# Patient Record
Sex: FEMALE | Race: WHITE | Employment: OTHER | URBAN - METROPOLITAN AREA
[De-identification: names, ages, dates, MRNs, and addresses within clinical notes are randomized per-mention and may not be internally consistent; named-entity substitution may affect disease eponyms.]

---

## 2017-01-13 ENCOUNTER — HOSPITAL ENCOUNTER (OUTPATIENT)
Dept: CARDIAC CATH/INVASIVE PROCEDURES | Age: 82
Discharge: HOME | End: 2017-01-13
Attending: INTERNAL MEDICINE | Admitting: INTERNAL MEDICINE
Payer: MEDICARE

## 2017-01-13 VITALS
BODY MASS INDEX: 24.92 KG/M2 | OXYGEN SATURATION: 94 % | DIASTOLIC BLOOD PRESSURE: 72 MMHG | RESPIRATION RATE: 16 BRPM | TEMPERATURE: 98.3 F | WEIGHT: 146 LBS | HEIGHT: 64 IN | HEART RATE: 66 BPM | SYSTOLIC BLOOD PRESSURE: 167 MMHG

## 2017-01-13 DIAGNOSIS — I35.0 NONRHEUMATIC AORTIC VALVE STENOSIS: Primary | ICD-10-CM

## 2017-01-13 LAB
ANION GAP BLD CALC-SCNC: 8 MMOL/L (ref 7–16)
ATRIAL RATE: 73 BPM
BUN SERPL-MCNC: 23 MG/DL (ref 8–23)
CALCIUM SERPL-MCNC: 8.6 MG/DL (ref 8.3–10.4)
CALCULATED P AXIS, ECG09: 79 DEGREES
CALCULATED R AXIS, ECG10: 39 DEGREES
CALCULATED T AXIS, ECG11: 62 DEGREES
CHLORIDE SERPL-SCNC: 106 MMOL/L (ref 98–107)
CO2 SERPL-SCNC: 27 MMOL/L (ref 21–32)
CREAT SERPL-MCNC: 1.08 MG/DL (ref 0.6–1)
DIAGNOSIS, 93000: NORMAL
DIASTOLIC BP, ECG02: NORMAL MMHG
ERYTHROCYTE [DISTWIDTH] IN BLOOD BY AUTOMATED COUNT: 13 % (ref 11.9–14.6)
GLUCOSE SERPL-MCNC: 160 MG/DL (ref 65–100)
HCT VFR BLD AUTO: 38.4 % (ref 35.8–46.3)
HGB BLD-MCNC: 13.4 G/DL (ref 11.7–15.4)
INR PPP: 0.9 (ref 0.9–1.2)
MCH RBC QN AUTO: 30.9 PG (ref 26.1–32.9)
MCHC RBC AUTO-ENTMCNC: 34.9 G/DL (ref 31.4–35)
MCV RBC AUTO: 88.5 FL (ref 79.6–97.8)
P-R INTERVAL, ECG05: 138 MS
PLATELET # BLD AUTO: 165 K/UL (ref 150–450)
PMV BLD AUTO: 11 FL (ref 10.8–14.1)
POTASSIUM SERPL-SCNC: 4.3 MMOL/L (ref 3.5–5.1)
PROTHROMBIN TIME: 10.3 SEC (ref 9.6–12)
Q-T INTERVAL, ECG07: 398 MS
QRS DURATION, ECG06: 84 MS
QTC CALCULATION (BEZET), ECG08: 438 MS
RBC # BLD AUTO: 4.34 M/UL (ref 4.05–5.25)
SODIUM SERPL-SCNC: 141 MMOL/L (ref 136–145)
SYSTOLIC BP, ECG01: NORMAL MMHG
VENTRICULAR RATE, ECG03: 73 BPM
WBC # BLD AUTO: 7.7 K/UL (ref 4.3–11.1)

## 2017-01-13 PROCEDURE — 93005 ELECTROCARDIOGRAM TRACING: CPT | Performed by: INTERNAL MEDICINE

## 2017-01-13 PROCEDURE — 74011250636 HC RX REV CODE- 250/636: Performed by: INTERNAL MEDICINE

## 2017-01-13 PROCEDURE — 74011250636 HC RX REV CODE- 250/636

## 2017-01-13 PROCEDURE — 85027 COMPLETE CBC AUTOMATED: CPT | Performed by: INTERNAL MEDICINE

## 2017-01-13 PROCEDURE — 77030015766

## 2017-01-13 PROCEDURE — 99152 MOD SED SAME PHYS/QHP 5/>YRS: CPT

## 2017-01-13 PROCEDURE — 74011000250 HC RX REV CODE- 250: Performed by: INTERNAL MEDICINE

## 2017-01-13 PROCEDURE — C1894 INTRO/SHEATH, NON-LASER: HCPCS

## 2017-01-13 PROCEDURE — 77030029997 HC DEV COM RDL R BND TELE -B

## 2017-01-13 PROCEDURE — C1769 GUIDE WIRE: HCPCS

## 2017-01-13 PROCEDURE — 93454 CORONARY ARTERY ANGIO S&I: CPT

## 2017-01-13 PROCEDURE — 85610 PROTHROMBIN TIME: CPT | Performed by: INTERNAL MEDICINE

## 2017-01-13 PROCEDURE — 74011636320 HC RX REV CODE- 636/320: Performed by: INTERNAL MEDICINE

## 2017-01-13 PROCEDURE — 80048 BASIC METABOLIC PNL TOTAL CA: CPT | Performed by: INTERNAL MEDICINE

## 2017-01-13 RX ORDER — MIDAZOLAM HYDROCHLORIDE 1 MG/ML
.5-5 INJECTION, SOLUTION INTRAMUSCULAR; INTRAVENOUS
Status: DISCONTINUED | OUTPATIENT
Start: 2017-01-13 | End: 2017-01-13 | Stop reason: HOSPADM

## 2017-01-13 RX ORDER — FENTANYL CITRATE 50 UG/ML
25-100 INJECTION, SOLUTION INTRAMUSCULAR; INTRAVENOUS
Status: DISCONTINUED | OUTPATIENT
Start: 2017-01-13 | End: 2017-01-13 | Stop reason: HOSPADM

## 2017-01-13 RX ORDER — LIDOCAINE HYDROCHLORIDE 20 MG/ML
1-20 INJECTION, SOLUTION INFILTRATION; PERINEURAL
Status: DISCONTINUED | OUTPATIENT
Start: 2017-01-13 | End: 2017-01-13 | Stop reason: HOSPADM

## 2017-01-13 RX ORDER — HEPARIN SODIUM 200 [USP'U]/100ML
3 INJECTION, SOLUTION INTRAVENOUS CONTINUOUS
Status: DISCONTINUED | OUTPATIENT
Start: 2017-01-13 | End: 2017-01-13 | Stop reason: HOSPADM

## 2017-01-13 RX ORDER — SODIUM CHLORIDE 9 MG/ML
75 INJECTION, SOLUTION INTRAVENOUS CONTINUOUS
Status: DISCONTINUED | OUTPATIENT
Start: 2017-01-13 | End: 2017-01-13 | Stop reason: HOSPADM

## 2017-01-13 RX ADMIN — MIDAZOLAM HYDROCHLORIDE 1 MG: 1 INJECTION, SOLUTION INTRAMUSCULAR; INTRAVENOUS at 14:06

## 2017-01-13 RX ADMIN — LIDOCAINE HYDROCHLORIDE 60 MG: 20 INJECTION, SOLUTION INFILTRATION; PERINEURAL at 14:30

## 2017-01-13 RX ADMIN — IOPAMIDOL 45 ML: 755 INJECTION, SOLUTION INTRAVENOUS at 14:30

## 2017-01-13 RX ADMIN — FENTANYL CITRATE 25 MCG: 50 INJECTION, SOLUTION INTRAMUSCULAR; INTRAVENOUS at 14:06

## 2017-01-13 RX ADMIN — HEPARIN SODIUM 2 ML: 10000 INJECTION, SOLUTION INTRAVENOUS; SUBCUTANEOUS at 14:29

## 2017-01-13 RX ADMIN — HEPARIN SODIUM 3 ML/HR: 200 INJECTION, SOLUTION INTRAVENOUS at 14:36

## 2017-01-13 NOTE — PROGRESS NOTES
Discharged to home accompanied by family. Left unit via wheelchair. Right wrist without bleeding or hematoma.

## 2017-01-13 NOTE — PROGRESS NOTES
Discharge instructions reviewed with patient and family. Verbalize understanding. Written instructions given.

## 2017-01-13 NOTE — PROGRESS NOTES
Report received from RN Cath Lab RN. Procedural findings communicated. Intra procedural  medication administration reviewed. Progression of care discussed.      Patient received into [address] post sheath removal.     Access site without bleeding or swelling yes    Dressing dry and intact yes    Patient instructed to limit movement to right upper extremity    Routine post procedural vital signs and site assessment initiated yes

## 2017-01-13 NOTE — PROGRESS NOTES
R band removed after deflation completed. No bleeding or hematoma. Site redressed with sterile gauze covered with tegaderm.

## 2017-01-13 NOTE — IP AVS SNAPSHOT
Chel Alvarado 
 
 
 [address] [phone] Patient: Da Pelaez MRN: [id number] ETX:7[birth year] Discharge Summary 1/13/2017 Aurora Oleary MRN[de-identified]  [id number] Admission Information Provider Pager Service Admission Date Expected D/C Date Akilah Lewis MD  CARDIAC CATH LAB 1/13/2017 Actual LOS Patient Class 0 days OUTPATIENT Follow-up Information Follow up With Details Comments Contact Info Akilah Lewis MD On 2/16/2017 at 1:00 p.m. in the [place] office [address] [facility] [address] 
[phone] Current Discharge Medication List  
  
ASK your doctor about these medications Dose & Instructions Dispensing Information Comments Morning Noon Evening Bedtime  
 amLODIPine 10 mg tablet Commonly known as:  Carroll Alstrom Your next dose is: Today, Tomorrow Other:  _________ Dose:  10 mg Take 1 Tab by mouth daily. Quantity:  90 Tab Refills:  3  
     
   
   
   
  
 benazepril 20 mg tablet Commonly known as:  LOTENSIN Your next dose is: Today, Tomorrow Other:  _________ Dose:  20 mg Take 1 Tab by mouth daily. Quantity:  90 Tab Refills:  1  
     
   
   
   
  
 clopidogrel 75 mg Tab Commonly known as:  PLAVIX Your next dose is: Today, Tomorrow Other:  _________ Recently started for aortic stenosis. Quantity:  90 Tab Refills:  1 CRANBERRY CONCENTRATE PO Your next dose is: Today, Tomorrow Other:  _________ Take  by mouth. Refills:  0 FREESTYLE LITE STRIPS strip Generic drug:  glucose blood VI test strips Your next dose is: Today, Tomorrow Other:  _________ CHECK BLOOD SUGAR EVERY DAY Quantity:  50 Strip Refills:  11  
     
   
   
   
  
 furosemide 40 mg tablet Commonly known as:  LASIX Your next dose is: Today, Tomorrow Other:  _________ Dose:  40 mg Take 1 Tab by mouth daily. Quantity:  90 Tab Refills:  1  
     
   
   
   
  
 levothyroxine 100 mcg tablet Commonly known as:  SYNTHROID Your next dose is: Today, Tomorrow Other:  _________ Dose:  100 mcg Take 1 Tab by mouth Daily (before breakfast). Quantity:  90 Tab Refills:  1  
     
   
   
   
  
 metoprolol tartrate 25 mg tablet Commonly known as:  LOPRESSOR Your next dose is: Today, Tomorrow Other:  _________ Dose:  25 mg Take 1 Tab by mouth two (2) times a day. Quantity:  180 Tab Refills:  3  
     
   
   
   
  
 nitroglycerin 0.4 mg SL tablet Commonly known as:  NITROSTAT Your next dose is: Today, Tomorrow Other:  _________ Dose:  0.4 mg  
1 Tab by SubLINGual route every five (5) minutes as needed. Quantity:  25 Tab Refills:  11  
     
   
   
   
  
 rosuvastatin 20 mg tablet Commonly known as:  CRESTOR Your next dose is: Today, Tomorrow Other:  _________ Dose:  20 mg Take 1 Tab by mouth nightly. Quantity:  30 Tab Refills:  5  
     
   
   
   
  
 traMADol 50 mg tablet Commonly known as:  ULTRAM  
   
Your next dose is: Today, Tomorrow Other:  _________ Dose:  50 mg Take 1 Tab by mouth every six (6) hours as needed for Pain. Quantity:  90 Tab Refills:  2 General Information Please provide this summary of care documentation to your next provider. Allergies Unspecified:  Aleve [Naproxen Sodium]; Aspirin;  Carafate [Sucralfate]; Clorazepate Dipotassium; Flexeril [Cyclobenzaprine]; Robaxin [Methocarbamol]; Vytorin 10-10 [Ezetimibe-simvastatin]; Zantac [Ranitidine Hcl] Current Immunizations  Never Reviewed Name Date Pneumococcal Conjugate (PCV-13) 9/29/2015 Discharge Instructions Discharge Instructions HEART CATHETERIZATION/ANGIOGRAPHY DISCHARGE INSTRUCTIONS 1. Check puncture site frequently for swelling or bleeding. If there is any bleeding, lie down and apply pressure over the area with a clean towel or washcloth. Notify your doctor for any redness, swelling, drainage, or oozing from the puncture site. Notify your doctor for any fever or chills. 2. If the extremity becomes cold, numb, or painful call Dr. Marcie Raines at [phone]. 
3. Activity should be limited for the next 48 hours. Do not use your right arm for lifting, pushing, or pulling during this time. 4. You may resume your usual diet. Drink more fluids than usual. 
5. Have a responsible person drive you home and stay with you for at least 24 hours after your heart catheterization/angiography. 6. You may remove bandage from your Right wrist in 24 hours. You may shower in 24 hours. No tub baths, hot tubs, or swimming for 1 week. Do not place any lotions, creams, powders, or ointments over puncture site for 1 week. You may place a clean band-aid over the puncture site each day for 5 days. Change daily. 7. Do not drive for 24 hours. I have read the above instructions and have had the opportunity to ask questions. Patient: ________________________   Date: 1/13/2017 Witness: _______________________   Date: 1/13/2017 Discharge Orders None  
  
` Patient Signature:  ____________________________________________________________ Date:  ____________________________________________________________  
  
 Ruth Prado Provider Signature:  ____________________________________________________________ Date:  ____________________________________________________________

## 2017-01-13 NOTE — DISCHARGE INSTRUCTIONS
HEART CATHETERIZATION/ANGIOGRAPHY DISCHARGE INSTRUCTIONS    1. Check puncture site frequently for swelling or bleeding. If there is any bleeding, lie down and apply pressure over the area with a clean towel or washcloth. Notify your doctor for any redness, swelling, drainage, or oozing from the puncture site. Notify your doctor for any fever or chills. 2. If the extremity becomes cold, numb, or painful call Dr. Tadeo Vance at [phone].  3. Activity should be limited for the next 48 hours. Do not use your right arm for lifting, pushing, or pulling during this time. 4. You may resume your usual diet. Drink more fluids than usual.  5. Have a responsible person drive you home and stay with you for at least 24 hours after your heart catheterization/angiography. 6. You may remove bandage from your Right wrist in 24 hours. You may shower in 24 hours. No tub baths, hot tubs, or swimming for 1 week. Do not place any lotions, creams, powders, or ointments over puncture site for 1 week. You may place a clean band-aid over the puncture site each day for 5 days. Change daily. 7. Do not drive for 24 hours. I have read the above instructions and have had the opportunity to ask questions.       Patient: ________________________   Date: 1/13/2017    Witness: _______________________   Date: 1/13/2017

## 2017-01-13 NOTE — PROGRESS NOTES
TRANSFER - OUT REPORT:    Verbal report given to Nghia Perez RN(name) on [address]  being transferred to cpru(unit) for routine progression of care       Report consisted of patients Situation, Background, Assessment and   Recommendations(SBAR). Information from the following report(s) SBAR was reviewed with the receiving nurse. is allergic to aleve [naproxen sodium]; aspirin; carafate [sucralfate]; clorazepate dipotassium; flexeril [cyclobenzaprine]; robaxin [methocarbamol]; vytorin 10-10 [ezetimibe-simvastatin]; and zantac [ranitidine hcl]. Opportunity for questions and clarification was provided. Procedure Summary:Pt had LHC via R wrist, site sealed with R band using 7 ml at 1430 hrs.     Med Administration    Versed:  1 mg  Fentanyl: 25 mcg  Visit Vitals    /67 (BP 1 Location: Left arm, BP Patient Position: Supine)    Pulse 65    Temp 98.3 °F (36.8 °C)    Resp 16    Ht 5' 4\" (1.626 m)    Wt 66.2 kg (146 lb)    SpO2 97%    BMI 25.06 kg/m2     Past Medical History   Diagnosis Date    Aortic stenosis 11/24/2015    Arrhythmia      murmur with sinus rhythm    Arthritis      osteo-right hand, left hip area    At risk for falling 9/4/2014    Bruit (arterial) 11/24/2015    Cancer (HCC)      basal cell right nose    Chest pain 11/24/2015    Chronic pain     Diabetes ([address]      controlled by diet; fbs 96  - 120    Dizzy      2-3 times a year    Essential hypertension, benign 9/4/2014    Frequent UTI      last time 11/11    Hyperlipidemia 9/4/2014    Hypertension     Hypothyroidism     Murmur, cardiac      dx'ed 2004    Type II or unspecified type diabetes mellitus without mention of complication, not stated as uncontrolled 9/4/2014    Unspecified adverse effect of anesthesia 2004     elevated BP w/ trigger finger surgery     Urinary incontinence      wears a pad    Visual disturbances 11/24/2015           Peripheral IV 01/13/17 Right Antecubital (Active)       Peripheral IV 01/13/17 Left Antecubital (Active)

## 2017-01-14 NOTE — PROCEDURES
[address] 44       Name:  Harper Guardado   MR#:  [id number]   :  1929   Account #:  [de-identified]   Date of Adm:  2017       DATE OF PROCEDURE: 2017. PROCEDURE: Coronary arteriography. INDICATION: Severe aortic stenosis. Preoperative cardiac angiography   needed prior to aortic valve replacement. TECHNICAL FACTORS: After informed consent was obtained, patient   brought to the cardiac catheterization lab. The right radial   artery was prepped and draped in the usual sterile fashion. Utilizing modified Seldinger technique and a micropuncture kit,   the right radial artery was entered. A 6 Malay Slender sheath   was placed without difficulty. A radial cocktail consisting of   2000 units of heparin, 2 mg verapamil, 200 mcg of nitroglycerin   was administered. A 5 Malay Tiger 4.0 catheter was used to   selectively engage the ostium of the left main coronary artery   and right coronary artery, respectively. Selective injections in   various projections performed. At the conclusion of the diagnostic procedure, radial sheath was   removed and a pneumatic band was placed with excellent   hemostasis. No complications were encountered. CONTRAST: Isovue 45. HEMODYNAMIC RESULTS: Aortic pressure 109/47 with a mean of 72. ANGIOGRAPHY   1.  Left main coronary artery is a medium to large caliber   vessel. Appears angiographically normal.   2.  LAD: A medium caliber vessel. Contains a focal 40% mid   stenosis. The remainder of vessel is patent. 3.  First diagonal artery: Small caliber vessel. Patent. 4.  Ramus intermediate is a small to medium caliber vessel, 20%   mid stenosis. 5.  Left circumflex is a medium caliber, codominant vessel. Contains a distal 70% stenosis. This is a small caliber 2 mm   vessel at this point. 6.  First obtuse marginal is a medium caliber vessel. Contains a   50% proximal stenosis.    7.  Second obtuse marginal artery: Very small caliber 2 mm   vessel. Patent. 8.  Third obtuse marginal artery: Small caliber vessels. A 1.5   to 2 mm vessel. Patent. 9.  Right coronary artery is a medium caliber dominant vessel,   20% proximal and 20% mid luminal irregularities. 10. Right PDA: Medium caliber vessel. Patent. 11. Right posterolateral branch: Small caliber vessels. Patent. CONCLUSION: Small vessel disease involving the distal   circumflex. PLAN: I see no targets for revascularization. The patient will   be evaluated by CT Surgery. She needs aortic valve replacement. Will assess for surgical risk and proceed with surgical versus   AVR versus transcatheter aortic valve replacement.         MD MARYANNE Maurice / MILAGRO   D:  01/13/2017   19:54   T:  01/13/2017   20:14   Job #:  [id number]

## 2017-01-20 PROBLEM — N18.31 CHRONIC KIDNEY DISEASE (CKD) STAGE G3A/A1, MODERATELY DECREASED GLOMERULAR FILTRATION RATE (GFR) BETWEEN 45-59 ML/MIN/1.73 SQUARE METER AND ALBUMINURIA CREATININE RATIO LESS THAN 30 MG/G (HCC): Status: ACTIVE | Noted: 2017-01-20

## 2017-02-07 ENCOUNTER — HOSPITAL ENCOUNTER (OUTPATIENT)
Dept: RESPIRATORY THERAPY | Age: 82
Discharge: HOME | End: 2017-02-07
Attending: INTERNAL MEDICINE
Payer: MEDICARE

## 2017-02-07 ENCOUNTER — HOSPITAL ENCOUNTER (OUTPATIENT)
Dept: CT IMAGING | Age: 82
Discharge: HOME | End: 2017-02-07
Attending: INTERNAL MEDICINE
Payer: MEDICARE

## 2017-02-07 ENCOUNTER — HOSPITAL ENCOUNTER (OUTPATIENT)
Dept: ULTRASOUND IMAGING | Age: 82
Discharge: HOME | End: 2017-02-07
Attending: INTERNAL MEDICINE
Payer: MEDICARE

## 2017-02-07 DIAGNOSIS — I35.0 NONRHEUMATIC AORTIC VALVE STENOSIS: ICD-10-CM

## 2017-02-07 PROCEDURE — 74011636320 HC RX REV CODE- 636/320: Performed by: INTERNAL MEDICINE

## 2017-02-07 PROCEDURE — 94010 BREATHING CAPACITY TEST: CPT

## 2017-02-07 PROCEDURE — 75574 CT ANGIO HRT W/3D IMAGE: CPT

## 2017-02-07 PROCEDURE — 74174 CTA ABD&PLVS W/CONTRAST: CPT

## 2017-02-07 PROCEDURE — 74011000258 HC RX REV CODE- 258: Performed by: INTERNAL MEDICINE

## 2017-02-07 PROCEDURE — 93880 EXTRACRANIAL BILAT STUDY: CPT

## 2017-02-07 RX ORDER — SODIUM CHLORIDE 0.9 % (FLUSH) 0.9 %
10 SYRINGE (ML) INJECTION
Status: COMPLETED | OUTPATIENT
Start: 2017-02-07 | End: 2017-02-07

## 2017-02-07 RX ADMIN — SODIUM CHLORIDE 100 ML: 900 INJECTION, SOLUTION INTRAVENOUS at 10:20

## 2017-02-07 RX ADMIN — IOPAMIDOL 140 ML: 755 INJECTION, SOLUTION INTRAVENOUS at 10:20

## 2017-02-07 RX ADMIN — Medication 10 ML: at 10:20

## 2017-02-16 NOTE — H&P
Patient:  Duane Ansari  YOB: 1929   Date:                       02/13/2017 3:30 PM   Visit Type:                 Consult      This 80year old  patient was referred by Albertina Ernst MD.    Chief Complaint:  Patient seen at the request of Albertina Ernst MD for evaluation of the following:  abnormal CT    History of Present Illness:  1.  abnormal CT   Mrs. Olivia Hernandez is an 80year old female new patient who presents for referral from Dr. Lupe Chaudhari regarding an abnormal CT. She is accompanied by her daughter, Barbara Dumont. CT scan on 2/7/17 showed dilation of intrahepatic and extrahepatic ducts with a distal CBD calcification, likely a gallstone. There are no pancreatic abnormalities. She has not had her gallbladder removed according to her report, although the CT suggests that she has. She denies any abdominal pain, urinary changes, nausea, or other GI related symptoms.         PAST MEDICAL/SURGICAL HISTORY  (Detailed)    Disease/disorder Onset Date Management Date Comments   Coronary artery disease       Diabetes type 2    RUST 02/13/2017 -   Hyperlipidemia    T 02/13/2017 -   Hypertension         Additional Surgical History:  Back surgery 1973  Spinal fusion 1971  Hysterectomy 1966  Bladder tack 1973, 2000  Knee replacement          PROVIDER INFORMATION AND PATIENT ADMISSION:    CURRENT MEDICATIONS  Medication Name Sig Desc   clopidogrel 75 mg tablet take 1 tablet by oral route  every day   furosemide 40 mg tablet take 1 tablet by oral route  every day   amlodipine 10 mg tablet take 1 tablet by oral route  every day   benazepril 20 mg tablet take 1 tablet by oral route  every day   tramadol 50 mg tablet take 1 tablet by oral route  every 6 hours as needed   metoprolol tartrate 25 mg tablet take 1 tablet by oral route 2 times every day   Crestor 20 mg tablet take 1 tablet by oral route  every day   Fish Oil 1 po qd   cranberry 2 po qd   levothyroxine 100 mcg tablet take 1 tablet by oral route  every day   Medication Reconciliation  Medications reconciled today. Allergies:  Ingredient Reaction Medication Name Comment   METHOCARBAMOL hives Robaxin    ASPIRIN swelling, hives     SUCRALFATE itching Carafate    Reviewed, updated. Family History  (Detailed)  Relationship Family Member Name  Age at Death Condition Onset Age Cause of Death       No family history of Cancer, colon  N     SOCIAL HISTORY  (Detailed)  Tobacco use reviewed. Preferred language is Unknown / Not Reported. Smoking status: Never smoker. SMOKING STATUS  Use Status Type Smoking Status Usage Per Day Years Used Total Pack Years   no/never  Never smoker          ALCOHOL  The patient denies alcohol use. CAFFEINE  The patient uses caffeine: coffee and tea. - 1 cup a day. REVIEW OF SYSTEMS  System Neg/Pos Details   ENMT Negative Hearing deficit. Eyes Negative Vision changes. Respiratory Positive Dyspnea. Cardio Negative Chest pain and irregular heartbeat/palpitations.  Negative Dysuria and urinary frequency. Endocrine Negative Cold intolerance and heat intolerance. Neuro Negative Confusion/disorientation, dizziness, headache and seizures. Integumentary Positive Rash. MS Positive Joint pain, Myalgia, Back pain. Hema/Lymph Negative Easy bleeding. The remainder of the 11-point review of systems is negative. VITAL SIGNS  BP mm/Hg Pulse/min Resp/min Temp F Height (Total in.) Weight (lbs.) Weight (oz.) BMI   128/72 73 16 97.2 64.00 150.20  25.78     PHYSICAL EXAM:  Patient appears healthy in no acute distress. Overweight. Neuro- alert and oriented. eyes-nonicteric, normal conjunctiva. ENT--grossly normal.  Lungs-clear to auscultation and percussion. Heart-regular rate and rhythm without murmur, gallop or rub. Abdomen-soft, nondistended, good bowel sounds, no significant tenderness, no rebound, no organomegaly. Extremities-without edema.   Skin-grossly normal.          Completed Orders (this encounter)  Order Details   Dietary management education, guidance, and counseling      Assessment/Plan  # Detail Type Description    1. Assessment Body mass index (BMI) 25.0-25.9, adult (O52.66). Plan Orders Today's instructions / counseling include(s) Dietary management education, guidance, and counseling. 2. Assessment Choledocholithiasis (K80.50). Provider Plan Patient does not have any noticeable symptoms but does have bile duct dilation and a distal ductal calcification, consistent with choledocholithiasis. Labs available by reviewing were normal from a liver standpoint. Patient denies any jaundice or bilirubinuria. She is scheduled for a TAVR in March and would like the ducts cleared prior. The plan is to hold her Plavix and schedule ERCP with CBD extraction one week from today. She will call in the meantime with any problems. after review - she has had some vague GI sxs. Plan Orders Further diagnostic evaluations ordered today include(s) ERCP to be performed. Counseling / Educational Factors:  Items reviewed / discussed during today's visit: prior testing / procedures were reviewed; testing was discussed in detail; old records were reviewed; indications and risks of the procedure were discussed; prescription medication usage was discussed; symptomatic care was discussed; advised to continue current treatment; patient instructed to call for results of diagnostic testing. Patient expressed understanding of instructions. The patient was checked out at 4:12 PM by Marsha Edmondson. Documentation assistance provided by zachery. Information recorded by the scribe was done at my direction and has been reviewed and validated by me. Provider:  Gayle Ashton MD 02/13/2017 04:18 PM  Document generated by:  Stella Balderas 02/13/2017 04:17 PM    CC Providers:  Yenny Irwin MD  [facility]  [address] [facility] [place], Yessenia Crooks 14 Puja Guaman MD  [facility] AN[address]ON[address]-          Electronically signed by Jodi Arevalo MD on 02/13/2017 04:31 PM

## 2017-02-20 ENCOUNTER — SURGERY (OUTPATIENT)
Age: 82
End: 2017-02-20

## 2017-02-20 ENCOUNTER — APPOINTMENT (OUTPATIENT)
Dept: GENERAL RADIOLOGY | Age: 82
End: 2017-02-20
Attending: INTERNAL MEDICINE
Payer: MEDICARE

## 2017-02-20 ENCOUNTER — ANESTHESIA EVENT (OUTPATIENT)
Dept: ENDOSCOPY | Age: 82
End: 2017-02-20
Payer: MEDICARE

## 2017-02-20 ENCOUNTER — ANESTHESIA (OUTPATIENT)
Dept: ENDOSCOPY | Age: 82
End: 2017-02-20
Payer: MEDICARE

## 2017-02-20 ENCOUNTER — HOSPITAL ENCOUNTER (OUTPATIENT)
Age: 82
Setting detail: OUTPATIENT SURGERY
Discharge: HOME | End: 2017-02-20
Attending: INTERNAL MEDICINE | Admitting: INTERNAL MEDICINE
Payer: MEDICARE

## 2017-02-20 VITALS
HEART RATE: 76 BPM | DIASTOLIC BLOOD PRESSURE: 56 MMHG | WEIGHT: 148 LBS | HEIGHT: 64 IN | TEMPERATURE: 97.5 F | RESPIRATION RATE: 16 BRPM | SYSTOLIC BLOOD PRESSURE: 135 MMHG | BODY MASS INDEX: 25.27 KG/M2 | OXYGEN SATURATION: 96 %

## 2017-02-20 LAB — GLUCOSE BLD STRIP.AUTO-MCNC: 164 MG/DL (ref 65–100)

## 2017-02-20 PROCEDURE — 74011250636 HC RX REV CODE- 250/636: Performed by: ANESTHESIOLOGY

## 2017-02-20 PROCEDURE — 74330 X-RAY BILE/PANC ENDOSCOPY: CPT

## 2017-02-20 PROCEDURE — 77030011640 HC PAD GRND REM COVD -A: Performed by: INTERNAL MEDICINE

## 2017-02-20 PROCEDURE — C2625 STENT, NON-COR, TEM W/DEL SY: HCPCS | Performed by: INTERNAL MEDICINE

## 2017-02-20 PROCEDURE — 77030007288 HC DEV LOK BILI BSC -A: Performed by: INTERNAL MEDICINE

## 2017-02-20 PROCEDURE — 77030008703 HC TU ET UNCUF COVD -A: Performed by: ANESTHESIOLOGY

## 2017-02-20 PROCEDURE — C1726 CATH, BAL DIL, NON-VASCULAR: HCPCS | Performed by: INTERNAL MEDICINE

## 2017-02-20 PROCEDURE — 74011250636 HC RX REV CODE- 250/636

## 2017-02-20 PROCEDURE — 77030009038 HC CATH BILI STN RTVR BSC -C: Performed by: INTERNAL MEDICINE

## 2017-02-20 PROCEDURE — 76060000034 HC ANESTHESIA 1.5 TO 2 HR: Performed by: INTERNAL MEDICINE

## 2017-02-20 PROCEDURE — 77030032490 HC SLV COMPR SCD KNE COVD -B: Performed by: INTERNAL MEDICINE

## 2017-02-20 PROCEDURE — 77030006969 HC BSKT BILI RTVR BSC -D: Performed by: INTERNAL MEDICINE

## 2017-02-20 PROCEDURE — 74011000250 HC RX REV CODE- 250

## 2017-02-20 PROCEDURE — 82962 GLUCOSE BLOOD TEST: CPT

## 2017-02-20 PROCEDURE — 77030012595 HC SPHNTOM BILI BSC -D: Performed by: INTERNAL MEDICINE

## 2017-02-20 PROCEDURE — 76040000027: Performed by: INTERNAL MEDICINE

## 2017-02-20 PROCEDURE — 77030008477 HC STYL SATN SLP COVD -A: Performed by: ANESTHESIOLOGY

## 2017-02-20 DEVICE — BILIARY STENT WITH RX DELIVERY SYSTEM
Type: IMPLANTABLE DEVICE | Site: BILE DUCT | Status: FUNCTIONAL
Brand: RX BILIARY

## 2017-02-20 RX ORDER — ROCURONIUM BROMIDE 10 MG/ML
INJECTION, SOLUTION INTRAVENOUS AS NEEDED
Status: DISCONTINUED | OUTPATIENT
Start: 2017-02-20 | End: 2017-02-20 | Stop reason: HOSPADM

## 2017-02-20 RX ORDER — SODIUM CHLORIDE 0.9 % (FLUSH) 0.9 %
5-10 SYRINGE (ML) INJECTION AS NEEDED
Status: DISCONTINUED | OUTPATIENT
Start: 2017-02-20 | End: 2017-02-20 | Stop reason: HOSPADM

## 2017-02-20 RX ORDER — PROPOFOL 10 MG/ML
INJECTION, EMULSION INTRAVENOUS AS NEEDED
Status: DISCONTINUED | OUTPATIENT
Start: 2017-02-20 | End: 2017-02-20

## 2017-02-20 RX ORDER — ETOMIDATE 2 MG/ML
INJECTION INTRAVENOUS AS NEEDED
Status: DISCONTINUED | OUTPATIENT
Start: 2017-02-20 | End: 2017-02-20 | Stop reason: HOSPADM

## 2017-02-20 RX ORDER — SODIUM CHLORIDE, SODIUM LACTATE, POTASSIUM CHLORIDE, CALCIUM CHLORIDE 600; 310; 30; 20 MG/100ML; MG/100ML; MG/100ML; MG/100ML
100 INJECTION, SOLUTION INTRAVENOUS CONTINUOUS
Status: DISCONTINUED | OUTPATIENT
Start: 2017-02-20 | End: 2017-02-20 | Stop reason: HOSPADM

## 2017-02-20 RX ORDER — LIDOCAINE HYDROCHLORIDE 20 MG/ML
INJECTION, SOLUTION EPIDURAL; INFILTRATION; INTRACAUDAL; PERINEURAL AS NEEDED
Status: DISCONTINUED | OUTPATIENT
Start: 2017-02-20 | End: 2017-02-20 | Stop reason: HOSPADM

## 2017-02-20 RX ADMIN — ROCURONIUM BROMIDE 35 MG: 10 INJECTION, SOLUTION INTRAVENOUS at 14:08

## 2017-02-20 RX ADMIN — SODIUM CHLORIDE, SODIUM LACTATE, POTASSIUM CHLORIDE, AND CALCIUM CHLORIDE 100 ML/HR: 600; 310; 30; 20 INJECTION, SOLUTION INTRAVENOUS at 13:18

## 2017-02-20 RX ADMIN — LIDOCAINE HYDROCHLORIDE 50 MG: 20 INJECTION, SOLUTION EPIDURAL; INFILTRATION; INTRACAUDAL; PERINEURAL at 14:08

## 2017-02-20 RX ADMIN — ETOMIDATE 22 MG: 2 INJECTION INTRAVENOUS at 14:08

## 2017-02-20 NOTE — PROCEDURES
ENDOSCOPIC  RETROGRADE CHOLANGIOPANCREATOGRAPHY    DATE of PROCEDURE: 2/20/2017    PT NAME: Altagracia Abreu     [id number]    MEDICATION: general; Glucagon 0.25 mg iv    INSTRUMENT:  [id number] VF    SPECIAL PROCEDURE:papillotomy w/ balloon sweep; 10 mm x 4 cm hurricane sphincteroplasty; 9-12 and 12-15 balloon; 1.5 and 2.0 cm basket; 8.5 / 10 cm biliary stent  BLOOD LOSS- 0 to min. SPEC- no  IMPLANT- none    PROCEDURE: standard procedure w/o complications    ASSESSMENT:  1. 2 large cuboidal (1.5 cm CBD stones)- unable to basket or extract despite passage of a 1.5 cm balloon through sphincteroplasty site- stent placed with good drainage  2. Pancreas not evaluated    3. GB    PLAN:  1.  Repeat in 6-8 weeks with possible spyglass and holmium laser if stent does not fracture stones    ERICH Ley MD

## 2017-02-20 NOTE — DISCHARGE INSTRUCTIONS
Endoscopic Retrograde Cholangiopancreatogram (ERCP): What to Expect at [address] [facility]  After you have an endoscopic retrograde cholangiopancreatogram (ERCP). You will be able to go home after your doctor or a nurse checks to make sure you are not having any problems. If you stay in the hospital overnight, you may go home the next day. You may have a sore throat for a day or two after the procedure. This care sheet gives you a general idea about how long it will take for you to recover. But each person recovers at a different pace. Follow the steps below to get better as quickly as possible. How can you care for yourself at home? Activity  1. Rest as much as you need to after you go home. 2. You should be able to go back to your usual activities the day after the procedure. Diet  · Follow your doctor's directions for eating after the procedure. · Drink plenty of fluids (unless your doctor tells you not to). Medicines  · If you have a sore throat the next day, use an over-the-counter spray to numb your throat. Follow-up care is a key part of your treatment and safety. Be sure to make and go to all appointments, and call your doctor if you are having problems. Its also a good idea to know your test results and keep a list of the medicines you take. When should you call for help? Call 911 anytime you think you may need emergency care. For example, call if:  · You vomit blood or what looks like coffee grounds. · You pass maroon or bloody stools. Call your doctor now or go to the emergency room if:  · You have trouble swallowing. · You have belly pain. · Your stools are black and tarlike or have streaks of blood. · You are sick to your stomach and cannot drink fluids. · You have a fever. · You have pain that does not get better after you take your pain medicine. Watch closely for changes in your health, and be sure to contact your doctor if:  · Your throat still hurts after a day or two.   You do not get better as expected. After general anesthesia or intravenous sedation, for 24 hours or while taking prescription Narcotics:  · Limit your activities  · Do not drive and operate hazardous machinery  · Do not make important personal or business decisions  · Do  not drink alcoholic beverages  · If you have not urinated within 8 hours after discharge, please contact your surgeon on call. *  Please give a list of your current medications to your Primary Care Provider. *  Please update this list whenever your medications are discontinued, doses are      changed, or new medications (including over-the-counter products) are added. *  Please carry medication information at all times in case of emergency situations. These are general instructions for a healthy lifestyle:    No smoking/ No tobacco products/ Avoid exposure to second hand smoke    Surgeon General's Warning:  Quitting smoking now greatly reduces serious risk to your health. Obesity, smoking, and sedentary lifestyle greatly increases your risk for illness    A healthy diet, regular physical exercise & weight monitoring are important for maintaining a healthy lifestyle    You may be retaining fluid if you have a history of heart failure or if you experience any of the following symptoms:  Weight gain of 3 pounds or more overnight or 5 pounds in a week, increased swelling in our hands or feet or shortness of breath while lying flat in bed. Please call your doctor as soon as you notice any of these symptoms; do not wait until your next office visit. Recognize signs and symptoms of STROKE:    F-face looks uneven    A-arms unable to move or move unevenly    S-speech slurred or non-existent    T-time-call 911 as soon as signs and symptoms begin-DO NOT go       Back to bed or wait to see if you get better-TIME IS BRAIN.

## 2017-02-20 NOTE — ANESTHESIA POSTPROCEDURE EVALUATION
Post-Anesthesia Evaluation and Assessment    Patient: Key Araiza MRN: [id number]  SSN: [id number]    YOB: 1929  Age: 80 y.o. Sex: female       Cardiovascular Function/Vital Signs  Visit Vitals    /56    Pulse 76    Temp 36.2 °C (97.1 °F)    Resp 16    Ht 5' 4\" (1.626 m)    Wt 67.1 kg (148 lb)    SpO2 99%    Breastfeeding No    BMI 25.4 kg/m2       Patient is status post general anesthesia for Procedure(s):  ENDOSCOPIC RETROGRADE CHOLANGIOPANCREATOGRAPHY/ 26  ENDOSCOPIC SPHINCTEROTOMY  ENDOSCOPIC STONE EXTRACTION/BALLOON SWEEP  BILIARY STENT PLACEMENT  Hurricane balloon Dilation. Nausea/Vomiting: None    Postoperative hydration reviewed and adequate. Pain:  Pain Scale 1: Visual (02/20/17 1533)  Pain Intensity 1: 0 (02/20/17 1533)   Managed    Neurological Status:   Neuro (WDL): Within Defined Limits (02/20/17 1533)   At baseline    Mental Status and Level of Consciousness: Arousable    Pulmonary Status:   O2 Device: Nasal cannula (02/20/17 1533)   Adequate oxygenation and airway patent    Complications related to anesthesia: None    Post-anesthesia assessment completed.  No concerns    Signed By: Wilner Aquino MD     February 20, 2017

## 2017-02-20 NOTE — IP AVS SNAPSHOT
Darlene Banda 
 
 
 [address] [place] [address] 
[phone] Patient: Jackie Acevedo MRN: [id number] KMX:[id number] You are allergic to the following Allergen Reactions Aleve (Naproxen Sodium) Rash Aspirin Anaphylaxis Hives, swelling Carafate (Sucralfate) Hives Clorazepate Dipotassium Hives Flexeril (Cyclobenzaprine) Itching Robaxin (Methocarbamol) Anaphylaxis Swelling of tongue, wheezing Muscle relaxers in general  
    
 Vytorin 10-10 (Ezetimibe-Simvastatin) Other (comments) Muscle soreness Zantac (Ranitidine Hcl) Itching Recent Documentation Height Weight Breastfeeding? BMI OB Status Smoking Status 1.626 m 67.1 kg No 25.4 kg/m2 Hysterectomy Never Smoker Emergency Contacts Name Discharge Info Relation Home Work Mobile Deonna Blum DISCHARGE CAREGIVER [3] Daughter [21] [phone] 4 [facility] CAREGIVER [3] Son [22] [phone] About your hospitalization You were admitted on:  February 20, 2017 You last received care in the:  [facility] PACU You were discharged on:  February 20, 2017 Unit phone number:  [phone] Why you were hospitalized Your primary diagnosis was:  Not on File Providers Seen During Your Hospitalizations Provider Role Specialty Primary office phone Clinton Ott MD Attending Provider Gastroenterology [phone] Your Primary Care Physician (PCP) Primary Care Physician Office Phone Office Fax Erna Raleigh, [address] [place] [address] [phone] Follow-up Information Follow up With Details Comments Contact Info Clinton Ott MD Schedule an appointment as soon as possible for a visit today follow up 6-8 weeks from now after your TAVR is completed. [address] [place] [address] [address] [facility] [address] [address] 
[phone] Current Discharge Medication List  
  
ASK your doctor about these medications Dose & Instructions Dispensing Information Comments Morning Noon Evening Bedtime  
 amLODIPine 10 mg tablet Commonly known as:  Davida Darting Your next dose is: Today, Tomorrow Other:  _________ Dose:  10 mg Take 1 Tab by mouth daily. Quantity:  90 Tab Refills:  3  
     
   
   
   
  
 benazepril 20 mg tablet Commonly known as:  LOTENSIN Your next dose is: Today, Tomorrow Other:  _________ Dose:  20 mg Take 1 Tab by mouth daily. Quantity:  90 Tab Refills:  1  
     
   
   
   
  
 clopidogrel 75 mg Tab Commonly known as:  PLAVIX Your next dose is: Today, Tomorrow Other:  _________ Recently started for aortic stenosis. Quantity:  90 Tab Refills:  1 CRANBERRY CONCENTRATE PO Your next dose is: Today, Tomorrow Other:  _________ Take  by mouth. Refills:  0 EPINEPHrine 0.3 mg/0.3 mL injection Commonly known as:  Buelah Mayotte Your next dose is: Today, Tomorrow Other:  _________ Dose:  0.3 mg  
0.3 mL by IntraMUSCular route once as needed for up to 1 dose. Indications: Anaphylaxis Quantity:  2 Syringe Refills:  1 FREESTYLE LITE STRIPS strip Generic drug:  glucose blood VI test strips Your next dose is: Today, Tomorrow Other:  _________ CHECK BLOOD SUGAR EVERY DAY Quantity:  50 Strip Refills:  11  
     
   
   
   
  
 furosemide 40 mg tablet Commonly known as:  LASIX Your next dose is: Today, Tomorrow Other:  _________ Dose:  40 mg Take 1 Tab by mouth daily. Quantity:  90 Tab Refills:  1  
     
   
   
   
  
 levothyroxine 100 mcg tablet Commonly known as:  SYNTHROID Your next dose is: Today, Tomorrow Other:  _________ Dose:  100 mcg Take 1 Tab by mouth Daily (before breakfast). Quantity:  90 Tab Refills:  1  
     
   
   
   
  
 metoprolol tartrate 25 mg tablet Commonly known as:  LOPRESSOR Your next dose is: Today, Tomorrow Other:  _________ Dose:  25 mg Take 1 Tab by mouth two (2) times a day. Quantity:  180 Tab Refills:  3  
     
   
   
   
  
 nitroglycerin 0.4 mg SL tablet Commonly known as:  NITROSTAT Your next dose is: Today, Tomorrow Other:  _________ Dose:  0.4 mg  
1 Tab by SubLINGual route every five (5) minutes as needed. Quantity:  25 Tab Refills:  11  
     
   
   
   
  
 rosuvastatin 20 mg tablet Commonly known as:  CRESTOR Your next dose is: Today, Tomorrow Other:  _________ Dose:  20 mg Take 1 Tab by mouth nightly. Quantity:  30 Tab Refills:  5  
     
   
   
   
  
 traMADol 50 mg tablet Commonly known as:  ULTRAM  
   
Your next dose is: Today, Tomorrow Other:  _________ Dose:  50 mg Take 1 Tab by mouth every six (6) hours as needed for Pain. Quantity:  90 Tab Refills:  2 Discharge Instructions Endoscopic Retrograde Cholangiopancreatogram (ERCP): What to Expect at [facility] Your Recovery After you have an endoscopic retrograde cholangiopancreatogram (ERCP). You will be able to go home after your doctor or a nurse checks to make sure you are not having any problems. If you stay in the hospital overnight, you may go home the next day. You may have a sore throat for a day or two after the procedure. This care sheet gives you a general idea about how long it will take for you to recover. But each person recovers at a different pace. Follow the steps below to get better as quickly as possible. How can you care for yourself at home? Activity 1. Rest as much as you need to after you go home. 2. You should be able to go back to your usual activities the day after the procedure. Diet · Follow your doctor's directions for eating after the procedure. · Drink plenty of fluids (unless your doctor tells you not to). Medicines · If you have a sore throat the next day, use an over-the-counter spray to numb your throat. Follow-up care is a key part of your treatment and safety. Be sure to make and go to all appointments, and call your doctor if you are having problems. Its also a good idea to know your test results and keep a list of the medicines you take. When should you call for help? Call 911 anytime you think you may need emergency care. For example, call if: 
· You vomit blood or what looks like coffee grounds. · You pass maroon or bloody stools. Call your doctor now or go to the emergency room if: 
· You have trouble swallowing. · You have belly pain. · Your stools are black and tarlike or have streaks of blood. · You are sick to your stomach and cannot drink fluids. · You have a fever. · You have pain that does not get better after you take your pain medicine. Watch closely for changes in your health, and be sure to contact your doctor if: 
· Your throat still hurts after a day or two. You do not get better as expected. After general anesthesia or intravenous sedation, for 24 hours or while taking prescription Narcotics: · Limit your activities · Do not drive and operate hazardous machinery · Do not make important personal or business decisions · Do  not drink alcoholic beverages · If you have not urinated within 8 hours after discharge, please contact your surgeon on call. *  Please give a list of your current medications to your Primary Care Provider. *  Please update this list whenever your medications are discontinued, doses are 
    changed, or new medications (including over-the-counter products) are added. *  Please carry medication information at all times in case of emergency situations. These are general instructions for a healthy lifestyle: No smoking/ No tobacco products/ Avoid exposure to second hand smoke Surgeon General's Warning:  Quitting smoking now greatly reduces serious risk to your health. Obesity, smoking, and sedentary lifestyle greatly increases your risk for illness A healthy diet, regular physical exercise & weight monitoring are important for maintaining a healthy lifestyle You may be retaining fluid if you have a history of heart failure or if you experience any of the following symptoms:  Weight gain of 3 pounds or more overnight or 5 pounds in a week, increased swelling in our hands or feet or shortness of breath while lying flat in bed. Please call your doctor as soon as you notice any of these symptoms; do not wait until your next office visit. Recognize signs and symptoms of STROKE: 
 
F-face looks uneven A-arms unable to move or move unevenly S-speech slurred or non-existent T-time-call 911 as soon as signs and symptoms begin-DO NOT go Back to bed or wait to see if you get better-TIME IS BRAIN. Discharge Orders None ACO Transitions of Care Introducing [employer] [address] Radha Cope offers a voluntary care coordination program to provide high quality service and care to [facility] fee-for-service beneficiaries. Cole David was designed to help you enhance your health and well-being through the following services: ? Transitions of Care  support for individuals who are transitioning from one care setting to another (example: Hospital to home). ? Chronic and Complex Care Coordination  support for individuals and caregivers of those with serious or chronic illnesses or with more than one chronic (ongoing) condition and those who take a number of different medications. If you meet specific medical criteria, a [address] [place] [address] Rd may call you directly to coordinate your care with your primary care physician and your other care providers. For questions about the [facility] programs, please, contact your physicians office. For general questions or additional information about Accountable Care Organizations: 
Please visit www.medicare.gov/acos. html or call 1-800-MEDICARE (3-471.364.8072) TTY users should call 6-250.218.4892. Introducing [facility]! Janel Loya introduces [employer] patient portal. Now you can access parts of your medical record, email your doctor's office, and request medication refills online. 1. In your internet browser, go to [email or web address] 2. Click on the First Time User? Click Here link in the Sign In box. You will see the New Member Sign Up page. 3. Enter your Reamaze Access Code exactly as it appears below. You will not need to use this code after youve completed the sign-up process. If you do not sign up before the expiration date, you must request a new code. · Reamaze Access Code: [email or web address] Expires: 4/6/2017 12:43 PM 
 
4. Enter the last four digits of your Social Security Number (xxxx) and Date of Birth (mm/dd/yyyy) as indicated and click Submit. You will be taken to the next sign-up page. 5. Create a Reamaze ID. This will be your Reamaze login ID and cannot be changed, so think of one that is secure and easy to remember. 6. Create a [employer] password. You can change your password at any time. 7. Enter your Password Reset Question and Answer. This can be used at a later time if you forget your password. 8. Enter your e-mail address. You will receive e-mail notification when new information is available in [address] 9. Click Sign Up. You can now view and download portions of your medical record.  
10. Click the Download Summary menu link to download a portable copy of your medical information. If you have questions, please visit the Frequently Asked Questions section of the [employer] website. Remember, MyChart is NOT to be used for urgent needs. For medical emergencies, dial 911. Now available from your iPhone and Android! General Information Please provide this summary of care documentation to your next provider. Patient Signature:  ____________________________________________________________ Date:  ____________________________________________________________  
  
Kip Shona Provider Signature:  ____________________________________________________________ Date:  ____________________________________________________________

## 2017-02-20 NOTE — ANESTHESIA PREPROCEDURE EVALUATION
Anesthetic History   No history of anesthetic complications            Review of Systems / Medical History  Patient summary reviewed, nursing notes reviewed and pertinent labs reviewed    Pulmonary  Within defined limits                 Neuro/Psych   Within defined limits           Cardiovascular    Hypertension: well controlled  Valvular problems/murmurs: aortic stenosis        CAD (Diffuse small vessel dz not amenable to intervention) and hyperlipidemia    Exercise tolerance: <4 METS  Comments: Severe AS - 0.9 cm HERNAN, peak grad 64, mean grad 40  Normal EF   GI/Hepatic/Renal     GERD: well controlled          Comments: Bile duct stone Endo/Other    Diabetes: well controlled, type 2  Hypothyroidism: well controlled  Arthritis     Other Findings              Physical Exam    Airway  Mallampati: I  TM Distance: 4 - 6 cm  Neck ROM: normal range of motion   Mouth opening: Normal     Cardiovascular    Rhythm: regular  Rate: normal    Murmur: Grade 4, Aortic area     Dental    Dentition: Full lower dentures and Full upper dentures     Pulmonary  Breath sounds clear to auscultation               Abdominal         Other Findings            Anesthetic Plan    ASA: 4  Anesthesia type: general    Monitoring Plan: Arterial line        Anesthetic plan and risks discussed with: Patient      Scheduled for TAVR 3/17

## 2017-02-20 NOTE — ANESTHESIA PROCEDURE NOTES
Arterial Line Placement    Start time: 2/20/2017 1:50 PM  End time: 2/20/2017 1:52 PM  Performed by: Werner Montesinos  Authorized by: Werner Montesinos     Pre-Procedure  Indications:  Arterial pressure monitoring  Preanesthetic Checklist: patient identified, risks and benefits discussed, anesthesia consent, site marked, patient being monitored, timeout performed and patient being monitored    Timeout Time: 13:49        Procedure:   Prep:  Chlorhexidine  Seldinger Technique?: Yes    Orientation:  Left  Location:  Radial artery  Catheter size:  20 G  Number of attempts:  1  Cont Cardiac Output Sensor: No      Assessment:   Post-procedure:  Line secured and sterile dressing applied  Patient Tolerance:  Patient tolerated the procedure well with no immediate complications

## 2017-02-20 NOTE — PROGRESS NOTES
TRANSFER - OUT REPORT:    Verbal report given to Sherren Pickles RN  on [employer]  being transferred to PACU (unit) for routine progression of care       Report consisted of patients Situation, Background, Assessment and   Recommendations(SBAR). Information from the following report(s) SBAR, Procedure Summary and Recent Results was reviewed with the receiving nurse. Lines:   Peripheral IV 02/20/17 Right Antecubital (Active)   Site Assessment Clean, dry, & intact 2/20/2017  1:00 PM   Phlebitis Assessment 0 2/20/2017  1:00 PM   Infiltration Assessment 0 2/20/2017  1:00 PM   Dressing Status Clean, dry, & intact 2/20/2017  1:00 PM   Dressing Type Transparent 2/20/2017  1:00 PM       Arterial Line 02/20/17 Left Radial artery (Active)        Opportunity for questions and clarification was provided.       Patient transported with:   O2 @ 2 liters  Registered Nurse

## 2017-03-15 DIAGNOSIS — I35.0 AORTIC VALVE STENOSIS, UNSPECIFIED ETIOLOGY: Primary | ICD-10-CM

## 2017-03-16 ENCOUNTER — APPOINTMENT (RX ONLY)
Dept: URBAN - METROPOLITAN AREA CLINIC 23 | Facility: CLINIC | Age: 82
Setting detail: DERMATOLOGY
End: 2017-03-16

## 2017-03-16 DIAGNOSIS — Z85.828 PERSONAL HISTORY OF OTHER MALIGNANT NEOPLASM OF SKIN: ICD-10-CM

## 2017-03-16 DIAGNOSIS — I78.8 OTHER DISEASES OF CAPILLARIES: ICD-10-CM

## 2017-03-16 DIAGNOSIS — D22 MELANOCYTIC NEVI: ICD-10-CM

## 2017-03-16 PROBLEM — D22.5 MELANOCYTIC NEVI OF TRUNK: Status: ACTIVE | Noted: 2017-03-16

## 2017-03-16 PROCEDURE — ? COUNSELING

## 2017-03-16 PROCEDURE — 99213 OFFICE O/P EST LOW 20 MIN: CPT

## 2017-03-16 ASSESSMENT — LOCATION SIMPLE DESCRIPTION DERM
LOCATION SIMPLE: RIGHT LOWER BACK
LOCATION SIMPLE: NOSE
LOCATION SIMPLE: RIGHT NOSE

## 2017-03-16 ASSESSMENT — LOCATION ZONE DERM
LOCATION ZONE: NOSE
LOCATION ZONE: TRUNK

## 2017-03-16 ASSESSMENT — LOCATION DETAILED DESCRIPTION DERM
LOCATION DETAILED: NASAL DORSUM
LOCATION DETAILED: NASAL ROOT
LOCATION DETAILED: RIGHT NASAL SIDEWALL
LOCATION DETAILED: RIGHT INFERIOR MEDIAL MIDBACK

## 2017-03-22 ENCOUNTER — HOSPITAL ENCOUNTER (OUTPATIENT)
Dept: LAB | Age: 82
Discharge: HOME | End: 2017-03-22
Payer: MEDICARE

## 2017-03-22 DIAGNOSIS — R53.81 MALAISE AND FATIGUE: ICD-10-CM

## 2017-03-22 DIAGNOSIS — I35.0 AORTIC VALVE STENOSIS, UNSPECIFIED ETIOLOGY: Primary | ICD-10-CM

## 2017-03-22 DIAGNOSIS — R53.83 MALAISE AND FATIGUE: ICD-10-CM

## 2017-03-22 DIAGNOSIS — I35.0 AORTIC VALVE STENOSIS, UNSPECIFIED ETIOLOGY: ICD-10-CM

## 2017-03-22 LAB
ALBUMIN SERPL BCP-MCNC: 4 G/DL (ref 3.2–4.6)
ALBUMIN/GLOB SERPL: 0.9 {RATIO} (ref 1.2–3.5)
ALP SERPL-CCNC: 120 U/L (ref 50–136)
ALT SERPL-CCNC: 17 U/L (ref 12–65)
ANION GAP BLD CALC-SCNC: 9 MMOL/L (ref 7–16)
APPEARANCE UR: ABNORMAL
AST SERPL W P-5'-P-CCNC: 17 U/L (ref 15–37)
BACTERIA URNS QL MICRO: ABNORMAL /HPF
BILIRUB SERPL-MCNC: 0.4 MG/DL (ref 0.2–1.1)
BILIRUB UR QL: NEGATIVE
BNP SERPL-MCNC: 174 PG/ML
BUN SERPL-MCNC: 31 MG/DL (ref 8–23)
CALCIUM SERPL-MCNC: 8.9 MG/DL (ref 8.3–10.4)
CHLORIDE SERPL-SCNC: 107 MMOL/L (ref 98–107)
CO2 SERPL-SCNC: 27 MMOL/L (ref 21–32)
COLOR UR: YELLOW
CREAT SERPL-MCNC: 1.16 MG/DL (ref 0.6–1)
CRYSTALS URNS QL MICRO: ABNORMAL /LPF
EPI CELLS #/AREA URNS HPF: ABNORMAL /HPF
ERYTHROCYTE [DISTWIDTH] IN BLOOD BY AUTOMATED COUNT: 13.2 % (ref 11.9–14.6)
GLOBULIN SER CALC-MCNC: 4.5 G/DL (ref 2.3–3.5)
GLUCOSE SERPL-MCNC: 140 MG/DL (ref 65–100)
GLUCOSE UR STRIP.AUTO-MCNC: NEGATIVE MG/DL
HCT VFR BLD AUTO: 39.3 % (ref 35.8–46.3)
HGB BLD-MCNC: 13.7 G/DL (ref 11.7–15.4)
HGB UR QL STRIP: ABNORMAL
KETONES UR QL STRIP.AUTO: NEGATIVE MG/DL
LEUKOCYTE ESTERASE UR QL STRIP.AUTO: NEGATIVE
MCH RBC QN AUTO: 30.4 PG (ref 26.1–32.9)
MCHC RBC AUTO-ENTMCNC: 34.9 G/DL (ref 31.4–35)
MCV RBC AUTO: 87.3 FL (ref 79.6–97.8)
NITRITE UR QL STRIP.AUTO: POSITIVE
PH UR STRIP: 5.5 [PH] (ref 5–9)
PLATELET # BLD AUTO: 160 K/UL (ref 150–450)
PMV BLD AUTO: 11.1 FL (ref 10.8–14.1)
POTASSIUM SERPL-SCNC: 4 MMOL/L (ref 3.5–5.1)
PROT SERPL-MCNC: 8.5 G/DL (ref 6.3–8.2)
PROT UR STRIP-MCNC: NEGATIVE MG/DL
RBC # BLD AUTO: 4.5 M/UL (ref 4.05–5.25)
RBC #/AREA URNS HPF: ABNORMAL /HPF
SODIUM SERPL-SCNC: 143 MMOL/L (ref 136–145)
SP GR UR REFRACTOMETRY: 1.02 (ref 1–1.02)
UROBILINOGEN UR QL STRIP.AUTO: 0.2 EU/DL (ref 0.2–1)
WBC # BLD AUTO: 7.2 K/UL (ref 4.3–11.1)
WBC URNS QL MICRO: ABNORMAL /HPF

## 2017-03-22 PROCEDURE — 36415 COLL VENOUS BLD VENIPUNCTURE: CPT | Performed by: INTERNAL MEDICINE

## 2017-03-22 PROCEDURE — 86900 BLOOD TYPING SEROLOGIC ABO: CPT | Performed by: INTERNAL MEDICINE

## 2017-03-22 PROCEDURE — 87186 SC STD MICRODIL/AGAR DIL: CPT | Performed by: INTERNAL MEDICINE

## 2017-03-22 PROCEDURE — 80053 COMPREHEN METABOLIC PANEL: CPT | Performed by: INTERNAL MEDICINE

## 2017-03-22 PROCEDURE — 85027 COMPLETE CBC AUTOMATED: CPT | Performed by: INTERNAL MEDICINE

## 2017-03-22 PROCEDURE — 87088 URINE BACTERIA CULTURE: CPT | Performed by: INTERNAL MEDICINE

## 2017-03-22 PROCEDURE — 81001 URINALYSIS AUTO W/SCOPE: CPT | Performed by: INTERNAL MEDICINE

## 2017-03-22 PROCEDURE — 87086 URINE CULTURE/COLONY COUNT: CPT | Performed by: INTERNAL MEDICINE

## 2017-03-22 PROCEDURE — 83880 ASSAY OF NATRIURETIC PEPTIDE: CPT | Performed by: INTERNAL MEDICINE

## 2017-03-23 LAB
ABO + RH BLD: NORMAL
BLOOD GROUP ANTIBODIES SERPL: NORMAL
SPECIMEN EXP DATE BLD: NORMAL

## 2017-03-26 LAB
BACTERIA SPEC CULT: ABNORMAL
SERVICE CMNT-IMP: ABNORMAL

## 2017-03-27 ENCOUNTER — APPOINTMENT (OUTPATIENT)
Dept: GENERAL RADIOLOGY | Age: 82
DRG: 267 | End: 2017-03-27
Payer: MEDICARE

## 2017-03-27 ENCOUNTER — ANESTHESIA EVENT (OUTPATIENT)
Dept: SURGERY | Age: 82
DRG: 267 | End: 2017-03-27
Payer: MEDICARE

## 2017-03-27 ENCOUNTER — HOSPITAL ENCOUNTER (INPATIENT)
Age: 82
LOS: 2 days | Discharge: HOME | DRG: 267 | End: 2017-03-30
Attending: INTERNAL MEDICINE | Admitting: INTERNAL MEDICINE
Payer: MEDICARE

## 2017-03-27 DIAGNOSIS — I10 ESSENTIAL HYPERTENSION, BENIGN: ICD-10-CM

## 2017-03-27 DIAGNOSIS — I25.83 CORONARY ARTERY DISEASE DUE TO LIPID RICH PLAQUE: ICD-10-CM

## 2017-03-27 DIAGNOSIS — I25.10 CORONARY ARTERY DISEASE DUE TO LIPID RICH PLAQUE: ICD-10-CM

## 2017-03-27 DIAGNOSIS — I77.9 AORTA DISORDER (HCC): ICD-10-CM

## 2017-03-27 LAB
ANION GAP BLD CALC-SCNC: 9 MMOL/L (ref 7–16)
APPEARANCE UR: CLEAR
APTT PPP: 27.5 SEC (ref 23.5–31.7)
BACTERIA SPEC CULT: NORMAL
BILIRUB UR QL: NEGATIVE
BNP SERPL-MCNC: 294 PG/ML
BUN SERPL-MCNC: 18 MG/DL (ref 8–23)
CALCIUM SERPL-MCNC: 8.6 MG/DL (ref 8.3–10.4)
CHLORIDE SERPL-SCNC: 109 MMOL/L (ref 98–107)
CO2 SERPL-SCNC: 26 MMOL/L (ref 21–32)
COLOR UR: YELLOW
CREAT SERPL-MCNC: 0.99 MG/DL (ref 0.6–1)
ERYTHROCYTE [DISTWIDTH] IN BLOOD BY AUTOMATED COUNT: 13.2 % (ref 11.9–14.6)
EST. AVERAGE GLUCOSE BLD GHB EST-MCNC: 169 MG/DL
GLUCOSE SERPL-MCNC: 139 MG/DL (ref 65–100)
GLUCOSE UR STRIP.AUTO-MCNC: NEGATIVE MG/DL
HBA1C MFR BLD: 7.5 % (ref 4.8–6)
HCT VFR BLD AUTO: 37.3 % (ref 35.8–46.3)
HGB BLD-MCNC: 12.7 G/DL (ref 11.7–15.4)
HGB UR QL STRIP: NEGATIVE
INR PPP: 1 (ref 0.9–1.2)
KETONES UR QL STRIP.AUTO: NEGATIVE MG/DL
LEUKOCYTE ESTERASE UR QL STRIP.AUTO: NEGATIVE
MCH RBC QN AUTO: 29.6 PG (ref 26.1–32.9)
MCHC RBC AUTO-ENTMCNC: 34 G/DL (ref 31.4–35)
MCV RBC AUTO: 86.9 FL (ref 79.6–97.8)
NITRITE UR QL STRIP.AUTO: NEGATIVE
PH UR STRIP: 5.5 [PH] (ref 5–9)
PLATELET # BLD AUTO: 156 K/UL (ref 150–450)
PMV BLD AUTO: 11.1 FL (ref 10.8–14.1)
POTASSIUM SERPL-SCNC: 3.7 MMOL/L (ref 3.5–5.1)
PROT UR STRIP-MCNC: NEGATIVE MG/DL
PROTHROMBIN TIME: 10.6 SEC (ref 9.6–12)
RBC # BLD AUTO: 4.29 M/UL (ref 4.05–5.25)
SERVICE CMNT-IMP: NORMAL
SODIUM SERPL-SCNC: 144 MMOL/L (ref 136–145)
SP GR UR REFRACTOMETRY: 1.01 (ref 1–1.02)
UROBILINOGEN UR QL STRIP.AUTO: 0.2 EU/DL (ref 0.2–1)
WBC # BLD AUTO: 6.6 K/UL (ref 4.3–11.1)

## 2017-03-27 PROCEDURE — 71010 XR CHEST PORT: CPT

## 2017-03-27 PROCEDURE — 80048 BASIC METABOLIC PNL TOTAL CA: CPT | Performed by: PHYSICIAN ASSISTANT

## 2017-03-27 PROCEDURE — 87641 MR-STAPH DNA AMP PROBE: CPT | Performed by: INTERNAL MEDICINE

## 2017-03-27 PROCEDURE — 85730 THROMBOPLASTIN TIME PARTIAL: CPT | Performed by: PHYSICIAN ASSISTANT

## 2017-03-27 PROCEDURE — 85610 PROTHROMBIN TIME: CPT | Performed by: PHYSICIAN ASSISTANT

## 2017-03-27 PROCEDURE — 81003 URINALYSIS AUTO W/O SCOPE: CPT | Performed by: PHYSICIAN ASSISTANT

## 2017-03-27 PROCEDURE — 36415 COLL VENOUS BLD VENIPUNCTURE: CPT | Performed by: PHYSICIAN ASSISTANT

## 2017-03-27 PROCEDURE — 93005 ELECTROCARDIOGRAM TRACING: CPT | Performed by: PHYSICIAN ASSISTANT

## 2017-03-27 PROCEDURE — 86923 COMPATIBILITY TEST ELECTRIC: CPT | Performed by: PHYSICIAN ASSISTANT

## 2017-03-27 PROCEDURE — 83036 HEMOGLOBIN GLYCOSYLATED A1C: CPT | Performed by: PHYSICIAN ASSISTANT

## 2017-03-27 PROCEDURE — 83880 ASSAY OF NATRIURETIC PEPTIDE: CPT | Performed by: PHYSICIAN ASSISTANT

## 2017-03-27 PROCEDURE — 87086 URINE CULTURE/COLONY COUNT: CPT | Performed by: PHYSICIAN ASSISTANT

## 2017-03-27 PROCEDURE — 74011250637 HC RX REV CODE- 250/637: Performed by: PHYSICIAN ASSISTANT

## 2017-03-27 PROCEDURE — 85027 COMPLETE CBC AUTOMATED: CPT | Performed by: PHYSICIAN ASSISTANT

## 2017-03-27 PROCEDURE — 86900 BLOOD TYPING SEROLOGIC ABO: CPT | Performed by: PHYSICIAN ASSISTANT

## 2017-03-27 RX ORDER — ONDANSETRON 2 MG/ML
4 INJECTION INTRAMUSCULAR; INTRAVENOUS
Status: DISCONTINUED | OUTPATIENT
Start: 2017-03-27 | End: 2017-03-28 | Stop reason: HOSPADM

## 2017-03-27 RX ORDER — SODIUM CHLORIDE 0.9 % (FLUSH) 0.9 %
5-10 SYRINGE (ML) INJECTION EVERY 8 HOURS
Status: DISCONTINUED | OUTPATIENT
Start: 2017-03-27 | End: 2017-03-28 | Stop reason: HOSPADM

## 2017-03-27 RX ORDER — ROSUVASTATIN CALCIUM 5 MG/1
10 TABLET, COATED ORAL
Status: DISCONTINUED | OUTPATIENT
Start: 2017-03-27 | End: 2017-03-30 | Stop reason: HOSPADM

## 2017-03-27 RX ORDER — HYDROCODONE BITARTRATE AND ACETAMINOPHEN 5; 325 MG/1; MG/1
1 TABLET ORAL
Status: DISCONTINUED | OUTPATIENT
Start: 2017-03-27 | End: 2017-03-28 | Stop reason: HOSPADM

## 2017-03-27 RX ORDER — METOPROLOL TARTRATE 25 MG/1
25 TABLET, FILM COATED ORAL EVERY 12 HOURS
Status: DISCONTINUED | OUTPATIENT
Start: 2017-03-27 | End: 2017-03-30 | Stop reason: HOSPADM

## 2017-03-27 RX ORDER — CIPROFLOXACIN 500 MG/1
500 TABLET ORAL EVERY 12 HOURS
Status: COMPLETED | OUTPATIENT
Start: 2017-03-27 | End: 2017-03-30

## 2017-03-27 RX ORDER — NITROGLYCERIN 0.4 MG/1
0.4 TABLET SUBLINGUAL
Status: DISCONTINUED | OUTPATIENT
Start: 2017-03-27 | End: 2017-03-28 | Stop reason: HOSPADM

## 2017-03-27 RX ORDER — AMLODIPINE BESYLATE 10 MG/1
10 TABLET ORAL DAILY
Status: DISCONTINUED | OUTPATIENT
Start: 2017-03-28 | End: 2017-03-30 | Stop reason: HOSPADM

## 2017-03-27 RX ORDER — CEFAZOLIN SODIUM IN 0.9 % NACL 2 G/50 ML
2 INTRAVENOUS SOLUTION, PIGGYBACK (ML) INTRAVENOUS ONCE
Status: COMPLETED | OUTPATIENT
Start: 2017-03-28 | End: 2017-03-28

## 2017-03-27 RX ORDER — MORPHINE SULFATE 2 MG/ML
2 INJECTION, SOLUTION INTRAMUSCULAR; INTRAVENOUS
Status: DISCONTINUED | OUTPATIENT
Start: 2017-03-27 | End: 2017-03-28 | Stop reason: HOSPADM

## 2017-03-27 RX ORDER — BENAZEPRIL HYDROCHLORIDE 10 MG/1
20 TABLET ORAL DAILY
Status: DISCONTINUED | OUTPATIENT
Start: 2017-03-28 | End: 2017-03-30 | Stop reason: HOSPADM

## 2017-03-27 RX ORDER — LEVOTHYROXINE SODIUM 100 UG/1
100 TABLET ORAL
Status: DISCONTINUED | OUTPATIENT
Start: 2017-03-28 | End: 2017-03-30 | Stop reason: HOSPADM

## 2017-03-27 RX ORDER — TRAMADOL HYDROCHLORIDE 50 MG/1
50 TABLET ORAL
Status: DISCONTINUED | OUTPATIENT
Start: 2017-03-27 | End: 2017-03-30 | Stop reason: HOSPADM

## 2017-03-27 RX ORDER — MUPIROCIN 20 MG/G
1 OINTMENT TOPICAL 2 TIMES DAILY
Status: DISCONTINUED | OUTPATIENT
Start: 2017-03-27 | End: 2017-03-28 | Stop reason: HOSPADM

## 2017-03-27 RX ORDER — CLOPIDOGREL BISULFATE 75 MG/1
75 TABLET ORAL DAILY
Status: DISCONTINUED | OUTPATIENT
Start: 2017-03-28 | End: 2017-03-30 | Stop reason: HOSPADM

## 2017-03-27 RX ORDER — SODIUM CHLORIDE 0.9 % (FLUSH) 0.9 %
5-10 SYRINGE (ML) INJECTION AS NEEDED
Status: DISCONTINUED | OUTPATIENT
Start: 2017-03-27 | End: 2017-03-28 | Stop reason: HOSPADM

## 2017-03-27 RX ORDER — ACETAMINOPHEN 325 MG/1
650 TABLET ORAL
Status: DISCONTINUED | OUTPATIENT
Start: 2017-03-27 | End: 2017-03-28 | Stop reason: HOSPADM

## 2017-03-27 RX ADMIN — CIPROFLOXACIN 500 MG: 500 TABLET ORAL at 20:24

## 2017-03-27 RX ADMIN — METOPROLOL TARTRATE 25 MG: 25 TABLET ORAL at 20:23

## 2017-03-27 RX ADMIN — ROSUVASTATIN CALCIUM 10 MG: 5 TABLET ORAL at 20:23

## 2017-03-27 RX ADMIN — MUPIROCIN 1 G: 20 OINTMENT TOPICAL at 18:09

## 2017-03-27 RX ADMIN — Medication 10 ML: at 17:03

## 2017-03-27 RX ADMIN — Medication 10 ML: at 20:21

## 2017-03-27 NOTE — PROGRESS NOTES
Bedside and Verbal shift change report given to [address] [facility],[address] (oncoming nurse) by Carlos Golden (offgoing nurse). Report included the following information Kardex, OR Summary, Intake/Output, MAR and Recent Results.

## 2017-03-27 NOTE — H&P
[facility] CARDIOLOGY History &Physical                 Primary Cardiologist: Dr. Marylee Balo     Primary Care Physician: Dr. Carroll Bishop    Admitting Physician: Dr. Carlos Reeves:     Patient is a 80 y.o. female who was seen in the office by Dr. Renny Mcneal for severe aortic stenosis. She was seen by CT surgery and was felt to be high risk for surgical AVR given frailty and poor rehab potential. She was determined to be a candidate for TAVR and it was felt that this procedure would improve her quality of life. She has life expectancy of greater than 1 year. She currently has NYHA Class III heart failure symptoms. Her CT scan showed a retained gallstone and bile duct dilatation. She underwent ERCP on 2/20/17. She had 2 large cuboidal stones that could not be extracted. A stent was placed with good drainage. She will have repeat ERCP in 6-8 weeks. She denies any abdominal pain or nausea.        Past Medical History:   Diagnosis Date    Aortic stenosis 11/24/2015    Arrhythmia     murmur with sinus rhythm    Arthritis     osteo-right hand, left hip area    At risk for falling 9/4/2014    Bruit (arterial) 11/24/2015    Cancer (HCC)     basal cell right nose    Chest pain 11/24/2015    Choledocholithiasis 02/20/2017    Chronic pain     Diabetes ([address]     controlled by diet; fbs 96  - 120    Dizzy     2-3 times a year    Essential hypertension, benign 9/4/2014    Frequent UTI     last time 11/11    Hyperlipidemia 9/4/2014    Hypertension     Hypothyroidism     Murmur, cardiac     dx'ed 2004    Type II or unspecified type diabetes mellitus without mention of complication, not stated as uncontrolled 9/4/2014    Unspecified adverse effect of anesthesia 2004    elevated BP w/ trigger finger surgery     Urinary incontinence     wears a pad    Visual disturbances 11/24/2015      Past Surgical History:   Procedure Laterality Date    HX APPENDECTOMY      HX CARPAL TUNNEL RELEASE      patient denies    HX CYSTOCELE REPAIR      HX ERCP  02/20/2017    large CBD stones    HX HYSTERECTOMY      HX KNEE ARTHROSCOPY Right     HX OOPHORECTOMY      Partial    HX ORTHOPAEDIC      2 back-one fusion-low; all fingers-trigger    HX OTHER SURGICAL Right     basal cell nose    HX RECTOCELE REPAIR        Allergies   Allergen Reactions    Aleve [Naproxen Sodium] Rash    Aspirin Anaphylaxis     Hives, swelling    Carafate [Sucralfate] Hives    Clorazepate Dipotassium Hives    Flexeril [Cyclobenzaprine] Itching    Robaxin [Methocarbamol] Anaphylaxis     Swelling of tongue, wheezing  Muscle relaxers in general    Vytorin 10-10 [Ezetimibe-Simvastatin] Other (comments)     Muscle soreness    Zantac [Ranitidine Hcl] Itching     Social History   Substance Use Topics    Smoking status: Never Smoker    Smokeless tobacco: Never Used    Alcohol use No      FH:   Family History   Problem Relation Age of Onset    Heart Attack Father      age 68    Heart Disease Father     Arthritis-osteo Mother     Heart Disease Sister     Arthritis-osteo Sister     Arthritis-osteo Brother     Arthritis-osteo Sister     Arthritis-osteo Sister           Review of Systems   Constitution: Negative for chills, fever, weakness, malaise/fatigue, weight gain and weight loss. HENT: Negative for ear pain, headaches, hearing loss, nosebleeds, sore throat and tinnitus. Eyes: Negative for blurred vision, vision loss in left eye and vision loss in right eye. Cardiovascular: Positive for dyspnea on exertion. Negative for chest pain, leg swelling, near-syncope, orthopnea, palpitations, paroxysmal nocturnal dyspnea and syncope. Respiratory: Positive for shortness of breath. Negative for cough, hemoptysis, sputum production and wheezing. Endocrine: Negative for cold intolerance, heat intolerance and polydipsia. Hematologic/Lymphatic: Does not bruise/bleed easily. Skin: Negative for color change and rash.    Musculoskeletal: Negative for back pain, joint pain, joint swelling and myalgias. Gastrointestinal: Negative for abdominal pain, constipation, diarrhea, dysphagia, heartburn, hematemesis, melena, nausea and vomiting. Genitourinary: Negative for dysuria, frequency, hematuria and urgency. Neurological: Negative for difficulty with concentration, dizziness, light-headedness, numbness, paresthesias, seizures and vertigo. Psychiatric/Behavioral: Negative for altered mental status and depression. Objective:       Physical Exam:  General: Well Developed, Well Nourished, No Acute Distress  HEENT: pupils equal and round, no abnormalities noted  Neck: supple, no JVD  Heart: S1S2 with RRR with grade III/VI RANDY  Lungs: Clear throughout auscultation bilaterally  Abd: soft, nontender, nondistended, with good bowel sounds  Ext: warm, no edema, calves supple/nontender, pulses 2+ bilaterally  Skin: warm and dry  Psychiatric: Normal mood and affect  Neurologic: Alert and oriented X 3    Data Review:  Pending    CXR: Pending    Assessment/Plan:   Principal Problem: Aortic stenosis (11/24/2015)-for TAVR in AM    Active Problems:    Hyperlipidemia (9/4/2014)-continue Crestor      Type II diabetes mellitus, uncontrolled ([facility] (9/4/2014)-diet controlled, monitor      Essential hypertension, benign (9/4/2014)-continue home meds and monitor       Chronic kidney disease (CKD) stage G3a/A1-labs pending, monitor BMP      Nemesio Huitron PA-C  3/27/2017  2:31 PM     [facility] CARDIOLOGY     3/27/2017     9:01 PM    I have personally seen and examined Aurora JUNG Mamta with [facility]. I agree and confirm findings in history, physical exam, and assessment/plan as outlined above with following pertinent additions/exceptions:   Patient presents for a Transcatheter aortic valve replacement due to severe symptomatic aortic stenosis. She has significant dyspnea with exertion.   She is felt to be a candidate for transcatheter valve replacement as her life expectancy is greater than 1 year. She underwent ERCP in February and had 2 stones that could not be extracted. She had a biliary stent placed with plans for repeat ERCP in 6-8 weeks. I spoke with her gastroenterologist who stated that she could remain on Plavix during this time. PE: CV; RRR with II/VI RANDY. L: CTA bilaterally E: no edema. ASS/Plan:  As above. Plan TAVR with 20 mm Luis S3 valve.         Sumit Donohue MD

## 2017-03-27 NOTE — IP AVS SNAPSHOT
Darlene Banda 
 
 
 145 [facility] 322 [facility] 
[phone] Patient: Jackie Acevedo MRN: [id number] DEDE:[id number] You are allergic to the following Allergen Reactions Ativan (Lorazepam) Other (comments) Confused and aggressive Aleve (Naproxen Sodium) Rash Aspirin Anaphylaxis Hives, swelling Carafate (Sucralfate) Hives Clorazepate Dipotassium Hives Flexeril (Cyclobenzaprine) Itching Robaxin (Methocarbamol) Anaphylaxis Swelling of tongue, wheezing Muscle relaxers in general  
    
 Vytorin 10-10 (Ezetimibe-Simvastatin) Other (comments) Muscle soreness Zantac (Ranitidine Hcl) Itching Recent Documentation Height Weight Breastfeeding? BMI OB Status Smoking Status 1.626 m 63.4 kg No 24 kg/m2 Hysterectomy Never Smoker Unresulted Labs Order Current Status TYPE & CROSSMATCH Preliminary result Emergency Contacts Name Discharge Info Relation Home Work Mobile Rita Blum DISCHARGE CAREGIVER [3] Daughter [21] [phone] 2 [facility] CAREGIVER [3] Son [22] [phone] About your hospitalization You were admitted on:  March 27, 2017 You last received care in the:  [facility] 2 CV STEPDOWN You were discharged on:  March 30, 2017 Unit phone number:  [phone] Why you were hospitalized Your primary diagnosis was: Aortic Stenosis Your diagnoses also included:  Essential Hypertension, Benign, Hyperlipidemia, Type Ii Diabetes Mellitus, Uncontrolled (Hcc), Chronic Kidney Disease (Ckd) Stage G3a/A1, Moderately Decreased Glomerular Filtration Rate (Gfr) Between 45-59 Ml/Min/1.73 Square Meter And Albuminuria Creatinine Ratio Less Than 30 Mg/G, S/P Tavr (Transcatheter Aortic Valve Replacement), Acute Cystitis Providers Seen During Your Hospitalizations Provider Role Specialty Primary office phone Jaison Sanders MD Attending Provider Cardiology [phone] Your Primary Care Physician (PCP) Primary Care Physician Office Phone Office Fax Pema Floyd, [address] [facility] [phone] Follow-up Information Follow up With Details Comments Contact Info Anh Fitzpatrick MD   11[address] [facility] [address] [place] [address] 
[phone] Jaison Sanders MD On 4/4/2017 Follow up on April 4th at 2:30pm [facility]  [address] [facility] [address] [phone] [facility] Cardiology On 5/1/2017 at 8:30 for labs then 9:30 for echo [address] 
[address] [address] [facility] 
[phone] Jaison Sanders MD On 5/1/2017 at 10:45 with Dr. Marilee Malagon [address] [place] [facility] [address] [phone] Your Appointments Tuesday April 04, 2017  2:30 PM EDT TRANSITIONAL CARE MANAGEMENT with Jaison Sanders MD  
[facility] Cardiology ([address] [place] [address]) [address] 
[address]  
[phone] Monday April 24, 2017 10:00 AM EDT  
[facility] PHONE ASSESSMENT with [facility] PHONE [address] ([facility] OR PRE ASSESSMENT) [address] [place] [address]  
[phone] Thursday April 27, 2017  4:00 PM EDT Office Visit with Joaquín Robles MD  
[facility] ([address] [place] [address]) [address] [place] [address]  
[phone] Friday April 28, 2017 ENDOSCOPIC RETROGRADE CHOLANGIOPANCREATOGRAPHY with Gayle Ashton MD  
[facility] ENDOSCOPY ([address] [place] [address]) [address] [place] [address]  
[phone] Monday May 01, 2017  9:30 AM EDT  
ECHOCARDIOGRAM with CAIOE ECHO 26 [facility] ([address] [place] [address]) [address] 
[address]  
[phone] Monday May 01, 2017 10:45 AM T HOSPITAL FOLLOW-UP with Paloma Salazar MD  
[facility] Cardiology ([address]) [address]  
[address]  
[phone] Current Discharge Medication List  
  
CONTINUE these medications which have CHANGED Dose & Instructions Dispensing Information Comments Morning Noon Evening Bedtime  
 clopidogrel 75 mg Tab Commonly known as:  PLAVIX What changed:   
- how much to take 
- how to take this - when to take this 
- additional instructions Your last dose was: Your next dose is:    
   
   
 Dose:  75 mg Take 1 Tab by mouth daily. Quantity:  90 Tab Refills:  5  
     
   
   
   
  
 rosuvastatin 20 mg tablet Commonly known as:  CRESTOR What changed:  how much to take Your last dose was: Your next dose is:    
   
   
 Dose:  20 mg Take 1 Tab by mouth nightly. Quantity:  30 Tab Refills:  5 CONTINUE these medications which have NOT CHANGED Dose & Instructions Dispensing Information Comments Morning Noon Evening Bedtime  
 amLODIPine 10 mg tablet Commonly known as:  Marion Spruce Your last dose was: Your next dose is:    
   
   
 Dose:  10 mg Take 1 Tab by mouth daily. Quantity:  90 Tab Refills:  3  
     
   
   
   
  
 benazepril 20 mg tablet Commonly known as:  LOTENSIN Your last dose was: Your next dose is:    
   
   
 Dose:  20 mg Take 1 Tab by mouth daily. Quantity:  90 Tab Refills:  1 CRANBERRY CONCENTRATE PO Your last dose was: Your next dose is: Take  by mouth. Refills:  0 EPINEPHrine 0.3 mg/0.3 mL injection Commonly known as:  Shaista Heredia Your last dose was: Your next dose is:    
   
   
 Dose:  0.3 mg  
0.3 mL by IntraMUSCular route once as needed for up to 1 dose. Indications: Anaphylaxis Quantity:  2 Syringe Refills:  1 FREESTYLE LITE STRIPS strip Generic drug:  glucose blood VI test strips Your last dose was: Your next dose is: CHECK BLOOD SUGAR EVERY DAY Quantity:  50 Strip Refills:  11  
     
   
   
   
  
 furosemide 40 mg tablet Commonly known as:  LASIX Your last dose was: Your next dose is:    
   
   
 Dose:  40 mg Take 1 Tab by mouth daily. Quantity:  90 Tab Refills:  1  
     
   
   
   
  
 levothyroxine 100 mcg tablet Commonly known as:  SYNTHROID Your last dose was: Your next dose is:    
   
   
 Dose:  100 mcg Take 1 Tab by mouth Daily (before breakfast). Quantity:  90 Tab Refills:  1  
     
   
   
   
  
 metoprolol tartrate 25 mg tablet Commonly known as:  LOPRESSOR Your last dose was: Your next dose is:    
   
   
 Dose:  25 mg Take 1 Tab by mouth two (2) times a day. Quantity:  180 Tab Refills:  3  
     
   
   
   
  
 nitroglycerin 0.4 mg SL tablet Commonly known as:  NITROSTAT Your last dose was: Your next dose is:    
   
   
 Dose:  0.4 mg  
1 Tab by SubLINGual route every five (5) minutes as needed. Quantity:  25 Tab Refills:  11  
     
   
   
   
  
 traMADol 50 mg tablet Commonly known as:  ULTRAM  
   
Your last dose was: Your next dose is:    
   
   
 Dose:  50 mg Take 1 Tab by mouth every six (6) hours as needed for Pain. Quantity:  90 Tab Refills:  2 STOP taking these medications   
 ciprofloxacin HCl 500 mg tablet Commonly known as:  CIPRO Where to Get Your Medications Information on where to get these meds will be given to you by the nurse or doctor. ! Ask your nurse or doctor about these medications  
  clopidogrel 75 mg Tab Discharge Instructions Do not lift, push or pull anything heavier than 10 lbs for the next 2 weeks. You should also avoid any straining, stooping or squatting for 2 weeks[address] Do not drive for 1 week. If your groin site starts bleeding or oozing, apply firm pressure with a clean cloth and call [facility] Cardiology at [phone]. You should watch for signs of infection such as increasing areas of redness, fever, or purulent drainage. If you see anything concerning, please call our office. If you experience any severe pain, numbness, color change or temperature change in your leg, please return to the Emergency Room for immediate evaluation. All patients with prosthetic valves, including those who have undergone TAVR, are considered among those at highest risk for endocarditis (infection of a heart valve). You may need to take antibiotics before certain procedures to prevent infection. Please call our office before you have any dental procedures or oral surgery. DISCHARGE SUMMARY from Nurse The following personal items are in your possession at time of discharge: 
 
Dental Appliances: Lowers, Uppers sent home with patient Visual Aid: None Home Medications: Sent to pharmacy sent home with patient Jewelry: None Clothing: Sent home Other Valuables: None PATIENT INSTRUCTIONS: 
 
 
F-face looks uneven A-arms unable to move or move unevenly S-speech slurred or non-existent T-time-call 911 as soon as signs and symptoms begin-DO NOT go Back to bed or wait to see if you get better-TIME IS BRAIN. Warning Signs of HEART ATTACK Call 911 if you have these symptoms: 
? Chest discomfort. Most heart attacks involve discomfort in the center of the chest that lasts more than a few minutes, or that goes away and comes back. It can feel like uncomfortable pressure, squeezing, fullness, or pain. ? Discomfort in other areas of the upper body. Symptoms can include pain or discomfort in one or both arms, the back, neck, jaw, or stomach. ? Shortness of breath with or without chest discomfort. ? Other signs may include breaking out in a cold sweat, nausea, or lightheadedness. Don't wait more than five minutes to call [address]! Fast action can save your life. Calling 911 is almost always the fastest way to get lifesaving treatment. Emergency Medical Services staff can begin treatment when they arrive  up to an hour sooner than if someone gets to the hospital by car. The discharge information has been reviewed with the patient and caregiver. The patient and caregiver verbalized understanding. Discharge medications reviewed with the patient and caregiver and appropriate educational materials and side effects teaching were provided. Discharge Instructions Attachments/References TRANSCATHETER AORTIC VALVE REPLACEMENT (TAVR): GENERAL INFO (ENGLISH) TRANSCATHETER AORTIC VALVE REPLACEMENT (TAVR): OLDER ADULT: POST-OP (ENGLISH) HEALTHY DIET: HEART (ENGLISH) SECONDHAND SMOKE (ENGLISH) CLOPIDOGREL (BY MOUTH) (ENGLISH) SMOKING: STOPPING (ENGLISH) Discharge Orders None ACO Transitions of Care Introducing [employer] [address] Radha Cope offers a voluntary care coordination program to provide high quality service and care to [facility] fee-for-service beneficiaries. Charissa Barry was designed to help you enhance your health and well-being through the following services: ? Transitions of Care  support for individuals who are transitioning from one care setting to another (example: Hospital to home). ?  Chronic and Complex Care Coordination  support for individuals and caregivers of those with serious or chronic illnesses or with more than one chronic (ongoing) condition and those who take a number of different medications. If you meet specific medical criteria, a [facility] may call you directly to coordinate your care with your primary care physician and your other care providers. For questions about the [facility] programs, please, contact your physicians office. For general questions or additional information about Accountable Care Organizations: 
Please visit www.medicare.gov/acos. html or call 1-800-MEDICARE (4-522.222.1738) TTY users should call 0-517.813.8811. Posterous Announcement We are excited to announce that we are making your provider's discharge notes available to you in Posterous. You will see these notes when they are completed and signed by the physician that discharged you from your recent hospital stay. If you have any questions or concerns about any information you see in Posterous, please call the Health Information Department where you were seen or reach out to your Primary Care Provider for more information about your plan of care. Introducing [facility]! Elli Marino introduces Posterous patient portal. Now you can access parts of your medical record, email your doctor's office, and request medication refills online. 1. In your internet browser, go to [email or web address] 2. Click on the First Time User? Click Here link in the Sign In box. You will see the New Member Sign Up page. 3. Enter your Posterous Access Code exactly as it appears below. You will not need to use this code after youve completed the sign-up process. If you do not sign up before the expiration date, you must request a new code. · Posterous Access Code: [email or web address] Expires: 4/6/2017  1:43 PM 
 
4. Enter the last four digits of your Social Security Number (xxxx) and Date of Birth (mm/dd/yyyy) as indicated and click Submit. You will be taken to the next sign-up page. 5. Create a NormOxys ID. This will be your NormOxys login ID and cannot be changed, so think of one that is secure and easy to remember. 6. Create a NormOxys password. You can change your password at any time. 7. Enter your Password Reset Question and Answer. This can be used at a later time if you forget your password. 8. Enter your e-mail address. You will receive e-mail notification when new information is available in [address] 9. Click Sign Up. You can now view and download portions of your medical record. 10. Click the Download Summary menu link to download a portable copy of your medical information. If you have questions, please visit the Frequently Asked Questions section of the NormOxys website. Remember, NormOxys is NOT to be used for urgent needs. For medical emergencies, dial 911. Now available from your iPhone and Android! General Information Please provide this summary of care documentation to your next provider. Patient Signature:  ____________________________________________________________ Date:  ____________________________________________________________  
  
[facility]  ____________________________________________________________ Date:  ____________________________________________________________ More Information Learning About Transcatheter Aortic Valve Replacement (TAVR) What is TAVR? Transcatheter aortic valve replacement (TAVR) is a procedure that replaces the aortic heart valve. It is done to treat aortic valve stenosis. In aortic valve stenosis, the valve between your heart and the large blood vessel that carries blood to the body (aorta) has narrowed. That forces the heart to pump harder to get enough blood through the valve. In TAVR, the doctor uses a catheter to put in the new heart valve. Open-heart surgery is not done. TAVR is a newer procedure. How well it works long-term is not known yet. And TAVR can cause serious problems. These include stroke, a heart attack during the procedure, or even death. TAVR may be a good option for a person who cannot have surgery or for a person who has a high risk of serious problems from open-heart surgery. For example, you might think about TAVR if you are not healthy enough for an open-heart surgery. TAVR may not be a good choice if an open-heart surgery is likely to be successful. A team of doctors will use professional guidelines to decide whether or not TAVR is a good choice for you. How is TAVR done? TAVR is often done through an incision (cut) in the groin. But sometimes a small cut is made in the chest. The doctor uses a tube called a catheter and special tools that fit inside the catheter. The doctor puts the catheter into a blood vessel and moves it through the blood vessel and into the heart. A specially designed artificial valve fits inside the catheter. The doctor then moves the new valve into the damaged aortic valve. The artificial valve expands and takes the place of the damaged aortic valve. Most people will be asleep for the procedure. The surgery usually takes about 2 to 3 hours. You will have to stay in the hospital for several days after the procedure. What can you expect after TAVR? · While you are in the hospital, your doctors and nurses will monitor you to check how the new valve is working. · You will receive information from the hospital about diet, activities, and medicine. · You will need to have regular checkups with your doctor. · When you leave the hospital, your doctor may give you a blood thinner for a few months to prevent blood clots. If you get a blood thinner, be sure you get instructions about how to take your medicine safely. Blood thinners can cause serious bleeding problems. Follow-up care is a key part of your treatment and safety. Be sure to make and go to all appointments, and call your doctor if you are having problems. It's also a good idea to know your test results and keep a list of the medicines you take. Where can you learn more? Go to http://angelica-zuri.info/. Enter O536 in the search box to learn more about \"Learning About Transcatheter Aortic Valve Replacement (TAVR). \" 
Current as of: January 27, 2016 Content Version: 11.2 © 8724-9323 [employer]. Care instructions adapted under license by [employer] (which disclaims liability or warranty for this information). If you have questions about a medical condition or this instruction, always ask your healthcare professional. [address] any warranty or liability for your use of this information. Transcatheter Aortic Valve Replacement: What to Expect at Home Your Recovery After your aortic valve is replaced, you will spend a few days in the hospital. This will help you get your energy back and make sure you are ready to go home. Most people can return to regular activities in 2 or 3 weeks. Your doctor may give you specific instructions on when you can do your normal activities again. This care sheet gives you a general idea about how long it will take for you to recover. But each person recovers at a different pace. Follow the steps below to feel better as quickly as possible. How can you care for yourself at home? Activity · Rest when you feel tired. Diet · Eat a heart-healthy diet. If you have not been eating this way, talk to your doctor. You also may want to talk to a dietitian. He or she can help you learn about healthy foods. · If your bowel movements are not regular right after the procedure, try to avoid constipation and straining. Drink plenty of water. Your doctor may suggest fiber, a stool softener, or a mild laxative. Medicines · Your doctor will tell you if and when you can restart your medicines. He or she will also give you instructions about taking any new medicines. · If you take blood thinners, such as warfarin (Coumadin), clopidogrel (Plavix), or aspirin, be sure to talk to your doctor. He or she will tell you if and when to start taking those medicines again. Make sure that you understand exactly what your doctor wants you to do. · Your doctor will likely prescribe blood-thinning medicines. Be sure to get instructions about how to take your medicine safely. Blood thinners can cause serious bleeding problems. · Be safe with medicines. Take your medicines exactly as prescribed. Call your doctor if you think you are having a problem with your medicine. Follow-up care is a key part of your treatment and safety. Be sure to make and go to all appointments, and call your doctor if you are having problems. It's also a good idea to know your test results and keep a list of the medicines you take. When should you call for help? Call 911 anytime you think you may need emergency care. For example, call if: 
· You passed out (lost consciousness). · You have severe trouble breathing. · You have sudden chest pain and shortness of breath, or you cough up blood. · You have symptoms of a stroke. These may include: 
¨ Sudden numbness, tingling, weakness, or loss of movement in your face, arm, or leg, especially on only one side of your body. ¨ Sudden vision changes. ¨ Sudden trouble speaking. ¨ Sudden confusion or trouble understanding simple statements. ¨ Sudden problems with walking or balance. ¨ A sudden, severe headache that is different from past headaches. · You have symptoms of a heart attack. These may include: ¨ Chest pain or pressure, or a strange feeling in the chest. 
¨ Sweating. ¨ Shortness of breath. ¨ Nausea or vomiting.  
¨ Pain, pressure, or a strange feeling in the back, neck, jaw, or upper belly or in one or both shoulders or arms. ¨ A fast or irregular heartbeat. After you call 911, the  may tell you to chew 1 adult-strength or 2 to 4 low-dose aspirin. Wait for an ambulance. Do not try to drive yourself. Call your doctor now or seek immediate medical care if: 
· You are dizzy or lightheaded, or you feel like you may faint. · You have symptoms of infection, such as: 
¨ Increased pain, swelling, warmth, or redness. ¨ Red streaks leading from the area. ¨ Pus draining from the area. ¨ A fever. ¨ An infected area that grows quickly. · Your leg looks blue or feels cold, numb, or tingly. Watch closely for changes in your health, and be sure to contact your doctor if you have any problems. Where can you learn more? Go to http://angelicaBujbuzuri.info/. Enter P870 in the search box to learn more about \"Transcatheter Aortic Valve Replacement: What to Expect at Home. \" Current as of: January 27, 2016 Content Version: 11.2 © 4963-5629 [employer]. Care instructions adapted under license by [employer] (which disclaims liability or warranty for this information). If you have questions about a medical condition or this instruction, always ask your healthcare professional. [address] any warranty or liability for your use of this information. Heart-Healthy Diet: Care Instructions Your Care Instructions A heart-healthy diet has lots of vegetables, fruits, nuts, beans, and whole grains, and is low in salt. It limits foods that are high in saturated fat, such as meats, cheeses, and fried foods. It may be hard to change your diet, but even small changes can lower your risk of heart attack and heart disease. Follow-up care is a key part of your treatment and safety.  Be sure to make and go to all appointments, and call your doctor if you are having problems. It's also a good idea to know your test results and keep a list of the medicines you take. How can you care for yourself at home? Watch your portions · Learn what a serving is. A \"serving\" and a \"portion\" are not always the same thing. Make sure that you are not eating larger portions than are recommended. For example, a serving of pasta is ½ cup. A serving size of meat is 2 to 3 ounces. A 3-ounce serving is about the size of a deck of cards. Measure serving sizes until you are good at [place]" them. Keep in mind that restaurants often serve portions that are 2 or 3 times the size of one serving. · To keep your energy level up and keep you from feeling hungry, eat often but in smaller portions. · Eat only the number of calories you need to stay at a healthy weight. If you need to lose weight, eat fewer calories than your body burns (through exercise and other physical activity). Eat more fruits and vegetables · Eat a variety of fruit and vegetables every day. Dark green, deep orange, red, or yellow fruits and vegetables are especially good for you. Examples include spinach, carrots, peaches, and berries. · Keep carrots, celery, and other veggies handy for snacks. Buy fruit that is in season and store it where you can see it so that you will be tempted to eat it. · Cook dishes that have a lot of veggies in them, such as stir-fries and soups. Limit saturated and trans fat · Read food labels, and try to avoid saturated and trans fats. They increase your risk of heart disease. Trans fat is found in many processed foods such as cookies and crackers. · Use olive or canola oil when you cook. Try cholesterol-lowering spreads, such as Benecol or Take Control. · Bake, broil, grill, or steam foods instead of frying them. · Choose lean meats instead of high-fat meats such as hot dogs and sausages. Cut off all visible fat when you prepare meat. · Eat fish, skinless poultry, and meat alternatives such as soy products instead of high-fat meats. Soy products, such as tofu, may be especially good for your heart. · Choose low-fat or fat-free milk and dairy products. Eat fish · Eat at least two servings of fish a week. Certain fish, such as salmon and tuna, contain omega-3 fatty acids, which may help reduce your risk of heart attack. Eat foods high in fiber · Eat a variety of grain products every day. Include whole-grain foods that have lots of fiber and nutrients. Examples of whole-grain foods include oats, whole wheat bread, and brown rice. · Buy whole-grain breads and cereals, instead of white bread or pastries. Limit salt and sodium · Limit how much salt and sodium you eat to help lower your blood pressure. · Taste food before you salt it. Add only a little salt when you think you need it. With time, your taste buds will adjust to less salt. · Eat fewer snack items, fast foods, and other high-salt, processed foods. Check food labels for the amount of sodium in packaged foods. · Choose low-sodium versions of canned goods (such as soups, vegetables, and beans). Limit sugar · Limit drinks and foods with added sugar. These include candy, desserts, and soda pop. Limit alcohol · Limit alcohol to no more than 2 drinks a day for men and 1 drink a day for women. Too much alcohol can cause health problems. When should you call for help? Watch closely for changes in your health, and be sure to contact your doctor if: 
· You would like help planning heart-healthy meals. Where can you learn more? Go to http://angelica-zuri.info/. Enter V137 in the search box to learn more about \"Heart-Healthy Diet: Care Instructions. \" Current as of: January 27, 2016 Content Version: 11.2 © 6652-8069 [employer], [employer].  Care instructions adapted under license by [employer] (which disclaims liability or warranty for this information). If you have questions about a medical condition or this instruction, always ask your healthcare professional. [address] any warranty or liability for your use of this information. Secondhand Smoke: Care Instructions Your Care Instructions Secondhand smoke comes from the burning end of a cigarette, cigar, or pipe and the smoke that a smoker exhales. The smoke contains nicotine and many other harmful chemicals. Breathing secondhand smoke can cause or worsen health problems including cancer, asthma, coronary artery disease, and respiratory infections. It can make your eyes and nose burn and cause a sore throat. Secondhand smoke is especially bad for babies and young children whose lungs are still developing. Babies whose parents smoke are more likely to have ear infections, pneumonia, and bronchitis in the first few years of their lives. Secondhand smoke can make asthma symptoms worse in children. If you are pregnant, it is important that you not smoke and that you avoid secondhand smoke. You are more likely to give birth to a baby who weighs less than expected (low birth weight) if you smoke. And your baby may have a greater risk for sudden infant death syndrome (SIDS). Babies whose mothers are exposed to secondhand smoke during pregnancy have a higher risk for health problems. Follow-up care is a key part of your treatment and safety. Be sure to make and go to all appointments, and call your doctor if you are having problems. It's also a good idea to know your test results and keep a list of the medicines you take. How can you care for yourself at home? · Do not smoke or let anyone else smoke in your home. If people must smoke, ask them to go outside. · If people do smoke in your home, choose a room where you can open a window or use a fan to get the smoke outside. · Do not let anyone smoke in your car.  If someone must smoke, pull over in a safe place and let him or her smoke away from the car. · Ask your employer to make sure that you have a smoke-free work area. · Make sure that your children are not exposed to secondhand smoke at day care, school, and after-school programs. · Try to choose nonsmoking bars, restaurants, and other public places when you go out. · Help your family and friends who smoke to quit by encouraging them to try. Tell them about treatment resources. Having support from others often helps. · If you smoke, quit. Quitting is hard, but there are ways to boost your chance of quitting tobacco for good. ¨ Use nicotine gum, patches, or lozenges. Call a quitline. Ask your doctor about stop-smoking programs and medicines. ¨ Keep trying. When should you call for help? Watch closely for changes in your health, and be sure to contact your doctor if you have any problems. Where can you learn more? Go to http://angelicaIGIGIzuri.info/. Enter L004 in the search box to learn more about \"Secondhand Smoke: Care Instructions. \" Current as of: May 26, 2016 Content Version: 11.2 © 9270-8441 [employer]. Care instructions adapted under license by [employer] (which disclaims liability or warranty for this information). If you have questions about a medical condition or this instruction, always ask your healthcare professional. [address] any warranty or liability for your use of this information. Clopidogrel (By mouth) Clopidogrel (lufb-MIF-kf-grel) Helps prevent stroke, heart attack, and other heart problems. This medicine is a platelet inhibitor. Brand Name(s):Plavix There may be other brand names for this medicine. When This Medicine Should Not Be Used: This medicine is not right for everyone. Do not use it if you had an allergic reaction to clopidogrel, or have current bleeding problems, such as a bleeding stomach ulcer. How to Use This Medicine: Tablet · Your doctor will tell you how much medicine to use. Do not use more than directed. · This medicine should come with a Medication Guide. Ask your pharmacist for a copy if you do not have one. · Missed dose: Take a dose as soon as you remember. If it is almost time for your next dose, wait until then and take a regular dose. Do not take extra medicine to make up for a missed dose. · Store the medicine in a closed container at room temperature, away from heat, moisture, and direct light. Drugs and Foods to Avoid: Ask your doctor or pharmacist before using any other medicine, including over-the-counter medicines, vitamins, and herbal products. · Some medicines can affect how clopidogrel works. Tell your doctor if you are using any of the following: ¨ Warfarin ¨ An NSAID medicine, such as celecoxib, diclofenac, ibuprofen, or naproxen ¨ Medicine to treat depression ¨ Stomach medicine, such as esomeprazole or omeprazole Warnings While Using This Medicine: · Tell your doctor if you are pregnant or breastfeeding, had a recent stroke, or have a history of bleeding problems. · This medicine can cause you to bleed and bruise more easily. Take precautions to avoid injury. Brush and floss your teeth gently, do not play rough sports, and be careful with sharp objects. Severe bleeding can be life-threatening. · This medicine may cause a rare but serious blood clotting condition called thrombotic thrombocytopenic purpura. · Make sure any doctor or dentist who treats you knows that you are using this medicine. Tell your doctor if you plan to have surgery or a dental procedure. · Do not stop using this medicine suddenly. Your doctor will need to slowly decrease your dose before you stop it completely. · Your doctor will check your progress and the effects of this medicine at regular visits. Keep all appointments. · Keep all medicine out of the reach of children. Never share your medicine with anyone. Possible Side Effects While Using This Medicine:  
Call your doctor right away if you notice any of these side effects: · Allergic reaction: Itching or hives, swelling in your face or hands, swelling or tingling in your mouth or throat, chest tightness, trouble breathing · Bloody or black, tarry stools · Nosebleeds · Pinpoint red or purple spots on your skin or in your mouth · Problems with vision, speech, or walking · Red or dark brown urine · Seizures · Severe stomach pain · Trouble breathing, tiredness, fast heartbeat, yellow skin or eyes · Unusual bleeding, bruising, or weakness · Vomiting of blood or vomit that looks like coffee grounds If you notice other side effects that you think are caused by this medicine, tell your doctor. Call your doctor for medical advice about side effects. You may report side effects to FDA at 0-010-FDA-0321 © 2016 [address] is for End User's use only and may not be sold, redistributed or otherwise used for commercial purposes. The above information is an  only. It is not intended as medical advice for individual conditions or treatments. Talk to your doctor, nurse or pharmacist before following any medical regimen to see if it is safe and effective for you. Stopping Smoking: Care Instructions Your Care Instructions Cigarette smokers crave the nicotine in cigarettes. Giving it up is much harder than simply changing a habit. Your body has to stop craving the nicotine. It is hard to quit, but you can do it. There are many tools that people use to quit smoking. You may find that combining tools works best for you. There are several steps to quitting. First you get ready to quit. Then you get support to help you. After that, you learn new skills and behaviors to become a nonsmoker. For many people, a necessary step is getting and using medicine. Your doctor will help you set up the plan that best meets your needs. You may want to attend a smoking cessation program to help you quit smoking. When you choose a program, look for one that has proven success. Ask your doctor for ideas. You will greatly increase your chances of success if you take medicine as well as get counseling or join a cessation program. 
Some of the changes you feel when you first quit tobacco are uncomfortable. Your body will miss the nicotine at first, and you may feel short-tempered and grumpy. You may have trouble sleeping or concentrating. Medicine can help you deal with these symptoms. You may struggle with changing your smoking habits and rituals. The last step is the tricky one: Be prepared for the smoking urge to continue for a time. This is a lot to deal with, but keep at it. You will feel better. Follow-up care is a key part of your treatment and safety. Be sure to make and go to all appointments, and call your doctor if you are having problems. Its also a good idea to know your test results and keep a list of the medicines you take. How can you care for yourself at home? · Ask your family, friends, and coworkers for support. You have a better chance of quitting if you have help and support. · Join a support group, such as Nicotine Anonymous, for people who are trying to quit smoking. · Consider signing up for a smoking cessation program, such as the American Lung Association's Freedom from Smoking program. 
· Set a quit date. Pick your date carefully so that it is not right in the middle of a big deadline or stressful time. Once you quit, do not even take a puff. Get rid of all ashtrays and lighters after your last cigarette. Clean your house and your clothes so that they do not smell of smoke. · Learn how to be a nonsmoker. Think about ways you can avoid those things that make you reach for a cigarette. ¨ Avoid situations that put you at greatest risk for smoking. For some people, it is hard to have a drink with friends without smoking. For others, they might skip a coffee break with coworkers who smoke. ¨ Change your daily routine. Take a different route to work or eat a meal in a different place. · Cut down on stress. Calm yourself or release tension by doing an activity you enjoy, such as reading a book, taking a hot bath, or gardening. · Talk to your doctor or pharmacist about nicotine replacement therapy, which replaces the nicotine in your body. You still get nicotine but you do not use tobacco. Nicotine replacement products help you slowly reduce the amount of nicotine you need. These products come in several forms, many of them available over-the-counter: ¨ Nicotine patches ¨ Nicotine gum and lozenges ¨ Nicotine inhaler · Ask your doctor about bupropion (Wellbutrin) or varenicline (Chantix), which are prescription medicines. They do not contain nicotine. They help you by reducing withdrawal symptoms, such as stress and anxiety. · Some people find hypnosis, acupuncture, and massage helpful for ending the smoking habit. · Eat a healthy diet and get regular exercise. Having healthy habits will help your body move past its craving for nicotine. · Be prepared to keep trying. Most people are not successful the first few times they try to quit. Do not get mad at yourself if you smoke again. Make a list of things you learned and think about when you want to try again, such as next week, next month, or next year. Where can you learn more? Go to http://angelica-zuri.info/. Enter G050 in the search box to learn more about \"Stopping Smoking: Care Instructions. \" Current as of: May 26, 2016 Content Version: 11.2 © 8854-3776 [employer].  Care instructions adapted under license by [employer] (which disclaims liability or warranty for this information). If you have questions about a medical condition or this instruction, always ask your healthcare professional. [place] [address] any warranty or liability for your use of this information.

## 2017-03-27 NOTE — IP AVS SNAPSHOT
Current Discharge Medication List  
  
CONTINUE these medications which have CHANGED Dose & Instructions Dispensing Information Comments Morning Noon Evening Bedtime  
 clopidogrel 75 mg Tab Commonly known as:  PLAVIX What changed:   
- how much to take 
- how to take this - when to take this 
- additional instructions Your last dose was: Your next dose is:    
   
   
 Dose:  75 mg Take 1 Tab by mouth daily. Quantity:  90 Tab Refills:  5  
     
   
   
   
  
 rosuvastatin 20 mg tablet Commonly known as:  CRESTOR What changed:  how much to take Your last dose was: Your next dose is:    
   
   
 Dose:  20 mg Take 1 Tab by mouth nightly. Quantity:  30 Tab Refills:  5 CONTINUE these medications which have NOT CHANGED Dose & Instructions Dispensing Information Comments Morning Noon Evening Bedtime  
 amLODIPine 10 mg tablet Commonly known as:  Ajit Borges Your last dose was: Your next dose is:    
   
   
 Dose:  10 mg Take 1 Tab by mouth daily. Quantity:  90 Tab Refills:  3  
     
   
   
   
  
 benazepril 20 mg tablet Commonly known as:  LOTENSIN Your last dose was: Your next dose is:    
   
   
 Dose:  20 mg Take 1 Tab by mouth daily. Quantity:  90 Tab Refills:  1 CRANBERRY CONCENTRATE PO Your last dose was: Your next dose is: Take  by mouth. Refills:  0 EPINEPHrine 0.3 mg/0.3 mL injection Commonly known as:  Orene Nipple Your last dose was: Your next dose is:    
   
   
 Dose:  0.3 mg  
0.3 mL by IntraMUSCular route once as needed for up to 1 dose. Indications: Anaphylaxis Quantity:  2 Syringe Refills:  1 FREESTYLE LITE STRIPS strip Generic drug:  glucose blood VI test strips Your last dose was: Your next dose is: CHECK BLOOD SUGAR EVERY DAY Quantity:  50 Strip Refills:  11  
     
   
   
   
  
 furosemide 40 mg tablet Commonly known as:  LASIX Your last dose was: Your next dose is:    
   
   
 Dose:  40 mg Take 1 Tab by mouth daily. Quantity:  90 Tab Refills:  1  
     
   
   
   
  
 levothyroxine 100 mcg tablet Commonly known as:  SYNTHROID Your last dose was: Your next dose is:    
   
   
 Dose:  100 mcg Take 1 Tab by mouth Daily (before breakfast). Quantity:  90 Tab Refills:  1  
     
   
   
   
  
 metoprolol tartrate 25 mg tablet Commonly known as:  LOPRESSOR Your last dose was: Your next dose is:    
   
   
 Dose:  25 mg Take 1 Tab by mouth two (2) times a day. Quantity:  180 Tab Refills:  3  
     
   
   
   
  
 nitroglycerin 0.4 mg SL tablet Commonly known as:  NITROSTAT Your last dose was: Your next dose is:    
   
   
 Dose:  0.4 mg  
1 Tab by SubLINGual route every five (5) minutes as needed. Quantity:  25 Tab Refills:  11  
     
   
   
   
  
 traMADol 50 mg tablet Commonly known as:  ULTRAM  
   
Your last dose was: Your next dose is:    
   
   
 Dose:  50 mg Take 1 Tab by mouth every six (6) hours as needed for Pain. Quantity:  90 Tab Refills:  2 STOP taking these medications   
 ciprofloxacin HCl 500 mg tablet Commonly known as:  CIPRO Where to Get Your Medications Information on where to get these meds will be given to you by the nurse or doctor. ! Ask your nurse or doctor about these medications  
  clopidogrel 75 mg Tab

## 2017-03-27 NOTE — ANESTHESIA PREPROCEDURE EVALUATION
Anesthetic History   No history of anesthetic complications            Review of Systems / Medical History  Patient summary reviewed, nursing notes reviewed and pertinent labs reviewed    Pulmonary          Shortness of breath         Neuro/Psych   Within defined limits           Cardiovascular    Hypertension: well controlled  Valvular problems/murmurs: aortic stenosis        CAD (Diffuse small vessel dz not amenable to intervention) and hyperlipidemia    Exercise tolerance: <4 METS  Comments: Severe AS - 0.9 cm HERNAN, peak grad 64, mean grad 40  Normal EF  LHC: LM WNL; LAD 40%; LCx 70% distal; RCA 20%   GI/Hepatic/Renal     GERD: well controlled          Comments: Bile duct stone Endo/Other    Diabetes: well controlled, type 2  Hypothyroidism: well controlled  Arthritis     Other Findings   Comments: Bilary obstruction         Physical Exam    Airway  Mallampati: I  TM Distance: 4 - 6 cm  Neck ROM: normal range of motion   Mouth opening: Normal     Cardiovascular    Rhythm: regular  Rate: normal    Murmur: Grade 4, Aortic area     Dental    Dentition: Full lower dentures and Full upper dentures     Pulmonary  Breath sounds clear to auscultation               Abdominal         Other Findings            Anesthetic Plan    ASA: 4  Anesthesia type: general    Monitoring Plan: Arterial line and LÁZARO      Induction: Intravenous  Anesthetic plan and risks discussed with: Patient and Family

## 2017-03-27 NOTE — PROGRESS NOTES
Patient has bruising bilaterally on upper extremities and on the second and third toe of left food. Lower extremities are red. No breakdown noted.

## 2017-03-28 ENCOUNTER — ANESTHESIA (OUTPATIENT)
Dept: SURGERY | Age: 82
DRG: 267 | End: 2017-03-28
Payer: MEDICARE

## 2017-03-28 ENCOUNTER — SURGERY (OUTPATIENT)
Age: 82
End: 2017-03-28

## 2017-03-28 LAB
ANION GAP BLD CALC-SCNC: 9 MMOL/L (ref 7–16)
ATRIAL RATE: 66 BPM
ATRIAL RATE: 70 BPM
BASE DEFICIT BLD-SCNC: 4 MMOL/L
BASE DEFICIT BLD-SCNC: 5 MMOL/L
BASE DEFICIT BLD-SCNC: 6 MMOL/L
BUN SERPL-MCNC: 19 MG/DL (ref 8–23)
CA-I BLD-MCNC: 1.12 MMOL/L (ref 1.12–1.32)
CA-I BLD-MCNC: 1.13 MMOL/L (ref 1.12–1.32)
CA-I BLD-MCNC: 1.15 MMOL/L (ref 1.12–1.32)
CALCIUM SERPL-MCNC: 8.4 MG/DL (ref 8.3–10.4)
CALCULATED P AXIS, ECG09: 69 DEGREES
CALCULATED P AXIS, ECG09: 75 DEGREES
CALCULATED R AXIS, ECG10: 13 DEGREES
CALCULATED R AXIS, ECG10: 4 DEGREES
CALCULATED T AXIS, ECG11: 35 DEGREES
CALCULATED T AXIS, ECG11: 97 DEGREES
CHLORIDE SERPL-SCNC: 110 MMOL/L (ref 98–107)
CO2 SERPL-SCNC: 25 MMOL/L (ref 21–32)
CREAT SERPL-MCNC: 0.95 MG/DL (ref 0.6–1)
DIAGNOSIS, 93000: NORMAL
DIAGNOSIS, 93000: NORMAL
GLUCOSE BLD STRIP.AUTO-MCNC: 129 MG/DL (ref 65–100)
GLUCOSE BLD STRIP.AUTO-MCNC: 163 MG/DL (ref 70–105)
GLUCOSE BLD STRIP.AUTO-MCNC: 172 MG/DL (ref 70–105)
GLUCOSE BLD STRIP.AUTO-MCNC: 179 MG/DL (ref 70–105)
GLUCOSE SERPL-MCNC: 137 MG/DL (ref 65–100)
HCO3 BLD-SCNC: 19.9 MMOL/L (ref 22–26)
HCO3 BLD-SCNC: 20.3 MMOL/L (ref 22–26)
HCO3 BLD-SCNC: 20.6 MMOL/L (ref 22–26)
P-R INTERVAL, ECG05: 152 MS
P-R INTERVAL, ECG05: 156 MS
PCO2 BLD: 34.5 MMHG (ref 35–45)
PCO2 BLD: 35.1 MMHG (ref 35–45)
PCO2 BLD: 39.6 MMHG (ref 35–45)
PH BLD: 7.31 [PH] (ref 7.35–7.45)
PH BLD: 7.37 [PH] (ref 7.35–7.45)
PH BLD: 7.38 [PH] (ref 7.35–7.45)
PO2 BLD: 100 MMHG (ref 80–105)
PO2 BLD: 108 MMHG (ref 80–105)
PO2 BLD: 118 MMHG (ref 80–105)
POTASSIUM BLD-SCNC: 3.8 MMOL/L (ref 3.5–5.1)
POTASSIUM BLD-SCNC: 3.8 MMOL/L (ref 3.5–5.1)
POTASSIUM BLD-SCNC: 4.1 MMOL/L (ref 3.5–5.1)
POTASSIUM SERPL-SCNC: 3.8 MMOL/L (ref 3.5–5.1)
Q-T INTERVAL, ECG07: 408 MS
Q-T INTERVAL, ECG07: 448 MS
QRS DURATION, ECG06: 118 MS
QRS DURATION, ECG06: 88 MS
QTC CALCULATION (BEZET), ECG08: 440 MS
QTC CALCULATION (BEZET), ECG08: 469 MS
SAO2 % BLD: 98 % (ref 95–98)
SODIUM BLD-SCNC: 140 MMOL/L (ref 138–146)
SODIUM BLD-SCNC: 141 MMOL/L (ref 138–146)
SODIUM BLD-SCNC: 141 MMOL/L (ref 138–146)
SODIUM SERPL-SCNC: 144 MMOL/L (ref 136–145)
VENTRICULAR RATE, ECG03: 66 BPM
VENTRICULAR RATE, ECG03: 70 BPM

## 2017-03-28 PROCEDURE — 77010033678 HC OXYGEN DAILY

## 2017-03-28 PROCEDURE — C1760 CLOSURE DEV, VASC: HCPCS

## 2017-03-28 PROCEDURE — C1769 GUIDE WIRE: HCPCS

## 2017-03-28 PROCEDURE — C1894 INTRO/SHEATH, NON-LASER: HCPCS

## 2017-03-28 PROCEDURE — 77030012221 HC CATH FOL CRITCOR BARD -B: Performed by: INTERNAL MEDICINE

## 2017-03-28 PROCEDURE — 76060000036 HC ANESTHESIA 2.5 TO 3 HR: Performed by: INTERNAL MEDICINE

## 2017-03-28 PROCEDURE — 74011250636 HC RX REV CODE- 250/636

## 2017-03-28 PROCEDURE — 77030004559 HC CATH ANGI DX SUPT CARD -B

## 2017-03-28 PROCEDURE — 77030018548 HC SUT ETHBND2 J&J -B: Performed by: INTERNAL MEDICINE

## 2017-03-28 PROCEDURE — 77030011640 HC PAD GRND REM COVD -A: Performed by: INTERNAL MEDICINE

## 2017-03-28 PROCEDURE — 77030013687 HC GD NDL BARD -B

## 2017-03-28 PROCEDURE — 77030031139 HC SUT VCRL2 J&J -A: Performed by: INTERNAL MEDICINE

## 2017-03-28 PROCEDURE — 82803 BLOOD GASES ANY COMBINATION: CPT

## 2017-03-28 PROCEDURE — 74011250636 HC RX REV CODE- 250/636: Performed by: PHYSICIAN ASSISTANT

## 2017-03-28 PROCEDURE — 65610000006 HC RM INTENSIVE CARE

## 2017-03-28 PROCEDURE — 77030013292 HC BOWL MX PRSM J&J -A: Performed by: NURSE ANESTHETIST, CERTIFIED REGISTERED

## 2017-03-28 PROCEDURE — 74011000250 HC RX REV CODE- 250

## 2017-03-28 PROCEDURE — B3101ZZ FLUOROSCOPY OF THORACIC AORTA USING LOW OSMOLAR CONTRAST: ICD-10-PCS | Performed by: INTERNAL MEDICINE

## 2017-03-28 PROCEDURE — 77030008703 HC TU ET UNCUF COVD -A: Performed by: NURSE ANESTHETIST, CERTIFIED REGISTERED

## 2017-03-28 PROCEDURE — 77030002986 HC SUT PROL J&J -A: Performed by: INTERNAL MEDICINE

## 2017-03-28 PROCEDURE — C1751 CATH, INF, PER/CENT/MIDLINE: HCPCS | Performed by: ANESTHESIOLOGY

## 2017-03-28 PROCEDURE — 77030018836 HC SOL IRR NACL ICUM -A: Performed by: INTERNAL MEDICINE

## 2017-03-28 PROCEDURE — 77030008477 HC STYL SATN SLP COVD -A: Performed by: NURSE ANESTHETIST, CERTIFIED REGISTERED

## 2017-03-28 PROCEDURE — 77030020407 HC IV BLD WRMR ST 3M -A: Performed by: NURSE ANESTHETIST, CERTIFIED REGISTERED

## 2017-03-28 PROCEDURE — 74011250636 HC RX REV CODE- 250/636: Performed by: INTERNAL MEDICINE

## 2017-03-28 PROCEDURE — 93005 ELECTROCARDIOGRAM TRACING: CPT | Performed by: INTERNAL MEDICINE

## 2017-03-28 PROCEDURE — 82962 GLUCOSE BLOOD TEST: CPT

## 2017-03-28 PROCEDURE — 74011250636 HC RX REV CODE- 250/636: Performed by: ANESTHESIOLOGY

## 2017-03-28 PROCEDURE — 76010000222 HC CV SURG 2 TO 2.5 HR INTENSV-TIER 1: Performed by: INTERNAL MEDICINE

## 2017-03-28 PROCEDURE — 77030020782 HC GWN BAIR PAWS FLX 3M -B: Performed by: NURSE ANESTHETIST, CERTIFIED REGISTERED

## 2017-03-28 PROCEDURE — 77030005537 HC CATH URETH BARD -A: Performed by: INTERNAL MEDICINE

## 2017-03-28 PROCEDURE — B24BZZ4 ULTRASONOGRAPHY OF HEART WITH AORTA, TRANSESOPHAGEAL: ICD-10-PCS | Performed by: INTERNAL MEDICINE

## 2017-03-28 PROCEDURE — 77030037468 HC VLV AORT EDWRD -L: Performed by: INTERNAL MEDICINE

## 2017-03-28 PROCEDURE — 02RF38Z REPLACEMENT OF AORTIC VALVE WITH ZOOPLASTIC TISSUE, PERCUTANEOUS APPROACH: ICD-10-PCS | Performed by: INTERNAL MEDICINE

## 2017-03-28 PROCEDURE — 77030016564 HC BLD STRNL SAW4 CNMD -B: Performed by: INTERNAL MEDICINE

## 2017-03-28 PROCEDURE — 74011636320 HC RX REV CODE- 636/320: Performed by: INTERNAL MEDICINE

## 2017-03-28 PROCEDURE — 82947 ASSAY GLUCOSE BLOOD QUANT: CPT

## 2017-03-28 PROCEDURE — 77030004534 HC CATH ANGI DX INFN CARD -A

## 2017-03-28 PROCEDURE — 80048 BASIC METABOLIC PNL TOTAL CA: CPT | Performed by: PHYSICIAN ASSISTANT

## 2017-03-28 PROCEDURE — 74011250637 HC RX REV CODE- 250/637: Performed by: INTERNAL MEDICINE

## 2017-03-28 PROCEDURE — C1751 CATH, INF, PER/CENT/MIDLINE: HCPCS | Performed by: NURSE ANESTHETIST, CERTIFIED REGISTERED

## 2017-03-28 PROCEDURE — C1769 GUIDE WIRE: HCPCS | Performed by: INTERNAL MEDICINE

## 2017-03-28 PROCEDURE — 77030005318 HC CATH ELECTRD PACE TMP BARD -C

## 2017-03-28 PROCEDURE — 77030019908 HC STETH ESOPH SIMS -A: Performed by: NURSE ANESTHETIST, CERTIFIED REGISTERED

## 2017-03-28 PROCEDURE — 77030004558 HC CATH ANGI DX SUPR TORQ CARD -A

## 2017-03-28 PROCEDURE — 36415 COLL VENOUS BLD VENIPUNCTURE: CPT | Performed by: PHYSICIAN ASSISTANT

## 2017-03-28 PROCEDURE — 77030013794 HC KT TRNSDUC BLD EDWD -B: Performed by: NURSE ANESTHETIST, CERTIFIED REGISTERED

## 2017-03-28 PROCEDURE — 74011250637 HC RX REV CODE- 250/637: Performed by: PHYSICIAN ASSISTANT

## 2017-03-28 DEVICE — IMPLANTABLE DEVICE: Type: IMPLANTABLE DEVICE | Site: AORTIC VALVE | Status: FUNCTIONAL

## 2017-03-28 RX ORDER — ROCURONIUM BROMIDE 10 MG/ML
INJECTION, SOLUTION INTRAVENOUS AS NEEDED
Status: DISCONTINUED | OUTPATIENT
Start: 2017-03-28 | End: 2017-03-28 | Stop reason: HOSPADM

## 2017-03-28 RX ORDER — DEXAMETHASONE SODIUM PHOSPHATE 100 MG/10ML
INJECTION INTRAMUSCULAR; INTRAVENOUS AS NEEDED
Status: DISCONTINUED | OUTPATIENT
Start: 2017-03-28 | End: 2017-03-28 | Stop reason: HOSPADM

## 2017-03-28 RX ORDER — SODIUM CHLORIDE, SODIUM LACTATE, POTASSIUM CHLORIDE, CALCIUM CHLORIDE 600; 310; 30; 20 MG/100ML; MG/100ML; MG/100ML; MG/100ML
75 INJECTION, SOLUTION INTRAVENOUS CONTINUOUS
Status: DISCONTINUED | OUTPATIENT
Start: 2017-03-28 | End: 2017-03-28 | Stop reason: HOSPADM

## 2017-03-28 RX ORDER — LIDOCAINE HYDROCHLORIDE 10 MG/ML
0.1 INJECTION INFILTRATION; PERINEURAL AS NEEDED
Status: DISCONTINUED | OUTPATIENT
Start: 2017-03-28 | End: 2017-03-28 | Stop reason: HOSPADM

## 2017-03-28 RX ORDER — FENTANYL CITRATE 50 UG/ML
100 INJECTION, SOLUTION INTRAMUSCULAR; INTRAVENOUS ONCE
Status: DISCONTINUED | OUTPATIENT
Start: 2017-03-28 | End: 2017-03-28 | Stop reason: HOSPADM

## 2017-03-28 RX ORDER — IODIXANOL 320 MG/ML
INJECTION, SOLUTION INTRAVASCULAR AS NEEDED
Status: DISCONTINUED | OUTPATIENT
Start: 2017-03-28 | End: 2017-03-28 | Stop reason: HOSPADM

## 2017-03-28 RX ORDER — LIDOCAINE HYDROCHLORIDE 20 MG/ML
INJECTION, SOLUTION EPIDURAL; INFILTRATION; INTRACAUDAL; PERINEURAL AS NEEDED
Status: DISCONTINUED | OUTPATIENT
Start: 2017-03-28 | End: 2017-03-28 | Stop reason: HOSPADM

## 2017-03-28 RX ORDER — HYDROCODONE BITARTRATE AND ACETAMINOPHEN 5; 325 MG/1; MG/1
1 TABLET ORAL
Status: DISCONTINUED | OUTPATIENT
Start: 2017-03-28 | End: 2017-03-30 | Stop reason: HOSPADM

## 2017-03-28 RX ORDER — SODIUM CHLORIDE 9 MG/ML
75 INJECTION, SOLUTION INTRAVENOUS CONTINUOUS
Status: DISCONTINUED | OUTPATIENT
Start: 2017-03-28 | End: 2017-03-30 | Stop reason: HOSPADM

## 2017-03-28 RX ORDER — ACETAMINOPHEN 325 MG/1
650 TABLET ORAL
Status: DISCONTINUED | OUTPATIENT
Start: 2017-03-28 | End: 2017-03-30 | Stop reason: HOSPADM

## 2017-03-28 RX ORDER — ONDANSETRON 2 MG/ML
INJECTION INTRAMUSCULAR; INTRAVENOUS AS NEEDED
Status: DISCONTINUED | OUTPATIENT
Start: 2017-03-28 | End: 2017-03-28 | Stop reason: HOSPADM

## 2017-03-28 RX ORDER — BENAZEPRIL HYDROCHLORIDE 10 MG/1
20 TABLET ORAL ONCE
Status: COMPLETED | OUTPATIENT
Start: 2017-03-28 | End: 2017-03-28

## 2017-03-28 RX ORDER — AMLODIPINE BESYLATE 10 MG/1
10 TABLET ORAL ONCE
Status: COMPLETED | OUTPATIENT
Start: 2017-03-28 | End: 2017-03-28

## 2017-03-28 RX ORDER — HEPARIN SODIUM 1000 [USP'U]/ML
INJECTION, SOLUTION INTRAVENOUS; SUBCUTANEOUS AS NEEDED
Status: DISCONTINUED | OUTPATIENT
Start: 2017-03-28 | End: 2017-03-28 | Stop reason: HOSPADM

## 2017-03-28 RX ORDER — SODIUM CHLORIDE 9 MG/ML
INJECTION, SOLUTION INTRAVENOUS
Status: DISCONTINUED | OUTPATIENT
Start: 2017-03-28 | End: 2017-03-28 | Stop reason: HOSPADM

## 2017-03-28 RX ORDER — SODIUM CHLORIDE 0.9 % (FLUSH) 0.9 %
5-10 SYRINGE (ML) INJECTION EVERY 8 HOURS
Status: DISCONTINUED | OUTPATIENT
Start: 2017-03-28 | End: 2017-03-30 | Stop reason: HOSPADM

## 2017-03-28 RX ORDER — GLYCOPYRROLATE 0.2 MG/ML
INJECTION INTRAMUSCULAR; INTRAVENOUS AS NEEDED
Status: DISCONTINUED | OUTPATIENT
Start: 2017-03-28 | End: 2017-03-28 | Stop reason: HOSPADM

## 2017-03-28 RX ORDER — NITROGLYCERIN 0.4 MG/1
0.4 TABLET SUBLINGUAL
Status: DISCONTINUED | OUTPATIENT
Start: 2017-03-28 | End: 2017-03-30 | Stop reason: HOSPADM

## 2017-03-28 RX ORDER — CEFAZOLIN SODIUM IN 0.9 % NACL 2 G/50 ML
2 INTRAVENOUS SOLUTION, PIGGYBACK (ML) INTRAVENOUS EVERY 8 HOURS
Status: COMPLETED | OUTPATIENT
Start: 2017-03-28 | End: 2017-03-30

## 2017-03-28 RX ORDER — ETOMIDATE 2 MG/ML
INJECTION INTRAVENOUS AS NEEDED
Status: DISCONTINUED | OUTPATIENT
Start: 2017-03-28 | End: 2017-03-28 | Stop reason: HOSPADM

## 2017-03-28 RX ORDER — MORPHINE SULFATE 2 MG/ML
1 INJECTION, SOLUTION INTRAMUSCULAR; INTRAVENOUS
Status: DISCONTINUED | OUTPATIENT
Start: 2017-03-28 | End: 2017-03-30 | Stop reason: HOSPADM

## 2017-03-28 RX ORDER — FENTANYL CITRATE 50 UG/ML
INJECTION, SOLUTION INTRAMUSCULAR; INTRAVENOUS AS NEEDED
Status: DISCONTINUED | OUTPATIENT
Start: 2017-03-28 | End: 2017-03-28 | Stop reason: HOSPADM

## 2017-03-28 RX ORDER — PROTAMINE SULFATE 10 MG/ML
INJECTION, SOLUTION INTRAVENOUS AS NEEDED
Status: DISCONTINUED | OUTPATIENT
Start: 2017-03-28 | End: 2017-03-28 | Stop reason: HOSPADM

## 2017-03-28 RX ORDER — SODIUM CHLORIDE 0.9 % (FLUSH) 0.9 %
5-10 SYRINGE (ML) INJECTION AS NEEDED
Status: DISCONTINUED | OUTPATIENT
Start: 2017-03-28 | End: 2017-03-30 | Stop reason: HOSPADM

## 2017-03-28 RX ORDER — NEOSTIGMINE METHYLSULFATE 1 MG/ML
INJECTION INTRAVENOUS AS NEEDED
Status: DISCONTINUED | OUTPATIENT
Start: 2017-03-28 | End: 2017-03-28 | Stop reason: HOSPADM

## 2017-03-28 RX ADMIN — LIDOCAINE HYDROCHLORIDE 40 MG: 20 INJECTION, SOLUTION EPIDURAL; INFILTRATION; INTRACAUDAL; PERINEURAL at 09:07

## 2017-03-28 RX ADMIN — Medication 10 ML: at 22:28

## 2017-03-28 RX ADMIN — GLYCOPYRROLATE 0.4 MG: 0.2 INJECTION INTRAMUSCULAR; INTRAVENOUS at 09:27

## 2017-03-28 RX ADMIN — CIPROFLOXACIN 500 MG: 500 TABLET ORAL at 14:14

## 2017-03-28 RX ADMIN — CIPROFLOXACIN 500 MG: 500 TABLET ORAL at 22:25

## 2017-03-28 RX ADMIN — FENTANYL CITRATE 50 MCG: 50 INJECTION, SOLUTION INTRAMUSCULAR; INTRAVENOUS at 08:07

## 2017-03-28 RX ADMIN — Medication 10 ML: at 05:11

## 2017-03-28 RX ADMIN — ROCURONIUM BROMIDE 50 MG: 10 INJECTION, SOLUTION INTRAVENOUS at 07:23

## 2017-03-28 RX ADMIN — ONDANSETRON 4 MG: 2 INJECTION INTRAMUSCULAR; INTRAVENOUS at 08:14

## 2017-03-28 RX ADMIN — IODIXANOL 170 ML: 320 INJECTION, SOLUTION INTRAVASCULAR at 09:25

## 2017-03-28 RX ADMIN — ROCURONIUM BROMIDE 10 MG: 10 INJECTION, SOLUTION INTRAVENOUS at 08:11

## 2017-03-28 RX ADMIN — METOPROLOL TARTRATE 25 MG: 25 TABLET ORAL at 05:05

## 2017-03-28 RX ADMIN — HEPARIN SODIUM 10000 UNITS: 1000 INJECTION, SOLUTION INTRAVENOUS; SUBCUTANEOUS at 08:30

## 2017-03-28 RX ADMIN — ROSUVASTATIN CALCIUM 10 MG: 5 TABLET ORAL at 22:25

## 2017-03-28 RX ADMIN — CEFAZOLIN 2 G: 1 INJECTION, POWDER, FOR SOLUTION INTRAMUSCULAR; INTRAVENOUS; PARENTERAL at 07:59

## 2017-03-28 RX ADMIN — CHLORASEPTIC 1 SPRAY: 1.5 LIQUID ORAL at 17:31

## 2017-03-28 RX ADMIN — CLOPIDOGREL BISULFATE 75 MG: 75 TABLET, FILM COATED ORAL at 11:10

## 2017-03-28 RX ADMIN — SODIUM CHLORIDE 75 ML/HR: 900 INJECTION, SOLUTION INTRAVENOUS at 11:04

## 2017-03-28 RX ADMIN — LIDOCAINE HYDROCHLORIDE 30 MG: 20 INJECTION, SOLUTION EPIDURAL; INFILTRATION; INTRACAUDAL; PERINEURAL at 08:45

## 2017-03-28 RX ADMIN — LIDOCAINE HYDROCHLORIDE 65 MG: 20 INJECTION, SOLUTION EPIDURAL; INFILTRATION; INTRACAUDAL; PERINEURAL at 07:23

## 2017-03-28 RX ADMIN — BENAZEPRIL HYDROCHLORIDE 20 MG: 10 TABLET, FILM COATED ORAL at 05:04

## 2017-03-28 RX ADMIN — HEPARIN SODIUM 2000 UNITS: 1000 INJECTION, SOLUTION INTRAVENOUS; SUBCUTANEOUS at 09:09

## 2017-03-28 RX ADMIN — NEOSTIGMINE METHYLSULFATE 3 MG: 1 INJECTION INTRAVENOUS at 09:27

## 2017-03-28 RX ADMIN — SODIUM CHLORIDE: 9 INJECTION, SOLUTION INTRAVENOUS at 07:30

## 2017-03-28 RX ADMIN — Medication 1 MG: at 11:11

## 2017-03-28 RX ADMIN — LEVOTHYROXINE SODIUM 100 MCG: 100 TABLET ORAL at 05:04

## 2017-03-28 RX ADMIN — MUPIROCIN 1 G: 20 OINTMENT TOPICAL at 05:07

## 2017-03-28 RX ADMIN — SODIUM CHLORIDE, SODIUM LACTATE, POTASSIUM CHLORIDE, AND CALCIUM CHLORIDE: 600; 310; 30; 20 INJECTION, SOLUTION INTRAVENOUS at 07:16

## 2017-03-28 RX ADMIN — PROTAMINE SULFATE 60 MG: 10 INJECTION, SOLUTION INTRAVENOUS at 09:19

## 2017-03-28 RX ADMIN — TRAMADOL HYDROCHLORIDE 50 MG: 50 TABLET, FILM COATED ORAL at 12:18

## 2017-03-28 RX ADMIN — FENTANYL CITRATE 50 MCG: 50 INJECTION, SOLUTION INTRAMUSCULAR; INTRAVENOUS at 08:42

## 2017-03-28 RX ADMIN — ETOMIDATE 16 MG: 2 INJECTION INTRAVENOUS at 07:23

## 2017-03-28 RX ADMIN — CEFAZOLIN 2 G: 1 INJECTION, POWDER, FOR SOLUTION INTRAMUSCULAR; INTRAVENOUS; PARENTERAL at 15:51

## 2017-03-28 RX ADMIN — FENTANYL CITRATE 50 MCG: 50 INJECTION, SOLUTION INTRAMUSCULAR; INTRAVENOUS at 07:19

## 2017-03-28 RX ADMIN — HYDROCODONE BITARTRATE AND ACETAMINOPHEN 1 TABLET: 5; 325 TABLET ORAL at 15:50

## 2017-03-28 RX ADMIN — FENTANYL CITRATE 50 MCG: 50 INJECTION, SOLUTION INTRAMUSCULAR; INTRAVENOUS at 07:00

## 2017-03-28 RX ADMIN — Medication 10 ML: at 14:15

## 2017-03-28 RX ADMIN — HEPARIN SODIUM 2000 UNITS: 1000 INJECTION INTRAVENOUS; SUBCUTANEOUS at 08:22

## 2017-03-28 RX ADMIN — AMLODIPINE BESYLATE 10 MG: 10 TABLET ORAL at 05:04

## 2017-03-28 RX ADMIN — DEXAMETHASONE SODIUM PHOSPHATE 7 MG: 100 INJECTION INTRAMUSCULAR; INTRAVENOUS at 08:14

## 2017-03-28 RX ADMIN — ROCURONIUM BROMIDE 10 MG: 10 INJECTION, SOLUTION INTRAVENOUS at 08:31

## 2017-03-28 NOTE — PROGRESS NOTES
Dual skin assessment completed - no skin breakdown. Allevyn repositioned on sacrum/coccyx. Bilateral groin sites stable, ecchymotic, no hematoma.

## 2017-03-28 NOTE — PROCEDURES
[facility] 44       Name:  Elizabeth Hernandez   MR#:  [id number]   :  [id number]   Account #:  [de-identified]   Date of Adm:  2017       DATE OF PROCEDURE: 2017    PROCEDURE: Transcatheter aortic valve replacement with an   Trejo Luis 3, 20-mm transcatheter heart valve. INDICATION: Symptomatic severe aortic stenosis. PRIMARY INTERVENTIONAL CARDIOLOGIST: Desean Ray MD    PRIMARY CARDIOTHORACIC SURGEON: Little Cagle. Raven Sanders MD    PRIMARY IMAGING CARDIOLOGIST: Farida Griffin MD    ASSISTING INTERVENTIONAL CARDIOLOGIST: Lizett Ball MD    TECHNICAL FACTORS: After informed consent was obtained, the   patient was brought to the hybrid operating room. General   anesthesia was administered by Dr. Lauryn Hernández. The patient had an arterial   line placed in the right radial artery. After induction of   anesthesia, the patient was prepped and draped in the usual   sterile fashion. Using [facility] ultrasound, the left common   femoral artery and vein were identified. A static image was   placed on the chart. A 6-Barbadian sheath was placed in the left  common femoral artery and a 6-Barbadian venous sheath was placed in   the left common femoral vein. At this point, a LIMA catheter   was advanced into the abdominal aorta and used to selectively   engage the ostium of the right common iliac. In the AP   projection, right common iliac selective angiography was   performed with visualization of the external and common femoral   artery. The iliac and femoral system appeared to be suitable for   a 14-Barbadian sheath. Under direct fluoroscopy with simultaneous   injection, a micropuncture needle was used to enter the right   common femoral artery. At this point, the micropuncture kit was   exchanged for a 6-Barbadian sheath. Attention was then turned to the left common femoral sheath.  A   balloon tipped transvenous pacemaker was advanced and placed in   the RV apex. Capture was at 0.4 milliamps. This was felt   appropriate and this was sutured in place. A pigtail catheter   was advanced into the right coronary cusp and a coplaner view   was established. At this point, the right common femoral sheath   was removed and 2 Perclose devices were placed in the 2 o'clock   and 10 o'clock positions. This was then exchanged for a 14-  [employer] dilator. Once the dilator was advanced, the [employer]   extra stiff wire was advanced. Over this, a 14-Bahraini sheath   was placed. Once the sheath was in place, the patient was given   intravenous heparin. ACT was checked and was greater than 300   during the case. A 5-Bahraini AL1 catheter was advanced into the   aortic root and using a Cordis straight wire, the aortic valve   was crossed. The AL1 that was placed in the LV and an Amplatz   extra stiff with a preformed curve was placed in the LV apex. At   this point, a 16 mm Trejo balloon was advanced and used to   cross the aortic valve. Balloon valvuloplasty was then performed   with suspended respiration, pacing at 180 beats per minute with   appropriate hemodynamic collapse. Following balloon   valvuloplasty, repeat transesophageal echocardiogram showed mild   to moderate aortic regurgitation. The Trejo Luis 3, 20-mm transcatheter heart valve was then   prepped in the usual sterile fashion. This was advanced via the   right common femoral artery into the descending thoracic aorta. The balloon was pulled back inside of the valve by standard   technique. The valve was then delivered across the aortic valve. At this point, the valve was positioned under fluoroscopy and   with aortic aortography. The patient then underwent pacing at   180 beats minute without complete capture and therefore this was   stopped. The pacing rate was decreased to 160 beats minute.    Again, respirations were suspended, pacing at 160 beats per   minute was undertaken with appropriate hemodynamic collapse,   simultaneous injection was performed that showed that the valve   was in good position. The valve was deployed by standard   technique with good angiographic result. Following valve deployment, the patient had mild to moderate   aortic regurgitation. This was initially felt to be wire related   and the wire was removed. Despite this, there remained mild to   moderate aortic regurgitation based on transesophageal   echocardiogram and aortography. The valve was recrossed with an   AL1 catheter and J tippe wire. The Amplatz extra stiff wire was then   replaced in the LV apex. The delivery balloon was readvanced   with 2 extra mL of fluid for post-dilatation. At this time,   post-dilatation was performed with suspended respiration, pacing   at 160 beats minute with hemodynamic collapse. Following post-  dilatation, there was significant improvement in the aortic   regurgitation based on aortography and transesophageal   echocardiogram. There was trivial aortic regurgitation. The   system was withdrawn. At this point, 14-Indian sheath was   removed and the Perclose systems were deployed with excellent   hemostasis. No complications were encountered. Attention was   then turned to the left femoral artery. Injection showed that   the sheath was contained in the left common femoral artery. Angio-Seal vascular closure device was deployed by standard   technique with excellent hemostasis. The transvenous pacemaker   was removed and the venous sheath was removed with manual   pressure. CONCLUSIONS: Successful transcatheter aortic valve replacement   with a 20 mm Luis S 3 valve. NOTE: Dr. Karo Torres was present throughout the procedure   and participated in all aspects of the case.         MD MARYANNE Granados / SHAD   D:  03/28/2017   17:23   T:  03/28/2017   18:10   Job #:  [id number]

## 2017-03-28 NOTE — PERIOP NOTES
TRANSFER - OUT REPORT:    Verbal report given to ROSA Sanches on Aurora Oleary  being transferred to CVICU for routine post - op       Report consisted of patients Situation, Background, Assessment and   Recommendations(SBAR). Information from the following report(s) OR Summary was reviewed with the receiving nurse. Lines:   Peripheral IV 03/27/17 Right Antecubital (Active)   Site Assessment Clean, dry, & intact 3/28/2017  9:49 AM   Phlebitis Assessment 0 3/28/2017  9:49 AM   Infiltration Assessment 0 3/28/2017  9:49 AM   Dressing Status Clean, dry, & intact 3/28/2017  9:49 AM   Dressing Type Transparent 3/28/2017  9:49 AM   Hub Color/Line Status Pink;Capped;Flushed;Patent; End cap changed 3/28/2017  9:49 AM   Action Taken Open ports on tubing capped 3/28/2017  9:49 AM   Alcohol Cap Used No 3/27/2017 11:04 PM       Peripheral IV 03/28/17 Left Antecubital (Active)   Site Assessment Clean, dry, & intact 3/28/2017  9:49 AM   Phlebitis Assessment 0 3/28/2017  9:49 AM   Infiltration Assessment 0 3/28/2017  9:49 AM   Dressing Status Clean, dry, & intact 3/28/2017  9:49 AM   Dressing Type Transparent 3/28/2017  9:49 AM   Hub Color/Line Status Gray;Capped;Flushed;Patent; End cap changed 3/28/2017  9:49 AM   Action Taken Open ports on tubing capped 3/28/2017  9:49 AM       Arterial Line 03/28/17 Right Radial artery (Active)   Site Assessment Clean, dry, & intact 3/28/2017  9:49 AM   Dressing Status Clean, dry, & intact 3/28/2017  9:49 AM   Dressing Type Transparent 3/28/2017  9:49 AM   Line Status Intact and in place 3/28/2017  9:49 AM   Treatment Arm board on;Zeroed or re-zeroed 3/28/2017  9:49 AM   Affected Extremity/Extremities Color distal to insertion site pink (or appropriate for race); Pulses palpable 3/28/2017  9:49 AM        Opportunity for questions and clarification was provided.       Patient transported with:   Monitor

## 2017-03-28 NOTE — PERIOP NOTES
TRANSFER - IN REPORT:    Verbal report received from [address] RN(name) on Aurora Oleary  being received from 2232(unit) for routine progression of care      Report consisted of patients Situation, Background, Assessment and   Recommendations(SBAR). Information from the following report(s) Kardex, MAR and Recent Results was reviewed with the receiving nurse. Opportunity for questions and clarification was provided. Assessment completed upon patients arrival to unit and care assumed.

## 2017-03-28 NOTE — PROGRESS NOTES
TRANSFER - OUT REPORT:    Verbal report given to pre-op on Aurora Oleary  being transferred to pre-op for ordered procedure       Report consisted of patients Situation, Background, Assessment and   Recommendations(SBAR). Information from the following report(s) Kardex was reviewed with the receiving nurse. Lines:   Peripheral IV 03/27/17 Right Antecubital (Active)   Site Assessment Clean, dry, & intact 3/27/2017 11:04 PM   Phlebitis Assessment 0 3/27/2017 11:04 PM   Infiltration Assessment 0 3/27/2017 11:04 PM   Dressing Status Clean, dry, & intact 3/27/2017 11:04 PM   Dressing Type Tape;Transparent 3/27/2017 11:04 PM   Hub Color/Line Status Pink 3/27/2017 11:04 PM   Alcohol Cap Used No 3/27/2017 11:04 PM        Opportunity for questions and clarification was provided.       Patient transported with:   O2 @ 3 liters

## 2017-03-28 NOTE — BRIEF OP NOTE
BRIEF OPERATIVE NOTE    Date of Procedure: 3/28/2017   Preoperative Diagnosis: Aortic valve stenosis, unspecified etiology [I35.0]  Postoperative Diagnosis: Aortic valve stenosis, unspecified etiology [I35.0]    Procedure(s):  TRANSCATHETER AORTIC VALVE REPLACEMENT  ESOPHAGEAL TRANS ECHOCARDIOGRAM  Surgeon(s) and Role:     * Giovanni Gonzalez MD - Primary Interventional Cardiologist     * Huma Daugherty MD - Primary Cardiac Surgeon     * Florencia Szymanski MD - [address] MD Katheryn - Assisting            Surgical Staff:  Circ-1: Jacquelyn Mills RN  Perfusionist: Amrik Haines  Scrub Tech-1: Fer Staples  Scrub RN-1: Marni Garcia RN  Radiology Technologist: Nara Han; Nehal Hilliardole; Lizzy Dumont RN; Dolly Sequeira; Nishant Carson  Event Time In   Incision Start 3818   Incision Close 0924     Anesthesia: General   Estimated Blood Loss: Minimal  Specimens: * No specimens in log *   Findings: Severe AS. Successful 20mm Trejo Luis 3 bioprosthetic transcatheter aortic valve via right common femoral artery percutaneous approach   Complications: None  Implants:   Implant Name Type Inv.  Item Serial No.  Lot No. LRB No. Used Action   VALVE AORTIC SAPEIN 3 20MM -- [employer] - [id number]   VALVE AORTIC SAPEIN 3 20MM -- [employer] [id number] TREJO HarriCIENCES   N/A 1 Implanted

## 2017-03-28 NOTE — ANESTHESIA PROCEDURE NOTES
Arterial Line Placement    Start time: 3/28/2017 6:50 AM  End time: 3/28/2017 6:55 AM  Performed by: [facility]  Authorized by: Darcy Wolfe     Pre-Procedure  Indications:  Arterial pressure monitoring and blood sampling  Preanesthetic Checklist: patient identified, risks and benefits discussed, anesthesia consent, site marked, patient being monitored, timeout performed and patient being monitored    Timeout Time: 06:50        Procedure:   Prep:  ChloraPrep  Seldinger Technique?: Yes    Orientation:  Right  Location:  Radial artery  Catheter size:  20 G  Number of attempts:  1  Cont Cardiac Output Sensor: No      Assessment:   Post-procedure:  Line secured and sterile dressing applied  Patient Tolerance:  Patient tolerated the procedure well with no immediate complications  Comment:   right arm prepped with ChloraPrep, 0.8ml of 1% lidocaine infiltrated at skin, Seldinger technique, good blood return, good waveform.

## 2017-03-28 NOTE — ANESTHESIA POSTPROCEDURE EVALUATION
Post-Anesthesia Evaluation and Assessment    Patient: Juliana Cordoba MRN: [id number]  SSN: [id number]    YOB: 1929  Age: 80 y.o. Sex: female       Cardiovascular Function/Vital Signs  Visit Vitals    /40    Pulse 65    Temp 37.6 °C (99.7 °F)    Resp 16    Ht 5' 4\" (1.626 m)    Wt 65 kg (143 lb 4 oz)    SpO2 95%    Breastfeeding No    BMI 24.59 kg/m2       Patient is status post general anesthesia for Procedure(s):  TRANSCATHETER AORTIC VALVE REPLACEMENT  ESOPHAGEAL TRANS ECHOCARDIOGRAM.    Nausea/Vomiting: None    Postoperative hydration reviewed and adequate. Pain:  Pain Scale 1: Numeric (0 - 10) (03/28/17 0949)  Pain Intensity 1: 0 (03/28/17 0949)   Managed    Neurological Status: At baseline    Mental Status and Level of Consciousness: Arousable    Pulmonary Status:   Nasal cannula  Adequate oxygenation and airway patent    Complications related to anesthesia: None    Post-anesthesia assessment completed. No concerns. Pt doing well.      Signed By: Pierre Ferris MD     March 28, 2017

## 2017-03-28 NOTE — PROGRESS NOTES
TRANSFER - IN REPORT:    Verbal report received from Darryl Palmer CRNA(name) on Aurora Oleary  being received from OR(unit) for routine post - op      Report consisted of patients Situation, Background, Assessment and   Recommendations(SBAR). Information from the following report(s) SBAR, Kardex, OR Summary, Procedure Summary, Intake/Output, MAR, Recent Results and Cardiac Rhythm NSR was reviewed with the receiving nurse. Opportunity for questions and clarification was provided. Assessment completed upon patients arrival to unit and care assumed.

## 2017-03-28 NOTE — PROCEDURES
Brief Cardiac Procedure Note    Patient: Mari Dubois MRN: [id number]  SSN: [id number]    YOB: 1929  Age: 80 y.o. Sex: female      Date of Procedure: 3/28/2017     Pre-procedure Diagnosis: Severe symptomatic aortic stenosis. Post-procedure Diagnosis:Severe symptomatic aortic stenosis    Procedure: TAVR    Brief Description of Procedure: See above    Performed By: Jaison Sanders MD     Assistants: Marimar MORAN MD    Anesthesia: General anesthesia    Estimated Blood Loss: Less than 10 mL      Specimens: None    Implants: Sapein 3 valve    Findings: Severe aortic stenosis. 20 mm Trejo Luis 3 valve deployed with good result. Right femoral approach. Perclose arteriotomy done. Complications: None    Recommendations: Continue medical therapy.     Signed By: Jaison Sanders MD     October 25, 2016

## 2017-03-29 PROBLEM — Z95.2 S/P TAVR (TRANSCATHETER AORTIC VALVE REPLACEMENT): Status: ACTIVE | Noted: 2017-03-29

## 2017-03-29 PROBLEM — N30.00 ACUTE CYSTITIS: Status: ACTIVE | Noted: 2017-03-29

## 2017-03-29 LAB
ANION GAP BLD CALC-SCNC: 9 MMOL/L (ref 7–16)
ATRIAL RATE: 72 BPM
BASOPHILS # BLD AUTO: 0 K/UL (ref 0–0.2)
BASOPHILS # BLD: 0 % (ref 0–2)
BUN SERPL-MCNC: 16 MG/DL (ref 8–23)
CALCIUM SERPL-MCNC: 7.5 MG/DL (ref 8.3–10.4)
CALCULATED P AXIS, ECG09: 73 DEGREES
CALCULATED R AXIS, ECG10: 26 DEGREES
CALCULATED T AXIS, ECG11: 56 DEGREES
CHLORIDE SERPL-SCNC: 110 MMOL/L (ref 98–107)
CHOLEST SERPL-MCNC: 109 MG/DL
CO2 SERPL-SCNC: 23 MMOL/L (ref 21–32)
CREAT SERPL-MCNC: 1.04 MG/DL (ref 0.6–1)
DIAGNOSIS, 93000: NORMAL
DIFFERENTIAL METHOD BLD: ABNORMAL
EOSINOPHIL # BLD: 0 K/UL (ref 0–0.8)
EOSINOPHIL NFR BLD: 0 % (ref 0.5–7.8)
ERYTHROCYTE [DISTWIDTH] IN BLOOD BY AUTOMATED COUNT: 13.2 % (ref 11.9–14.6)
GLUCOSE SERPL-MCNC: 160 MG/DL (ref 65–100)
HCT VFR BLD AUTO: 29.6 % (ref 35.8–46.3)
HDLC SERPL-MCNC: 37 MG/DL (ref 40–60)
HDLC SERPL: 2.9 {RATIO}
HGB BLD-MCNC: 10.2 G/DL (ref 11.7–15.4)
IMM GRANULOCYTES # BLD: 0 K/UL (ref 0–0.5)
IMM GRANULOCYTES NFR BLD AUTO: 0.1 % (ref 0–5)
LDLC SERPL CALC-MCNC: 45.6 MG/DL
LIPID PROFILE,FLP: ABNORMAL
LYMPHOCYTES # BLD AUTO: 19 % (ref 13–44)
LYMPHOCYTES # BLD: 1.3 K/UL (ref 0.5–4.6)
MAGNESIUM SERPL-MCNC: 2.1 MG/DL (ref 1.8–2.4)
MCH RBC QN AUTO: 29.8 PG (ref 26.1–32.9)
MCHC RBC AUTO-ENTMCNC: 34.5 G/DL (ref 31.4–35)
MCV RBC AUTO: 86.5 FL (ref 79.6–97.8)
MONOCYTES # BLD: 0.6 K/UL (ref 0.1–1.3)
MONOCYTES NFR BLD AUTO: 9 % (ref 4–12)
NEUTS SEG # BLD: 4.8 K/UL (ref 1.7–8.2)
NEUTS SEG NFR BLD AUTO: 72 % (ref 43–78)
P-R INTERVAL, ECG05: 136 MS
PLATELET # BLD AUTO: 147 K/UL (ref 150–450)
PMV BLD AUTO: 10.7 FL (ref 10.8–14.1)
POTASSIUM SERPL-SCNC: 4.2 MMOL/L (ref 3.5–5.1)
Q-T INTERVAL, ECG07: 412 MS
QRS DURATION, ECG06: 88 MS
QTC CALCULATION (BEZET), ECG08: 451 MS
RBC # BLD AUTO: 3.42 M/UL (ref 4.05–5.25)
SODIUM SERPL-SCNC: 142 MMOL/L (ref 136–145)
TRIGL SERPL-MCNC: 132 MG/DL (ref 35–150)
VENTRICULAR RATE, ECG03: 72 BPM
VLDLC SERPL CALC-MCNC: 26.4 MG/DL (ref 6–23)
WBC # BLD AUTO: 6.7 K/UL (ref 4.3–11.1)

## 2017-03-29 PROCEDURE — 80061 LIPID PANEL: CPT | Performed by: INTERNAL MEDICINE

## 2017-03-29 PROCEDURE — 36415 COLL VENOUS BLD VENIPUNCTURE: CPT | Performed by: INTERNAL MEDICINE

## 2017-03-29 PROCEDURE — 65660000004 HC RM CVT STEPDOWN

## 2017-03-29 PROCEDURE — 93306 TTE W/DOPPLER COMPLETE: CPT

## 2017-03-29 PROCEDURE — 80048 BASIC METABOLIC PNL TOTAL CA: CPT | Performed by: INTERNAL MEDICINE

## 2017-03-29 PROCEDURE — 93005 ELECTROCARDIOGRAM TRACING: CPT | Performed by: INTERNAL MEDICINE

## 2017-03-29 PROCEDURE — 85025 COMPLETE CBC W/AUTO DIFF WBC: CPT | Performed by: INTERNAL MEDICINE

## 2017-03-29 PROCEDURE — 74011250637 HC RX REV CODE- 250/637: Performed by: PHYSICIAN ASSISTANT

## 2017-03-29 PROCEDURE — 74011250636 HC RX REV CODE- 250/636: Performed by: INTERNAL MEDICINE

## 2017-03-29 PROCEDURE — 83735 ASSAY OF MAGNESIUM: CPT | Performed by: INTERNAL MEDICINE

## 2017-03-29 PROCEDURE — 74011250637 HC RX REV CODE- 250/637: Performed by: INTERNAL MEDICINE

## 2017-03-29 RX ADMIN — METOPROLOL TARTRATE 25 MG: 25 TABLET ORAL at 11:20

## 2017-03-29 RX ADMIN — HYDROCODONE BITARTRATE AND ACETAMINOPHEN 1 TABLET: 5; 325 TABLET ORAL at 16:04

## 2017-03-29 RX ADMIN — AMLODIPINE BESYLATE 10 MG: 10 TABLET ORAL at 08:44

## 2017-03-29 RX ADMIN — Medication 10 ML: at 21:01

## 2017-03-29 RX ADMIN — CIPROFLOXACIN 500 MG: 500 TABLET ORAL at 20:52

## 2017-03-29 RX ADMIN — Medication 10 ML: at 06:38

## 2017-03-29 RX ADMIN — LEVOTHYROXINE SODIUM 100 MCG: 100 TABLET ORAL at 06:37

## 2017-03-29 RX ADMIN — HYDROCODONE BITARTRATE AND ACETAMINOPHEN 1 TABLET: 5; 325 TABLET ORAL at 20:54

## 2017-03-29 RX ADMIN — CIPROFLOXACIN 500 MG: 500 TABLET ORAL at 08:43

## 2017-03-29 RX ADMIN — CEFAZOLIN 2 G: 1 INJECTION, POWDER, FOR SOLUTION INTRAMUSCULAR; INTRAVENOUS; PARENTERAL at 00:15

## 2017-03-29 RX ADMIN — METOPROLOL TARTRATE 25 MG: 25 TABLET ORAL at 20:52

## 2017-03-29 RX ADMIN — CEFAZOLIN 2 G: 1 INJECTION, POWDER, FOR SOLUTION INTRAMUSCULAR; INTRAVENOUS; PARENTERAL at 08:44

## 2017-03-29 RX ADMIN — CLOPIDOGREL BISULFATE 75 MG: 75 TABLET, FILM COATED ORAL at 08:44

## 2017-03-29 RX ADMIN — Medication 10 ML: at 14:15

## 2017-03-29 RX ADMIN — SODIUM CHLORIDE 75 ML/HR: 900 INJECTION, SOLUTION INTRAVENOUS at 01:16

## 2017-03-29 RX ADMIN — ROSUVASTATIN CALCIUM 10 MG: 5 TABLET ORAL at 20:51

## 2017-03-29 RX ADMIN — BENAZEPRIL HYDROCHLORIDE 20 MG: 10 TABLET, FILM COATED ORAL at 08:43

## 2017-03-29 NOTE — PROGRESS NOTES
CV Progress Note    Subjective:   Admit Date: 3/27/2017    Surgery Date:  1 Day Post-Op      Procedure:  Procedure(s):  TRANSCATHETER AORTIC VALVE REPLACEMENT  ESOPHAGEAL TRANS ECHOCARDIOGRAM    No complaints. No chest pain or shortness of breath      Objective:     Vitals:  Blood pressure 115/50, pulse 63, temperature 98 °F (36.7 °C), resp. rate 16, height 5' 4\" (1.626 m), weight 148 lb 2.4 oz (67.2 kg), SpO2 97 %, not currently breastfeeding. Temp (24hrs), Av.2 °F (36.8 °C), Min:97.5 °F (36.4 °C), Max:99.7 °F (37.6 °C)  Neuro in tact  RRR  Lungs clear  No hematoma or bruit in either groin  Good distal pulses        Plan/Recommendations/Medical Decision Making:     Principal Problem: Aortic stenosis (2015)    Active Problems:    Hyperlipidemia (2014)      Type II diabetes mellitus, uncontrolled ([facility] [address] (2014)      Essential hypertension, benign (2014)      Chronic kidney disease (CKD) stage G3a/A1, moderately decreased glomerular filtration rate (GFR) between 45-59 mL/min/1.73 square meter and albuminuria creatinine ratio less than 30 mg/g (2017)      S/P TAVR (transcatheter aortic valve replacement) (3/29/2017)      Acute cystitis (3/29/2017)    Doing well. Protocol. Echo. Plavix. Transfer to stepdown. Watch creatine    Continue present treatment    See orders for details. Blayne Mckinnon.  Doris Hamilton MD

## 2017-03-29 NOTE — PROGRESS NOTES
TRANSFER - OUT REPORT:    Verbal report given to Sharona Madrigal RN(name) on [address]  being transferred to [facility](unit) for routine progression of care       Report consisted of patients Situation, Background, Assessment and   Recommendations(SBAR). Information from the following report(s) SBAR, Kardex, OR Summary, Procedure Summary, Intake/Output, MAR, Recent Results, Med Rec Status and Cardiac Rhythm NSR was reviewed with the receiving nurse. Lines:   Peripheral IV 03/27/17 Right Antecubital (Active)   Site Assessment Clean, dry, & intact 3/29/2017 11:00 AM   Phlebitis Assessment 0 3/29/2017 11:00 AM   Infiltration Assessment 0 3/29/2017 11:00 AM   Dressing Status Clean, dry, & intact 3/29/2017 11:00 AM   Dressing Type Transparent 3/29/2017 11:00 AM   Hub Color/Line Status Flushed;Patent 3/29/2017 11:00 AM   Action Taken Open ports on tubing capped 3/29/2017  3:00 AM   Alcohol Cap Used No 3/27/2017 11:04 PM       Peripheral IV 03/28/17 Left Antecubital (Active)   Site Assessment Clean, dry, & intact 3/29/2017 11:00 AM   Phlebitis Assessment 0 3/29/2017 11:00 AM   Infiltration Assessment 0 3/29/2017 11:00 AM   Dressing Status Clean, dry, & intact 3/29/2017 11:00 AM   Dressing Type Transparent 3/29/2017 11:00 AM   Hub Color/Line Status Flushed;Patent 3/29/2017 11:00 AM   Action Taken Open ports on tubing capped 3/29/2017  3:00 AM   Alcohol Cap Used Yes 3/28/2017  7:00 PM        Opportunity for questions and clarification was provided.       Patient transported with:   Monitor  Registered Nurse

## 2017-03-29 NOTE — PROGRESS NOTES
[facility] CARDIOLOGY PROGRESS NOTE           3/29/2017 7:10 AM    Admit Date: 3/27/2017      Subjective:   Patient doing well. No chest pain or dyspnea. BP well controlled. Mild discomfort \"pulling\" at right femoral puncture site. No hematoma. ROS:  Cardiovascular:  As noted above    Objective:      Vitals:    03/29/17 0400 03/29/17 0500 03/29/17 0600 03/29/17 0700   BP: 123/57 126/59 132/57 123/56   Pulse: 71 74 73 68   Resp: 15 15 15 15   Temp:       SpO2: 94% 93% 95% 96%   Weight:  67.2 kg (148 lb 2.4 oz)     Height:           Physical Exam:  General-No Acute Distress  Neck- supple, no JVD  CV- regular rate and rhythm I/VI RADNY. Lung- clear bilaterally  Abd- soft, nontender, nondistended  Ext- no edema bilaterally. Skin- warm and dry      Data Review:   Recent Labs      03/29/17   0330  03/28/17   0431  03/27/17   1803   NA   --   144  144   K   --   3.8  3.7   BUN   --   19  18   CREA   --   0.95  0.99   GLU   --   137*  139*   WBC  6.7   --   6.6   HGB  10.2*   --   12.7   HCT  29.6*   --   37.3   PLT  147*   --   156   INR   --    --   1.0      No results found for: TROIQ, RAFAELA, TROPT, TNIPOC    Assessment/Plan:     Principal Problem: Aortic stenosis (11/24/2015)  S/P TAVR. See below. Active Problems:    Hyperlipidemia (9/4/2014)  On Crestor. Type II diabetes mellitus, uncontrolled ([facility] [address] (9/4/2014)  Stable. Essential hypertension, benign (9/4/2014)  BP controlled. Chronic kidney disease (CKD) stage G3a/A1, moderately decreased glomerular filtration rate (GFR) between 45-59 mL/min/1.73 square meter and albuminuria creatinine ratio less than 30 mg/g (1/20/2017)      S/P TAVR (transcatheter aortic valve replacement) (3/29/2017)  POD #1 after 20 mm Luis S3 TAVR valve. On Plavix. Allergic to aspirin. Doing well. Move to CV stepdown. Acute cystitis (3/29/2017)  On Cipro.                   Sumit Donohue MD  3/29/2017 7:10 AM

## 2017-03-29 NOTE — PROGRESS NOTES
LEAPFROG PROTOCOL NOTE    Aurora Oleary  3/29/2017    The patient is currently in the critical care setting managed by Dr. Isaias Anderson post TAVR. The patient's chart is reviewed and the patient is discussed with the staff. Patient is currently hemodynamically stable. Patient has no needs identified for Intensivist management in the critical care setting at this time. Please notify us if can be of assistance. No charge billed to the patient. Thank you.     Wendy Martin MD

## 2017-03-29 NOTE — OP NOTES
[address]   OPERATIVE REPORT       Name:  Cathryn Cary   MR#:  [id number]   :  1929   Account #:  [de-identified]   Date of Adm:  2017       DATE OF SURGERY: 2017    PREOPERATIVE DIAGNOSIS: Severe aortic stenosis. POSTOPERATIVE DIAGNOSIS: Severe aortic stenosis. PROCEDURE: Transcatheter aortic valve replacement (Trejo   Luis 3 twenty millimeter transcatheter heart valve) via right   common femoral artery percutaneous approach. PRIMARY CARDIAC SURGEON: Kiko Pantoja MD     PRIMARY INTERVENTIONAL CARDIOLOGIST: Melissa Pierce MD    ANESTHESIA: General.    BLOOD LOSS: Minimal.    INDICATION FOR PROCEDURE: The patient is an 80-year-old woman   with a history of severe symptomatic aortic stenosis who was   felt to be intermediate risk and have limited rehabilitation   potential after surgical aortic valve replacement   and transcatheter aortic valve replacement was recommended. The   patient was extensively discussed at the multidisciplinary valve   board meeting and TAVR was recommended. DESCRIPTION OF PROCEDURE: Informed consent was obtained and   placed in the chart. The patient was brought to the operating   room, placed supine, time-out performed. Standard monitoring   lines were placed by Anesthesia. The patient was intubated and   placed under general anesthesia without event. Intravenous   vancomycin was administered. The patient was positioned with the   arms tucked at the sides. All pressure points were padded. The   patient was prepped and draped in standard, meticulous surgical   fashion. I[place]an cardiovascular drapes were used. Access to the left common femoral artery was obtained using   ultrasound guidance with a 6-South African sheath. A 6-South African venous   sheath was placed in the left common femoral vein. The LIMA   catheter was advanced into the abdominal aorta and used to   perform selective angiography of the right common iliac artery. Using fluoroscopic guidance, access was obtained to the right   common femoral artery using a micropuncture kit. This was   upsized to a 6-Moroccan sheath. Next, a balloon tipped transvenous pacemaker was advanced into   the RV apex via the left common femoral vein. Pacing threshold   was measured at 0.4 milliamps. Next, the pigtail catheter was advanced through the left common   femoral arterial sheath and located within the right coronary   cusp. A coplanar angle with the image intensifier was   identified. Next, the right common femoral arterial sheath was removed over   a wire and 2 Perclose devices were placed in the 2 o'clock and   10 o'clock positions. Next, a 14-Moroccan dilator was placed in the right common femoral   artery under fluoroscopic guidance. A DeepRockDrive extra stiff   wire was advanced through the dilator. Next, this was removed and exchanged for a 14-Moroccan Trejo   sheath. The patient was heparinized. ACT was maintained at   greater than 300. Next, a 5-Moroccan AL1 catheter was advanced into the aortic root   using a Cordis straight wire, and using the Cordis straight   wire the aortic valve was crossed. The AL1 catheter was advanced   into the left ventricular apex. An Amplatz extra stiff wire with   a preformed curve was advanced through this into the apex. Next, a 16 mm Trejo balloon was advanced and used to cross the   aortic valve, and balloon valvuloplasty was performed during a   period of rapid ventricular pacing. Following this, LÁZARO revealed   mild to moderate AI. Next, the Trejo Luis 3 twenty millimeter transcatheter heart   valve was prepared and mounted on the delivery system. Appropriate orientation was confirmed. This was advanced via the   right common femoral artery into the descending thoracic aorta. The valve was then positioned across the aortic valve under   fluoroscopy.  The valve was then deployed under a period of rapid ventricular pacing. Simultaneous aortography confirmed good   position of the valve. There was mild to moderate aortic   regurgitation by echo and aortography, which persisted after   removal of the wire located in the LV apex. The valve was   recrossed with an AL1 catheter and J-tipped wire. This was   exchanged for an Amplatz extra stiff wire. The delivery balloon   was readvanced and postdilatation was performed with an   additional 2 mL of fluid. Following this, there was marked   improvement in the degree of AI to a grade of trivial. The   delivery system was removed. The 14-Maori sheath was removed   and a Perclose system was deployed with good hemostasis. Protamine was administered. The sheath was removed from the left   common femoral artery and an Angio-Seal vascular closure device   deployed. Transvenous pacemaker and venous sheath were removed,   and manual pressure held until hemostasis was good. The patient   remained hemodynamically stable, and was extubated in the   operating room and transported to the CVICU in stable condition. Please note that Dr. Lyndsay Rogers and LAURA were both present   and scrubbed throughout the duration of the case, and both   participated in all aspects of the case.         MD Freya Patrick   D:  03/29/2017   09:13   T:  03/29/2017   13:36   Job #:  [id number]

## 2017-03-29 NOTE — PROGRESS NOTES
TRANSFER - IN REPORT:    Verbal report received from SABRINA Guerrier(name) on Aurora Oleary  being received from CVICU(unit) for routine progression of care      Report consisted of patients Situation, Background, Assessment and   Recommendations(SBAR). Information from the following report(s) SBAR, Procedure Summary and Recent Results was reviewed with the   receiving nurse. Opportunity for questions and clarification was provided. Assessment completed upon patients arrival to unit and care assumed. Dual skin assessment completed with RN. Allevyn pulled back and sacrum visualized. No redness or breakdown to skin noted.

## 2017-03-30 VITALS
HEIGHT: 64 IN | TEMPERATURE: 99.1 F | RESPIRATION RATE: 18 BRPM | SYSTOLIC BLOOD PRESSURE: 152 MMHG | BODY MASS INDEX: 23.87 KG/M2 | DIASTOLIC BLOOD PRESSURE: 67 MMHG | OXYGEN SATURATION: 92 % | HEART RATE: 72 BPM | WEIGHT: 139.8 LBS

## 2017-03-30 DIAGNOSIS — I35.0 AORTIC VALVE STENOSIS, UNSPECIFIED ETIOLOGY: Primary | ICD-10-CM

## 2017-03-30 LAB
BACTERIA SPEC CULT: NORMAL
SERVICE CMNT-IMP: NORMAL

## 2017-03-30 PROCEDURE — 74011250637 HC RX REV CODE- 250/637: Performed by: INTERNAL MEDICINE

## 2017-03-30 PROCEDURE — 74011250637 HC RX REV CODE- 250/637: Performed by: PHYSICIAN ASSISTANT

## 2017-03-30 RX ORDER — CLOPIDOGREL BISULFATE 75 MG/1
75 TABLET ORAL DAILY
Qty: 90 TAB | Refills: 5 | Status: SHIPPED | OUTPATIENT
Start: 2017-03-30 | End: 2017-09-08 | Stop reason: SDUPTHER

## 2017-03-30 RX ORDER — HYDROCODONE BITARTRATE AND ACETAMINOPHEN 5; 325 MG/1; MG/1
1 TABLET ORAL
Qty: 8 TAB | Refills: 0 | Status: ON HOLD | OUTPATIENT
Start: 2017-03-30 | End: 2017-05-04

## 2017-03-30 RX ADMIN — BENAZEPRIL HYDROCHLORIDE 20 MG: 10 TABLET, FILM COATED ORAL at 08:47

## 2017-03-30 RX ADMIN — LEVOTHYROXINE SODIUM 100 MCG: 100 TABLET ORAL at 05:59

## 2017-03-30 RX ADMIN — METOPROLOL TARTRATE 25 MG: 25 TABLET ORAL at 08:49

## 2017-03-30 RX ADMIN — AMLODIPINE BESYLATE 10 MG: 10 TABLET ORAL at 08:46

## 2017-03-30 RX ADMIN — HYDROCODONE BITARTRATE AND ACETAMINOPHEN 1 TABLET: 5; 325 TABLET ORAL at 04:29

## 2017-03-30 RX ADMIN — CIPROFLOXACIN 500 MG: 500 TABLET ORAL at 08:44

## 2017-03-30 RX ADMIN — Medication 10 ML: at 05:59

## 2017-03-30 RX ADMIN — HYDROCODONE BITARTRATE AND ACETAMINOPHEN 1 TABLET: 5; 325 TABLET ORAL at 08:45

## 2017-03-30 NOTE — PROGRESS NOTES
Comfort Alejo in with patient reviewing discharge instructions and appointments. Patient's family has lots of questions.

## 2017-03-30 NOTE — DISCHARGE SUMMARY
[facility] Cardiology Discharge Summary     Patient ID:  Horacio Myles  [id number]  40 y.o.  3/20/1929    Admit date: 3/27/2017    Discharge date:  3/30/2017    Admitting Physician: Shae Swanson MD     Discharge Physician: ANTHONY Tam/Dr. Magali Mayer    Admission Diagnoses: Aortic valve stenosis, unspecified etiology [I35.0]    Discharge Diagnoses:   Patient Active Problem List    Diagnosis Date Noted    S/P TAVR (transcatheter aortic valve replacement) 03/29/2017    Acute cystitis 03/29/2017    Chronic kidney disease (CKD) stage G3a/A1, moderately decreased glomerular filtration rate (GFR) between 45-59 mL/min/1.73 square meter and albuminuria creatinine ratio less than 30 mg/g 01/20/2017    Aortic stenosis 11/24/2015    Chest pain 11/24/2015    Bruit (arterial) 11/24/2015    Visual disturbances 11/24/2015    Coronary artery disease due to lipid rich plaque 06/08/2015    Hyperlipidemia 09/04/2014    Type II diabetes mellitus, uncontrolled ([address] 09/04/2014    Essential hypertension, benign 09/04/2014    At risk for falling 09/04/2014       Cardiology Procedures this admission:  Transcatheter aortic valve replacement  Consults: None    Hospital Course: Patient was seen at the office of [facility] Cardiology by Dr. Magali Mayer in follow up for severe aortic stenosis. She was seen by CTS and felt to be high risk for surgical AVR. The patient was felt to be an appropriate candidate for TAVR. She was admitted for planned TAVR. The patient underwent successful balloon valvuloplasty and transcatheter aortic valve replacement with a 20mm Trejo Luis 3 valve. The patient was monitored closely in the CVICU. She was transferred to [facility] for further monitoring. Echocardiogram showed normal function of bioprosthesis. The morning of 3/30/17, patient was up feeling well without any complaints of chest pain or shortness of breath. Patient's labs were stable.   Patient was seen and examined by Dr. Carrie Kiran and determined stable and ready for discharge. Patient was instructed on the importance of medication compliance. She is allergic to aspirin. She will continue Plavix for at least 6 months. She was started on antibiotic for UTI on 3/23/17. She has completed her antibiotic course. Repeat urinalysis and culture were negative this admission. The patient will have transitional care follow up with [facility] Cardiology Dr. Carrie Kiran on April 4th at 2:30pm THE [facility] AT [facility]. A repeat echocardiogram will be obtained in 1 month. DISPOSITION: The patient is being discharged home in stable condition on a low saturated fat, low cholesterol and low salt diet. The patient is instructed to advance activities as tolerated to the limit of fatigue or shortness of breath. The patient is instructed to avoid lifting anything heavier than 10 lbs for 2 weeks. The patient is instructed to avoid any straining, stooping or squatting for 2 weeks. The patient is instructed not to drive for 1 week. The patient is instructed to watch the groin site for bleeding/oozing; if seen, the patient is instructed to apply firm pressure with a clean cloth and call [facility] Cardiology at [phone]. The patient is instructed to watch for signs of infection which include: increasing area of redness, fever/hot to touch or purulent drainage at the groin site. The patient is instructed not to soak in a bathtub for 7-10 days, but is cleared to shower. The patient is instructed to return to the ER immediately for any severe pain, color change, or temperature change in leg. The patient is informed that prophylaxis for bacterial endocarditis is recommended for high-risk procedures such as all dental procedures that involve manipulation of gingival tissue, the periapical region of teeth, or perforation of the oral mucosa. Discharge Exam:   Visit Vitals    /67 (BP 1 Location: Left arm, BP Patient Position:  At rest)    Pulse 72    Temp 99.1 °F (37.3 °C)    Resp 18    Ht 5' 4\" (1.626 m)    Wt 63.4 kg (139 lb 12.8 oz)    SpO2 92%    Breastfeeding No    BMI 24 kg/m2       Patient has been seen by Dr. Rosemarie Salazar: see his progress note for exam details. Patient Instructions:   Current Discharge Medication List      START taking these medications    Details   HYDROcodone-acetaminophen (NORCO) 5-325 mg per tablet Take 1 Tab by mouth every four (4) hours as needed for Pain. Max Daily Amount: 6 Tabs. Qty: 8 Tab, Refills: 0         CONTINUE these medications which have CHANGED    Details   clopidogrel (PLAVIX) 75 mg tab Take 1 Tab by mouth daily. Qty: 90 Tab, Refills: 5    Associated Diagnoses: Coronary artery disease due to lipid rich plaque; Essential hypertension, benign; Aorta disorder ([address]         CONTINUE these medications which have NOT CHANGED    Details   amLODIPine (NORVASC) 10 mg tablet Take 1 Tab by mouth daily. Qty: 90 Tab, Refills: 3      traMADol (ULTRAM) 50 mg tablet Take 1 Tab by mouth every six (6) hours as needed for Pain. Qty: 90 Tab, Refills: 2    Associated Diagnoses: Left hip pain; Neck pain      levothyroxine (SYNTHROID) 100 mcg tablet Take 1 Tab by mouth Daily (before breakfast). Qty: 90 Tab, Refills: 1    Associated Diagnoses: Hypothyroidism due to acquired atrophy of thyroid      benazepril (LOTENSIN) 20 mg tablet Take 1 Tab by mouth daily. Qty: 90 Tab, Refills: 1    Associated Diagnoses: Essential hypertension, benign; Coronary artery disease due to lipid rich plaque      furosemide (LASIX) 40 mg tablet Take 1 Tab by mouth daily. Qty: 90 Tab, Refills: 1    Associated Diagnoses: Essential hypertension, benign; Coronary artery disease due to lipid rich plaque      metoprolol tartrate (LOPRESSOR) 25 mg tablet Take 1 Tab by mouth two (2) times a day.   Qty: 180 Tab, Refills: 3    Associated Diagnoses: Essential hypertension, benign; Coronary artery disease due to lipid rich plaque rosuvastatin (CRESTOR) 20 mg tablet Take 1 Tab by mouth nightly. Qty: 30 Tab, Refills: 5    Associated Diagnoses: Mixed hyperlipidemia; Coronary artery disease due to lipid rich plaque      FREESTYLE LITE STRIPS strip CHECK BLOOD SUGAR EVERY DAY  Qty: 50 Strip, Refills: 11      ASCORBIC ACID/VITAMIN E (CRANBERRY CONCENTRATE PO) Take  by mouth. Associated Diagnoses: Coronary artery disease due to lipid rich plaque      EPINEPHrine (EPIPEN) 0.3 mg/0.3 mL injection 0.3 mL by IntraMUSCular route once as needed for up to 1 dose. Indications: Anaphylaxis  Qty: 2 Syringe, Refills: 1    Associated Diagnoses: Allergic reaction, initial encounter      nitroglycerin (NITROSTAT) 0.4 mg SL tablet 1 Tab by SubLINGual route every five (5) minutes as needed. Qty: 25 Tab, Refills: 11    Associated Diagnoses: Coronary artery disease due to lipid rich plaque; Aortic valve stenosis, unspecified etiology;  Aorta disorder ([facility] [address]         STOP taking these medications       ciprofloxacin HCl (CIPRO) 500 mg tablet Comments:   Reason for Stopping:               Signed:  Melody Reid PA-C  3/30/2017  7:55 AM

## 2017-03-30 NOTE — PROGRESS NOTES
Reviewed discharge instructions and medications with patient and daughter in law and gave copies and prescription. Gave time for questions. Removed PIV and heart monitor.

## 2017-03-30 NOTE — DISCHARGE INSTRUCTIONS
Do not lift, push or pull anything heavier than 10 lbs for the next 2 weeks. You should also avoid any straining, stooping or squatting for 2 weeks. Do not drive for 1 week. If your groin site starts bleeding or oozing, apply firm pressure with a clean cloth and call [facility] Cardiology at [phone]. You should watch for signs of infection such as increasing areas of redness, fever, or purulent drainage. If you see anything concerning, please call our office. If you experience any severe pain, numbness, color change or temperature change in your leg, please return to the Emergency Room for immediate evaluation. All patients with prosthetic valves, including those who have undergone TAVR, are considered among those at highest risk for endocarditis (infection of a heart valve). You may need to take antibiotics before certain procedures to prevent infection. Please call our office before you have any dental procedures or oral surgery. DISCHARGE SUMMARY from Nurse    The following personal items are in your possession at time of discharge:    Dental Appliances: Lowers, Uppers sent home with patient  Visual Aid: None     Home Medications: Sent to pharmacy sent home with patient  Jewelry: None  Clothing: Sent home  Other Valuables: None             PATIENT INSTRUCTIONS:    After general anesthesia or intravenous sedation, for 24 hours or while taking prescription Narcotics:  · Limit your activities  · Do not drive and operate hazardous machinery  · Do not make important personal or business decisions  · Do  not drink alcoholic beverages  · If you have not urinated within 8 hours after discharge, please contact your surgeon on call.     Report the following to your surgeon:  · Excessive pain, swelling, redness or odor of or around the surgical area  · Temperature over 100.5  · Nausea and vomiting lasting longer than 4 hours or if unable to take medications  · Any signs of decreased circulation or nerve impairment to extremity: change in color, persistent  numbness, tingling, coldness or increase pain  · Any questions    *  Please give a list of your current medications to your Primary Care Provider. *  Please update this list whenever your medications are discontinued, doses are      changed, or new medications (including over-the-counter products) are added. *  Please carry medication information at all times in case of emergency situations. These are general instructions for a healthy lifestyle:    No smoking/ No tobacco products/ Avoid exposure to second hand smoke    Surgeon General's Warning:  Quitting smoking now greatly reduces serious risk to your health. Obesity, smoking, and sedentary lifestyle greatly increases your risk for illness    A healthy diet, regular physical exercise & weight monitoring are important for maintaining a healthy lifestyle    You may be retaining fluid if you have a history of heart failure or if you experience any of the following symptoms:  Weight gain of 3 pounds or more overnight or 5 pounds in a week, increased swelling in our hands or feet or shortness of breath while lying flat in bed. Please call your doctor as soon as you notice any of these symptoms; do not wait until your next office visit. Recognize signs and symptoms of STROKE:    F-face looks uneven    A-arms unable to move or move unevenly    S-speech slurred or non-existent    T-time-call 911 as soon as signs and symptoms begin-DO NOT go       Back to bed or wait to see if you get better-TIME IS BRAIN. Warning Signs of HEART ATTACK     Call 911 if you have these symptoms:   Chest discomfort. Most heart attacks involve discomfort in the center of the chest that lasts more than a few minutes, or that goes away and comes back. It can feel like uncomfortable pressure, squeezing, fullness, or pain.  Discomfort in other areas of the upper body.  Symptoms can include pain or discomfort in one or both arms, the back, neck, jaw, or stomach.  Shortness of breath with or without chest discomfort.  Other signs may include breaking out in a cold sweat, nausea, or lightheadedness. Don't wait more than five minutes to call 911 - MINUTES MATTER! Fast action can save your life. Calling 911 is almost always the fastest way to get lifesaving treatment. Emergency Medical Services staff can begin treatment when they arrive -- up to an hour sooner than if someone gets to the hospital by car. The discharge information has been reviewed with the patient and caregiver. The patient and caregiver verbalized understanding. Discharge medications reviewed with the patient and caregiver and appropriate educational materials and side effects teaching were provided.

## 2017-03-30 NOTE — PROGRESS NOTES
Bedside shift report received from Xiang Arambula, [facility] (offgoing nurse). Report given to Rudy Valiente RN. Vital signs stable. No complaints noted. Patient in stable condition.

## 2017-03-30 NOTE — PROGRESS NOTES
Patient's daughter requesting patient to walk steps before going home. Daughter in law will assist patient in ambulating up steps at nurses station. Patient getting dressed at present time. Patient stable.

## 2017-03-30 NOTE — PROGRESS NOTES
[facility] CARDIOLOGY PROGRESS NOTE           3/30/2017 7:10 AM    Admit Date: 3/27/2017      Subjective:   Patient doing well. No chest pain or dyspnea. BP well controlled. ROS:  Cardiovascular:  As noted above    Objective:      Vitals:    03/29/17 2051 03/29/17 2312 03/30/17 0422 03/30/17 0434   BP: 137/63 129/60 133/60    Pulse: 78 73 75    Resp:  18 16    Temp:  98.8 °F (37.1 °C) 98.4 °F (36.9 °C)    SpO2:  91% 92%    Weight:    63.4 kg (139 lb 12.8 oz)   Height:    5' 4\" (1.626 m)       Physical Exam:  General-No Acute Distress  Neck- supple, no JVD  CV- regular rate and rhythm I/VI RANDY. Lung- clear bilaterally  Abd- soft, nontender, nondistended  Ext- no edema bilaterally. Skin- warm and dry      Data Review:   Recent Labs      03/29/17   0330  03/28/17   0431  03/27/17   1803   NA  142  144  144   K  4.2  3.8  3.7   MG  2.1   --    --    BUN  16  19  18   CREA  1.04*  0.95  0.99   GLU  160*  137*  139*   WBC  6.7   --   6.6   HGB  10.2*   --   12.7   HCT  29.6*   --   37.3   PLT  147*   --   156   INR   --    --   1.0   HDL  37*   --    --       No results found for: TROIQ, RAFAELA, TROPT, TNIPOC    Assessment/Plan:     Principal Problem: Aortic stenosis (11/24/2015)  S/P TAVR. See below. Active Problems:    Hyperlipidemia (9/4/2014)  On Crestor. Type II diabetes mellitus, uncontrolled ([address] (9/4/2014)  Stable. Essential hypertension, benign (9/4/2014)  BP controlled. Chronic kidney disease (CKD) stage G3a/A1, moderately decreased glomerular filtration rate (GFR) between 45-59 mL/min/1.73 square meter and albuminuria creatinine ratio less than 30 mg/g (1/20/2017)      S/P TAVR (transcatheter aortic valve replacement) (3/29/2017)  POD #2 after 20 mm Luis S3 TAVR valve. On Plavix. Allergic to aspirin. Doing well. Home today. TC-7      Acute cystitis (3/29/2017)  On Cipro.   COmplete 10 day course                Ileana Otero MD  3/30/2017 7:10 AM

## 2017-03-30 NOTE — PROGRESS NOTES
Bedside shift report given to Katie Avilez RN (oncoming nurse) by Maritza Zhou RN (offgoing nurse). Bedside shift report included the following information: SBAR, Kardex, ED Summary, MAR, and Recent Results.

## 2017-03-31 ENCOUNTER — PATIENT OUTREACH (OUTPATIENT)
Dept: CASE MANAGEMENT | Age: 82
End: 2017-03-31

## 2017-03-31 LAB
ABO + RH BLD: NORMAL
BLD PROD TYP BPU: NORMAL
BLOOD GROUP ANTIBODIES SERPL: NORMAL
BPU ID: NORMAL
CROSSMATCH RESULT,%XM: NORMAL
SPECIMEN EXP DATE BLD: NORMAL
STATUS OF UNIT,%ST: NORMAL
UNIT DIVISION, %UDIV: 0

## 2017-03-31 NOTE — PROGRESS NOTES
Transition of Care Discharge Follow-Up Questionnaire       Date/Time of Call:   March 31, 2017 12:29PM   What was the patient hospitalized for? Patient hospitalized Aortic Stenosis           Does the patient understand his/her diagnosis and/or treatment and what happened during the hospitalization? Patient's daughter states understanding of diagnosis and treatment during hospitalization. Did the patient receive discharge instructions? Patient's daughter states discharge instructions explained and received before discharge to home. Review any discharge instructions (see notes in [facility]). Ask patient if they understand these. Do they have any questions? Patient's daughter states no questions at this time regarding discharge instructions. Were home services ordered (nursing, PT, OT, ST, etc.)? No home health services ordered. If so, has the first visit occurred? If not, why? (Assist with coordination of services if necessary. ) NA         Was any DME ordered? No durable medical equipment ordered. If so, has it been received? If not, why?  (Assist with coordination of arranging DME orders if necessary. ) NA         Complete a review of all medications (new, continued and discontinued meds per the D/C instructions and [facility] Care Coordinator reviewed all medications two new prescriptions prescribed Norco 5-325 mg one tab po every 4 hours as needed. Plavix 75 mg one tab daily. Discontinued Medications: Cipro 500 mg tabs po            Were all new prescriptions filled? If not, why?  (Assist with obtainment of medications if necessary.) Patient's daughter states new prescriptions filled and currently being taken by patient per doctors order. Does the patient understand the purpose and dosing instructions for all medications?   (If patient has questions, provide explanation and education.) Patient's daughter states understanding of medication purposes and dosing instructions. Does the patient have any problems in performing ADLs? (If patient is unable to perform ADLs - what is the limiting factor(s)? Do they have a support system that can assist? If no support system is present, discuss possible assistance that they may be able to obtain.) Patient's daughter states patient is independent with ADL's and ambulation. Patient's daughter states that patient is staying at her house and that she and brother is patient POA. Patient's daughter states that she and family members assist patient as needed. Does the patient have all follow-up appointments scheduled? Has transportation been arranged? [facility] Pul[facility] follow-up should be within 7 days of discharge; all others should have PCP follow-up within 7 days of discharge; follow-ups with other specialists as appropriate or ordered.) Patient's daughter states follow up appointment scheduled with [facility] Cardiology, April 4, 2017 @ 2:30PM with Dr. Tadeo Vance. [address] [place] [address], May 1, 2017 @ 9:30AM for ECHO. [address] [place] [address], May 1, 2017 @ 10:45 AM with Dr. Tadeo Vance. Patient's daughter states that patient has transportation to appointments. Any other questions or concerns expressed by the patient? Patient's daughter expresses no questions or concerns. Schedule next appointment with MAKAYLA MARCIAL Coordinator or refer to RN Case Manager/  as appropriate. No further needs identified, patient's daughter instructed to call Care Coordinator if further questions or concerns arise.    ALONZO Call Completed By: Dedrick Epperson LPN  Care Coordinator   [facility]

## 2017-04-03 PROBLEM — N18.30 CKD (CHRONIC KIDNEY DISEASE) STAGE 3, GFR 30-59 ML/MIN (HCC): Status: ACTIVE | Noted: 2017-01-20

## 2017-04-11 ENCOUNTER — TELEPHONE (OUTPATIENT)
Dept: CARDIAC REHAB | Age: 82
End: 2017-04-11

## 2017-04-11 NOTE — TELEPHONE ENCOUNTER
Patient called as follow up for cardiac rehab referral. No answer and voice mailbox is full so unable to leave message. Letter mailed asking patient to call to schedule orientation or for more information.

## 2017-04-27 ENCOUNTER — ANESTHESIA EVENT (OUTPATIENT)
Dept: ENDOSCOPY | Age: 82
DRG: 872 | End: 2017-04-27
Payer: MEDICARE

## 2017-04-27 NOTE — H&P
Patient: Trung Samuels  YOB: 1950   Date:                       03/08/2017 9:15 AM   Visit Type:                 Consult      This 77year old  patient was referred by Elgin Charles MD.    Chief Complaint:  Patient seen at the request of Elgin Charles MD for evaluation of the following:  Dysphagia, GERD, Nausea    History of Present Illness:  1. Dysphagia   2. GERD   3. Nausea   Ms Valentina Smyth is a 77year old female with h/o Afib (on Eliquis), h/o tracheostomy for resp failure 3 years ago, CHF, severe COPD, GERD who presents wity dysphagia to solids on a daily basis for 3-4 months. Dr Luellen Hodgkin, ENT, referred patient here today to get EGD with dilation under sedation at hospital due to severe COPD. Pt's last EGD was at [facility] in 2014 for PEG placement due to feeding difficulty. Pt has since had her PEG tube removed and eating food by mouth. She had a laryngoscopy by Dr Luellen Hodgkin no 1/13 and was normal. He ordered barium swallow that showed reflux and delay in passage of tablet so mild distal esophageal stricture could not be excluded. She is on Omeprazole 40mg daily which states doesnt not control her sx well. She is bleching on/off during our exam. She c/o constipation but denies bleeding. She has a BM every third day. Pt does not want to take any meds OTC for this bc she is scared to have diarrhea. Pt thinks colonoscopy was done three years ago but she isnt sure. She has no other complaints.         PAST MEDICAL/SURGICAL HISTORY  (Detailed)    Disease/disorder Onset Date Management Date Comments     Tracheotomy 2014  [facility] 03/08/2017 -     Hysterectomy, total abdominal, BSO  [facility] 03/08/2017 -     Cholecystectomy     COPD       Diabetes type 2    [facility] 03/08/2017 -   Hypertension       MI  Heart Bypass  [facility] 03/08/2017 -           PROVIDER INFORMATION AND PATIENT ADMISSION:    CURRENT MEDICATIONS  Medication Name Sig Desc   Ventolin HFA 90 mcg/actuation aerosol inhaler inhale 2 puff by inhalation route  every 4 - 6 hours as needed   metformin 500 mg tablet take 4 tablet by oral route  every day with largest meal   gabapentin 800 mg tablet take 1 tablet by oral route 4 times every day   sucralfate 1 gram tablet take 1 tablet by oral route 3 times every day on an empty stomach 1 hour before meals and at bedtime   oxybutynin chloride ER 10 mg tablet,extended release 24 hr take 1 tablet by oral route  every day   meclizine 25 mg tablet take 1 tablet by oral route 3 times every day as needed   furosemide 40 mg tablet take 1 tablet by oral route  every day   omeprazole 40 mg capsule,delayed release take 1 capsule by oral route  every day before a meal   atorvastatin 20 mg tablet take 1 tablet by oral route  every day   Digox 125 mcg tablet take 1 tablet by oral route  every day   Eliquis 5 mg tablet take 1 tablet by oral route 2 times every day   Victoza 3-Andrei 0.6 mg/0.1 mL (18 mg/3 mL) subcutaneous pen injector inject (1.2MG)  by subcutaneous route  every day   glipizide ER 10 mg tablet, extended release 24 hr take 1 tablet by oral route  every day with breakfast   levothyroxine 100 mcg tablet take 1 tablet by oral route  every day   trazodone 100 mg tablet take 2 tablet by oral route  every day after meals   Namenda XR 28 mg capsule sprinkle,extended release take 1 capsule by oral route  every day   metoprolol succinate ER 50 mg tablet,extended release 24 hr take 1 tablet by oral route  every day   Aspir-81 81 mg tablet,delayed release take 1 tablet by oral route  every day   sertraline 100 mg tablet take 1 tablet by oral route  every day   Nitrostat 0.4 mg sublingual tablet place 1 tablet by sublingual route  every 5 minutes as needed for chest pain. Do not exceed 3 doses in 15 minutes. valsartan 40 mg tablet take 1 tablet by oral route  every day   Medication Reconciliation  Medications reconciled today.     Allergies:  Ingredient Reaction Medication Name Comment   NO KNOWN ALLERGIES      Reviewed, updated. Family History  (Detailed)    SOCIAL HISTORY  (Detailed)  Tobacco use reviewed. Preferred language is [place]. Smoking status: Former smoker. SMOKING STATUS  Use Status Type Smoking Status Usage Per Day Years Used Total Pack Years   yes  Former smoker          ALCOHOL  The patient denies alcohol use. CAFFEINE  The patient uses caffeine - 1 cup a day. REVIEW OF SYSTEMS  System Neg/Pos Details   ENMT Positive Hoarseness, Sleep apnea. Respiratory Positive Cough, Dyspnea. Cardio Positive Irregular heartbeat/palpitations. GI Positive Dysphagia, Nausea, Vomiting. Endocrine Positive Heat intolerance. Neuro Positive Dizziness. MS Positive Joint pain, Myalgia. The remainder of the 11-point review of systems is negative. VITAL SIGNS  BP mm/Hg Pulse/min Resp/min Temp F Height (Total in.) Weight (lbs.) Weight (oz.) BMI   124/70 66 18  61.50 197.20  36.66     PHYSICAL EXAM:  GENERAL: A well nourished, well hydrated patient who appears their stated age. SKIN: Extremities and face reveal no rashes. HEENT: Sclerae anicteric. No oral ulcerations or abnormal pigment of the lips. PERRLA  CARDIOVASCULAR: RRR. RESPIRATORY:  inspiratory and expiratory wheezing in R lung  GI: soft, TTP RUQ, epigastric and RLQ, no masses, +BS, no guarding or rebound  RECTAL: deferred  MUSCULOSKELETAL: Gait appears steady. No pitting edema of the lower legs. NEUROLOGICAL: Gross memory appears intact. A&O x3  PSYCHIATRIC: Mood appears appropriate with judgement intact. Completed Orders (this encounter)  Order Details   1 box of Dexilan given    Start Zofran ODT 4mg every 6 hours as needed, Rx sent    Medications Prescribed (this encounter)  Medication Directions   Zofran ODT 4 mg disintegrating tablet take 1 tablet by oral route  every 6 hours and place on top of the tongue where they will dissolve, then swallow     Assessment/Plan  # Detail Type Description    1.  Assessment Dysphagia, unspecified type (R13.10). Plan Orders She is to schedule a follow-up visit for to be determined after testing/proce. 2. Assessment Nausea (R11.0). 3. Assessment Gastroesophageal reflux disease, esophagitis presence not specified (K21.9). Plan Orders Further diagnostic evaluations ordered today include(s) EGD W Dilation to be performed on 04/28/2017. Today's instructions / counseling include(s) 1 box of Dexilan given and Start Zofran ODT 4mg every 6 hours as needed, Rx sent. 4. Assessment Constipation, unspecified constipation type (K59.00). Provider Plan 77year old female with severe COPD, Afib on Eliquis, CHF, and other co morbidities presents with dysphagia to solid foods for 3-4mon. Her ENT referred her here for EGD with dilation under sedation at hospital. She also suffers with GERD, nausea and constipation. She is on Omeprazole 40mg QD without much help. Plan  1. EGD with dilation at hospital due to severe copd  The risks, reasons and alternatives to this procedure were carefully explained and the patient has given verbal, informed consent. An informative video about the procedure was made available for viewing. On the day of the procedure a  will be necessary. Medicines to avoid before the procedure were listed for the patient. Other adjustments in their medications were discussed and written instructions were provided. 2. Rx of Zofran 4mg ODT - two week supply given  3. Samples of Dexilant given to pt. If she likes, can call for Rx. Told her to take this in place of her Omeprazole  4. Will call Dr Stephen Delgado regarding Eliquis hold  5. Discussed options for constipation but pt refusing to use Miralax, fiber or prune juice for this              The patient was checked out at 10:16 AM by Kathrine Schilling. Elements of this note may have been dictated using speech recognition software. As a result, errors of speech recognition may have occurred.     Provider: Vicente JUNG 03/08/2017 12:43 PM  Document generated by:  Vicente Baires 03/08/2017 12:43 PM    CC Providers:  [facility]  [address],  [facility] MD  [address] [place] [address], [address] [facility] MD  [facility]  [address]-          Electronically signed by Vicente JUNG on 03/08/2017 12:44 PM

## 2017-04-28 ENCOUNTER — HOSPITAL ENCOUNTER (OUTPATIENT)
Age: 82
Setting detail: OUTPATIENT SURGERY
Discharge: HOME | DRG: 872 | End: 2017-04-28
Attending: INTERNAL MEDICINE | Admitting: INTERNAL MEDICINE
Payer: MEDICARE

## 2017-04-28 ENCOUNTER — ANESTHESIA (OUTPATIENT)
Dept: ENDOSCOPY | Age: 82
DRG: 872 | End: 2017-04-28
Payer: MEDICARE

## 2017-04-28 ENCOUNTER — APPOINTMENT (OUTPATIENT)
Dept: GENERAL RADIOLOGY | Age: 82
DRG: 872 | End: 2017-04-28
Attending: INTERNAL MEDICINE
Payer: MEDICARE

## 2017-04-28 VITALS
HEART RATE: 70 BPM | RESPIRATION RATE: 16 BRPM | BODY MASS INDEX: 24.41 KG/M2 | TEMPERATURE: 97.5 F | HEIGHT: 64 IN | WEIGHT: 143 LBS | OXYGEN SATURATION: 93 % | SYSTOLIC BLOOD PRESSURE: 142 MMHG | DIASTOLIC BLOOD PRESSURE: 64 MMHG

## 2017-04-28 LAB — GLUCOSE BLD STRIP.AUTO-MCNC: 155 MG/DL (ref 65–100)

## 2017-04-28 PROCEDURE — 0FC98ZZ EXTIRPATION OF MATTER FROM COMMON BILE DUCT, VIA NATURAL OR ARTIFICIAL OPENING ENDOSCOPIC: ICD-10-PCS | Performed by: INTERNAL MEDICINE

## 2017-04-28 PROCEDURE — 0FPB8DZ REMOVAL OF INTRALUMINAL DEVICE FROM HEPATOBILIARY DUCT, VIA NATURAL OR ARTIFICIAL OPENING ENDOSCOPIC: ICD-10-PCS | Performed by: INTERNAL MEDICINE

## 2017-04-28 RX ORDER — ONDANSETRON 2 MG/ML
INJECTION INTRAMUSCULAR; INTRAVENOUS AS NEEDED
Status: DISCONTINUED | OUTPATIENT
Start: 2017-04-28 | End: 2017-04-28 | Stop reason: HOSPADM

## 2017-04-28 RX ORDER — SODIUM CHLORIDE, SODIUM LACTATE, POTASSIUM CHLORIDE, CALCIUM CHLORIDE 600; 310; 30; 20 MG/100ML; MG/100ML; MG/100ML; MG/100ML
100 INJECTION, SOLUTION INTRAVENOUS CONTINUOUS
Status: DISCONTINUED | OUTPATIENT
Start: 2017-04-28 | End: 2017-04-28 | Stop reason: HOSPADM

## 2017-04-28 RX ORDER — PROPOFOL 10 MG/ML
INJECTION, EMULSION INTRAVENOUS AS NEEDED
Status: DISCONTINUED | OUTPATIENT
Start: 2017-04-28 | End: 2017-04-28 | Stop reason: HOSPADM

## 2017-04-28 RX ORDER — SODIUM CHLORIDE 0.9 % (FLUSH) 0.9 %
5-10 SYRINGE (ML) INJECTION AS NEEDED
Status: DISCONTINUED | OUTPATIENT
Start: 2017-04-28 | End: 2017-04-28 | Stop reason: HOSPADM

## 2017-04-28 RX ORDER — LIDOCAINE HYDROCHLORIDE 20 MG/ML
INJECTION, SOLUTION EPIDURAL; INFILTRATION; INTRACAUDAL; PERINEURAL AS NEEDED
Status: DISCONTINUED | OUTPATIENT
Start: 2017-04-28 | End: 2017-04-28 | Stop reason: HOSPADM

## 2017-04-28 RX ORDER — FENTANYL CITRATE 50 UG/ML
INJECTION, SOLUTION INTRAMUSCULAR; INTRAVENOUS AS NEEDED
Status: DISCONTINUED | OUTPATIENT
Start: 2017-04-28 | End: 2017-04-28 | Stop reason: HOSPADM

## 2017-04-28 RX ORDER — DEXAMETHASONE SODIUM PHOSPHATE 4 MG/ML
INJECTION, SOLUTION INTRA-ARTICULAR; INTRALESIONAL; INTRAMUSCULAR; INTRAVENOUS; SOFT TISSUE AS NEEDED
Status: DISCONTINUED | OUTPATIENT
Start: 2017-04-28 | End: 2017-04-28 | Stop reason: HOSPADM

## 2017-04-28 RX ORDER — SUCCINYLCHOLINE CHLORIDE 20 MG/ML
INJECTION INTRAMUSCULAR; INTRAVENOUS AS NEEDED
Status: DISCONTINUED | OUTPATIENT
Start: 2017-04-28 | End: 2017-04-28 | Stop reason: HOSPADM

## 2017-04-28 RX ORDER — ROCURONIUM BROMIDE 10 MG/ML
INJECTION, SOLUTION INTRAVENOUS AS NEEDED
Status: DISCONTINUED | OUTPATIENT
Start: 2017-04-28 | End: 2017-04-28 | Stop reason: HOSPADM

## 2017-04-28 RX ADMIN — LIDOCAINE HYDROCHLORIDE 100 MG: 20 INJECTION, SOLUTION EPIDURAL; INFILTRATION; INTRACAUDAL; PERINEURAL at 12:36

## 2017-04-28 RX ADMIN — IOVERSOL 30 ML: 741 INJECTION INTRA-ARTERIAL; INTRAVENOUS at 13:00

## 2017-04-28 RX ADMIN — DEXAMETHASONE SODIUM PHOSPHATE 4 MG: 4 INJECTION, SOLUTION INTRA-ARTICULAR; INTRALESIONAL; INTRAMUSCULAR; INTRAVENOUS; SOFT TISSUE at 12:43

## 2017-04-28 RX ADMIN — ROCURONIUM BROMIDE 5 MG: 10 INJECTION, SOLUTION INTRAVENOUS at 12:36

## 2017-04-28 RX ADMIN — SODIUM CHLORIDE, SODIUM LACTATE, POTASSIUM CHLORIDE, AND CALCIUM CHLORIDE: 600; 310; 30; 20 INJECTION, SOLUTION INTRAVENOUS at 12:28

## 2017-04-28 RX ADMIN — FENTANYL CITRATE 50 MCG: 50 INJECTION, SOLUTION INTRAMUSCULAR; INTRAVENOUS at 12:36

## 2017-04-28 RX ADMIN — ONDANSETRON 4 MG: 2 INJECTION INTRAMUSCULAR; INTRAVENOUS at 12:48

## 2017-04-28 RX ADMIN — PROPOFOL 150 MG: 10 INJECTION, EMULSION INTRAVENOUS at 12:36

## 2017-04-28 RX ADMIN — SUCCINYLCHOLINE CHLORIDE 140 MG: 20 INJECTION INTRAMUSCULAR; INTRAVENOUS at 12:36

## 2017-04-28 NOTE — INTERVAL H&P NOTE
H&P Update:  Zachary Paulino was seen and examined. History and physical has been reviewed. The patient has been examined.  There have been no significant clinical changes since the completion of the originally dated History and Physical.    Signed By: Marely Pereyra MD     April 28, 2017 11:59 AM

## 2017-04-28 NOTE — PERIOP NOTES
20 Watt Holmium laser   Rate  10-12  Energy 1.5 - 2 j  Enrique 18-20  Pulses =643  200 fiber used  Total Time 11:19  Patient prone with eyes taped

## 2017-04-28 NOTE — PROCEDURES
ENDOSCOPIC  RETROGRADE CHOLANGIOPANCREATOGRAPHY    DATE of PROCEDURE: 4/28/2017    PT NAME: Michelle Rodas     [id number]    MEDICATION: general;    INSTRUMENT:  [id number]    SPECIAL PROCEDURE:stent removal;Holmium laser lithotripsy;mechanical lithotripsy; balloon sweep extraction- 9/12 and 12/15  BLOOD LOSS- 0 to min. SPEC- no  IMPLANT- none    PROCEDURE: standard procedure w/o complications    ASSESSMENT:  1. 2 large CBD stones removed with above methods- able to pass 15 mm balloon w/o resistance after clearance  2.  Pancreas not evaluated    3. GB not seen    PLAN:  1. F/u in office    Lisandro Suárez MD    Assisted by Dr. Jacky Jang

## 2017-04-28 NOTE — ANESTHESIA PREPROCEDURE EVALUATION
Anesthetic History   No history of anesthetic complications            Review of Systems / Medical History  Patient summary reviewed, nursing notes reviewed and pertinent labs reviewed    Pulmonary          Shortness of breath         Neuro/Psych   Within defined limits           Cardiovascular    Hypertension: well controlled  Valvular problems/murmurs: aortic stenosis        CAD (Diffuse small vessel dz not amenable to intervention) and hyperlipidemia    Exercise tolerance: <4 METS  Comments: S/p TAVR last month      Normal EF    LHC: LM WNL; LAD 40%; LCx 70% distal; RCA 20%   GI/Hepatic/Renal     GERD: well controlled          Comments: Bile duct stone Endo/Other    Diabetes: well controlled, type 2  Hypothyroidism: well controlled  Arthritis     Other Findings   Comments: Bilary obstruction           Physical Exam    Airway  Mallampati: I  TM Distance: 4 - 6 cm  Neck ROM: normal range of motion   Mouth opening: Normal     Cardiovascular    Rhythm: regular  Rate: normal         Dental    Dentition: Full lower dentures and Full upper dentures     Pulmonary  Breath sounds clear to auscultation               Abdominal         Other Findings            Anesthetic Plan    ASA: 3  Anesthesia type: general          Induction: Intravenous  Anesthetic plan and risks discussed with: Patient

## 2017-04-28 NOTE — DISCHARGE INSTRUCTIONS
Endoscopic Retrograde Cholangiopancreatogram (ERCP): What to Expect at [address]  After you have an endoscopic retrograde cholangiopancreatogram (ERCP). You will be able to go home after your doctor or a nurse checks to make sure you are not having any problems. If you stay in the hospital overnight, you may go home the next day. You may have a sore throat for a day or two after the procedure. This care sheet gives you a general idea about how long it will take for you to recover. But each person recovers at a different pace. Follow the steps below to get better as quickly as possible. How can you care for yourself at home? Activity  1. Rest as much as you need to after you go home. 2. You should be able to go back to your usual activities the day after the procedure. Diet  · Follow your doctor's directions for eating after the procedure. · Drink plenty of fluids (unless your doctor tells you not to). Medicines  · If you have a sore throat the next day, use an over-the-counter spray to numb your throat. Follow-up care is a key part of your treatment and safety. Be sure to make and go to all appointments, and call your doctor if you are having problems. Its also a good idea to know your test results and keep a list of the medicines you take. When should you call for help? Call 911 anytime you think you may need emergency care. For example, call if:  · You vomit blood or what looks like coffee grounds. · You pass maroon or bloody stools. Call your doctor now or go to the emergency room if:  · You have trouble swallowing. · You have belly pain. · Your stools are black and tarlike or have streaks of blood. · You are sick to your stomach and cannot drink fluids. · You have a fever. · You have pain that does not get better after you take your pain medicine. Watch closely for changes in your health, and be sure to contact your doctor if:  · Your throat still hurts after a day or two.   You do not get better as expected. After general anesthesia or intravenous sedation, for 24 hours or while taking prescription Narcotics:  · Limit your activities  · Do not drive and operate hazardous machinery  · Do not make important personal or business decisions  · Do  not drink alcoholic beverages  · If you have not urinated within 8 hours after discharge, please contact your surgeon on call. *  Please give a list of your current medications to your Primary Care Provider. *  Please update this list whenever your medications are discontinued, doses are      changed, or new medications (including over-the-counter products) are added. *  Please carry medication information at all times in case of emergency situations. These are general instructions for a healthy lifestyle:    No smoking/ No tobacco products/ Avoid exposure to second hand smoke    Surgeon General's Warning:  Quitting smoking now greatly reduces serious risk to your health. Obesity, smoking, and sedentary lifestyle greatly increases your risk for illness    A healthy diet, regular physical exercise & weight monitoring are important for maintaining a healthy lifestyle    You may be retaining fluid if you have a history of heart failure or if you experience any of the following symptoms:  Weight gain of 3 pounds or more overnight or 5 pounds in a week, increased swelling in our hands or feet or shortness of breath while lying flat in bed. Please call your doctor as soon as you notice any of these symptoms; do not wait until your next office visit. Recognize signs and symptoms of STROKE:    F-face looks uneven    A-arms unable to move or move unevenly    S-speech slurred or non-existent    T-time-call 911 as soon as signs and symptoms begin-DO NOT go       Back to bed or wait to see if you get better-TIME IS BRAIN.

## 2017-04-28 NOTE — ANESTHESIA POSTPROCEDURE EVALUATION
Post-Anesthesia Evaluation and Assessment    Patient: Liang Alan MRN: [id number]  SSN: [id number]    YOB: 1929  Age: 80 y.o. Sex: female       Cardiovascular Function/Vital Signs  Visit Vitals    /64 (BP 1 Location: Right arm, BP Patient Position: At rest)    Pulse 70    Temp 36.4 °C (97.5 °F)    Resp 16    Ht 5' 4\" (1.626 m)    Wt 64.9 kg (143 lb)    SpO2 93%    Breastfeeding No    BMI 24.55 kg/m2       Patient is status post general anesthesia for Procedure(s):  ENDOSCOPIC RETROGRADE CHOLANGIOPANCREATOGRAPHY  BMI 26  ENDOSCOPIC STONE EXTRACTION/BALLOON SWEEP  ENDOSCOPY WITH PROSTHESIS OR STENT REMOVAL  ENDOSCOPIC RETROGRADE CHOLANGIOPANCREATOGRAPHY DIRECT VISUALIZATION  LITHOTRIPSY WITH HOLIMIUM LASER. Nausea/Vomiting: None    Postoperative hydration reviewed and adequate. Pain:  Pain Scale 1: Numeric (0 - 10) (04/28/17 1531)  Pain Intensity 1: 0 (04/28/17 1531)   Managed    Neurological Status:   Neuro (WDL): Within Defined Limits (04/28/17 1531)  Neuro  LUE Motor Response: Purposeful (04/28/17 1531)  LLE Motor Response: Purposeful (04/28/17 1531)  RUE Motor Response: Purposeful (04/28/17 1531)  RLE Motor Response: Purposeful (04/28/17 1531)   At baseline    Mental Status and Level of Consciousness: Arousable    Pulmonary Status:   O2 Device: Room air (04/28/17 1531)   Adequate oxygenation and airway patent    Complications related to anesthesia: None    Post-anesthesia assessment completed.  No concerns    Signed By: Shereen Ellis MD     April 28, 2017

## 2017-04-30 ENCOUNTER — HOSPITAL ENCOUNTER (INPATIENT)
Age: 82
LOS: 4 days | Discharge: NURSING, REHAB OR LONG-TERM CARE | DRG: 872 | End: 2017-05-04
Admitting: INTERNAL MEDICINE
Payer: MEDICARE

## 2017-04-30 ENCOUNTER — APPOINTMENT (OUTPATIENT)
Dept: GENERAL RADIOLOGY | Age: 82
DRG: 872 | End: 2017-04-30
Payer: MEDICARE

## 2017-04-30 DIAGNOSIS — M54.2 NECK PAIN: ICD-10-CM

## 2017-04-30 DIAGNOSIS — M25.552 LEFT HIP PAIN: ICD-10-CM

## 2017-04-30 DIAGNOSIS — N39.0 URINARY TRACT INFECTION WITHOUT HEMATURIA, SITE UNSPECIFIED: Primary | ICD-10-CM

## 2017-04-30 DIAGNOSIS — A41.9 SEPSIS, DUE TO UNSPECIFIED ORGANISM: ICD-10-CM

## 2017-04-30 PROBLEM — N17.9 ACUTE KIDNEY INJURY (HCC): Status: ACTIVE | Noted: 2017-04-30

## 2017-04-30 LAB
ALBUMIN SERPL BCP-MCNC: 2.8 G/DL (ref 3.2–4.6)
ALBUMIN/GLOB SERPL: 0.7 {RATIO} (ref 1.2–3.5)
ALP SERPL-CCNC: 176 U/L (ref 50–136)
ALT SERPL-CCNC: 335 U/L (ref 12–65)
ANION GAP BLD CALC-SCNC: 12 MMOL/L (ref 7–16)
APPEARANCE UR: ABNORMAL
AST SERPL W P-5'-P-CCNC: 269 U/L (ref 15–37)
ATRIAL RATE: 101 BPM
ATRIAL RATE: 94 BPM
BACTERIA URNS QL MICRO: ABNORMAL /HPF
BASOPHILS # BLD AUTO: 0 K/UL (ref 0–0.2)
BASOPHILS # BLD: 0 % (ref 0–2)
BILIRUB DIRECT SERPL-MCNC: 1 MG/DL
BILIRUB SERPL-MCNC: 2.9 MG/DL (ref 0.2–1.1)
BILIRUB UR QL: ABNORMAL
BUN SERPL-MCNC: 28 MG/DL (ref 8–23)
CALCIUM SERPL-MCNC: 8.1 MG/DL (ref 8.3–10.4)
CALCULATED P AXIS, ECG09: 69 DEGREES
CALCULATED P AXIS, ECG09: 74 DEGREES
CALCULATED R AXIS, ECG10: 26 DEGREES
CALCULATED R AXIS, ECG10: 30 DEGREES
CALCULATED T AXIS, ECG11: 54 DEGREES
CALCULATED T AXIS, ECG11: 58 DEGREES
CASTS URNS QL MICRO: ABNORMAL /LPF
CHLORIDE SERPL-SCNC: 104 MMOL/L (ref 98–107)
CO2 SERPL-SCNC: 22 MMOL/L (ref 21–32)
COLLECTION COMMENT, COLCM: NORMAL
COLOR UR: ABNORMAL
CREAT SERPL-MCNC: 1.45 MG/DL (ref 0.6–1)
DIAGNOSIS, 93000: NORMAL
DIAGNOSIS, 93000: NORMAL
DIFFERENTIAL METHOD BLD: ABNORMAL
EOSINOPHIL # BLD: 0 K/UL (ref 0–0.8)
EOSINOPHIL NFR BLD: 0 % (ref 0.5–7.8)
EPI CELLS #/AREA URNS HPF: ABNORMAL /HPF
ERYTHROCYTE [DISTWIDTH] IN BLOOD BY AUTOMATED COUNT: 13.8 % (ref 11.9–14.6)
EST. AVERAGE GLUCOSE BLD GHB EST-MCNC: 148 MG/DL
GLOBULIN SER CALC-MCNC: 4.1 G/DL (ref 2.3–3.5)
GLUCOSE BLD STRIP.AUTO-MCNC: 123 MG/DL (ref 65–100)
GLUCOSE BLD STRIP.AUTO-MCNC: 144 MG/DL (ref 65–100)
GLUCOSE BLD STRIP.AUTO-MCNC: 158 MG/DL (ref 65–100)
GLUCOSE SERPL-MCNC: 180 MG/DL (ref 65–100)
GLUCOSE UR STRIP.AUTO-MCNC: NEGATIVE MG/DL
HBA1C MFR BLD: 6.8 % (ref 4.8–6)
HCT VFR BLD AUTO: 34.7 % (ref 35.8–46.3)
HGB BLD-MCNC: 11.6 G/DL (ref 11.7–15.4)
HGB UR QL STRIP: ABNORMAL
IMM GRANULOCYTES # BLD: 0 K/UL (ref 0–0.5)
IMM GRANULOCYTES NFR BLD AUTO: 0.2 % (ref 0–5)
KETONES UR QL STRIP.AUTO: ABNORMAL MG/DL
LACTATE BLD-SCNC: 2.5 MMOL/L (ref 0.5–1.9)
LACTATE SERPL-SCNC: 0.9 MMOL/L (ref 0.4–2)
LACTATE SERPL-SCNC: 1.1 MMOL/L (ref 0.4–2)
LEUKOCYTE ESTERASE UR QL STRIP.AUTO: ABNORMAL
LYMPHOCYTES # BLD AUTO: 10 % (ref 13–44)
LYMPHOCYTES # BLD: 1 K/UL (ref 0.5–4.6)
MCH RBC QN AUTO: 29.4 PG (ref 26.1–32.9)
MCHC RBC AUTO-ENTMCNC: 33.4 G/DL (ref 31.4–35)
MCV RBC AUTO: 88.1 FL (ref 79.6–97.8)
MONOCYTES # BLD: 0.4 K/UL (ref 0.1–1.3)
MONOCYTES NFR BLD AUTO: 4 % (ref 4–12)
NEUTS SEG # BLD: 8.9 K/UL (ref 1.7–8.2)
NEUTS SEG NFR BLD AUTO: 86 % (ref 43–78)
NITRITE UR QL STRIP.AUTO: POSITIVE
OTHER OBSERVATIONS,UCOM: ABNORMAL
P-R INTERVAL, ECG05: 138 MS
P-R INTERVAL, ECG05: 182 MS
PH UR STRIP: 5.5 [PH] (ref 5–9)
PLATELET # BLD AUTO: 79 K/UL (ref 150–450)
PMV BLD AUTO: 11.4 FL (ref 10.8–14.1)
POTASSIUM SERPL-SCNC: 4.5 MMOL/L (ref 3.5–5.1)
PROCALCITONIN SERPL-MCNC: 6.4 NG/ML
PROT SERPL-MCNC: 6.9 G/DL (ref 6.3–8.2)
PROT UR STRIP-MCNC: 30 MG/DL
Q-T INTERVAL, ECG07: 344 MS
Q-T INTERVAL, ECG07: 350 MS
QRS DURATION, ECG06: 84 MS
QRS DURATION, ECG06: 86 MS
QTC CALCULATION (BEZET), ECG08: 430 MS
QTC CALCULATION (BEZET), ECG08: 453 MS
RBC # BLD AUTO: 3.94 M/UL (ref 4.05–5.25)
RBC #/AREA URNS HPF: ABNORMAL /HPF
SODIUM SERPL-SCNC: 138 MMOL/L (ref 136–145)
SP GR UR REFRACTOMETRY: 1.02 (ref 1–1.02)
UROBILINOGEN UR QL STRIP.AUTO: 1 EU/DL (ref 0.2–1)
VENTRICULAR RATE, ECG03: 101 BPM
VENTRICULAR RATE, ECG03: 94 BPM
WBC # BLD AUTO: 10.4 K/UL (ref 4.3–11.1)
WBC URNS QL MICRO: >100 /HPF

## 2017-04-30 PROCEDURE — 86580 TB INTRADERMAL TEST: CPT | Performed by: INTERNAL MEDICINE

## 2017-04-30 PROCEDURE — 93005 ELECTROCARDIOGRAM TRACING: CPT

## 2017-04-30 PROCEDURE — 51701 INSERT BLADDER CATHETER: CPT

## 2017-04-30 PROCEDURE — 83605 ASSAY OF LACTIC ACID: CPT | Performed by: INTERNAL MEDICINE

## 2017-04-30 PROCEDURE — 84145 PROCALCITONIN (PCT): CPT

## 2017-04-30 PROCEDURE — 96361 HYDRATE IV INFUSION ADD-ON: CPT

## 2017-04-30 PROCEDURE — 80076 HEPATIC FUNCTION PANEL: CPT

## 2017-04-30 PROCEDURE — 74011250636 HC RX REV CODE- 250/636

## 2017-04-30 PROCEDURE — 83036 HEMOGLOBIN GLYCOSYLATED A1C: CPT | Performed by: INTERNAL MEDICINE

## 2017-04-30 PROCEDURE — 74011250636 HC RX REV CODE- 250/636: Performed by: INTERNAL MEDICINE

## 2017-04-30 PROCEDURE — 83605 ASSAY OF LACTIC ACID: CPT

## 2017-04-30 PROCEDURE — 74011636637 HC RX REV CODE- 636/637: Performed by: INTERNAL MEDICINE

## 2017-04-30 PROCEDURE — 87040 BLOOD CULTURE FOR BACTERIA: CPT

## 2017-04-30 PROCEDURE — 81001 URINALYSIS AUTO W/SCOPE: CPT

## 2017-04-30 PROCEDURE — 81003 URINALYSIS AUTO W/O SCOPE: CPT

## 2017-04-30 PROCEDURE — 74011250637 HC RX REV CODE- 250/637

## 2017-04-30 PROCEDURE — 82962 GLUCOSE BLOOD TEST: CPT

## 2017-04-30 PROCEDURE — 77030032490 HC SLV COMPR SCD KNE COVD -B

## 2017-04-30 PROCEDURE — 93005 ELECTROCARDIOGRAM TRACING: CPT | Performed by: INTERNAL MEDICINE

## 2017-04-30 PROCEDURE — 77030011943

## 2017-04-30 PROCEDURE — 87205 SMEAR GRAM STAIN: CPT

## 2017-04-30 PROCEDURE — 65270000029 HC RM PRIVATE

## 2017-04-30 PROCEDURE — 74011250637 HC RX REV CODE- 250/637: Performed by: INTERNAL MEDICINE

## 2017-04-30 PROCEDURE — 87086 URINE CULTURE/COLONY COUNT: CPT

## 2017-04-30 PROCEDURE — 87088 URINE BACTERIA CULTURE: CPT

## 2017-04-30 PROCEDURE — 99285 EMERGENCY DEPT VISIT HI MDM: CPT

## 2017-04-30 PROCEDURE — 74011000302 HC RX REV CODE- 302: Performed by: INTERNAL MEDICINE

## 2017-04-30 PROCEDURE — 36415 COLL VENOUS BLD VENIPUNCTURE: CPT | Performed by: INTERNAL MEDICINE

## 2017-04-30 PROCEDURE — 96365 THER/PROPH/DIAG IV INF INIT: CPT

## 2017-04-30 PROCEDURE — 87186 SC STD MICRODIL/AGAR DIL: CPT

## 2017-04-30 PROCEDURE — 85025 COMPLETE CBC W/AUTO DIFF WBC: CPT

## 2017-04-30 PROCEDURE — 80048 BASIC METABOLIC PNL TOTAL CA: CPT

## 2017-04-30 PROCEDURE — 74011000258 HC RX REV CODE- 258

## 2017-04-30 PROCEDURE — 87077 CULTURE AEROBIC IDENTIFY: CPT

## 2017-04-30 PROCEDURE — 74011000258 HC RX REV CODE- 258: Performed by: INTERNAL MEDICINE

## 2017-04-30 PROCEDURE — 71010 XR CHEST PORT: CPT

## 2017-04-30 RX ORDER — ROSUVASTATIN CALCIUM 20 MG/1
10 TABLET, COATED ORAL
Status: DISCONTINUED | OUTPATIENT
Start: 2017-04-30 | End: 2017-05-04 | Stop reason: HOSPADM

## 2017-04-30 RX ORDER — INSULIN LISPRO 100 [IU]/ML
INJECTION, SOLUTION INTRAVENOUS; SUBCUTANEOUS
Status: DISCONTINUED | OUTPATIENT
Start: 2017-04-30 | End: 2017-05-04 | Stop reason: HOSPADM

## 2017-04-30 RX ORDER — PHENAZOPYRIDINE HYDROCHLORIDE 95 MG/1
200 TABLET ORAL
Status: COMPLETED | OUTPATIENT
Start: 2017-04-30 | End: 2017-04-30

## 2017-04-30 RX ORDER — SODIUM CHLORIDE 0.9 % (FLUSH) 0.9 %
5-10 SYRINGE (ML) INJECTION AS NEEDED
Status: DISCONTINUED | OUTPATIENT
Start: 2017-04-30 | End: 2017-05-04 | Stop reason: HOSPADM

## 2017-04-30 RX ORDER — ACETAMINOPHEN 500 MG
500 TABLET ORAL
Status: DISCONTINUED | OUTPATIENT
Start: 2017-04-30 | End: 2017-05-04 | Stop reason: HOSPADM

## 2017-04-30 RX ORDER — METOPROLOL TARTRATE 25 MG/1
25 TABLET, FILM COATED ORAL 2 TIMES DAILY
Status: DISCONTINUED | OUTPATIENT
Start: 2017-04-30 | End: 2017-05-04 | Stop reason: HOSPADM

## 2017-04-30 RX ORDER — LEVOTHYROXINE SODIUM 100 UG/1
100 TABLET ORAL
Status: DISCONTINUED | OUTPATIENT
Start: 2017-05-01 | End: 2017-05-04 | Stop reason: HOSPADM

## 2017-04-30 RX ORDER — TRAMADOL HYDROCHLORIDE 50 MG/1
50 TABLET ORAL
Status: DISCONTINUED | OUTPATIENT
Start: 2017-04-30 | End: 2017-05-04 | Stop reason: HOSPADM

## 2017-04-30 RX ORDER — SODIUM CHLORIDE 9 MG/ML
125 INJECTION, SOLUTION INTRAVENOUS CONTINUOUS
Status: DISCONTINUED | OUTPATIENT
Start: 2017-04-30 | End: 2017-05-01

## 2017-04-30 RX ORDER — CLOPIDOGREL BISULFATE 75 MG/1
75 TABLET ORAL
Status: DISCONTINUED | OUTPATIENT
Start: 2017-04-30 | End: 2017-05-04 | Stop reason: HOSPADM

## 2017-04-30 RX ADMIN — SODIUM CHLORIDE 1000 ML: 900 INJECTION, SOLUTION INTRAVENOUS at 05:45

## 2017-04-30 RX ADMIN — ROSUVASTATIN CALCIUM 10 MG: 20 TABLET ORAL at 21:03

## 2017-04-30 RX ADMIN — PIPERACILLIN SODIUM,TAZOBACTAM SODIUM 3.38 G: 3; .375 INJECTION, POWDER, FOR SOLUTION INTRAVENOUS at 17:39

## 2017-04-30 RX ADMIN — CEFTRIAXONE SODIUM 1 G: 1 INJECTION, POWDER, FOR SOLUTION INTRAMUSCULAR; INTRAVENOUS at 05:45

## 2017-04-30 RX ADMIN — SODIUM CHLORIDE 125 ML/HR: 900 INJECTION, SOLUTION INTRAVENOUS at 10:52

## 2017-04-30 RX ADMIN — INSULIN LISPRO 2 UNITS: 100 INJECTION, SOLUTION INTRAVENOUS; SUBCUTANEOUS at 13:02

## 2017-04-30 RX ADMIN — METOPROLOL TARTRATE 25 MG: 25 TABLET ORAL at 12:55

## 2017-04-30 RX ADMIN — TUBERCULIN PURIFIED PROTEIN DERIVATIVE 5 UNITS: 5 INJECTION INTRADERMAL at 10:09

## 2017-04-30 RX ADMIN — ACETAMINOPHEN 500 MG: 500 TABLET ORAL at 18:44

## 2017-04-30 RX ADMIN — CLOPIDOGREL BISULFATE 75 MG: 75 TABLET ORAL at 12:54

## 2017-04-30 RX ADMIN — URINARY PAIN RELIEF 190 MG: 95 TABLET ORAL at 05:45

## 2017-04-30 RX ADMIN — METOPROLOL TARTRATE 25 MG: 25 TABLET ORAL at 17:39

## 2017-04-30 RX ADMIN — SODIUM CHLORIDE 947 ML: 900 INJECTION, SOLUTION INTRAVENOUS at 07:30

## 2017-04-30 RX ADMIN — PIPERACILLIN SODIUM,TAZOBACTAM SODIUM 3.38 G: 3; .375 INJECTION, POWDER, FOR SOLUTION INTRAVENOUS at 10:09

## 2017-04-30 NOTE — H&P
Subjective:     Patient: Zachary Paulino MRN: [id number]  SSN: [id number]    YOB: 1929  Age: 80 y.o. Sex: female          HPI: Ms. Jay Daugherty is a 79 yo WF with PMH of TAVR, ERCP 4-28-17, evaluated with fever and pelvic pressure with urination, diagnosed with sepsis/UTI in the ED. Given rocephin and IVF bolus. Has some hematuria and anorexia since ERCP. CXR negative.       ROS positive for chronic vision changes, back pain, edema, headache, hand paresthesia and easy bruising    Past Medical History:   Diagnosis Date    Aortic regurgitation     mild to moderate per ECHO 3/28/17    Aortic stenosis     severe per ECHO 3/28/17    Basal cell carcinoma     Bruit (arterial)     CAD (coronary artery disease)     small vessel disease, medical management per cardiologist not 4/17    Choledocholithiasis     Hyperlipidemia     Hypertension     Hypothyroidism     Murmur, cardiac     Poor historian     S/P TAVR (transcatheter aortic valve replacement)     3/17    Tricuspid valve regurgitation     moderate per ECHO 3/28/17    Type II or unspecified type diabetes mellitus without mention of complication, not stated as uncontrolled     not medicated, treated w/ diet    Urinary incontinence       Past Surgical History:   Procedure Laterality Date    HX APPENDECTOMY      HX CARPAL TUNNEL RELEASE      patient denies    HX CYSTOCELE REPAIR      HX ERCP  02/20/2017    large CBD stones    HX ERCP  04/28/2017    laser    HX HYSTERECTOMY      HX KNEE ARTHROSCOPY Right     HX OOPHORECTOMY      Partial    HX ORTHOPAEDIC      2 back-one fusion-low; all fingers-trigger    HX OTHER SURGICAL Right     basal cell nose    HX RECTOCELE REPAIR          (Not in a hospital admission)  Current Facility-Administered Medications   Medication Dose Route Frequency    sodium chloride (NS) flush 5-10 mL  5-10 mL IntraVENous PRN    insulin lispro (HUMALOG) injection   SubCUTAneous AC&HS    piperacillin-tazobactam (ZOSYN) 4.5 g in 0.9% sodium chloride (MBP/ADV) 100 mL  4.5 g IntraVENous Q6H    tuberculin injection 5 Units  5 Units IntraDERMal ONCE    acetaminophen (TYLENOL) tablet 500 mg  500 mg Oral Q6H PRN    traMADol (ULTRAM) tablet 50 mg  50 mg Oral Q6H PRN     Current Outpatient Prescriptions   Medication Sig    clopidogrel (PLAVIX) 75 mg tab Take 1 Tab by mouth daily. (Patient taking differently: Take 75 mg by mouth every morning.)    HYDROcodone-acetaminophen (NORCO) 5-325 mg per tablet Take 1 Tab by mouth every four (4) hours as needed for Pain. Max Daily Amount: 6 Tabs. (Patient taking differently: Take 1 Tab by mouth as needed for Pain.)    amLODIPine (NORVASC) 10 mg tablet Take 1 Tab by mouth daily. (Patient taking differently: Take 10 mg by mouth every morning.)    traMADol (ULTRAM) 50 mg tablet Take 1 Tab by mouth every six (6) hours as needed for Pain. (Patient taking differently: Take 50 mg by mouth as needed for Pain.)    levothyroxine (SYNTHROID) 100 mcg tablet Take 1 Tab by mouth Daily (before breakfast).  benazepril (LOTENSIN) 20 mg tablet Take 1 Tab by mouth daily.  furosemide (LASIX) 40 mg tablet Take 1 Tab by mouth daily. (Patient taking differently: Take 40 mg by mouth as needed.)    metoprolol tartrate (LOPRESSOR) 25 mg tablet Take 1 Tab by mouth two (2) times a day.  rosuvastatin (CRESTOR) 20 mg tablet Take 1 Tab by mouth nightly. (Patient taking differently: Take 10 mg by mouth nightly.)    nitroglycerin (NITROSTAT) 0.4 mg SL tablet 1 Tab by SubLINGual route every five (5) minutes as needed.  ASCORBIC ACID/VITAMIN E (CRANBERRY CONCENTRATE PO) Take  by mouth daily.      Allergies   Allergen Reactions    Aspirin Anaphylaxis     Hives, swelling    Ativan [Lorazepam] Other (comments)     Confused and aggressive    Aleve [Naproxen Sodium] Rash    Carafate [Sucralfate] Hives    Clorazepate Dipotassium Hives    Flexeril [Cyclobenzaprine] Itching    Robaxin [Methocarbamol] Anaphylaxis     Swelling of tongue, wheezing  Muscle relaxers in general    Vytorin 10-10 [Ezetimibe-Simvastatin] Other (comments)     Muscle soreness    Zantac [Ranitidine Hcl] Itching      Social History   Substance Use Topics    Smoking status: Never Smoker    Smokeless tobacco: Never Used    Alcohol use No      Family History   Problem Relation Age of Onset    Heart Attack Father      age 68    Heart Disease Father     Arthritis-osteo Mother         Review of Systems  Complete review of systems was obtained and is otherwise negative unless stated in the HPI       I have reviewed all the pertinent medical and surgical history, social history, allergies, family history,  as well as pertinent labs and studies . Objective:     Patient Vitals for the past 24 hrs:   BP Temp Pulse Resp SpO2 Height Weight   04/30/17 0836 134/56 99.8 °F (37.7 °C) 96 16 93 % - -   04/30/17 0830 140/66 - 95 - 91 % - -   04/30/17 0800 150/66 - 97 20 93 % - -   04/30/17 0730 140/65 - 94 21 94 % - -   04/30/17 0716 137/65 - 96 19 95 % - -   04/30/17 0702 142/64 99.7 °F (37.6 °C) 96 - 94 % - -   04/30/17 0701 142/64 99.7 °F (37.6 °C) 96 16 94 % - -   04/30/17 0521 142/64 (!) 101.1 °F (38.4 °C) 99 18 93 % 5' 4\" (1.626 m) 64.9 kg (143 lb)             Exam:  General: well developed, well nourished, no distress, alert  Eyes: PERRLA  HEENT: oral cavity clear,nasal septum midline  Neck: supple, symmetrical, trachea midline, no adenopathy,no carotid bruit and no JVD  Lungs: clear to auscultation bilaterally, good effort   Heart: regular rate and rhythm, S1, S2 normal, III/VI AV murmur LUSB, click, rub or gallop, trace L>R edema   Abdomen: soft, non-tender. Bowel sounds decreased. No masses,  no organomegaly  Extremities: extremities normal, atraumatic, no cyanosis   Skin: chronic venous stasis to LE  Neurologic: Alert and oriented X 3, normal strength and tone.    Musculoskeletal: no gross deficits   Psychiatric: appropriate mood and affect    ECG: ordered     Data Review (Labs):   Recent Results (from the past 24 hour(s))   PROCALCITONIN    Collection Time: 04/30/17  5:36 AM   Result Value Ref Range    Procalcitonin 6.4 ng/mL   METABOLIC PANEL, BASIC    Collection Time: 04/30/17  5:39 AM   Result Value Ref Range    Sodium 138 136 - 145 mmol/L    Potassium 4.5 3.5 - 5.1 mmol/L    Chloride 104 98 - 107 mmol/L    CO2 22 21 - 32 mmol/L    Anion gap 12 7 - 16 mmol/L    Glucose 180 (H) 65 - 100 mg/dL    BUN 28 (H) 8 - 23 MG/DL    Creatinine 1.45 (H) 0.6 - 1.0 MG/DL    GFR est AA 44 (L) >60 ml/min/1.73m2    GFR est non-AA 36 (L) >60 ml/min/1.73m2    Calcium 8.1 (L) 8.3 - 10.4 MG/DL   CBC WITH AUTOMATED DIFF    Collection Time: 04/30/17  5:39 AM   Result Value Ref Range    WBC 10.4 4.3 - 11.1 K/uL    RBC 3.94 (L) 4.05 - 5.25 M/uL    HGB 11.6 (L) 11.7 - 15.4 g/dL    HCT 34.7 (L) 35.8 - 46.3 %    MCV 88.1 79.6 - 97.8 FL    MCH 29.4 26.1 - 32.9 PG    MCHC 33.4 31.4 - 35.0 g/dL    RDW 13.8 11.9 - 14.6 %    PLATELET 79 (L) 356 - 450 K/uL    MPV 11.4 10.8 - 14.1 FL    DF AUTOMATED      NEUTROPHILS 86 (H) 43 - 78 %    LYMPHOCYTES 10 (L) 13 - 44 %    MONOCYTES 4 4.0 - 12.0 %    EOSINOPHILS 0 (L) 0.5 - 7.8 %    BASOPHILS 0 0.0 - 2.0 %    IMMATURE GRANULOCYTES 0.2 0.0 - 5.0 %    ABS. NEUTROPHILS 8.9 (H) 1.7 - 8.2 K/UL    ABS. LYMPHOCYTES 1.0 0.5 - 4.6 K/UL    ABS. MONOCYTES 0.4 0.1 - 1.3 K/UL    ABS. EOSINOPHILS 0.0 0.0 - 0.8 K/UL    ABS. BASOPHILS 0.0 0.0 - 0.2 K/UL    ABS. IMM. GRANS. 0.0 0.0 - 0.5 K/UL   HEPATIC FUNCTION PANEL    Collection Time: 04/30/17  5:39 AM   Result Value Ref Range    Protein, total 6.9 6.3 - 8.2 g/dL    Albumin 2.8 (L) 3.2 - 4.6 g/dL    Globulin 4.1 (H) 2.3 - 3.5 g/dL    A-G Ratio 0.7 (L) 1.2 - 3.5      Bilirubin, total 2.9 (H) 0.2 - 1.1 MG/DL    Bilirubin, direct 1.0 (H) <0.4 MG/DL    Alk.  phosphatase 176 (H) 50 - 136 U/L    AST (SGOT) 269 (H) 15 - 37 U/L    ALT (SGPT) 335 (H) 12 - 65 U/L   POC LACTIC ACID    Collection Time: 04/30/17  5:43 AM   Result Value Ref Range    Lactic Acid (POC) 2.5 (H) 0.5 - 1.9 mmol/L   URINALYSIS W/ RFLX MICROSCOPIC    Collection Time: 04/30/17  7:15 AM   Result Value Ref Range    Color ORANGE      Appearance TURBID      Specific gravity 1.018 1.001 - 1.023      pH (UA) 5.5 5.0 - 9.0      Protein 30 (A) NEG mg/dL    Glucose NEGATIVE  mg/dL    Ketone TRACE (A) NEG mg/dL    Bilirubin MODERATE (A) NEG      Blood LARGE (A) NEG      Urobilinogen 1.0 0.2 - 1.0 EU/dL    Nitrites POSITIVE (A) NEG      Leukocyte Esterase LARGE (A) NEG      WBC >100 (H) 0 /hpf    RBC 3-5 0 /hpf    Epithelial cells 0-3 0 /hpf    Bacteria 3+ (H) 0 /hpf    Casts 5-10 0 /lpf    Other observations RESULTS VERIFIED MANUALLY         Assessment / Plan:   Principal Problem:    Sepsis ([facility] [address] (4/30/2017)    Active Problems:    Type II diabetes mellitus, uncontrolled ([facility] [address] (9/4/2014)      Essential hypertension, benign (9/4/2014)      S/P TAVR (transcatheter aortic valve replacement) (3/29/2017)      Overview: 1. TAVR (3/28/17):  20 mm Luis S3 valve.        Acute kidney injury ([facility] [address] (4/30/2017)      UTI (urinary tract infection) (4/30/2017)        -admit to medical bed   -hydrate with NS and follow clinically, recheck lactate and renal function   -change to zosyn in light of elevated LFTS and recent ERCP, follow labs tomorrow  -BC x 2 and UCX ordered   -continue home meds with holding lasix/several chronic antihypertensivs  -PPD,PT/OT once stable, SW for dispo    DVT Prophylaxis:SCD due to thrombocytopenia  FEN:oral,IVF  Code Status: FULL  PMD: [address] addressed: daughter Jannetta Seip [phone]  EST LOS 3 days  Dasha Carey MD    Signed By: Dasha Carey MD     April 30, 2017

## 2017-04-30 NOTE — ED NOTES
TRANSFER - OUT REPORT:    Verbal report given to Sobeida Garcia RN (name) on [address]  being transferred to 8th floor (unit) for routine progression of care       Report consisted of patients Situation, Background, Assessment and   Recommendations(SBAR). Information from the following report(s) SBAR, OR Summary, MAR and Recent Results was reviewed with the receiving nurse. Lines:   Peripheral IV 04/30/17 Left Arm (Active)   Site Assessment Clean, dry, & intact 4/30/2017  7:32 AM   Phlebitis Assessment 0 4/30/2017  7:32 AM   Infiltration Assessment 0 4/30/2017  7:32 AM   Dressing Status Clean, dry, & intact 4/30/2017  7:32 AM       Peripheral IV 04/30/17 Right Arm (Active)   Site Assessment Clean, dry, & intact 4/30/2017  7:32 AM   Phlebitis Assessment 0 4/30/2017  7:32 AM   Infiltration Assessment 0 4/30/2017  7:32 AM   Dressing Status Clean, dry, & intact 4/30/2017  7:32 AM        Opportunity for questions and clarification was provided.       Patient transported with:   [employer]

## 2017-04-30 NOTE — PROGRESS NOTES
TRANSFER - IN REPORT:    Verbal report received from Hai Wade RN on [employer]  being received from ER for routine progression of care      Report consisted of patients Situation, Background, Assessment and   Recommendations(SBAR). Information from the following report(s) SBAR and Kardex was reviewed with the receiving nurse. Opportunity for questions and clarification was provided. Assessment completed upon patients arrival to unit and care assumed.

## 2017-04-30 NOTE — IP AVS SNAPSHOT
[address] 
[phone] Patient: Samreen Yancey MRN: [id number] FQE:[id number] You are allergic to the following Allergen Reactions Aspirin Anaphylaxis Hives, swelling Ativan (Lorazepam) Other (comments) Confused and aggressive Aleve (Naproxen Sodium) Rash Carafate (Sucralfate) Hives Clorazepate Dipotassium Hives Flexeril (Cyclobenzaprine) Itching Robaxin (Methocarbamol) Anaphylaxis Swelling of tongue, wheezing Muscle relaxers in general  
    
 Vytorin 10-10 (Ezetimibe-Simvastatin) Other (comments) Muscle soreness Zantac (Ranitidine Hcl) Itching Immunizations Administered for This Admission Name Date  
 TB Skin Test (PPD) Intradermal 4/30/2017 Recent Documentation Height Weight Breastfeeding? BMI OB Status Smoking Status 1.626 m 65.7 kg No 24.87 kg/m2 Hysterectomy Never Smoker Unresulted Labs Order Current Status CULTURE, BLOOD Preliminary result CULTURE, BLOOD Preliminary result CULTURE, BLOOD Preliminary result PLEASE READ & DOCUMENT PPD TEST IN 24 HRS Preliminary result Emergency Contacts Name Discharge Info Relation Home Work Mobile Rita Blum DISCHARGE CAREGIVER [3] Daughter [21] [phone] [facility] CAREGIVER [3] Son [22] [phone] About your hospitalization You were admitted on:  April 30, 2017 You last received care in the:  [facility] You were discharged on: May 4, 2017 Unit phone number:  [phone] Why you were hospitalized Your primary diagnosis was:  Sepsis (Hcc) Your diagnoses also included:  Acute Kidney Injury (Hcc), Essential Hypertension, Benign, S/P Tavr (Transcatheter Aortic Valve Replacement), Type Ii Diabetes Mellitus, Uncontrolled (Hcc), Uti (Urinary Tract Infection) Providers Seen During Your Hospitalizations Provider Role Specialty Primary office phone Diandra Torres MD Attending Provider Emergency Medicine [phone] Suzan Nguyen MD Attending Provider Internal Medicine [phone] Your Primary Care Physician (PCP) Primary Care Physician Office Phone Office Fax Clint Gill, [facility] [facility] [phone] Follow-up Information Follow up With Details Comments Contact Info Jennifer Alex MD  office calling me back with appt. BB 7203 [facility] Primary Care 3 Leana Stokes 
[phone] Your Appointments Wednesday May 10, 2017 10:00 AM EDT Office Visit with Jennifer Alex MD  
[facility] PRIMARY CARE ([address] [place] [address]) [address] [place] [address] 14.  
[phone] Current Discharge Medication List  
  
START taking these medications Dose & Instructions Dispensing Information Comments Morning Noon Evening Bedtime  
 ciprofloxacin HCl 500 mg tablet Commonly known as:  CIPRO Your last dose was: Your next dose is:    
   
   
 Dose:  500 mg Take 1 Tab by mouth two (2) times a day. Quantity:  18 Tab Refills:  0 CONTINUE these medications which have CHANGED Dose & Instructions Dispensing Information Comments Morning Noon Evening Bedtime  
 amLODIPine 10 mg tablet Commonly known as:  Barabara Puls What changed:  when to take this Your last dose was: Your next dose is:    
   
   
 Dose:  10 mg Take 1 Tab by mouth daily. Quantity:  90 Tab Refills:  3  
     
   
   
   
  
 clopidogrel 75 mg Tab Commonly known as:  PLAVIX What changed:  when to take this Your last dose was: Your next dose is:    
   
   
 Dose:  75 mg Take 1 Tab by mouth daily. Quantity:  90 Tab Refills:  5 HYDROcodone-acetaminophen 5-325 mg per tablet Commonly known as:  Sheyla Solorzano What changed:  when to take this Your last dose was: Your next dose is:    
   
   
 Dose:  1 Tab Take 1 Tab by mouth every four (4) hours as needed for Pain. Max Daily Amount: 6 Tabs. Quantity:  10 Tab Refills:  0  
     
   
   
   
  
 rosuvastatin 20 mg tablet Commonly known as:  CRESTOR What changed:  how much to take Your last dose was: Your next dose is:    
   
   
 Dose:  20 mg Take 1 Tab by mouth nightly. Quantity:  30 Tab Refills:  5  
     
   
   
   
  
 traMADol 50 mg tablet Commonly known as:  ULTRAM  
What changed:  when to take this Your last dose was: Your next dose is:    
   
   
 Dose:  50 mg Take 1 Tab by mouth every six (6) hours as needed for Pain. Quantity:  30 Tab Refills:  0 CONTINUE these medications which have NOT CHANGED Dose & Instructions Dispensing Information Comments Morning Noon Evening Bedtime  
 benazepril 20 mg tablet Commonly known as:  LOTENSIN Your last dose was: Your next dose is:    
   
   
 Dose:  20 mg Take 1 Tab by mouth daily. Quantity:  90 Tab Refills:  1 CRANBERRY CONCENTRATE PO Your last dose was: Your next dose is: Take  by mouth daily. Refills:  0  
     
   
   
   
  
 levothyroxine 100 mcg tablet Commonly known as:  SYNTHROID Your last dose was: Your next dose is:    
   
   
 Dose:  100 mcg Take 1 Tab by mouth Daily (before breakfast). Quantity:  90 Tab Refills:  1  
     
   
   
   
  
 metoprolol tartrate 25 mg tablet Commonly known as:  LOPRESSOR Your last dose was: Your next dose is:    
   
   
 Dose:  25 mg Take 1 Tab by mouth two (2) times a day. Quantity:  180 Tab Refills:  3  
     
   
   
   
  
 nitroglycerin 0.4 mg SL tablet Commonly known as:  NITROSTAT Your last dose was: Your next dose is:    
   
   
 Dose:  0.4 mg  
1 Tab by SubLINGual route every five (5) minutes as needed. Quantity:  25 Tab Refills:  11 STOP taking these medications   
 furosemide 40 mg tablet Commonly known as:  LASIX Where to Get Your Medications Information on where to get these meds will be given to you by the nurse or doctor. ! Ask your nurse or doctor about these medications  
  ciprofloxacin HCl 500 mg tablet HYDROcodone-acetaminophen 5-325 mg per tablet  
 traMADol 50 mg tablet Discharge Instructions DISCHARGE SUMMARY from Nurse The following personal items are in your possession at time of discharge: 
 
Dental Appliances: Lowers, Uppers, With patient Visual Aid: None Home Medications: None Jewelry: None Clothing: At bedside, Pants, Shirt, Slippers, Socks Other Valuables: None Personal Items Sent to Safe: none PATIENT INSTRUCTIONS: 
What to do at Home: 
Recommended activity: PT/OT Eval and Treat. *  Please give a list of your current medications to your Primary Care Provider. *  Please update this list whenever your medications are discontinued, doses are 
    changed, or new medications (including over-the-counter products) are added. *  Please carry medication information at all times in case of emergency situations. These are general instructions for a healthy lifestyle: No smoking/ No tobacco products/ Avoid exposure to second hand smoke Surgeon General's Warning:  Quitting smoking now greatly reduces serious risk to your health. Obesity, smoking, and sedentary lifestyle greatly increases your risk for illness A healthy diet, regular physical exercise & weight monitoring are important for maintaining a healthy lifestyle You may be retaining fluid if you have a history of heart failure or if you experience any of the following symptoms:  Weight gain of 3 pounds or more overnight or 5 pounds in a week, increased swelling in our hands or feet or shortness of breath while lying flat in bed. Please call your doctor as soon as you notice any of these symptoms; do not wait until your next office visit. Recognize signs and symptoms of STROKE: 
 
F-face looks uneven A-arms unable to move or move unevenly S-speech slurred or non-existent T-time-call 911 as soon as signs and symptoms begin-DO NOT go Back to bed or wait to see if you get better-TIME IS BRAIN. Warning Signs of HEART ATTACK Call 911 if you have these symptoms: 
? Chest discomfort. Most heart attacks involve discomfort in the center of the chest that lasts more than a few minutes, or that goes away and comes back. It can feel like uncomfortable pressure, squeezing, fullness, or pain. ? Discomfort in other areas of the upper body. Symptoms can include pain or discomfort in one or both arms, the back, neck, jaw, or stomach. ? Shortness of breath with or without chest discomfort. ? Other signs may include breaking out in a cold sweat, nausea, or lightheadedness. Don't wait more than five minutes to call [address]! Fast action can save your life. Calling 911 is almost always the fastest way to get lifesaving treatment. Emergency Medical Services staff can begin treatment when they arrive  up to an hour sooner than if someone gets to the hospital by car. The discharge information has been reviewed with the patient and children. The patient and children verbalized understanding. Discharge medications reviewed with the patient and children and appropriate educational materials and side effects teaching were provided. Urinary Tract Infection in Women: Care Instructions Your Care Instructions A urinary tract infection, or UTI, is a general term for an infection anywhere between the kidneys and the urethra (where urine comes out). Most UTIs are bladder infections. They often cause pain or burning when you urinate. UTIs are caused by bacteria and can be cured with antibiotics. Be sure to complete your treatment so that the infection goes away. Follow-up care is a key part of your treatment and safety. Be sure to make and go to all appointments, and call your doctor if you are having problems. It's also a good idea to know your test results and keep a list of the medicines you take. How can you care for yourself at home? · Take your antibiotics as directed. Do not stop taking them just because you feel better. You need to take the full course of antibiotics. · Drink extra water and other fluids for the next day or two. This may help wash out the bacteria that are causing the infection. (If you have kidney, heart, or liver disease and have to limit fluids, talk with your doctor before you increase your fluid intake.) · Avoid drinks that are carbonated or have caffeine. They can irritate the bladder. · Urinate often. Try to empty your bladder each time. · To relieve pain, take a hot bath or lay a heating pad set on low over your lower belly or genital area. Never go to sleep with a heating pad in place. To prevent UTIs · Drink plenty of water each day. This helps you urinate often, which clears bacteria from your system. (If you have kidney, heart, or liver disease and have to limit fluids, talk with your doctor before you increase your fluid intake.) · Urinate when you need to. · Urinate right after you have sex. · Change sanitary pads often. · Avoid douches, bubble baths, feminine hygiene sprays, and other feminine hygiene products that have deodorants. · After going to the bathroom, wipe from front to back. When should you call for help? Call your doctor now or seek immediate medical care if: · Symptoms such as fever, chills, nausea, or vomiting get worse or appear for the first time. · You have new pain in your back just below your rib cage. This is called flank pain. · There is new blood or pus in your urine. · You have any problems with your antibiotic medicine. Watch closely for changes in your health, and be sure to contact your doctor if: 
· You are not getting better after taking an antibiotic for 2 days. · Your symptoms go away but then come back. Where can you learn more? Go to http://angelica-zuri.info/. Enter M591 in the search box to learn more about \"Urinary Tract Infection in Women: Care Instructions. \" Current as of: November 28, 2016 Content Version: 11.2 © 4030-2302 [employer]. Care instructions adapted under license by [employer] (which disclaims liability or warranty for this information). If you have questions about a medical condition or this instruction, always ask your healthcare professional. [place] [address] any warranty or liability for your use of this information. MAKE SURE PATIENT HAS FOLLOW UP APPOINTMENT IN 3 TO 5 DAYS, SEPSIS BOOK AND THERMOMETER FOR HOME USE. Discharge Orders None ACO Transitions of Care Introducing [employer] [address] Radha Cope offers a voluntary care coordination program to provide high quality service and care to [facility] fee-for-service beneficiaries. Leandro Ledezma was designed to help you enhance your health and well-being through the following services: ? Transitions of Care  support for individuals who are transitioning from one care setting to another (example: Hospital to home). ?  Chronic and Complex Care Coordination  support for individuals and caregivers of those with serious or chronic illnesses or with more than one chronic (ongoing) condition and those who take a number of different medications. If you meet specific medical criteria, a [facility] may call you directly to coordinate your care with your primary care physician and your other care providers. For questions about the [facility] programs, please, contact your physicians office. For general questions or additional information about Accountable Care Organizations: 
Please visit www.medicare.gov/acos. html or call 1-800-MEDICARE (0-835.825.7732) TTY users should call 7-159.240.7549. Open[employer] Announcement We are excited to announce that we are making your provider's discharge notes available to you in Openbucks. You will see these notes when they are completed and signed by the physician that discharged you from your recent hospital stay. If you have any questions or concerns about any information you see in Openbucks, please call the Health Information Department where you were seen or reach out to your Primary Care Provider for more information about your plan of care. Introducing \[facility]\"" & HEALTH SERVICES! King Aristides introduces Open[employer] patient portal. Now you can access parts of your medical record, email your doctor's office, and request medication refills online. 1. In your internet browser, go to [email or web address] 2. Click on the First Time User? Click Here link in the Sign In box. You will see the New Member Sign Up page. 3. Enter your Openbucks Access Code exactly as it appears below. You will not need to use this code after youve completed the sign-up process. If you do not sign up before the expiration date, you must request a new code. · Openbucks Access Code: [email or web address] Expires: 7/20/2017  1:45 PM 
 
4. Enter the last four digits of your Social Security Number (xxxx) and Date of Birth (mm/dd/yyyy) as indicated and click Submit. You will be taken to the next sign-up page. 5. Create a Openbucks ID.  This will be your [employer] login ID and cannot be changed, so think of one that is secure and easy to remember. 6. Create a [employer] password. You can change your password at any time. 7. Enter your Password Reset Question and Answer. This can be used at a later time if you forget your password. 8. Enter your e-mail address. You will receive e-mail notification when new information is available in [address] 9. Click Sign Up. You can now view and download portions of your medical record. 10. Click the Download Summary menu link to download a portable copy of your medical information. If you have questions, please visit the Frequently Asked Questions section of the [employer] website. Remember, [employer] is NOT to be used for urgent needs. For medical emergencies, dial 911. Now available from your iPhone and Android! General Information Please provide this summary of care documentation to your next provider. Patient Signature:  ____________________________________________________________ Date:  ____________________________________________________________  
  
Patria Agarwal Provider Signature:  ____________________________________________________________ Date:  ____________________________________________________________

## 2017-04-30 NOTE — ED TRIAGE NOTES
PT arrived to ED c/o fever, chills and difficulty urinating. PT alert and oriented upon arrival.Per EMS PT was tachycardic at 105 and had a fever of 102.5.

## 2017-04-30 NOTE — ED PROVIDER NOTES
HPI Comments: -72-year-old female brought to the ED by EMS for fever and dysuria. ppatient was reportedly tachycardic and febrile 102. Symptoms started yesterday patient has been weak for several days. The patient had a trans-catheter aortic valve replacement done several weeks ago and had a ERCP for gallstones done last week. She had gotten home without any difficulties however on Friday being weak and low-grade fever. Early this morning she awoke even more weak and had a fever of 102. She is complaining of pressure in the suprapubic area particularly when she urinates. Patient is a 80 y.o. female presenting with urinary burning. The history is provided by the patient and the EMS personnel. Urinary Burning    This is a new problem. The current episode started 3 to 5 hours ago. The problem occurs every urination. The problem has not changed since onset. The quality of the pain is described as burning. The pain is at a severity of 10/10. The pain is moderate. There has been a fever of 102 - 102.9 F. Associated symptoms include chills, sweats, hesitancy and urgency. Her past medical history is significant for recurrent UTIs.         Past Medical History:   Diagnosis Date    Aortic regurgitation     mild to moderate per ECHO 3/28/17    Aortic stenosis     severe per ECHO 3/28/17    Basal cell carcinoma     Bruit (arterial)     CAD (coronary artery disease)     small vessel disease, medical management per cardiologist not 4/17    Choledocholithiasis     Hyperlipidemia     Hypertension     Hypothyroidism     Murmur, cardiac     Poor historian     S/P TAVR (transcatheter aortic valve replacement)     3/17    Tricuspid valve regurgitation     moderate per ECHO 3/28/17    Type II or unspecified type diabetes mellitus without mention of complication, not stated as uncontrolled     not medicated, treated w/ diet    Urinary incontinence        Past Surgical History:   Procedure Laterality Date    HX APPENDECTOMY      HX CARPAL TUNNEL RELEASE      patient denies    HX CYSTOCELE REPAIR      HX ERCP  02/20/2017    large CBD stones    HX ERCP  04/28/2017    laser    HX HYSTERECTOMY      HX KNEE ARTHROSCOPY Right     HX OOPHORECTOMY      Partial    HX ORTHOPAEDIC      2 back-one fusion-low; all fingers-trigger    HX OTHER SURGICAL Right     basal cell nose    HX RECTOCELE REPAIR           Family History:   Problem Relation Age of Onset    Heart Attack Father      age 68    Heart Disease Father     Arthritis-osteo Mother        Social History     Social History    Marital status:      Spouse name: N/A    Number of children: N/A    Years of education: N/A     Occupational History    Not on file. Social History Main Topics    Smoking status: Never Smoker    Smokeless tobacco: Never Used    Alcohol use No    Drug use: No    Sexual activity: Not on file     Other Topics Concern    Not on file     Social History Narrative         ALLERGIES: Aspirin; Ativan [lorazepam]; Aleve [naproxen sodium]; Carafate [sucralfate]; Clorazepate dipotassium; Flexeril [cyclobenzaprine]; Robaxin [methocarbamol]; Vytorin 10-10 [ezetimibe-simvastatin]; and Zantac [ranitidine hcl]    Review of Systems   Constitutional: Positive for chills. Negative for activity change. HENT: Negative. Eyes: Negative. Respiratory: Negative. Cardiovascular: Negative. Gastrointestinal: Negative. Genitourinary: Positive for hesitancy and urgency. Musculoskeletal: Negative. Skin: Negative. Neurological: Negative. Psychiatric/Behavioral: Negative. All other systems reviewed and are negative. Vitals:    04/30/17 0521   BP: 142/64   Pulse: 99   Resp: 18   Temp: (!) 101.1 °F (38.4 °C)   SpO2: 93%   Weight: 64.9 kg (143 lb)   Height: 5' 4\" (1.626 m)            Physical Exam   Constitutional: She is oriented to person, place, and time. She appears well-developed and well-nourished. No distress.    HENT: Head: Normocephalic and atraumatic. Right Ear: External ear normal.   Left Ear: External ear normal.   Nose: Nose normal.   Mouth/Throat: Oropharynx is clear and moist. No oropharyngeal exudate. Eyes: Conjunctivae and EOM are normal. Pupils are equal, round, and reactive to light. Right eye exhibits no discharge. Left eye exhibits no discharge. No scleral icterus. Neck: Normal range of motion. Neck supple. No JVD present. No tracheal deviation present. Cardiovascular: Regular rhythm and intact distal pulses. Tachycardia present. Murmur heard. Systolic murmur is present with a grade of 4/6   Pulmonary/Chest: Effort normal and breath sounds normal. No stridor. No respiratory distress. She has no wheezes. She exhibits no tenderness. Abdominal: Soft. Bowel sounds are normal. She exhibits no distension and no mass. There is no tenderness. Musculoskeletal: Normal range of motion. She exhibits no edema or tenderness. Neurological: She is alert and oriented to person, place, and time. No cranial nerve deficit. Skin: Skin is warm and dry. No rash noted. She is not diaphoretic. No erythema. No pallor. Psychiatric: She has a normal mood and affect. Her behavior is normal. Thought content normal.   Nursing note and vitals reviewed. MDM  Number of Diagnoses or Management Options  Diagnosis management comments: Patient appears to be an early sepsis with urosepsis. Abdomen soft nontender lungs are clear. Discussed with hospitalist there was admitted patient given antibiotics and fluids in the ED.        Amount and/or Complexity of Data Reviewed  Clinical lab tests: ordered and reviewed  Tests in the radiology section of CPT®: ordered and reviewed  Tests in the medicine section of CPT®: ordered and reviewed    Risk of Complications, Morbidity, and/or Mortality  Presenting problems: high  Diagnostic procedures: high  Management options: high      ED Course       Procedures

## 2017-05-01 LAB
ALBUMIN SERPL BCP-MCNC: 2.3 G/DL (ref 3.2–4.6)
ALBUMIN/GLOB SERPL: 0.6 {RATIO} (ref 1.2–3.5)
ALP SERPL-CCNC: 159 U/L (ref 50–136)
ALT SERPL-CCNC: 175 U/L (ref 12–65)
ANION GAP BLD CALC-SCNC: 11 MMOL/L (ref 7–16)
AST SERPL W P-5'-P-CCNC: 66 U/L (ref 15–37)
BASOPHILS # BLD AUTO: 0 K/UL (ref 0–0.2)
BASOPHILS # BLD: 0 % (ref 0–2)
BILIRUB SERPL-MCNC: 1.3 MG/DL (ref 0.2–1.1)
BUN SERPL-MCNC: 16 MG/DL (ref 8–23)
CALCIUM SERPL-MCNC: 7.4 MG/DL (ref 8.3–10.4)
CHLORIDE SERPL-SCNC: 110 MMOL/L (ref 98–107)
CO2 SERPL-SCNC: 21 MMOL/L (ref 21–32)
CREAT SERPL-MCNC: 0.84 MG/DL (ref 0.6–1)
DIFFERENTIAL METHOD BLD: ABNORMAL
EOSINOPHIL # BLD: 0 K/UL (ref 0–0.8)
EOSINOPHIL NFR BLD: 0 % (ref 0.5–7.8)
ERYTHROCYTE [DISTWIDTH] IN BLOOD BY AUTOMATED COUNT: 13.8 % (ref 11.9–14.6)
GLOBULIN SER CALC-MCNC: 3.6 G/DL (ref 2.3–3.5)
GLUCOSE BLD STRIP.AUTO-MCNC: 147 MG/DL (ref 65–100)
GLUCOSE BLD STRIP.AUTO-MCNC: 149 MG/DL (ref 65–100)
GLUCOSE BLD STRIP.AUTO-MCNC: 179 MG/DL (ref 65–100)
GLUCOSE BLD STRIP.AUTO-MCNC: 234 MG/DL (ref 65–100)
GLUCOSE SERPL-MCNC: 133 MG/DL (ref 65–100)
HCT VFR BLD AUTO: 30.5 % (ref 35.8–46.3)
HGB BLD-MCNC: 10.6 G/DL (ref 11.7–15.4)
IMM GRANULOCYTES # BLD: 0 K/UL (ref 0–0.5)
IMM GRANULOCYTES NFR BLD AUTO: 0.2 % (ref 0–5)
LYMPHOCYTES # BLD AUTO: 16 % (ref 13–44)
LYMPHOCYTES # BLD: 0.8 K/UL (ref 0.5–4.6)
MCH RBC QN AUTO: 29.4 PG (ref 26.1–32.9)
MCHC RBC AUTO-ENTMCNC: 34.8 G/DL (ref 31.4–35)
MCV RBC AUTO: 84.7 FL (ref 79.6–97.8)
MM INDURATION POC: NORMAL MM (ref 0–5)
MONOCYTES # BLD: 0.2 K/UL (ref 0.1–1.3)
MONOCYTES NFR BLD AUTO: 4 % (ref 4–12)
NEUTS SEG # BLD: 4.2 K/UL (ref 1.7–8.2)
NEUTS SEG NFR BLD AUTO: 80 % (ref 43–78)
PLATELET # BLD AUTO: 70 K/UL (ref 150–450)
PMV BLD AUTO: 10.8 FL (ref 10.8–14.1)
POTASSIUM SERPL-SCNC: 3.4 MMOL/L (ref 3.5–5.1)
PPD POC: NEGATIVE NEGATIVE
PROT SERPL-MCNC: 5.9 G/DL (ref 6.3–8.2)
RBC # BLD AUTO: 3.6 M/UL (ref 4.05–5.25)
SODIUM SERPL-SCNC: 142 MMOL/L (ref 136–145)
WBC # BLD AUTO: 5.3 K/UL (ref 4.3–11.1)

## 2017-05-01 PROCEDURE — 74011000258 HC RX REV CODE- 258: Performed by: INTERNAL MEDICINE

## 2017-05-01 PROCEDURE — 97161 PT EVAL LOW COMPLEX 20 MIN: CPT

## 2017-05-01 PROCEDURE — 74011636637 HC RX REV CODE- 636/637: Performed by: INTERNAL MEDICINE

## 2017-05-01 PROCEDURE — 36415 COLL VENOUS BLD VENIPUNCTURE: CPT | Performed by: INTERNAL MEDICINE

## 2017-05-01 PROCEDURE — 97165 OT EVAL LOW COMPLEX 30 MIN: CPT

## 2017-05-01 PROCEDURE — 80053 COMPREHEN METABOLIC PANEL: CPT | Performed by: INTERNAL MEDICINE

## 2017-05-01 PROCEDURE — 74011250637 HC RX REV CODE- 250/637: Performed by: INTERNAL MEDICINE

## 2017-05-01 PROCEDURE — 65270000029 HC RM PRIVATE

## 2017-05-01 PROCEDURE — 74011250636 HC RX REV CODE- 250/636: Performed by: INTERNAL MEDICINE

## 2017-05-01 PROCEDURE — 87040 BLOOD CULTURE FOR BACTERIA: CPT | Performed by: HOSPITALIST

## 2017-05-01 PROCEDURE — 82962 GLUCOSE BLOOD TEST: CPT

## 2017-05-01 PROCEDURE — 85025 COMPLETE CBC W/AUTO DIFF WBC: CPT | Performed by: INTERNAL MEDICINE

## 2017-05-01 RX ORDER — DOCUSATE SODIUM 100 MG/1
100 CAPSULE, LIQUID FILLED ORAL 2 TIMES DAILY
Status: DISCONTINUED | OUTPATIENT
Start: 2017-05-01 | End: 2017-05-04 | Stop reason: HOSPADM

## 2017-05-01 RX ORDER — AMLODIPINE BESYLATE 10 MG/1
10 TABLET ORAL DAILY
Status: DISCONTINUED | OUTPATIENT
Start: 2017-05-01 | End: 2017-05-04 | Stop reason: HOSPADM

## 2017-05-01 RX ORDER — POTASSIUM CHLORIDE 20 MEQ/1
40 TABLET, EXTENDED RELEASE ORAL
Status: COMPLETED | OUTPATIENT
Start: 2017-05-01 | End: 2017-05-01

## 2017-05-01 RX ORDER — SODIUM CHLORIDE 9 MG/ML
75 INJECTION, SOLUTION INTRAVENOUS CONTINUOUS
Status: DISPENSED | OUTPATIENT
Start: 2017-05-01 | End: 2017-05-01

## 2017-05-01 RX ADMIN — INSULIN LISPRO 4 UNITS: 100 INJECTION, SOLUTION INTRAVENOUS; SUBCUTANEOUS at 21:32

## 2017-05-01 RX ADMIN — AMLODIPINE BESYLATE 10 MG: 10 TABLET ORAL at 11:10

## 2017-05-01 RX ADMIN — DOCUSATE SODIUM 100 MG: 100 CAPSULE, LIQUID FILLED ORAL at 17:20

## 2017-05-01 RX ADMIN — PIPERACILLIN SODIUM,TAZOBACTAM SODIUM 3.38 G: 3; .375 INJECTION, POWDER, FOR SOLUTION INTRAVENOUS at 17:20

## 2017-05-01 RX ADMIN — METOPROLOL TARTRATE 25 MG: 25 TABLET ORAL at 09:03

## 2017-05-01 RX ADMIN — INSULIN LISPRO 2 UNITS: 100 INJECTION, SOLUTION INTRAVENOUS; SUBCUTANEOUS at 17:20

## 2017-05-01 RX ADMIN — ACETAMINOPHEN 500 MG: 500 TABLET ORAL at 02:38

## 2017-05-01 RX ADMIN — DOCUSATE SODIUM 100 MG: 100 CAPSULE, LIQUID FILLED ORAL at 12:25

## 2017-05-01 RX ADMIN — LEVOTHYROXINE SODIUM 100 MCG: 100 TABLET ORAL at 05:27

## 2017-05-01 RX ADMIN — SODIUM CHLORIDE 125 ML/HR: 900 INJECTION, SOLUTION INTRAVENOUS at 05:28

## 2017-05-01 RX ADMIN — CLOPIDOGREL BISULFATE 75 MG: 75 TABLET ORAL at 05:26

## 2017-05-01 RX ADMIN — METOPROLOL TARTRATE 25 MG: 25 TABLET ORAL at 17:20

## 2017-05-01 RX ADMIN — POTASSIUM CHLORIDE 40 MEQ: 20 TABLET, EXTENDED RELEASE ORAL at 11:11

## 2017-05-01 RX ADMIN — PIPERACILLIN SODIUM,TAZOBACTAM SODIUM 3.38 G: 3; .375 INJECTION, POWDER, FOR SOLUTION INTRAVENOUS at 09:03

## 2017-05-01 RX ADMIN — PIPERACILLIN SODIUM,TAZOBACTAM SODIUM 3.38 G: 3; .375 INJECTION, POWDER, FOR SOLUTION INTRAVENOUS at 02:33

## 2017-05-01 RX ADMIN — SODIUM CHLORIDE 75 ML/HR: 900 INJECTION, SOLUTION INTRAVENOUS at 16:26

## 2017-05-01 RX ADMIN — ROSUVASTATIN CALCIUM 10 MG: 20 TABLET ORAL at 21:32

## 2017-05-01 NOTE — PROGRESS NOTES
Problem: Self Care Deficits Care Plan (Adult)  Goal: *Acute Goals and Plan of Care (Insert Text)  1. Danae Valle will be modified independent with functional mobility for ADL within 4 - 7 visits. 2. Danae Valle will be modified independent with total body bathing and dressing within 4 - 7 visits. 3. Aurora Oleary will state and demonstrate at least 5 energy conservation techniques during ADL/therapeutic activities within 4 - 7 visits. 4. Aurora Oleary will voice a plan for 2-3 appropriate home modifications for home safety and fall prevention within 7 visits. 5. Aurora Oleary will participate at least 30 minutes of ADL with 3 or less rest breaks within 7 visits. 6. Danae Valle will complete a shower transfer with modified independence within 7 visits. OCCUPATIONAL THERAPY: Initial Assessment 5/1/2017  INPATIENT: Hospital Day: 2  [place] / Product Type: Medicare /      NAME/AGE/GENDER: Danae Valle is a 80 y.o. female   PRIMARY DIAGNOSIS:  Sepsis ([facility] [address] Sepsis ([facility] [address] Sepsis ([facility] [address]        ICD-10: Treatment Diagnosis:        · Generalized Muscle Weakness (M62.81)   Precautions/Allergies:         Aspirin; Ativan [lorazepam]; Aleve [naproxen sodium]; Carafate [sucralfate]; Clorazepate dipotassium; Flexeril [cyclobenzaprine]; Robaxin [methocarbamol]; Vytorin 10-10 [ezetimibe-simvastatin]; and Zantac [ranitidine hcl]       ASSESSMENT:      Ms. Duncan Murillo presents secondary to the above with a recent history of ERCP and TAVR procedures. She presents with generalized weakness, decreased activity tolerance, unsteadiness on her feet, decreased independence with activities of daily living, decreased independence with instrumental activities of daily living, and decreased independence with functional mobility for activities of daily living.   Danae Valle presents with the following problems and would benefit from occupational therapy to maximize independence with self-care,instrumental activities of daily living, and functional transfers/mobility for activities of daily living/instrumental activities of daily living via the stated goals. This section established at most recent assessment   PROBLEM LIST (Impairments causing functional limitations):  1. Decreased Strength  2. Decreased ADL/Functional Activities  3. Decreased Transfer Abilities  4. Decreased Balance  5. Decreased Activity Tolerance  6. Decreased Flexibility/Joint Mobility    INTERVENTIONS PLANNED: (Benefits and precautions of occupational therapy have been discussed with the patient.)  1. Activities of daily living training  2. Theraputic activity  3. Theraputic exercise      TREATMENT PLAN: Frequency/Duration: Follow patient 3 times/week to address above goals. Rehabilitation Potential For Stated Goals: GOOD      RECOMMENDED REHABILITATION/EQUIPMENT: (at time of discharge pending progress): Continue Skilled Therapy. OCCUPATIONAL PROFILE AND HISTORY:   History of Present Injury/Illness (Reason for Referral):  Per MD H & P: Ms. Mel Cuenca is a 79 yo WF with PMH of TAVR, ERCP 4-28-17, evaluated with fever and pelvic pressure with urination, diagnosed with sepsis/UTI in the ED. Given rocephin and IVF bolus. Has some hematuria and anorexia since ERCP. CXR negative. Past Medical History/Comorbidities:   Ms. Mel Cuenca  has a past medical history of Aortic regurgitation; Aortic stenosis; Basal cell carcinoma; Bruit (arterial); CAD (coronary artery disease); Choledocholithiasis; Hyperlipidemia; Hypertension; Hypothyroidism; Murmur, cardiac; Poor historian; S/P TAVR (transcatheter aortic valve replacement); Tricuspid valve regurgitation; Type II or unspecified type diabetes mellitus without mention of complication, not stated as uncontrolled; and Urinary incontinence.   Ms. Mel Cuenca  has a past surgical history that includes cystocele repair; rectocele repair; appendectomy; carpal tunnel release; ercp (02/20/2017); hysterectomy; oophorectomy; other surgical (Right); orthopaedic; knee arthroscopy (Right); and ercp (04/28/2017). Social History/Living Environment: Lives alone. Home Environment: Private residence  # Steps to Enter: 4  Rails to Enter: Yes  Hand Rails : Bilateral  One/Two Story Residence: One story  Living Alone: Yes  Support Systems: Child(mayo), Family member(s)  Patient Expects to be Discharged to[de-identified] Private residence  Current DME Used/Available at Home: Cane, straight, Commode, bedside  Tub or Shower Type: Shower  Prior Level of Function/Work/Activity:  Dresses herself, bathes herself in shower with built in bench, cooks, and cleans. Does not drive. Gets a ride to store, etc.   Number of Personal Factors/Comorbidities that affect the Plan of Care: Brief history (0):  LOW COMPLEXITY   ASSESSMENT OF OCCUPATIONAL PERFORMANCE[de-identified]   Activities of Daily Living:           Basic ADLs (From Assessment) Complex ADLs (From Assessment)   Basic ADL  Feeding: Setup  Oral Facial Hygiene/Grooming: Setup  Bathing: Moderate assistance  Upper Body Dressing: Stand-by assistance  Lower Body Dressing: Moderate assistance  Toileting: Moderate assistance Instrumental ADL  Meal Preparation: Total assistance  Homemaking:  Total assistance   Grooming/Bathing/Dressing Activities of Daily Living     Cognitive Retraining  Safety/Judgement: Fall prevention                       Bed/Mat Mobility  Supine to Sit: Contact guard assistance  Sit to Stand: Contact guard assistance  Scooting: Stand-by asssistance          Most Recent Physical Functioning:   Gross Assessment:  AROM: Generally decreased, functional  Strength: Generally decreased, functional               Posture:     Balance:  Sitting: Intact  Standing: Impaired  Standing - Static: Constant support  Standing - Dynamic : Fair Bed Mobility:  Supine to Sit: Contact guard assistance  Scooting: Stand-by asssistance  Wheelchair Mobility:     Transfers:  Sit to Stand: Contact guard assistance  Stand to Sit: Contact guard assistance              Patient Vitals for the past 6 hrs:       BP SpO2 Pulse   17 1157 139/63 98 % 78   17 1533 142/75 97 % 82        Mental Status  Neurologic State: Alert  Orientation Level: Oriented to person, Oriented to place, Oriented to situation  Cognition: Follows commands  Perception: Appears intact  Perseveration: No perseveration noted  Safety/Judgement: Fall prevention                               Physical Skills Involved:  1. Range of Motion  2. Balance  3. Mobility  4. Strength  5. Activity tolerance Cognitive Skills Affected (resulting in the inability to perform in a timely and safe manner):  1. None noted Psychosocial Skills Affected:  1. Routines and Behaviors  2. Environmental Adaptations   Number of elements that affect the Plan of Care: 3-5:  MODERATE COMPLEXITY   CLINICAL DECISION MAKIN[address] [place] [address] AM-PAC 6 Clicks   Basic Mobility Inpatient Short Form  How much help from another person does the patient currently need. .. Total A Lot A Little None   1. Putting on and taking off regular lower body clothing?   [ ] 1   [X] 2   [ ] 3   [ ] 4   2. Bathing (including washing, rinsing, drying)? [ ] 1   [X] 2   [ ] 3   [ ] 4   3. Toileting, which includes using toilet, bedpan or urinal?   [ ] 1   [X] 2   [ ] 3   [ ] 4   4. Putting on and taking off regular upper body clothing?   [ ] 1   [ ] 2   [X] 3   [ ] 4   5. Taking care of personal grooming such as brushing teeth? [ ] 1   [ ] 2   [X] 3   [ ] 4   6. Eating meals? [ ] 1   [ ] 2   [X] 3   [ ] 4   © , Trustees of [address] [place] [id number], under license to [employer]. All rights reserved    Score:  Initial: 15 Most Recent: X (Date: -- )     Interpretation of Tool:  Represents activities that are increasingly more difficult (i.e. Bed mobility, Transfers, Gait).        Score 24 23 22-20 19-15 14-10 9-7 6       Modifier CH CI CJ CK CL CM CN         · Self Care:  - CURRENT STATUS:    CK - 40%-59% impaired, limited or restricted               - GOAL STATUS:           CI - 1%-19% impaired, limited or restricted               - D/C STATUS:                       ---------------To be determined---------------  [place] / Product Type: Medicare /       Medical Necessity:     · Patient demonstrates good rehab potential due to higher previous functional level. Reason for Services/Other Comments:  · Patient continues to require skilled intervention due to decreased independence with ADL, instrumental ADL, and functional mobility for ADL. Use of outcome tool(s) and clinical judgement create a POC that gives a: LOW COMPLEXITY             TREATMENT:   (In addition to Assessment/Re-Assessment sessions the following treatments were rendered)      Pre-treatment Symptoms/Complaints:    Pain: Initial:   Pain Intensity 1: 0  Post Session:  same      Assessment/Reassessment only, no treatment provided today     Braces/Orthotics/Lines/Etc:   · O2 Device: Room air  Treatment/Session Assessment:    · Response to Treatment:  No treatment rendered. Assessment only. · Interdisciplinary Collaboration:  · Occupational Therapist  · Registered Nurse  · After treatment position/precautions:  · Up in chair  · Bed/Chair-wheels locked  · Call light within reach  · RN notified  · Family at bedside  · Compliance with Program/Exercises: Will assess as treatment progresses. · Recommendations/Intent for next treatment session: \"Next visit will focus on advancements to more challenging activities\".   Total Treatment Duration:  OT Patient Time In/Time Out  Time In: 1605  Time Out: [address] [place], OTD, OTR/L

## 2017-05-01 NOTE — PROGRESS NOTES
PM assessment completed. Pt assisted to restroom x 2. Tolerated well. AAO x 3. Daughter in room. Warm to touch. RR even and unlabored. Denies any pain. Aware to call for assistance when needed. Bed locked, in low position, call light in reach. Will monitor.

## 2017-05-01 NOTE — PROGRESS NOTES
Problem: Mobility Impaired (Adult and Pediatric)  Goal: *Acute Goals and Plan of Care (Insert Text)  STG:  (1.)Ms. Oleary will move from supine to sit and sit to supine , scoot up and down and roll side to side with SUPERVISION within 3 day(s). (2.)Ms. Grabiel Ahuja will transfer from bed to chair and chair to bed with STAND BY ASSIST using the least restrictive device within 3 day(s). (3.)Ms. Oleary will ambulate with STAND BY ASSIST for 200 feet with the least restrictive device within 3 day(s). LTG:  (1.)Ms. Grabiel Ahuja will move from supine to sit and sit to supine , scoot up and down and roll side to side in bed with INDEPENDENT within 7 day(s). (2.)Ms. Grbaiel Ahuja will transfer from bed to chair and chair to bed with SUPERVISION using the least restrictive device within 7 day(s). (3.)Ms. Oleary will ambulate with SUPERVISION for 500 feet with the least restrictive device within 7 day(s). ________________________________________________________________________________________________      PHYSICAL THERAPY: INITIAL ASSESSMENT, TREATMENT DAY: DAY OF ASSESSMENT, AM 5/1/2017  INPATIENT: Hospital Day: 2  [place] / Product Type: Medicare /      NAME/AGE/GENDER: Brit Pedraza is a 80 y.o. female   PRIMARY DIAGNOSIS: Sepsis ([facility] [address] Sepsis ([facility] [address] Sepsis ([facility] [address]        ICD-10: Treatment Diagnosis:       · Generalized Muscle Weakness (M62.81)  · Difficulty in walking, Not elsewhere classified (R26.2)   Precaution/Allergies:  Aspirin; Ativan [lorazepam]; Aleve [naproxen sodium]; Carafate [sucralfate]; Clorazepate dipotassium; Flexeril [cyclobenzaprine]; Robaxin [methocarbamol]; Vytorin 10-10 [ezetimibe-simvastatin]; and Zantac [ranitidine hcl]       ASSESSMENT:      Ms. Grabiel Ahuja is supine in bed upon contact with daughter present and agreeable to PT evaluation. Pt reports living alone in 1 story home with 4 steps to enter with B railings.  Pt reports use of SPC for community distances and no AD for household distances. Pt reports no longer driving and 0 falls in past 6 months. Pt is SBA for supine to sit EOB this morning. Pt is CGA with sit to stand to RW and ambulated 100 ft in hallway with CGA. Pt returned to room and left sitting up in bedside chair with all needs met and within reach with daughter at bedside. Pt is hopeful for HHPT and doesn't want to go to rehab unless absolutely necessary. Avery Martínez will benefit from skilled PT (medically necessary) to address decreased strength, decreased balance, decreased functional tolerance, decreased cardiopulmonary endurance affecting participation in basic ADLs and functional tasks. This section established at most recent assessment   PROBLEM LIST (Impairments causing functional limitations):  1. Decreased Strength  2. Decreased ADL/Functional Activities  3. Decreased Ambulation Ability/Technique  4. Decreased Balance  5. Increased Pain  6. Decreased Activity Tolerance  7. Increased Fatigue    INTERVENTIONS PLANNED: (Benefits and precautions of physical therapy have been discussed with the patient.)  1. Balance Exercise  2. Bed Mobility  3. Family Education  4. Gait Training  5. Neuromuscular Re-education/Strengthening  6. Therapeutic Activites  7. Therapeutic Exercise/Strengthening  8. Group Therapy      TREATMENT PLAN: Frequency/Duration: 3 times a week for duration of hospital stay  Rehabilitation Potential For Stated Goals: GOOD      RECOMMENDED REHABILITATION/EQUIPMENT: (at time of discharge pending progress): Continue Skilled Therapy and rehab versus HHPT dependent on pt progress. HISTORY:   History of Present Injury/Illness (Reason for Referral):  See H&P below  42-year-old female brought to the ED by EMS for fever and dysuria. patient was reportedly tachycardic and febrile 102. Symptoms started yesterday patient has been weak for several days.  The patient had a trans-catheter aortic valve replacement done several weeks ago and had a ERCP for gallstones done last week. She had gotten home without any difficulties however on Friday being weak and low-grade fever. Early this morning she awoke even more weak and had a fever of 102. She is complaining of pressure in the suprapubic area particularly when she urinates. Past Medical History/Comorbidities:   Ms. Lorelei Anand  has a past medical history of Aortic regurgitation; Aortic stenosis; Basal cell carcinoma; Bruit (arterial); CAD (coronary artery disease); Choledocholithiasis; Hyperlipidemia; Hypertension; Hypothyroidism; Murmur, cardiac; Poor historian; S/P TAVR (transcatheter aortic valve replacement); Tricuspid valve regurgitation; Type II or unspecified type diabetes mellitus without mention of complication, not stated as uncontrolled; and Urinary incontinence. Ms. Lorelei Anand  has a past surgical history that includes cystocele repair; rectocele repair; appendectomy; carpal tunnel release; ercp (02/20/2017); hysterectomy; oophorectomy; other surgical (Right); orthopaedic; knee arthroscopy (Right); and ercp (04/28/2017).   Social History/Living Environment:   Home Environment: Private residence  # Steps to Enter: 4  Rails to Enter: Yes  Hand Rails : Bilateral  One/Two Story Residence: One story  Living Alone: Yes  Support Systems: Child(mayo), Family member(s)  Patient Expects to be Discharged to[de-identified] Private residence  Current DME Used/Available at Home: Cane, straight, Commode, bedside  Tub or Shower Type: Shower  Prior Level of Function/Work/Activity:  Ambulating household distances indep, use of SPC for community distances, lives alone, indep with ADLs      Number of Personal Factors/Comorbidities that affect the Plan of Care: 3+: HIGH COMPLEXITY   EXAMINATION:   Most Recent Physical Functioning:   Gross Assessment:  AROM: Generally decreased, functional  PROM: Within functional limits  Strength: Generally decreased, functional  Sensation: Intact               Posture: Balance:  Sitting: Intact; Without support  Standing: Pull to stand; With support; Impaired Bed Mobility:  Supine to Sit: Stand-by asssistance  Wheelchair Mobility:     Transfers:  Sit to Stand: Contact guard assistance  Stand to Sit: Contact guard assistance  Gait:     Base of Support: Narrowed  Speed/Blanca: Shuffled; Slow  Step Length: Left shortened;Right shortened  Gait Abnormalities: Decreased step clearance; Path deviations  Distance (ft): 100 Feet (ft)  Assistive Device: Walker, rolling  Ambulation - Level of Assistance: Contact guard assistance  Interventions: Safety awareness training; Tactile cues; Verbal cues       Body Structures Involved:  1. Heart  2. Lungs  3. Bones  4. Joints  5. Muscles  6. Ligaments Body Functions Affected:  1. Sensory/Pain  2. Cardio  3. Respiratory  4. Neuromusculoskeletal  5. Movement Related Activities and Participation Affected:  1. General Tasks and Demands  2. Mobility  3. Self Care  4. Domestic Life  5. Interpersonal Interactions and Relationships  6. Community, Social and [place]   Number of elements that affect the Plan of Care: 4+: HIGH COMPLEXITY   CLINICAL PRESENTATION:   Presentation: Stable and uncomplicated: LOW COMPLEXITY   CLINICAL DECISION MAKIN [facility] Inpatient Short Form  How much difficulty does the patient currently have. .. Unable A Lot A Little None   1. Turning over in bed (including adjusting bedclothes, sheets and blankets)? [ ] 1   [ ] 2   [ ] 3   [X] 4   2. Sitting down on and standing up from a chair with arms ( e.g., wheelchair, bedside commode, etc.)   [ ] 1   [ ] 2   [X] 3   [ ] 4   3. Moving from lying on back to sitting on the side of the bed? [ ] 1   [ ] 2   [ ] 3   [X] 4   How much help from another person does the patient currently need. .. Total A Lot A Little None   4. Moving to and from a bed to a chair (including a wheelchair)? [ ] 1   [ ] 2   [X] 3   [ ] 4   5.   Need to walk in hospital room? [ ] 1   [ ] 2   [X] 3   [ ] 4   6. Climbing 3-5 steps with a railing? [ ] 1   [X] 2   [ ] 3   [ ] 4   © 2007, Trustees of [employer], under license to [employer]. All rights reserved    Score:  Initial: 19 Most Recent: X (Date: -- )     Interpretation of Tool:  Represents activities that are increasingly more difficult (i.e. Bed mobility, Transfers, Gait). Score 24 23 22-20 19-15 14-10 9-7 6       Modifier CH CI CJ CK CL CM CN         · Mobility - Walking and Moving Around:               - CURRENT STATUS:    CK - 40%-59% impaired, limited or restricted               - GOAL STATUS:           CJ - 20%-39% impaired, limited or restricted               - D/C STATUS:                       ---------------To be determined---------------  [place] / Product Type: Medicare /       Medical Necessity:     · Patient is expected to demonstrate progress in strength, balance, coordination and functional technique to decrease assistance required with gait and functional mobility. Reason for Services/Other Comments:  · Patient continues to require skilled intervention due to decreased strength, decreased balance, decreased functional tolerance, decreased cardiopulmonary endurance affecting participation in basic ADLs and functional tasks. .   Use of outcome tool(s) and clinical judgement create a POC that gives a: Clear prediction of patient's progress: LOW COMPLEXITY                 TREATMENT:   (In addition to Assessment/Re-Assessment sessions the following treatments were rendered)   Pre-treatment Symptoms/Complaints:  No complaints- pt eager to work with PT  Pain: Initial:   Pain Intensity 1: 0  Post Session:  0/10 resting comfortably up in chair.       Assessment/Reassessment only, no treatment provided today     Braces/Orthotics/Lines/Etc:   · IV  · O2 Device: Room air  Treatment/Session Assessment:    · Response to Treatment:  Pt participated well with PT and motivated to get stronger to return home. · Interdisciplinary Collaboration:  · Physical Therapist  · Registered Nurse  · After treatment position/precautions:  · Up in chair  · Bed/Chair-wheels locked  · Bed in low position  · Call light within reach  · Family at bedside  · Compliance with Program/Exercises: Will assess as treatment progresses. · Recommendations/Intent for next treatment session: \"Next visit will focus on advancements to more challenging activities and reduction in assistance provided\".   Total Treatment Duration:  PT Patient Time In/Time Out  Time In: 0949  Time Out: 205 [facility]

## 2017-05-01 NOTE — PROGRESS NOTES
Hospitalist Progress Note    2017  2:58 PM  Admit Date: 2017  5:29 AM   NAME: Brit Pedraza   :  3/20/1929   MRN:  [id number]   Attending: Logan Rodríguez MD  PCP:  Garcia Cohen MD  Treatment Team: Attending Provider: Logan Rodríguez MD; Utilization Review: Myles Vale, RN; Care Manager: Tyler August, RN; Physical Therapist: Amilcar oClon  SUBJECTIVE:           Ms. Grabiel Ahuja is a 79 yo WF with PMH of TAVR, ERCP 17, evaluated with fever and pelvic pressure with urination, diagnosed with sepsis/UTI/ ALBERT in the ED. Given rocephin and IVF bolus. Has some hematuria and anorexia since ERCP. CXR negative. Started on daily zosyn, 1/2 BC positive for GNR. Renal function improved with hydration.  Will need SW for possible SNF      17 up to chair, needs to have BM with LLQ discomfort/bloating, denies dyspnea/cough , daughter at bedside        Recent Results (from the past 24 hour(s))   GLUCOSE, POC    Collection Time: 17  4:33 PM   Result Value Ref Range    Glucose (POC) 144 (H) 65 - 100 mg/dL   LACTIC ACID, PLASMA    Collection Time: 17  5:10 PM   Result Value Ref Range    Lactic acid 1.1 0.4 - 2.0 MMOL/L   COLLECTION COMMENT    Collection Time: 17  5:10 PM   Result Value Ref Range    Collection Comment Q4    GLUCOSE, POC    Collection Time: 17  7:58 PM   Result Value Ref Range    Glucose (POC) 123 (H) 65 - 100 mg/dL   GLUCOSE, POC    Collection Time: 17  5:32 AM   Result Value Ref Range    Glucose (POC) 147 (H) 65 - 102 mg/dL   METABOLIC PANEL, COMPREHENSIVE    Collection Time: 17  6:14 AM   Result Value Ref Range    Sodium 142 136 - 145 mmol/L    Potassium 3.4 (L) 3.5 - 5.1 mmol/L    Chloride 110 (H) 98 - 107 mmol/L    CO2 21 21 - 32 mmol/L    Anion gap 11 7 - 16 mmol/L    Glucose 133 (H) 65 - 100 mg/dL    BUN 16 8 - 23 MG/DL    Creatinine 0.84 0.6 - 1.0 MG/DL    GFR est AA >60 >60 ml/min/1.73m2    GFR est non-AA >60 >60 ml/min/1.73m2    Calcium 7.4 (L) 8.3 - 10.4 MG/DL    Bilirubin, total 1.3 (H) 0.2 - 1.1 MG/DL    ALT (SGPT) 175 (H) 12 - 65 U/L    AST (SGOT) 66 (H) 15 - 37 U/L    Alk. phosphatase 159 (H) 50 - 136 U/L    Protein, total 5.9 (L) 6.3 - 8.2 g/dL    Albumin 2.3 (L) 3.2 - 4.6 g/dL    Globulin 3.6 (H) 2.3 - 3.5 g/dL    A-G Ratio 0.6 (L) 1.2 - 3.5     CBC WITH AUTOMATED DIFF    Collection Time: 05/01/17  6:14 AM   Result Value Ref Range    WBC 5.3 4.3 - 11.1 K/uL    RBC 3.60 (L) 4.05 - 5.25 M/uL    HGB 10.6 (L) 11.7 - 15.4 g/dL    HCT 30.5 (L) 35.8 - 46.3 %    MCV 84.7 79.6 - 97.8 FL    MCH 29.4 26.1 - 32.9 PG    MCHC 34.8 31.4 - 35.0 g/dL    RDW 13.8 11.9 - 14.6 %    PLATELET 70 (L) 849 - 450 K/uL    MPV 10.8 10.8 - 14.1 FL    DF AUTOMATED      NEUTROPHILS 80 (H) 43 - 78 %    LYMPHOCYTES 16 13 - 44 %    MONOCYTES 4 4.0 - 12.0 %    EOSINOPHILS 0 (L) 0.5 - 7.8 %    BASOPHILS 0 0.0 - 2.0 %    IMMATURE GRANULOCYTES 0.2 0.0 - 5.0 %    ABS. NEUTROPHILS 4.2 1.7 - 8.2 K/UL    ABS. LYMPHOCYTES 0.8 0.5 - 4.6 K/UL    ABS. MONOCYTES 0.2 0.1 - 1.3 K/UL    ABS. EOSINOPHILS 0.0 0.0 - 0.8 K/UL    ABS. BASOPHILS 0.0 0.0 - 0.2 K/UL    ABS. IMM.  GRANS. 0.0 0.0 - 0.5 K/UL   GLUCOSE, POC    Collection Time: 05/01/17 11:50 AM   Result Value Ref Range    Glucose (POC) 149 (H) 65 - 100 mg/dL       Allergies   Allergen Reactions    Aspirin Anaphylaxis     Hives, swelling    Ativan [Lorazepam] Other (comments)     Confused and aggressive    Aleve [Naproxen Sodium] Rash    Carafate [Sucralfate] Hives    Clorazepate Dipotassium Hives    Flexeril [Cyclobenzaprine] Itching    Robaxin [Methocarbamol] Anaphylaxis     Swelling of tongue, wheezing  Muscle relaxers in general    Vytorin 10-10 [Ezetimibe-Simvastatin] Other (comments)     Muscle soreness    Zantac [Ranitidine Hcl] Itching     Current Facility-Administered Medications   Medication Dose Route Frequency Provider Last Rate Last Dose    0.9% sodium chloride infusion  75 mL/hr IntraVENous CONTINUOUS Lida KHAN MD Edgardo 75 mL/hr at 17 1000 75 mL/hr at 17 1000    amLODIPine (NORVASC) tablet 10 mg  10 mg Oral DAILY Noni Galvan MD   10 mg at 17 1110    docusate sodium (COLACE) capsule 100 mg  100 mg Oral BID Noni Galvan MD   100 mg at 17 1225    sodium chloride (NS) flush 5-10 mL  5-10 mL IntraVENous PRN Arleen Infante MD        clopidogrel (PLAVIX) tablet 75 mg  75 mg Oral 7am Noni Galvan MD   75 mg at 17 0526    levothyroxine (SYNTHROID) tablet 100 mcg  100 mcg Oral ACB Noni Galvan MD   100 mcg at 17 0527    metoprolol tartrate (LOPRESSOR) tablet 25 mg  25 mg Oral BID Noni Galvan MD   25 mg at 17 2322    rosuvastatin (CRESTOR) tablet 10 mg  10 mg Oral QHS Noni Galvan MD   10 mg at 17 2103    insulin lispro (HUMALOG) injection   SubCUTAneous AC&HS Noni Galvan MD   Stopped at 17 2200    acetaminophen (TYLENOL) tablet 500 mg  500 mg Oral Q6H PRN Noni Galvan MD   500 mg at 17 0238    traMADol (ULTRAM) tablet 50 mg  50 mg Oral Q6H PRN Noni Galvan MD        piperacillin-tazobactam (ZOSYN) 3.375 g in 0.9% sodium chloride (MBP/ADV) 100 mL  3.375 g IntraVENous Q8H Noni Galvan MD 25 mL/hr at 17 0903 3.375 g at 17 4980           Review of Systems as noted above in HPI   PHYSICAL EXAM     Visit Vitals    /63    Pulse 78    Temp 99.1 °F (37.3 °C)    Resp 18    Ht 5' 4\" (1.626 m)    Wt 68 kg (150 lb)    SpO2 98%    Breastfeeding No    BMI 25.75 kg/m2      Temp (24hrs), Av.1 °F (37.3 °C), Min:98 °F (36.7 °C), Max:102.2 °F (39 °C)    Oxygen Therapy  O2 Sat (%): 98 % (17 1157)  Pulse via Oximetry: 98 beats per minute (17 0836)  O2 Device: Room air (17 0836)    Intake/Output Summary (Last 24 hours) at 17 1457  Last data filed at 17 1221   Gross per 24 hour   Intake                0 ml   Output 2050 ml   Net            -2050 ml      General: No acute distress,conversive  Lungs:  CTAB, good effort   Heart:  Regular rate and rhythm, no murmur, no edema   Abdomen: Soft, mildly tender  LLQ and nondistended, decreased bowel sounds  Extremities: Warm and dry         DIAGNOSTIC STUDIES      Labs and Studies from previous 24 hours have been personally reviewed by myself           Prior    ASSESSMENT / June New 169 Problems    Diagnosis Date Noted    Sepsis ([facility] [address] 04/30/2017    Acute kidney injury ([facility] [address] 04/30/2017    UTI (urinary tract infection) 04/30/2017    S/P TAVR (transcatheter aortic valve replacement) 03/29/2017     1. TAVR (3/28/17):  20 mm Luis S3 valve.        Essential hypertension, benign 09/04/2014    Type II diabetes mellitus, uncontrolled ([facility] [address] 09/04/2014     -continue  Zosyn, follow BC   -add bowel regimen, if fails to improve will obtain CT AP  -follow thrombocytopenia and elevated liver functions that should improve with sepsis management   -continue home meds with holding lasix/several chronic antihypertensivs  -PPD,PT/OT once stable,  for dispo     DVT Prophylaxis:SCD due to thrombocytopenia  FEN:oral  Code Status: FULL  PMD: [address] addressed: daughter Kinza Caceres MD                                              Signed By: Deena Cates MD     May 1, 2017

## 2017-05-01 NOTE — PROGRESS NOTES
Beside report received from Carlos Samuels, [facility]. Patient resting quietly in bed with eyes closed. Morning assessment complete. Daughter at bedside.

## 2017-05-01 NOTE — PROGRESS NOTES
OT Note: Patient had a very active morning and requests OT to return later. Plan to return later as schedule permits.

## 2017-05-02 ENCOUNTER — APPOINTMENT (OUTPATIENT)
Dept: ULTRASOUND IMAGING | Age: 82
DRG: 872 | End: 2017-05-02
Attending: HOSPITALIST
Payer: MEDICARE

## 2017-05-02 LAB
ALBUMIN SERPL BCP-MCNC: 2.2 G/DL (ref 3.2–4.6)
ALBUMIN/GLOB SERPL: 0.6 {RATIO} (ref 1.2–3.5)
ALP SERPL-CCNC: 192 U/L (ref 50–136)
ALT SERPL-CCNC: 122 U/L (ref 12–65)
ANION GAP BLD CALC-SCNC: 12 MMOL/L (ref 7–16)
AST SERPL W P-5'-P-CCNC: 34 U/L (ref 15–37)
BACTERIA SPEC CULT: ABNORMAL
BASOPHILS # BLD AUTO: 0 K/UL (ref 0–0.2)
BASOPHILS # BLD: 0 % (ref 0–2)
BILIRUB SERPL-MCNC: 0.9 MG/DL (ref 0.2–1.1)
BUN SERPL-MCNC: 11 MG/DL (ref 8–23)
CALCIUM SERPL-MCNC: 8 MG/DL (ref 8.3–10.4)
CHLORIDE SERPL-SCNC: 111 MMOL/L (ref 98–107)
CO2 SERPL-SCNC: 21 MMOL/L (ref 21–32)
CREAT SERPL-MCNC: 0.83 MG/DL (ref 0.6–1)
DIFFERENTIAL METHOD BLD: ABNORMAL
EOSINOPHIL # BLD: 0.1 K/UL (ref 0–0.8)
EOSINOPHIL NFR BLD: 1 % (ref 0.5–7.8)
ERYTHROCYTE [DISTWIDTH] IN BLOOD BY AUTOMATED COUNT: 13.4 % (ref 11.9–14.6)
GLOBULIN SER CALC-MCNC: 4 G/DL (ref 2.3–3.5)
GLUCOSE BLD STRIP.AUTO-MCNC: 147 MG/DL (ref 65–100)
GLUCOSE BLD STRIP.AUTO-MCNC: 182 MG/DL (ref 65–100)
GLUCOSE BLD STRIP.AUTO-MCNC: 187 MG/DL (ref 65–100)
GLUCOSE BLD STRIP.AUTO-MCNC: 206 MG/DL (ref 65–100)
GLUCOSE SERPL-MCNC: 158 MG/DL (ref 65–100)
GRAM STN SPEC: ABNORMAL
HCT VFR BLD AUTO: 31.5 % (ref 35.8–46.3)
HGB BLD-MCNC: 10.8 G/DL (ref 11.7–15.4)
IMM GRANULOCYTES # BLD: 0 K/UL (ref 0–0.5)
IMM GRANULOCYTES NFR BLD AUTO: 0.4 % (ref 0–5)
LYMPHOCYTES # BLD AUTO: 23 % (ref 13–44)
LYMPHOCYTES # BLD: 1.3 K/UL (ref 0.5–4.6)
MAGNESIUM SERPL-MCNC: 1.7 MG/DL (ref 1.8–2.4)
MCH RBC QN AUTO: 29.5 PG (ref 26.1–32.9)
MCHC RBC AUTO-ENTMCNC: 34.3 G/DL (ref 31.4–35)
MCV RBC AUTO: 86.1 FL (ref 79.6–97.8)
MM INDURATION POC: NORMAL MM (ref 0–5)
MONOCYTES # BLD: 0.4 K/UL (ref 0.1–1.3)
MONOCYTES NFR BLD AUTO: 6 % (ref 4–12)
NEUTS SEG # BLD: 3.8 K/UL (ref 1.7–8.2)
NEUTS SEG NFR BLD AUTO: 70 % (ref 43–78)
PLATELET # BLD AUTO: 75 K/UL (ref 150–450)
PMV BLD AUTO: 11.6 FL (ref 10.8–14.1)
POTASSIUM SERPL-SCNC: 3.4 MMOL/L (ref 3.5–5.1)
PPD POC: NEGATIVE NEGATIVE
PROT SERPL-MCNC: 6.2 G/DL (ref 6.3–8.2)
RBC # BLD AUTO: 3.66 M/UL (ref 4.05–5.25)
SERVICE CMNT-IMP: ABNORMAL
SODIUM SERPL-SCNC: 144 MMOL/L (ref 136–145)
WBC # BLD AUTO: 5.5 K/UL (ref 4.3–11.1)

## 2017-05-02 PROCEDURE — 65270000029 HC RM PRIVATE

## 2017-05-02 PROCEDURE — 80053 COMPREHEN METABOLIC PANEL: CPT | Performed by: INTERNAL MEDICINE

## 2017-05-02 PROCEDURE — 74011000258 HC RX REV CODE- 258: Performed by: INTERNAL MEDICINE

## 2017-05-02 PROCEDURE — 76770 US EXAM ABDO BACK WALL COMP: CPT

## 2017-05-02 PROCEDURE — 82962 GLUCOSE BLOOD TEST: CPT

## 2017-05-02 PROCEDURE — 74011250636 HC RX REV CODE- 250/636: Performed by: INTERNAL MEDICINE

## 2017-05-02 PROCEDURE — 85025 COMPLETE CBC W/AUTO DIFF WBC: CPT | Performed by: INTERNAL MEDICINE

## 2017-05-02 PROCEDURE — 83735 ASSAY OF MAGNESIUM: CPT | Performed by: INTERNAL MEDICINE

## 2017-05-02 PROCEDURE — 36415 COLL VENOUS BLD VENIPUNCTURE: CPT | Performed by: INTERNAL MEDICINE

## 2017-05-02 PROCEDURE — 51798 US URINE CAPACITY MEASURE: CPT

## 2017-05-02 PROCEDURE — 74011636637 HC RX REV CODE- 636/637: Performed by: INTERNAL MEDICINE

## 2017-05-02 PROCEDURE — 74011250637 HC RX REV CODE- 250/637: Performed by: INTERNAL MEDICINE

## 2017-05-02 RX ORDER — MAGNESIUM SULFATE HEPTAHYDRATE 40 MG/ML
2 INJECTION, SOLUTION INTRAVENOUS ONCE
Status: COMPLETED | OUTPATIENT
Start: 2017-05-02 | End: 2017-05-02

## 2017-05-02 RX ORDER — POTASSIUM CHLORIDE 20 MEQ/1
40 TABLET, EXTENDED RELEASE ORAL
Status: COMPLETED | OUTPATIENT
Start: 2017-05-02 | End: 2017-05-02

## 2017-05-02 RX ADMIN — DOCUSATE SODIUM 100 MG: 100 CAPSULE, LIQUID FILLED ORAL at 08:52

## 2017-05-02 RX ADMIN — INSULIN LISPRO 2 UNITS: 100 INJECTION, SOLUTION INTRAVENOUS; SUBCUTANEOUS at 16:43

## 2017-05-02 RX ADMIN — LEVOTHYROXINE SODIUM 100 MCG: 100 TABLET ORAL at 06:06

## 2017-05-02 RX ADMIN — MAGNESIUM SULFATE HEPTAHYDRATE 2 G: 40 INJECTION, SOLUTION INTRAVENOUS at 15:00

## 2017-05-02 RX ADMIN — CEFTRIAXONE 2 G: 2 INJECTION, POWDER, FOR SOLUTION INTRAMUSCULAR; INTRAVENOUS at 08:53

## 2017-05-02 RX ADMIN — PIPERACILLIN SODIUM,TAZOBACTAM SODIUM 3.38 G: 3; .375 INJECTION, POWDER, FOR SOLUTION INTRAVENOUS at 02:03

## 2017-05-02 RX ADMIN — POTASSIUM CHLORIDE 40 MEQ: 20 TABLET, EXTENDED RELEASE ORAL at 14:20

## 2017-05-02 RX ADMIN — INSULIN LISPRO 4 UNITS: 100 INJECTION, SOLUTION INTRAVENOUS; SUBCUTANEOUS at 21:57

## 2017-05-02 RX ADMIN — INSULIN LISPRO 2 UNITS: 100 INJECTION, SOLUTION INTRAVENOUS; SUBCUTANEOUS at 12:34

## 2017-05-02 RX ADMIN — CLOPIDOGREL BISULFATE 75 MG: 75 TABLET ORAL at 06:06

## 2017-05-02 RX ADMIN — ACETAMINOPHEN 500 MG: 500 TABLET ORAL at 15:11

## 2017-05-02 RX ADMIN — DOCUSATE SODIUM 100 MG: 100 CAPSULE, LIQUID FILLED ORAL at 16:43

## 2017-05-02 RX ADMIN — METOPROLOL TARTRATE 25 MG: 25 TABLET ORAL at 16:43

## 2017-05-02 RX ADMIN — Medication 10 ML: at 06:06

## 2017-05-02 RX ADMIN — METOPROLOL TARTRATE 25 MG: 25 TABLET ORAL at 08:52

## 2017-05-02 RX ADMIN — AMLODIPINE BESYLATE 10 MG: 10 TABLET ORAL at 08:52

## 2017-05-02 RX ADMIN — ROSUVASTATIN CALCIUM 10 MG: 20 TABLET ORAL at 21:57

## 2017-05-02 RX ADMIN — TRAMADOL HYDROCHLORIDE 50 MG: 50 TABLET, FILM COATED ORAL at 22:03

## 2017-05-02 NOTE — PROGRESS NOTES
Hospitalist Progress Note    2017  8:28 PM  Admit Date: 2017  5:29 AM   NAME: Angie Chacon   :  3/20/1929   MRN:  [id number]   Attending: Terry Duran MD  PCP:  Malcom Quiroz MD  Treatment Team: Attending Provider: Terry Duran MD; Care Manager: Talya Perdomo RN; Physical Therapist: Spring Arteaga  SUBJECTIVE:           Ms. Munira Mata is a 81 yo WF with PMH of TAVR, ERCP 17, evaluated with fever and pelvic pressure with urination, diagnosed with sepsis/UTI/ ALBERT in the ED. Given rocephin and IVF bolus. Has some hematuria and anorexia since ERCP. CXR negative. Started on daily zosyn day 3,  BC positive for quinolone sensitive ECOLI. Renal function improved with hydration.  Will need SW for SNF      17 daughter in law at bedside, has less abd pain after BM, feels full after eating, has some cough, no dyspnea, has less edema,        Recent Results (from the past 24 hour(s))   GLUCOSE, POC    Collection Time: 17 11:50 AM   Result Value Ref Range    Glucose (POC) 149 (H) 65 - 100 mg/dL   GLUCOSE, POC    Collection Time: 17  3:16 PM   Result Value Ref Range    Glucose (POC) 179 (H) 65 - 100 mg/dL   GLUCOSE, POC    Collection Time: 17  8:53 PM   Result Value Ref Range    Glucose (POC) 234 (H) 65 - 100 mg/dL   GLUCOSE, POC    Collection Time: 17  5:20 AM   Result Value Ref Range    Glucose (POC) 147 (H) 65 - 100 mg/dL   CBC WITH AUTOMATED DIFF    Collection Time: 17  6:39 AM   Result Value Ref Range    WBC 5.5 4.3 - 11.1 K/uL    RBC 3.66 (L) 4.05 - 5.25 M/uL    HGB 10.8 (L) 11.7 - 15.4 g/dL    HCT 31.5 (L) 35.8 - 46.3 %    MCV 86.1 79.6 - 97.8 FL    MCH 29.5 26.1 - 32.9 PG    MCHC 34.3 31.4 - 35.0 g/dL    RDW 13.4 11.9 - 14.6 %    PLATELET 75 (L) 885 - 450 K/uL    MPV 11.6 10.8 - 14.1 FL    DF AUTOMATED      NEUTROPHILS 70 43 - 78 %    LYMPHOCYTES 23 13 - 44 %    MONOCYTES 6 4.0 - 12.0 %    EOSINOPHILS 1 0.5 - 7.8 %    BASOPHILS 0 0.0 - 2.0 %    IMMATURE GRANULOCYTES 0.4 0.0 - 5.0 %    ABS. NEUTROPHILS 3.8 1.7 - 8.2 K/UL    ABS. LYMPHOCYTES 1.3 0.5 - 4.6 K/UL    ABS. MONOCYTES 0.4 0.1 - 1.3 K/UL    ABS. EOSINOPHILS 0.1 0.0 - 0.8 K/UL    ABS. BASOPHILS 0.0 0.0 - 0.2 K/UL    ABS. IMM.  GRANS. 0.0 0.0 - 0.5 K/UL       Allergies   Allergen Reactions    Aspirin Anaphylaxis     Hives, swelling    Ativan [Lorazepam] Other (comments)     Confused and aggressive    Aleve [Naproxen Sodium] Rash    Carafate [Sucralfate] Hives    Clorazepate Dipotassium Hives    Flexeril [Cyclobenzaprine] Itching    Robaxin [Methocarbamol] Anaphylaxis     Swelling of tongue, wheezing  Muscle relaxers in general    Vytorin 10-10 [Ezetimibe-Simvastatin] Other (comments)     Muscle soreness    Zantac [Ranitidine Hcl] Itching     Current Facility-Administered Medications   Medication Dose Route Frequency Provider Last Rate Last Dose    amLODIPine (NORVASC) tablet 10 mg  10 mg Oral DAILY Ernestine Reynoso MD   10 mg at 05/01/17 1110    docusate sodium (COLACE) capsule 100 mg  100 mg Oral BID Mya Aponte MD   100 mg at 05/01/17 1720    sodium chloride (NS) flush 5-10 mL  5-10 mL IntraVENous PRN Jamshid Chen MD   10 mL at 05/02/17 0606    clopidogrel (PLAVIX) tablet 75 mg  75 mg Oral 7am Lida Reynoso MD   75 mg at 05/02/17 0606    levothyroxine (SYNTHROID) tablet 100 mcg  100 mcg Oral ACB Ernestine Reynoso MD   100 mcg at 05/02/17 0606    metoprolol tartrate (LOPRESSOR) tablet 25 mg  25 mg Oral BID Mya Aponte MD   25 mg at 05/01/17 1720    rosuvastatin (CRESTOR) tablet 10 mg  10 mg Oral QHS Ernestine Reynoso MD   10 mg at 05/01/17 2132    insulin lispro (HUMALOG) injection   SubCUTAneous AC&HS Mya Aponte MD   Stopped at 05/02/17 0600    acetaminophen (TYLENOL) tablet 500 mg  500 mg Oral Q6H PRN Mya Aponte MD   500 mg at 05/01/17 0238    traMADol (ULTRAM) tablet 50 mg  50 mg Oral Q6H PRN Mya Aponte MD  piperacillin-tazobactam (ZOSYN) 3.375 g in 0.9% sodium chloride (MBP/ADV) 100 mL  3.375 g IntraVENous Q8H Deena Cates MD 25 mL/hr at 17 0203 3.375 g at 17 0203           Review of Systems as noted above in HPI   PHYSICAL EXAM     Visit Vitals    /78    Pulse 83    Temp 98.7 °F (37.1 °C)    Resp 14    Ht 5' 4\" (1.626 m)    Wt 67.6 kg (149 lb)    SpO2 97%    Breastfeeding No    BMI 25.58 kg/m2      Temp (24hrs), Av °F (37.2 °C), Min:98.7 °F (37.1 °C), Max:99.4 °F (37.4 °C)    Oxygen Therapy  O2 Sat (%): 97 % (17 0700)  Pulse via Oximetry: 98 beats per minute (17 0836)  O2 Device: Room air (17 1902)    Intake/Output Summary (Last 24 hours) at 17 0827  Last data filed at 17 1238   Gross per 24 hour   Intake              360 ml   Output             1900 ml   Net            -1540 ml      General: No acute distress,conversive  Lungs:  CTAB, good effort   Heart:  Regular rate and rhythm, no murmur, no edema   Abdomen: Soft, non tender  LLQ and nondistended, decreased bowel sounds  Extremities: Warm and dry         DIAGNOSTIC STUDIES      Labs and Studies from previous 24 hours have been personally reviewed by myself           Full Code    ASSESSMENT / June New 169 Problems    Diagnosis Date Noted    Sepsis ([facility] [address] 2017    Acute kidney injury ([facility] [address] 2017    UTI (urinary tract infection) 2017    S/P TAVR (transcatheter aortic valve replacement) 2017     1. TAVR (3/28/17):  20 mm Luis S3 valve.        Essential hypertension, benign 2014    Type II diabetes mellitus, uncontrolled ([facility] [address] 2014     -change to rocephin while admitted but can discharge on oral quinolone for 2 weeks   -following thrombocytopenia and elevated liver functions that are improving with sepsis management   -continue home meds with holding lasix/several chronic antihypertensivs  -PPD,PT/OT once stable, SW for dispo to SNF     DVT Prophylaxis:SCD due to thrombocytopenia  FEN:oral  Code Status: FULL  PMD: [address] addressed: daughter Clarissa Ortega [phone]    Tiago Barker MD      Signed By: Tiago Barker MD     May 2, 2017

## 2017-05-02 NOTE — PROGRESS NOTES
Ambulated with pt in hallway with walker, completed 5MWT. Spoke with pt and daughter, questions answered.      Kei Fung, TAVR Coordinator

## 2017-05-02 NOTE — PROGRESS NOTES
Bedside report received from Jenae, Replaced by [facility].

## 2017-05-02 NOTE — PROGRESS NOTES
PT Note:  Pt's chart reviewed and treatment attempted this AM.  Transport arrived as PT was about to get started. PT will re-attempted as schedule and pt status allow.   Thanks,  Larry Be, PT, DPT

## 2017-05-02 NOTE — PROGRESS NOTES
Met with patient and her family regarding discharge planning. Patient, at baseline, lives alone in her own home and is normally independent in all ADLs. However, patient has had several health issues this year to include gallstones requiring stent and ESRP, TAVR surgery, and now further infection. Patient admits to increased weakness and patient and her son and daughter-in-law Chapincito Han and Epifanio Foster) request that patient go to [facility] at discharge for short-term rehab and IV antibiotics if needed. Clinical information forwarded to [facility]. They will not have a bed until Thursday, 5/4, but will likely accept patient pending decision regarding IV Abx and what antibiotic it is. Case Management will continue to follow for discharge planning.     Care Management Interventions  Transition of Care Consult (CM Consult): Discharge Planning  Discharge Durable Medical Equipment: No  Physical Therapy Consult: Yes  Occupational Therapy Consult: Yes  Speech Therapy Consult: No  Current Support Network: Lives Alone, Own Home, Family Lives Nearby  Confirm Follow Up Transport: Family  Plan discussed with Pt/Family/Caregiver: Yes  Freedom of Choice Offered: Yes  Discharge Location  Discharge Placement: Rehab Unit Subacute

## 2017-05-03 LAB
ALBUMIN SERPL BCP-MCNC: 2.3 G/DL (ref 3.2–4.6)
ALBUMIN/GLOB SERPL: 0.6 {RATIO} (ref 1.2–3.5)
ALP SERPL-CCNC: 186 U/L (ref 50–136)
ALT SERPL-CCNC: 88 U/L (ref 12–65)
ANION GAP BLD CALC-SCNC: 9 MMOL/L (ref 7–16)
AST SERPL W P-5'-P-CCNC: 26 U/L (ref 15–37)
BACTERIA SPEC CULT: ABNORMAL
BASOPHILS # BLD AUTO: 0 K/UL (ref 0–0.2)
BASOPHILS # BLD: 0 % (ref 0–2)
BILIRUB SERPL-MCNC: 0.6 MG/DL (ref 0.2–1.1)
BUN SERPL-MCNC: 10 MG/DL (ref 8–23)
CALCIUM SERPL-MCNC: 7.6 MG/DL (ref 8.3–10.4)
CHLORIDE SERPL-SCNC: 111 MMOL/L (ref 98–107)
CO2 SERPL-SCNC: 24 MMOL/L (ref 21–32)
CREAT SERPL-MCNC: 0.68 MG/DL (ref 0.6–1)
DIFFERENTIAL METHOD BLD: ABNORMAL
EOSINOPHIL # BLD: 0.2 K/UL (ref 0–0.8)
EOSINOPHIL NFR BLD: 3 % (ref 0.5–7.8)
ERYTHROCYTE [DISTWIDTH] IN BLOOD BY AUTOMATED COUNT: 14 % (ref 11.9–14.6)
GLOBULIN SER CALC-MCNC: 3.8 G/DL (ref 2.3–3.5)
GLUCOSE BLD STRIP.AUTO-MCNC: 119 MG/DL (ref 65–100)
GLUCOSE BLD STRIP.AUTO-MCNC: 134 MG/DL (ref 65–100)
GLUCOSE BLD STRIP.AUTO-MCNC: 179 MG/DL (ref 65–100)
GLUCOSE BLD STRIP.AUTO-MCNC: 240 MG/DL (ref 65–100)
GLUCOSE SERPL-MCNC: 123 MG/DL (ref 65–100)
HCT VFR BLD AUTO: 30.7 % (ref 35.8–46.3)
HGB BLD-MCNC: 10.7 G/DL (ref 11.7–15.4)
IMM GRANULOCYTES # BLD: 0 K/UL (ref 0–0.5)
IMM GRANULOCYTES NFR BLD AUTO: 0.5 % (ref 0–5)
LYMPHOCYTES # BLD AUTO: 28 % (ref 13–44)
LYMPHOCYTES # BLD: 1.7 K/UL (ref 0.5–4.6)
MAGNESIUM SERPL-MCNC: 2.1 MG/DL (ref 1.8–2.4)
MCH RBC QN AUTO: 29.4 PG (ref 26.1–32.9)
MCHC RBC AUTO-ENTMCNC: 34.9 G/DL (ref 31.4–35)
MCV RBC AUTO: 84.3 FL (ref 79.6–97.8)
MM INDURATION POC: NORMAL MM (ref 0–5)
MONOCYTES # BLD: 0.4 K/UL (ref 0.1–1.3)
MONOCYTES NFR BLD AUTO: 7 % (ref 4–12)
NEUTS SEG # BLD: 3.8 K/UL (ref 1.7–8.2)
NEUTS SEG NFR BLD AUTO: 62 % (ref 43–78)
PLATELET # BLD AUTO: 87 K/UL (ref 150–450)
PMV BLD AUTO: 10.7 FL (ref 10.8–14.1)
POTASSIUM SERPL-SCNC: 3.7 MMOL/L (ref 3.5–5.1)
PPD POC: NORMAL NEGATIVE
PROT SERPL-MCNC: 6.1 G/DL (ref 6.3–8.2)
RBC # BLD AUTO: 3.64 M/UL (ref 4.05–5.25)
SERVICE CMNT-IMP: ABNORMAL
SODIUM SERPL-SCNC: 144 MMOL/L (ref 136–145)
WBC # BLD AUTO: 6 K/UL (ref 4.3–11.1)

## 2017-05-03 PROCEDURE — 97535 SELF CARE MNGMENT TRAINING: CPT

## 2017-05-03 PROCEDURE — 74011000250 HC RX REV CODE- 250: Performed by: INTERNAL MEDICINE

## 2017-05-03 PROCEDURE — 74011250636 HC RX REV CODE- 250/636: Performed by: INTERNAL MEDICINE

## 2017-05-03 PROCEDURE — 74011000258 HC RX REV CODE- 258: Performed by: INTERNAL MEDICINE

## 2017-05-03 PROCEDURE — 74011636637 HC RX REV CODE- 636/637: Performed by: INTERNAL MEDICINE

## 2017-05-03 PROCEDURE — 80053 COMPREHEN METABOLIC PANEL: CPT | Performed by: INTERNAL MEDICINE

## 2017-05-03 PROCEDURE — 97530 THERAPEUTIC ACTIVITIES: CPT

## 2017-05-03 PROCEDURE — 74011250637 HC RX REV CODE- 250/637: Performed by: INTERNAL MEDICINE

## 2017-05-03 PROCEDURE — 85025 COMPLETE CBC W/AUTO DIFF WBC: CPT | Performed by: INTERNAL MEDICINE

## 2017-05-03 PROCEDURE — 65270000029 HC RM PRIVATE

## 2017-05-03 PROCEDURE — 82962 GLUCOSE BLOOD TEST: CPT

## 2017-05-03 PROCEDURE — C8929 TTE W OR WO FOL WCON,DOPPLER: HCPCS

## 2017-05-03 PROCEDURE — 36415 COLL VENOUS BLD VENIPUNCTURE: CPT | Performed by: INTERNAL MEDICINE

## 2017-05-03 PROCEDURE — 83735 ASSAY OF MAGNESIUM: CPT | Performed by: INTERNAL MEDICINE

## 2017-05-03 RX ORDER — BENAZEPRIL HYDROCHLORIDE 10 MG/1
20 TABLET ORAL DAILY
Status: DISCONTINUED | OUTPATIENT
Start: 2017-05-03 | End: 2017-05-04 | Stop reason: HOSPADM

## 2017-05-03 RX ADMIN — AMLODIPINE BESYLATE 10 MG: 10 TABLET ORAL at 09:03

## 2017-05-03 RX ADMIN — INSULIN LISPRO 2 UNITS: 100 INJECTION, SOLUTION INTRAVENOUS; SUBCUTANEOUS at 22:15

## 2017-05-03 RX ADMIN — ROSUVASTATIN CALCIUM 10 MG: 20 TABLET ORAL at 22:13

## 2017-05-03 RX ADMIN — CEFTRIAXONE 2 G: 2 INJECTION, POWDER, FOR SOLUTION INTRAMUSCULAR; INTRAVENOUS at 09:04

## 2017-05-03 RX ADMIN — PERFLUTREN 1 ML: 6.52 INJECTION, SUSPENSION INTRAVENOUS at 12:00

## 2017-05-03 RX ADMIN — METOPROLOL TARTRATE 25 MG: 25 TABLET ORAL at 09:04

## 2017-05-03 RX ADMIN — INSULIN LISPRO 4 UNITS: 100 INJECTION, SOLUTION INTRAVENOUS; SUBCUTANEOUS at 18:02

## 2017-05-03 RX ADMIN — METOPROLOL TARTRATE 25 MG: 25 TABLET ORAL at 18:02

## 2017-05-03 RX ADMIN — TRAMADOL HYDROCHLORIDE 50 MG: 50 TABLET, FILM COATED ORAL at 22:13

## 2017-05-03 RX ADMIN — BENAZEPRIL HYDROCHLORIDE 20 MG: 10 TABLET, FILM COATED ORAL at 12:49

## 2017-05-03 RX ADMIN — LEVOTHYROXINE SODIUM 100 MCG: 100 TABLET ORAL at 05:45

## 2017-05-03 RX ADMIN — CLOPIDOGREL BISULFATE 75 MG: 75 TABLET ORAL at 05:45

## 2017-05-03 RX ADMIN — Medication 5 ML: at 22:15

## 2017-05-03 NOTE — PROGRESS NOTES
Patient has been accepted for admissions to [facility] [place] [facility] for short-term rehab at discharge. They expect to have a bed available as early as 5/4 if patient is medically ready for discharge at that time. Case Management will continue to follow.

## 2017-05-03 NOTE — PROGRESS NOTES
Problem: Self Care Deficits Care Plan (Adult)  Goal: *Acute Goals and Plan of Care (Insert Text)  1. Александр Molina will be modified independent with functional mobility for ADL within 4 - 7 visits. 2. Александр Molina will be modified independent with total body bathing and dressing within 4 - 7 visits. 3. Aurora Oleary will state and demonstrate at least 5 energy conservation techniques during ADL/therapeutic activities within 4 - 7 visits. 4. Aruora Oleary will voice a plan for 2-3 appropriate home modifications for home safety and fall prevention within 7 visits. 5. Aurora Oleary will participate at least 30 minutes of ADL with 3 or less rest breaks within 7 visits. 6. Александр Molina will complete a shower transfer with modified independence within 7 visits. OCCUPATIONAL THERAPY: Daily Note, Treatment Day: 1st and AM 5/3/2017  INPATIENT: Hospital Day: 4  [place] / [place] / Product Type: Medicare /      NAME/AGE/GENDER: Александр Molina is a 80 y.o. female   PRIMARY DIAGNOSIS:  Sepsis ([facility] Sepsis ([facility] [address] Sepsis ([facility] [address]        ICD-10: Treatment Diagnosis:        · Generalized Muscle Weakness (M62.81)   Precautions/Allergies:         Aspirin; Ativan [lorazepam]; Aleve [naproxen sodium]; Carafate [sucralfate]; Clorazepate dipotassium; Flexeril [cyclobenzaprine]; Robaxin [methocarbamol]; Vytorin 10-10 [ezetimibe-simvastatin]; and Zantac [ranitidine hcl]       ASSESSMENT:      Ms. Renuka Moore presents secondary to the above with a recent history of ERCP and TAVR procedures. Patient was Supervision for supine to sit. Patient stood with CGA and ambulated to bathroom without assistive device with minimal assistance. Patient performed toilet transfer with CGA. Patient performed toileting with CGA using RW and cues for safety. Patient washed perineal area and buttocks with steadying assist for balance while using RW.   This section established at most recent assessment   PROBLEM LIST (Impairments causing functional limitations):  1. Decreased Strength  2. Decreased ADL/Functional Activities  3. Decreased Transfer Abilities  4. Decreased Balance  5. Decreased Activity Tolerance  6. Decreased Flexibility/Joint Mobility    INTERVENTIONS PLANNED: (Benefits and precautions of occupational therapy have been discussed with the patient.)  1. Activities of daily living training  2. Theraputic activity  3. Theraputic exercise      TREATMENT PLAN: Frequency/Duration: Follow patient 3 times/week to address above goals. Rehabilitation Potential For Stated Goals: GOOD      RECOMMENDED REHABILITATION/EQUIPMENT: (at time of discharge pending progress): Continue Skilled Therapy. OCCUPATIONAL PROFILE AND HISTORY:   History of Present Injury/Illness (Reason for Referral):  Per MD H & P: Ms. Calin Morejon is a 81 yo WF with PMH of TAVR, ERCP 4-28-17, evaluated with fever and pelvic pressure with urination, diagnosed with sepsis/UTI in the ED. Given rocephin and IVF bolus. Has some hematuria and anorexia since ERCP. CXR negative. Past Medical History/Comorbidities:   Ms. Calin Morejon  has a past medical history of Aortic regurgitation; Aortic stenosis; Basal cell carcinoma; Bruit (arterial); CAD (coronary artery disease); Choledocholithiasis; Hyperlipidemia; Hypertension; Hypothyroidism; Murmur, cardiac; Poor historian; S/P TAVR (transcatheter aortic valve replacement); Tricuspid valve regurgitation; Type II or unspecified type diabetes mellitus without mention of complication, not stated as uncontrolled; and Urinary incontinence. Ms. Calin Morejon  has a past surgical history that includes cystocele repair; rectocele repair; appendectomy; carpal tunnel release; ercp (02/20/2017); hysterectomy; oophorectomy; other surgical (Right); orthopaedic; knee arthroscopy (Right); and ercp (04/28/2017). Social History/Living Environment: Lives alone.   Home Environment: Private residence  # Steps to Enter: 4  Rails to Enter: Yes  Hand Rails : Bilateral  One/Two Story Residence: One story  Living Alone: Yes  Support Systems: Child(mayo), Family member(s)  Patient Expects to be Discharged to[de-identified] Private residence  Current DME Used/Available at Home: Cane, straight, Commode, bedside  Tub or Shower Type: Shower  Prior Level of Function/Work/Activity:  Dresses herself, bathes herself in shower with built in bench, cooks, and cleans. Does not drive. Gets a ride to store, etc.   Number of Personal Factors/Comorbidities that affect the Plan of Care: Brief history (0):  LOW COMPLEXITY   ASSESSMENT OF OCCUPATIONAL PERFORMANCE[de-identified]   Activities of Daily Living:           Basic ADLs (From Assessment) Complex ADLs (From Assessment)   Basic ADL  Feeding: Setup  Oral Facial Hygiene/Grooming: Setup  Bathing: Moderate assistance  Upper Body Dressing: Stand-by assistance  Lower Body Dressing: Moderate assistance  Toileting: Moderate assistance Instrumental ADL  Meal Preparation: Total assistance  Homemaking:  Total assistance   Grooming/Bathing/Dressing Activities of Daily Living               Lower Body Bathing  Perineal  : Contact guard assistance (to wash perineal and buttocks while standing w RW)  Adaptive Equipment: Walker (arm rests on toilet) Toileting  Toileting Assistance: Contact guard assistance  Cues:  (steadying assist for balance with pt using RW)     Functional Transfers  Bathroom Mobility: Contact guard assistance  Toilet Transfer : Contact guard assistance     Bed/Mat Mobility  Rolling: Stand-by asssistance  Supine to Sit: Stand-by asssistance  Sit to Stand: Contact guard assistance  Bed to Chair: Contact guard assistance  Scooting: Stand-by asssistance          Most Recent Physical Functioning:   Gross Assessment:                  Posture:     Balance:  Sitting: Intact  Standing: Impaired  Standing - Static: Good  Standing - Dynamic : Fair Bed Mobility:  Rolling: Stand-by asssistance  Supine to Sit: Stand-by asssistance  Scooting: Stand-by asssistance  Interventions: Safety awareness training;Verbal cues  Wheelchair Mobility:     Transfers:  Sit to Stand: Contact guard assistance  Stand to Sit: Contact guard assistance  Bed to Chair: Contact guard assistance  Interventions: Manual cues; Safety awareness training;Verbal cues              Patient Vitals for the past 6 hrs:   BP SpO2 Pulse   17 0700 169/77 95 % 80   17 1100 - 98 % -        Mental Status  Neurologic State: Alert  Orientation Level: Oriented X4  Cognition: Appropriate decision making  Perception: Appears intact  Perseveration: No perseveration noted  Safety/Judgement: Fall prevention                               Physical Skills Involved:  1. Range of Motion  2. Balance  3. Mobility  4. Strength  5. Activity tolerance Cognitive Skills Affected (resulting in the inability to perform in a timely and safe manner):  1. None noted Psychosocial Skills Affected:  1. Routines and Behaviors  2. Environmental Adaptations   Number of elements that affect the Plan of Care: 3-5:  MODERATE COMPLEXITY   CLINICAL DECISION MAKIN[address] [place] [id number] AM-PAC 6 Clicks   Basic Mobility Inpatient Short Form  How much help from another person does the patient currently need. .. Total A Lot A Little None   1. Putting on and taking off regular lower body clothing?   [ ] 1   [X] 2   [ ] 3   [ ] 4   2. Bathing (including washing, rinsing, drying)? [ ] 1   [X] 2   [ ] 3   [ ] 4   3. Toileting, which includes using toilet, bedpan or urinal?   [ ] 1   [X] 2   [ ] 3   [ ] 4   4. Putting on and taking off regular upper body clothing?   [ ] 1   [ ] 2   [X] 3   [ ] 4   5. Taking care of personal grooming such as brushing teeth? [ ] 1   [ ] 2   [X] 3   [ ] 4   6. Eating meals? [ ] 1   [ ] 2   [X] 3   [ ] 4   © , Trustees of [address] [place] [address] [address] [address], under license to [employer].  All rights reserved    Score:  Initial: 15 Most Recent: X (Date: -- )     Interpretation of Tool:  Represents activities that are increasingly more difficult (i.e. Bed mobility, Transfers, Gait). Score 24 23 22-20 19-15 14-10 9-7 6       Modifier CH CI CJ CK CL CM CN         · Self Care:               - CURRENT STATUS:    CK - 40%-59% impaired, limited or restricted               - GOAL STATUS:           CI - 1%-19% impaired, limited or restricted               - D/C STATUS:                       ---------------To be determined---------------  [place] / [place] / Product Type: Medicare /       Medical Necessity:     · Patient demonstrates good rehab potential due to higher previous functional level. Reason for Services/Other Comments:  · Patient continues to require skilled intervention due to decreased independence with ADL, instrumental ADL, and functional mobility for ADL. Use of outcome tool(s) and clinical judgement create a POC that gives a: LOW COMPLEXITY             TREATMENT:   (In addition to Assessment/Re-Assessment sessions the following treatments were rendered)      Pre-treatment Symptoms/Complaints:    Pain: Initial:   Pain Intensity 1: 0  Post Session:  same      Self Care: (14 minutes): Procedure(s) (per grid) utilized to improve and/or restore self-care/home management as related to bathing, toileting and (bathing perineal and buttocks). Required minimal verbal and tactile cueing to facilitate activities of daily living skills. Therapeutic Activity: (    13 minutes): Therapeutic activities including Chair transfers, Toilet transfers and Ambulation on level ground to improve mobility, strength and balance. Required minimal Safety awareness training;Verbal cues to promote static and dynamic balance in standing. Braces/Orthotics/Lines/Etc:   · O2 Device: Room air  Treatment/Session Assessment:    · Response to Treatment:  No treatment rendered. Assessment only.   · Interdisciplinary Collaboration:  · Occupational Therapist  · Registered Nurse  · After treatment position/precautions:  · Up in chair  · Bed/Chair-wheels locked  · Call light within reach  · RN notified  · Family at bedside  · Compliance with Program/Exercises: Will assess as treatment progresses. · Recommendations/Intent for next treatment session: \"Next visit will focus on advancements to more challenging activities\".   Total Treatment Duration:  OT Patient Time In/Time Out  Time In: 1102  Time Out: [address], OT

## 2017-05-03 NOTE — PROGRESS NOTES
Hospitalist Progress Note    5/3/2017  8:28 PM  Admit Date: 2017  5:29 AM   NAME: Jeremy Bartholomew   :  3/20/1929   MRN:  [id number]   Attending: Sonia Fuentes MD  PCP:  Tom Albarado MD  Treatment Team: Attending Provider: Sonia Fuentes MD; Care Manager: Sheldon Strickland, RN; Physical Therapist: Wendy Aragon  SUBJECTIVE:           Ms. Ruth Jeronimo is a 79 yo WF with PMH of TAVR, ERCP 17, evaluated with fever and pelvic pressure with urination, diagnosed with sepsis/UTI/ ALBERT in the ED. Given rocephin and IVF bolus. Has some hematuria and anorexia since ERCP. CXR negative. Started on daily zosyn day 3,  BC positive for quinolone sensitive ECOLI. Renal function improved with hydration. Will need SW for SNF      5/3: daughter at bedside. Patient doing well this AM without complaints. Denies CP, SOB, cough, abd pain. Hesitant to go to rehab. Daughter hoping patient can go home with her. Recent Results (from the past 24 hour(s))   GLUCOSE, POC    Collection Time: 17  3:20 PM   Result Value Ref Range    Glucose (POC) 187 (H) 65 - 100 mg/dL   GLUCOSE, POC    Collection Time: 17  8:33 PM   Result Value Ref Range    Glucose (POC) 206 (H) 65 - 100 mg/dL   GLUCOSE, POC    Collection Time: 17  5:18 AM   Result Value Ref Range    Glucose (POC) 119 (H) 65 - 100 mg/dL   CBC WITH AUTOMATED DIFF    Collection Time: 17  5:39 AM   Result Value Ref Range    WBC 6.0 4.3 - 11.1 K/uL    RBC 3.64 (L) 4.05 - 5.25 M/uL    HGB 10.7 (L) 11.7 - 15.4 g/dL    HCT 30.7 (L) 35.8 - 46.3 %    MCV 84.3 79.6 - 97.8 FL    MCH 29.4 26.1 - 32.9 PG    MCHC 34.9 31.4 - 35.0 g/dL    RDW 14.0 11.9 - 14.6 %    PLATELET 87 (L) 817 - 450 K/uL    MPV 10.7 (L) 10.8 - 14.1 FL    DF AUTOMATED      NEUTROPHILS 62 43 - 78 %    LYMPHOCYTES 28 13 - 44 %    MONOCYTES 7 4.0 - 12.0 %    EOSINOPHILS 3 0.5 - 7.8 %    BASOPHILS 0 0.0 - 2.0 %    IMMATURE GRANULOCYTES 0.5 0.0 - 5.0 %    ABS.  NEUTROPHILS 3.8 1.7 - 8.2 K/UL ABS. LYMPHOCYTES 1.7 0.5 - 4.6 K/UL    ABS. MONOCYTES 0.4 0.1 - 1.3 K/UL    ABS. EOSINOPHILS 0.2 0.0 - 0.8 K/UL    ABS. BASOPHILS 0.0 0.0 - 0.2 K/UL    ABS. IMM. GRANS. 0.0 0.0 - 0.5 K/UL   METABOLIC PANEL, COMPREHENSIVE    Collection Time: 05/03/17  5:39 AM   Result Value Ref Range    Sodium 144 136 - 145 mmol/L    Potassium 3.7 3.5 - 5.1 mmol/L    Chloride 111 (H) 98 - 107 mmol/L    CO2 24 21 - 32 mmol/L    Anion gap 9 7 - 16 mmol/L    Glucose 123 (H) 65 - 100 mg/dL    BUN 10 8 - 23 MG/DL    Creatinine 0.68 0.6 - 1.0 MG/DL    GFR est AA >60 >60 ml/min/1.73m2    GFR est non-AA >60 >60 ml/min/1.73m2    Calcium 7.6 (L) 8.3 - 10.4 MG/DL    Bilirubin, total 0.6 0.2 - 1.1 MG/DL    ALT (SGPT) 88 (H) 12 - 65 U/L    AST (SGOT) 26 15 - 37 U/L    Alk.  phosphatase 186 (H) 50 - 136 U/L    Protein, total 6.1 (L) 6.3 - 8.2 g/dL    Albumin 2.3 (L) 3.2 - 4.6 g/dL    Globulin 3.8 (H) 2.3 - 3.5 g/dL    A-G Ratio 0.6 (L) 1.2 - 3.5     MAGNESIUM    Collection Time: 05/03/17  5:39 AM   Result Value Ref Range    Magnesium 2.1 1.8 - 2.4 mg/dL   GLUCOSE, POC    Collection Time: 05/03/17 11:42 AM   Result Value Ref Range    Glucose (POC) 134 (H) 65 - 100 mg/dL   PLEASE READ & DOCUMENT PPD TEST IN 72 HRS    Collection Time: 05/03/17 11:48 AM   Result Value Ref Range    PPD  Negative    mm Induration  0 mm       Allergies   Allergen Reactions    Aspirin Anaphylaxis     Hives, swelling    Ativan [Lorazepam] Other (comments)     Confused and aggressive    Aleve [Naproxen Sodium] Rash    Carafate [Sucralfate] Hives    Clorazepate Dipotassium Hives    Flexeril [Cyclobenzaprine] Itching    Robaxin [Methocarbamol] Anaphylaxis     Swelling of tongue, wheezing  Muscle relaxers in general    Vytorin 10-10 [Ezetimibe-Simvastatin] Other (comments)     Muscle soreness    Zantac [Ranitidine Hcl] Itching     Current Facility-Administered Medications   Medication Dose Route Frequency Provider Last Rate Last Dose    perflutren lipid microspheres (DEFINITY) in NS bolus IV  1 mL IntraVENous PRN Hemant Hartmann MD   1 mL at 17 1200    cefTRIAXone (ROCEPHIN) 2 g in 0.9% sodium chloride (MBP/ADV) 50 mL  2 g IntraVENous Q24H Hemant Hartmann  mL/hr at 17 0904 2 g at 17 0904    amLODIPine (NORVASC) tablet 10 mg  10 mg Oral DAILY Hemant Hartmann MD   10 mg at 17 7427    docusate sodium (COLACE) capsule 100 mg  100 mg Oral BID Hemant Hartmann MD   100 mg at 17 1643    sodium chloride (NS) flush 5-10 mL  5-10 mL IntraVENous PRN Hannah Pryor MD   10 mL at 17 0606    clopidogrel (PLAVIX) tablet 75 mg  75 mg Oral 7am Hemant Hartmann MD   75 mg at 17 0545    levothyroxine (SYNTHROID) tablet 100 mcg  100 mcg Oral ACB Hemant Hartmann MD   100 mcg at 17 0545    metoprolol tartrate (LOPRESSOR) tablet 25 mg  25 mg Oral BID Hemant Hartmann MD   25 mg at 17 0904    rosuvastatin (CRESTOR) tablet 10 mg  10 mg Oral QHS Hemant Hartmann MD   10 mg at 17 2157    insulin lispro (HUMALOG) injection   SubCUTAneous AC&HS Hemant Hartmann MD   Stopped at 17 0600    acetaminophen (TYLENOL) tablet 500 mg  500 mg Oral Q6H PRN Hemant Hartmann MD   500 mg at 17 1511    traMADol (ULTRAM) tablet 50 mg  50 mg Oral Q6H PRN Hemant Hartmann MD   50 mg at 17 2203           Review of Systems as noted above in HPI   PHYSICAL EXAM     Visit Vitals    /77    Pulse 80    Temp 98.7 °F (37.1 °C)    Resp 15    Ht 5' 4\" (1.626 m)    Wt 67.6 kg (149 lb)    SpO2 95%    Breastfeeding No    BMI 25.58 kg/m2      Temp (24hrs), Av.1 °F (36.7 °C), Min:97.4 °F (36.3 °C), Max:98.7 °F (37.1 °C)    Oxygen Therapy  O2 Sat (%): 95 % (17 0700)  Pulse via Oximetry: 98 beats per minute (17 0836)  O2 Device: Room air (17 1902)    Intake/Output Summary (Last 24 hours) at 17 1155  Last data filed at 17 0729   Gross per 24 hour   Intake              240 ml   Output             1050 ml   Net             -810 ml      General: No acute distress  Lungs:  CTAB, good effort   Heart:  Regular rate and rhythm, no murmur, no edema   Abdomen: Soft, NTTP, +BS  Extremities: Warm and dry         DIAGNOSTIC STUDIES      Labs and Studies from previous 24 hours have been personally reviewed by myself           Full Code    ASSESSMENT / June New 169 Problems    Diagnosis Date Noted    Sepsis ([facility] [address] 04/30/2017    [facility] 04[facility]30/2017    UTI (urinary tract infection) 04/30/2017    S/P TAVR (transcatheter aortic valve replacement) 03/29/2017     1. TAVR (3/28/17):  20 mm Luis S3 valve.        Essential hypertension, benign 09/04/2014    Type II diabetes mellitus, uncontrolled ([facility] 09/04/2014     - continue rocephin while admitted but can discharge on oral quinolone for 2 weeks (day 4/14)  - improving thrombocytopenia and LFTs with sepsis treatment   - continue home meds with holding lasix/several chronic anti-HTN; restart lotensin today     DVT Prophylaxis: SCD due to thrombocytopenia  FEN: oral  Code Status: FULL  Dispo: home with daughter vs STR tomorrow pending PT eval today  Care Plan addressed: daughter Chiqui Guevara [phone]      Signed By: Eli Bagley MD     May 3, 2017

## 2017-05-03 NOTE — PROGRESS NOTES
Problem: Mobility Impaired (Adult and Pediatric)  Goal: *Acute Goals and Plan of Care (Insert Text)  STG:  (1.)Ms. Oleary will move from supine to sit and sit to supine , scoot up and down and roll side to side with SUPERVISION within 3 day(s). (2.)Ms. Pepe Elena will transfer from bed to chair and chair to bed with STAND BY ASSIST using the least restrictive device within 3 day(s). (3.)Ms. Oleary will ambulate with STAND BY ASSIST for 200 feet with the least restrictive device within 3 day(s). LTG:  (1.)Ms. Pepe Elena will move from supine to sit and sit to supine , scoot up and down and roll side to side in bed with INDEPENDENT within 7 day(s). (2.)Ms. Pepe Elena will transfer from bed to chair and chair to bed with SUPERVISION using the least restrictive device within 7 day(s). (3.)Ms. Oleary will ambulate with SUPERVISION for 500 feet with the least restrictive device within 7 day(s). ________________________________________________________________________________________________      PHYSICAL THERAPY: Daily Note, Treatment Day: 1st and AM 5/3/2017  INPATIENT: Hospital Day: 4  [place] / Product Type: Medicare /      NAME/AGE/GENDER: Tracie Bowling is a 80 y.o. female   PRIMARY DIAGNOSIS: Sepsis ([facility] Sepsis ([facility] Sepsis ([facility]        ICD-10: Treatment Diagnosis:       · Generalized Muscle Weakness (M62.81)  · Difficulty in walking, Not elsewhere classified (R26.2)   Precaution/Allergies:  Aspirin; Ativan [lorazepam]; Aleve [naproxen sodium]; Carafate [sucralfate]; Clorazepate dipotassium; Flexeril [cyclobenzaprine]; Robaxin [methocarbamol]; Vytorin 10-10 [ezetimibe-simvastatin]; and Zantac [ranitidine hcl]       ASSESSMENT:      Ms. Pepe Elena is supine in bed upon contact with daughter present and agreeable to PT treatment. Pt reports living alone in 1 story home with 4 steps to enter with B railings.  Pt reports use of SPC for community distances and no AD for household distances. Pt reports no longer driving and 0 falls in past 6 months. Pt is SBA for supine to sit EOB this morning with bed flat. Pt is CGA with sit to stand to RW and ambulated 100 ft in hallway with CGA to stand by assist at times. Attempted short ambulation distance with no assistive device, however patient exhibited decreased step clearance and hands in high guard, attempting to hold onto objects in room. Pt returned to room and left sitting up in bedside chair with all needs met and within reach with daughter at bedside. Education on reasoning for walker use for short period of time at d/c, as her balance has decreased from baseline. Patient in agreement. Required slightly less assistance this session than previously. Slow progress. Lyle Rodarte will benefit from skilled PT (medically necessary) to address decreased strength, decreased balance, decreased functional tolerance, decreased cardiopulmonary endurance affecting participation in basic ADLs and functional tasks. This section established at most recent assessment   PROBLEM LIST (Impairments causing functional limitations):  1. Decreased Strength  2. Decreased ADL/Functional Activities  3. Decreased Ambulation Ability/Technique  4. Decreased Balance  5. Increased Pain  6. Decreased Activity Tolerance  7. Increased Fatigue    INTERVENTIONS PLANNED: (Benefits and precautions of physical therapy have been discussed with the patient.)  1. Balance Exercise  2. Bed Mobility  3. Family Education  4. Gait Training  5. Neuromuscular Re-education/Strengthening  6. Therapeutic Activites  7. Therapeutic Exercise/Strengthening  8. Group Therapy      TREATMENT PLAN: Frequency/Duration: 3 times a week for duration of hospital stay  Rehabilitation Potential For Stated Goals: GOOD      RECOMMENDED REHABILITATION/EQUIPMENT: (at time of discharge pending progress): Continue Skilled Therapy and rehab versus HHPT dependent on pt progress. HISTORY:   History of Present Injury/Illness (Reason for Referral):  See H&P below  25-year-old female brought to the ED by EMS for fever and dysuria. patient was reportedly tachycardic and febrile 102. Symptoms stesarted yesterday patient has been weak for several days. The patient had a trans-catheter aortic valve replacement done several weeks ago and had a ERCP for gallstones done last week. She had gotten home without any difficulties however on Friday being weak and low-grade fever. Early this morning she awoke even more weak and had a fever of 102. She is complaining of pressure in the suprapubic area particularly when she urinates. Past Medical History/Comorbidities:   Ms. Kenna Baron  has a past medical history of Aortic regurgitation; Aortic stenosis; Basal cell carcinoma; Bruit (arterial); CAD (coronary artery disease); Choledocholithiasis; Hyperlipidemia; Hypertension; Hypothyroidism; Murmur, cardiac; Poor historian; S/P TAVR (transcatheter aortic valve replacement); Tricuspid valve regurgitation; Type II or unspecified type diabetes mellitus without mention of complication, not stated as uncontrolled; and Urinary incontinence. Ms. Kenna Baron  has a past surgical history that includes cystocele repair; rectocele repair; appendectomy; carpal tunnel release; ercp (02/20/2017); hysterectomy; oophorectomy; other surgical (Right); orthopaedic; knee arthroscopy (Right); and ercp (04/28/2017).   Social History/Living Environment:   Home Environment: Private residence  # Steps to Enter: 4  Rails to Enter: Yes  Hand Rails : Bilateral  One/Two Story Residence: One story  Living Alone: Yes  Support Systems: Child(mayo), Family member(s)  Patient Expects to be Discharged to[de-identified] Private residence  Current DME Used/Available at Home: Cane, straight, Commode, bedside  Tub or Shower Type: Shower  Prior Level of Function/Work/Activity:  Ambulating household distances indep, use of SPC for community distances, lives alone, indep with ADLs      Number of Personal Factors/Comorbidities that affect the Plan of Care: 3+: HIGH COMPLEXITY   EXAMINATION:   Most Recent Physical Functioning:   Gross Assessment:                  Posture:     Balance:  Sitting: Intact  Standing: Impaired  Standing - Static: Good  Standing - Dynamic : Fair Bed Mobility:  Rolling: Stand-by asssistance  Supine to Sit: Stand-by asssistance  Scooting: Stand-by asssistance  Interventions: Safety awareness training;Verbal cues  Wheelchair Mobility:     Transfers:  Sit to Stand: Contact guard assistance  Stand to Sit: Contact guard assistance  Bed to Chair: Contact guard assistance  Interventions: Manual cues; Safety awareness training;Verbal cues  Gait:     Base of Support: Narrowed; Center of gravity altered  Speed/Blanca: Slow;Shuffled  Step Length: Right shortened;Left shortened  Gait Abnormalities: Decreased step clearance;Trunk sway increased; Path deviations  Distance (ft): 100 Feet (ft)  Assistive Device: Walker, rolling  Ambulation - Level of Assistance: Contact guard assistance;Stand-by asssistance  Interventions: Safety awareness training;Verbal cues       Body Structures Involved:  1. Heart  2. Lungs  3. Bones  4. Joints  5. Muscles  6. Ligaments Body Functions Affected:  1. Sensory/Pain  2. Cardio  3. Respiratory  4. Neuromusculoskeletal  5. Movement Related Activities and Participation Affected:  1. General Tasks and Demands  2. Mobility  3. Self Care  4. Domestic Life  5. Interpersonal Interactions and Relationships  6. Community, Social and [facility]   Number of elements that affect the Plan of Care: 4+: HIGH COMPLEXITY   CLINICAL PRESENTATION:   Presentation: Stable and uncomplicated: LOW COMPLEXITY   CLINICAL DECISION MAKIN [facility] Inpatient Short Form  How much difficulty does the patient currently have. .. Unable A Lot A Little None   1.   Turning over in bed (including adjusting bedclothes, sheets and blankets)? [ ] 1   [ ] 2   [ ] 3   [X] 4   2. Sitting down on and standing up from a chair with arms ( e.g., wheelchair, bedside commode, etc.)   [ ] 1   [ ] 2   [X] 3   [ ] 4   3. Moving from lying on back to sitting on the side of the bed? [ ] 1   [ ] 2   [ ] 3   [X] 4   How much help from another person does the patient currently need. .. Total A Lot A Little None   4. Moving to and from a bed to a chair (including a wheelchair)? [ ] 1   [ ] 2   [X] 3   [ ] 4   5. Need to walk in hospital room? [ ] 1   [ ] 2   [X] 3   [ ] 4   6. Climbing 3-5 steps with a railing? [ ] 1   [X] 2   [ ] 3   [ ] 4   © 2007, Trustees of [address] [place] [id number], under license to [employer]. All rights reserved    Score:  Initial: 19 Most Recent: X (Date: -- )     Interpretation of Tool:  Represents activities that are increasingly more difficult (i.e. Bed mobility, Transfers, Gait). Score 24 23 22-20 19-15 14-10 9-7 6       Modifier CH CI CJ CK CL CM CN         · Mobility - Walking and Moving Around:               - CURRENT STATUS:    CK - 40%-59% impaired, limited or restricted               - GOAL STATUS:           CJ - 20%-39% impaired, limited or restricted               - D/C STATUS:                       ---------------To be determined---------------  [place] / [place] / Product Type: Medicare /       Medical Necessity:     · Patient is expected to demonstrate progress in strength, balance, coordination and functional technique to decrease assistance required with gait and functional mobility. Reason for Services/Other Comments:  · Patient continues to require skilled intervention due to decreased strength, decreased balance, decreased functional tolerance, decreased cardiopulmonary endurance affecting participation in basic ADLs and functional tasks.    .   Use of outcome tool(s) and clinical judgement create a POC that gives a: Clear prediction of patient's progress: LOW COMPLEXITY                 TREATMENT:   (In addition to Assessment/Re-Assessment sessions the following treatments were rendered)   Pre-treatment Symptoms/Complaints:  No complaints- pt eager to work with PT  Pain: Initial:   Pain Intensity 1: 0  Post Session:  0/10 resting comfortably up in chair. Therapeutic Activity: (    25 minutes): Therapeutic activities including Bed transfers, Chair transfers, Ambulation on level ground and standing balance activities to improve mobility, strength, balance and coordination. Required minimal Safety awareness training;Verbal cues to promote static and dynamic balance in standing. Braces/Orthotics/Lines/Etc:   · IV  · O2 Device: Room air  Treatment/Session Assessment:    · Response to Treatment:  Pt participated well with PT and motivated to get stronger to return home. · Interdisciplinary Collaboration:  · Physical Therapist  · Registered Nurse  · After treatment position/precautions:  · Up in chair  · Bed/Chair-wheels locked  · Bed in low position  · Call light within reach  · Family at bedside  · Compliance with Program/Exercises: Will assess as treatment progresses. · Recommendations/Intent for next treatment session: \"Next visit will focus on advancements to more challenging activities and reduction in assistance provided\".   Total Treatment Duration:  PT Patient Time In/Time Out  Time In: 0905  Time Out: 0930     Mal Mishra DPT

## 2017-05-03 NOTE — PROGRESS NOTES
Shift assessment complete. Pt resting in bed. Family at bedside. No complaints. Left lower extremity edema 1+ noted. Safety measures in place. Call light in reach.

## 2017-05-04 VITALS
OXYGEN SATURATION: 96 % | SYSTOLIC BLOOD PRESSURE: 145 MMHG | TEMPERATURE: 98.8 F | DIASTOLIC BLOOD PRESSURE: 77 MMHG | HEART RATE: 62 BPM | RESPIRATION RATE: 16 BRPM | BODY MASS INDEX: 24.74 KG/M2 | WEIGHT: 144.9 LBS | HEIGHT: 64 IN

## 2017-05-04 LAB — GLUCOSE BLD STRIP.AUTO-MCNC: 132 MG/DL (ref 65–100)

## 2017-05-04 PROCEDURE — 74011250636 HC RX REV CODE- 250/636: Performed by: INTERNAL MEDICINE

## 2017-05-04 PROCEDURE — 74011250637 HC RX REV CODE- 250/637: Performed by: INTERNAL MEDICINE

## 2017-05-04 PROCEDURE — 97530 THERAPEUTIC ACTIVITIES: CPT

## 2017-05-04 PROCEDURE — 74011000258 HC RX REV CODE- 258: Performed by: INTERNAL MEDICINE

## 2017-05-04 PROCEDURE — 82962 GLUCOSE BLOOD TEST: CPT

## 2017-05-04 RX ORDER — CIPROFLOXACIN 500 MG/1
500 TABLET ORAL 2 TIMES DAILY
Qty: 18 TAB | Refills: 0 | Status: SHIPPED | OUTPATIENT
Start: 2017-05-04 | End: 2017-05-24 | Stop reason: ALTCHOICE

## 2017-05-04 RX ORDER — HYDROCODONE BITARTRATE AND ACETAMINOPHEN 5; 325 MG/1; MG/1
1 TABLET ORAL
Qty: 10 TAB | Refills: 0 | Status: SHIPPED | OUTPATIENT
Start: 2017-05-04 | End: 2017-05-24

## 2017-05-04 RX ORDER — TRAMADOL HYDROCHLORIDE 50 MG/1
50 TABLET ORAL
Qty: 30 TAB | Refills: 0 | Status: SHIPPED | OUTPATIENT
Start: 2017-05-04 | End: 2017-06-07 | Stop reason: SDUPTHER

## 2017-05-04 RX ADMIN — Medication 10 ML: at 06:14

## 2017-05-04 RX ADMIN — CEFTRIAXONE 2 G: 2 INJECTION, POWDER, FOR SOLUTION INTRAMUSCULAR; INTRAVENOUS at 08:44

## 2017-05-04 RX ADMIN — METOPROLOL TARTRATE 25 MG: 25 TABLET ORAL at 08:45

## 2017-05-04 RX ADMIN — BENAZEPRIL HYDROCHLORIDE 20 MG: 10 TABLET, FILM COATED ORAL at 08:45

## 2017-05-04 RX ADMIN — LEVOTHYROXINE SODIUM 100 MCG: 100 TABLET ORAL at 06:14

## 2017-05-04 RX ADMIN — AMLODIPINE BESYLATE 10 MG: 10 TABLET ORAL at 08:45

## 2017-05-04 RX ADMIN — CLOPIDOGREL BISULFATE 75 MG: 75 TABLET ORAL at 06:14

## 2017-05-04 NOTE — PROGRESS NOTES
Discharged per MD order. Telephone report called to Fish Chavez, with [facility] ([phone]). IVs to both arms removed; tips intact. Patient has all personal belongings with her that she was admitted with. Transported by family car (daughter).

## 2017-05-04 NOTE — PROGRESS NOTES
Patient has discharge orders for discharge to [facility] for short-term rehab. Spoke with patient and her son regarding discharge plans and both confirm that the plan is for short-term rehab at [facility]. Call placed to Belinda in Admissions at [facility] and left a message. We will await confirmation of bed being ready for patient prior to discharge. Family wishes to transport patient to [facility] by POV. Case Management will continue to follow.     Care Management Interventions  Transition of Care Consult (CM Consult): Discharge Planning  Discharge Durable Medical Equipment: No  Physical Therapy Consult: Yes  Occupational Therapy Consult: Yes  Speech Therapy Consult: No  Current Support Network: Lives Alone, Own Home, Family Lives Nearby  Confirm Follow Up Transport: Family  Plan discussed with Pt/Family/Caregiver: Yes  Freedom of Choice Offered: Yes  Discharge Location  Discharge Placement: Rehab Unit Subacute

## 2017-05-04 NOTE — PROGRESS NOTES
Spoke with Belinda at [facility] [place]. They are ready for patient as soon as she can get there. Patient will transport with family. Case Management will remain available to assist as needed.     Care Management Interventions  Transition of Care Consult (CM Consult): Discharge Planning  Discharge Durable Medical Equipment: No  Physical Therapy Consult: Yes  Occupational Therapy Consult: Yes  Speech Therapy Consult: No  Current Support Network: Lives Alone, Own Home, Family Lives Nearby  Confirm Follow Up Transport: Family  Plan discussed with Pt/Family/Caregiver: Yes  Freedom of Choice Offered: Yes  Discharge Location  Discharge Placement: Rehab Unit Subacute

## 2017-05-04 NOTE — PROGRESS NOTES
Mrs. Michael Mauricio resting in bed watching TV with son at bedside. Alert, oriented in all spheres. Lung sounds clear; on room air. Ambulates in room with minimal assistance and walker. No complaints or needs at this time. Will monitor with noe light at reach and door open.

## 2017-05-04 NOTE — DISCHARGE SUMMARY
Hospitalist Discharge Summary     Patient ID:  Liang Alan  [id number]  75 y.o.  3/20/1929  Admit date: 4/30/2017  5:29 AM  Discharge date and time: 5/4/2017  Attending: Mara Boswell MD  PCP:  Taylor Irby MD  Treatment Team: Attending Provider: Mara Boswell MD; Care Manager: Patricia Patel RN; Physical Therapist: Mikal Blum    Principal Diagnosis Sepsis [facility])   Hospital Problems as of 5/4/2017  Date Reviewed: 4/28/2017          Codes Class Noted - Resolved POA    * (Principal)Sepsis ([facility] ICD-10-CM: A41.9  ICD-9-CM: 038.9, 995.91  4/30/2017 - Present Yes        Acute kidney injury ([facility] ICD-10-CM: N17.9  ICD-9-CM: 584.9  4/30/2017 - Present Yes        UTI (urinary tract infection) ICD-10-CM: N39.0  ICD-9-CM: 599.0  4/30/2017 - Present Yes        S/P TAVR (transcatheter aortic valve replacement) ICD-10-CM: Z95.2  ICD-9-CM: V43.3  3/29/2017 - Present Yes    Overview Signed 4/3/2017  8:30 PM by Renetta Mcduffie MD     1. TAVR (3/28/17):  20 mm Luis S3 valve. Type II diabetes mellitus, uncontrolled ([facility] ICD-10-CM: E11.65  ICD-9-CM: 250.02  9/4/2014 - Present Yes        Essential hypertension, benign ICD-10-CM: I10  ICD-9-CM: 401.1  9/4/2014 - Present Yes              HPI: 79 yo WF with PMH of TAVR, ERCP 4-28-17, evaluated with fever and pelvic pressure with urination, diagnosed with sepsis/UTI in the ED. Given rocephin and IVF bolus. Has some hematuria and anorexia since ERCP. CXR negative. Hospital Course: Admitted on 4/30 with sepsis from urinary source. She was found to have pan-sensitive E Coli bacteremia. Initially with thrombocytopenia and elevated LFTs from sepsis that improved with treatment. Will send out with cipro to complete 14 days course. Continue to hold Lasix at discharge, may need to be restarted by PCP at next appt. Is not at her baseline physically, so will benefit from continued rehab at [facility] on discharge.     Significant Diagnostic Studies: Labs: Results:       Chemistry Recent Labs      05/03/17   0539  05/02/17   0639   GLU  123*  158*   NA  144  144   K  3.7  3.4*   CL  111*  111*   CO2  24  21   BUN  10  11   CREA  0.68  0.83   CA  7.6*  8.0*   AGAP  9  12   AP  186*  192*   TP  6.1*  6.2*   ALB  2.3*  2.2*   GLOB  3.8*  4.0*   AGRAT  0.6*  0.6*      CBC w/Diff Recent Labs      05/03/17   0539  05/02/17   0639   WBC  6.0  5.5   RBC  3.64*  3.66*   HGB  10.7*  10.8*   HCT  30.7*  31.5*   PLT  87*  75*   GRANS  62  70   LYMPH  28  23   EOS  3  1      Cardiac Enzymes No results for input(s): CPK, CKND1, JENNIFER in the last 72 hours. No lab exists for component: CKRMB, TROIP   Coagulation No results for input(s): PTP, INR, APTT in the last 72 hours. No lab exists for component: INREXT, INREXT    Lipid Panel Lab Results   Component Value Date/Time    Cholesterol, total 109 03/29/2017 03:30 AM    HDL Cholesterol 37 03/29/2017 03:30 AM    LDL, calculated 45.6 03/29/2017 03:30 AM    VLDL, calculated 26.4 03/29/2017 03:30 AM    Triglyceride 132 03/29/2017 03:30 AM    CHOL/HDL Ratio 2.9 03/29/2017 03:30 AM      BNP No results for input(s): BNPP in the last 72 hours. Liver Enzymes Recent Labs      05/03/17   0539   TP  6.1*   ALB  2.3*   AP  186*   SGOT  26      Thyroid Studies Lab Results   Component Value Date/Time    TSH 0.433 10/13/2016 09:46 AM            Discharge Exam:  Visit Vitals    /77 (BP 1 Location: Right arm, BP Patient Position: At rest)    Pulse 62    Temp 98.8 °F (37.1 °C)    Resp 16    Ht 5' 4\" (1.626 m)    Wt 65.7 kg (144 lb 14.4 oz)    SpO2 96%    Breastfeeding No    BMI 24.87 kg/m2     General appearance: alert, cooperative, NAD  Lungs: clear to auscultation bilaterally  Heart: regular rate and rhythm, no BLE edema  Abdomen: soft, non-tender.  +BS  Extremities: no cyanosis or edema  Neurologic: Grossly normal    Disposition: rehab  Discharge Condition: stable  Patient Instructions:   Current Discharge Medication List START taking these medications    Details   ciprofloxacin HCl (CIPRO) 500 mg tablet Take 1 Tab by mouth two (2) times a day. Qty: 18 Tab, Refills: 0         CONTINUE these medications which have CHANGED    Details   HYDROcodone-acetaminophen (NORCO) 5-325 mg per tablet Take 1 Tab by mouth every four (4) hours as needed for Pain. Max Daily Amount: 6 Tabs. Qty: 10 Tab, Refills: 0      traMADol (ULTRAM) 50 mg tablet Take 1 Tab by mouth every six (6) hours as needed for Pain. Qty: 30 Tab, Refills: 0    Associated Diagnoses: Left hip pain; Neck pain         CONTINUE these medications which have NOT CHANGED    Details   clopidogrel (PLAVIX) 75 mg tab Take 1 Tab by mouth daily. Qty: 90 Tab, Refills: 5    Associated Diagnoses: Coronary artery disease due to lipid rich plaque; Essential hypertension, benign; Aorta disorder (HCC)      amLODIPine (NORVASC) 10 mg tablet Take 1 Tab by mouth daily. Qty: 90 Tab, Refills: 3      levothyroxine (SYNTHROID) 100 mcg tablet Take 1 Tab by mouth Daily (before breakfast). Qty: 90 Tab, Refills: 1    Associated Diagnoses: Hypothyroidism due to acquired atrophy of thyroid      benazepril (LOTENSIN) 20 mg tablet Take 1 Tab by mouth daily. Qty: 90 Tab, Refills: 1    Associated Diagnoses: Essential hypertension, benign; Coronary artery disease due to lipid rich plaque      metoprolol tartrate (LOPRESSOR) 25 mg tablet Take 1 Tab by mouth two (2) times a day. Qty: 180 Tab, Refills: 3    Associated Diagnoses: Essential hypertension, benign; Coronary artery disease due to lipid rich plaque      rosuvastatin (CRESTOR) 20 mg tablet Take 1 Tab by mouth nightly. Qty: 30 Tab, Refills: 5    Associated Diagnoses: Mixed hyperlipidemia; Coronary artery disease due to lipid rich plaque      nitroglycerin (NITROSTAT) 0.4 mg SL tablet 1 Tab by SubLINGual route every five (5) minutes as needed.   Qty: 25 Tab, Refills: 11    Associated Diagnoses: Coronary artery disease due to lipid rich plaque; Aortic valve stenosis, unspecified etiology; Aorta disorder (HCC)      ASCORBIC ACID/VITAMIN E (CRANBERRY CONCENTRATE PO) Take  by mouth daily.     Associated Diagnoses: Coronary artery disease due to lipid rich plaque         STOP taking these medications       furosemide (LASIX) 40 mg tablet Comments:   Reason for Stopping:               Activity: PT/OT Eval and Treat  Diet: Regular Diet    Follow-up  -   PCP in 1-2 weeks      Signed:  Corine Seals MD  5/4/2017  8:57 AM

## 2017-05-04 NOTE — DISCHARGE INSTRUCTIONS
DISCHARGE SUMMARY from Nurse    The following personal items are in your possession at time of discharge:    Dental Appliances: Lowers, Uppers, With patient  Visual Aid: None     Home Medications: None  Jewelry: None  Clothing: At bedside, Pants, Shirt, Slippers, Socks  Other Valuables: None  Personal Items Sent to Safe: none          PATIENT INSTRUCTIONS:  What to do at Home:  Recommended activity: PT/OT Eval and Treat. *  Please give a list of your current medications to your Primary Care Provider. *  Please update this list whenever your medications are discontinued, doses are      changed, or new medications (including over-the-counter products) are added. *  Please carry medication information at all times in case of emergency situations. These are general instructions for a healthy lifestyle:    No smoking/ No tobacco products/ Avoid exposure to second hand smoke    Surgeon General's Warning:  Quitting smoking now greatly reduces serious risk to your health. Obesity, smoking, and sedentary lifestyle greatly increases your risk for illness    A healthy diet, regular physical exercise & weight monitoring are important for maintaining a healthy lifestyle    You may be retaining fluid if you have a history of heart failure or if you experience any of the following symptoms:  Weight gain of 3 pounds or more overnight or 5 pounds in a week, increased swelling in our hands or feet or shortness of breath while lying flat in bed. Please call your doctor as soon as you notice any of these symptoms; do not wait until your next office visit. Recognize signs and symptoms of STROKE:    F-face looks uneven    A-arms unable to move or move unevenly    S-speech slurred or non-existent    T-time-call 911 as soon as signs and symptoms begin-DO NOT go       Back to bed or wait to see if you get better-TIME IS BRAIN.     Warning Signs of HEART ATTACK     Call 911 if you have these symptoms:   Chest discomfort. Most heart attacks involve discomfort in the center of the chest that lasts more than a few minutes, or that goes away and comes back. It can feel like uncomfortable pressure, squeezing, fullness, or pain.  Discomfort in other areas of the upper body. Symptoms can include pain or discomfort in one or both arms, the back, neck, jaw, or stomach.  Shortness of breath with or without chest discomfort.  Other signs may include breaking out in a cold sweat, nausea, or lightheadedness. Don't wait more than five minutes to call 911 - MINUTES MATTER! Fast action can save your life. Calling 911 is almost always the fastest way to get lifesaving treatment. Emergency Medical Services staff can begin treatment when they arrive -- up to an hour sooner than if someone gets to the hospital by car. The discharge information has been reviewed with the patient and children. The patient and children verbalized understanding. Discharge medications reviewed with the patient and children and appropriate educational materials and side effects teaching were provided. Urinary Tract Infection in Women: Care Instructions  Your Care Instructions    A urinary tract infection, or UTI, is a general term for an infection anywhere between the kidneys and the urethra (where urine comes out). Most UTIs are bladder infections. They often cause pain or burning when you urinate. UTIs are caused by bacteria and can be cured with antibiotics. Be sure to complete your treatment so that the infection goes away. Follow-up care is a key part of your treatment and safety. Be sure to make and go to all appointments, and call your doctor if you are having problems. It's also a good idea to know your test results and keep a list of the medicines you take. How can you care for yourself at home? · Take your antibiotics as directed. Do not stop taking them just because you feel better.  You need to take the full course of antibiotics. · Drink extra water and other fluids for the next day or two. This may help wash out the bacteria that are causing the infection. (If you have kidney, heart, or liver disease and have to limit fluids, talk with your doctor before you increase your fluid intake.)  · Avoid drinks that are carbonated or have caffeine. They can irritate the bladder. · Urinate often. Try to empty your bladder each time. · To relieve pain, take a hot bath or lay a heating pad set on low over your lower belly or genital area. Never go to sleep with a heating pad in place. To prevent UTIs  · Drink plenty of water each day. This helps you urinate often, which clears bacteria from your system. (If you have kidney, heart, or liver disease and have to limit fluids, talk with your doctor before you increase your fluid intake.)  · Urinate when you need to. · Urinate right after you have sex. · Change sanitary pads often. · Avoid douches, bubble baths, feminine hygiene sprays, and other feminine hygiene products that have deodorants. · After going to the bathroom, wipe from front to back. When should you call for help? Call your doctor now or seek immediate medical care if:  · Symptoms such as fever, chills, nausea, or vomiting get worse or appear for the first time. · You have new pain in your back just below your rib cage. This is called flank pain. · There is new blood or pus in your urine. · You have any problems with your antibiotic medicine. Watch closely for changes in your health, and be sure to contact your doctor if:  · You are not getting better after taking an antibiotic for 2 days. · Your symptoms go away but then come back. Where can you learn more? Go to http://angelica-zuri.info/. Enter U180 in the search box to learn more about \"Urinary Tract Infection in Women: Care Instructions. \"  Current as of: November 28, 2016  Content Version: 11.2  © 7064-1565 Healthwise, [facility].  Care instructions adapted under license by [employer] (which disclaims liability or warranty for this information). If you have questions about a medical condition or this instruction, always ask your healthcare professional. [address] any warranty or liability for your use of this information. MAKE SURE PATIENT HAS FOLLOW UP APPOINTMENT IN 3 TO 5 DAYS, SEPSIS BOOK AND THERMOMETER FOR HOME USE.

## 2017-05-04 NOTE — PROGRESS NOTES
Problem: Mobility Impaired (Adult and Pediatric)  Goal: *Acute Goals and Plan of Care (Insert Text)  STG:  (1.)Ms. Oleary will move from supine to sit and sit to supine , scoot up and down and roll side to side with SUPERVISION within 3 day(s). (2.)Ms. Jose Mendoza will transfer from bed to chair and chair to bed with STAND BY ASSIST using the least restrictive device within 3 day(s). (3.)Ms. Oleary will ambulate with STAND BY ASSIST for 200 feet with the least restrictive device within 3 day(s). LTG:  (1.)Ms. Jose Mendoza will move from supine to sit and sit to supine , scoot up and down and roll side to side in bed with INDEPENDENT within 7 day(s). (2.)Ms. Jose Mendoza will transfer from bed to chair and chair to bed with SUPERVISION using the least restrictive device within 7 day(s). (3.)Ms. Oleary will ambulate with SUPERVISION for 500 feet with the least restrictive device within 7 day(s). ________________________________________________________________________________________________      PHYSICAL THERAPY: Daily Note, Treatment Day: 2nd and AM 5/4/2017  INPATIENT: Hospital Day: 5  [place] / [place] / Product Type: Medicare /      NAME/AGE/GENDER: Lyle Rodarte is a 80 y.o. female   PRIMARY DIAGNOSIS: Sepsis ([facility] [address] Sepsis ([facility] [address] Sepsis ([facility] [address]        ICD-10: Treatment Diagnosis:       · Generalized Muscle Weakness (M62.81)  · Difficulty in walking, Not elsewhere classified (R26.2)   Precaution/Allergies:  Aspirin; Ativan [lorazepam]; Aleve [naproxen sodium]; Carafate [sucralfate]; Clorazepate dipotassium; Flexeril [cyclobenzaprine]; Robaxin [methocarbamol]; Vytorin 10-10 [ezetimibe-simvastatin]; and Zantac [ranitidine hcl]       ASSESSMENT:      Ms. Jose Mendoza was supine in bed upon arrival but agreeable to PT with motivation. She performs bed mobility and transfers with supervision. RW used for standing and ambulation with SBA.   Pt took several rest breaks during ambulation where she was instructed in pursed lip breathing. Pt transferred to sitting on edge of bed after requesting return to room. Pt has progressed in mobility and ambulation distance. Plan to continue with POC. This section established at most recent assessment   PROBLEM LIST (Impairments causing functional limitations):  1. Decreased Strength  2. Decreased ADL/Functional Activities  3. Decreased Ambulation Ability/Technique  4. Decreased Balance  5. Increased Pain  6. Decreased Activity Tolerance  7. Increased Fatigue    INTERVENTIONS PLANNED: (Benefits and precautions of physical therapy have been discussed with the patient.)  1. Balance Exercise  2. Bed Mobility  3. Family Education  4. Gait Training  5. Neuromuscular Re-education/Strengthening  6. Therapeutic Activites  7. Therapeutic Exercise/Strengthening  8. Group Therapy      TREATMENT PLAN: Frequency/Duration: 3 times a week for duration of hospital stay  Rehabilitation Potential For Stated Goals: GOOD      RECOMMENDED REHABILITATION/EQUIPMENT: (at time of discharge pending progress): Continue Skilled Therapy and rehab versus HHPT dependent on pt progress. HISTORY:   History of Present Injury/Illness (Reason for Referral):  See H&P below  80-year-old female brought to the ED by EMS for fever and dysuria. patient was reportedly tachycardic and febrile 102. Symptoms stesarted yesterday patient has been weak for several days. The patient had a trans-catheter aortic valve replacement done several weeks ago and had a ERCP for gallstones done last week. She had gotten home without any difficulties however on Friday being weak and low-grade fever. Early this morning she awoke even more weak and had a fever of 102. She is complaining of pressure in the suprapubic area particularly when she urinates. Past Medical History/Comorbidities:   Ms. Jet Young  has a past medical history of Aortic regurgitation; Aortic stenosis;  Basal cell carcinoma; Bruit (arterial); CAD (coronary artery disease); Choledocholithiasis; Hyperlipidemia; Hypertension; Hypothyroidism; Murmur, cardiac; Poor historian; S/P TAVR (transcatheter aortic valve replacement); Tricuspid valve regurgitation; Type II or unspecified type diabetes mellitus without mention of complication, not stated as uncontrolled; and Urinary incontinence. Ms. Derek Cueto  has a past surgical history that includes cystocele repair; rectocele repair; appendectomy; carpal tunnel release; ercp (02/20/2017); hysterectomy; oophorectomy; other surgical (Right); orthopaedic; knee arthroscopy (Right); and ercp (04/28/2017).   Social History/Living Environment:   Home Environment: Private residence  # Steps to Enter: 4  Rails to Enter: Yes  Hand Rails : Bilateral  One/Two Story Residence: One story  Living Alone: Yes  Support Systems: Child(mayo), Family member(s)  Patient Expects to be Discharged to[de-identified] Private residence  Current DME Used/Available at Home: [address], [place], straight, [employer], bedside  Tub or Shower Type: Shower  Prior Level of Function/Work/Activity:  Ambulating household distances indep, use of SPC for community distances, lives alone, indep with ADLs      Number of Personal Factors/Comorbidities that affect the Plan of Care: 3+: HIGH COMPLEXITY   EXAMINATION:   Most Recent Physical Functioning:   Gross Assessment:                  Posture:     Balance:  Sitting: Intact  Standing: Impaired  Standing - Static: Good  Standing - Dynamic : Fair Bed Mobility:  Rolling: Supervision  Supine to Sit: Supervision  Wheelchair Mobility:     Transfers:  Sit to Stand: Supervision  Stand to Sit: Supervision  Gait:     Base of Support: Narrowed  Speed/Blanca: Slow  Step Length: Left shortened;Right shortened  Gait Abnormalities: Decreased step clearance  Distance (ft): 125 Feet (ft) (x 2)  Assistive Device: Walker, rolling  Ambulation - Level of Assistance: Stand-by asssistance       Body Structures Involved:  1. Heart  2. Lungs  3. Bones  4. Joints  5. Muscles  6. Ligaments Body Functions Affected:  1. Sensory/Pain  2. Cardio  3. Respiratory  4. Neuromusculoskeletal  5. Movement Related Activities and Participation Affected:  1. General Tasks and Demands  2. Mobility  3. Self Care  4. Domestic Life  5. Interpersonal Interactions and Relationships  6. Community, Social and [place]   Number of elements that affect the Plan of Care: 4+: HIGH COMPLEXITY   CLINICAL PRESENTATION:   Presentation: Stable and uncomplicated: LOW COMPLEXITY   CLINICAL DECISION MAKIN [facility] Inpatient Short Form  How much difficulty does the patient currently have. .. Unable A Lot A Little None   1. Turning over in bed (including adjusting bedclothes, sheets and blankets)? [ ] 1   [ ] 2   [ ] 3   [X] 4   2. Sitting down on and standing up from a chair with arms ( e.g., wheelchair, bedside commode, etc.)   [ ] 1   [ ] 2   [X] 3   [ ] 4   3. Moving from lying on back to sitting on the side of the bed? [ ] 1   [ ] 2   [ ] 3   [X] 4   How much help from another person does the patient currently need. .. Total A Lot A Little None   4. Moving to and from a bed to a chair (including a wheelchair)? [ ] 1   [ ] 2   [X] 3   [ ] 4   5. Need to walk in hospital room? [ ] 1   [ ] 2   [X] 3   [ ] 4   6. Climbing 3-5 steps with a railing? [ ] 1   [X] 2   [ ] 3   [ ] 4   © , Trustees of [address] [place], [place] [id number], under license to [employer]. All rights reserved    Score:  Initial: 19 Most Recent: X (Date: -- )     Interpretation of Tool:  Represents activities that are increasingly more difficult (i.e. Bed mobility, Transfers, Gait).        Score 24 23 22-20 19-15 14-10 9-7 6       Modifier CH CI CJ CK CL CM CN         · Mobility - Walking and Moving Around:               - CURRENT STATUS:    CK - 40%-59% impaired, limited or restricted               - GOAL STATUS:           CJ - 20%-39% impaired, limited or restricted               - D/C STATUS:                       ---------------To be determined---------------  [place] / [place] / Product Type: Medicare /       Medical Necessity:     · Patient is expected to demonstrate progress in strength, balance, coordination and functional technique to decrease assistance required with gait and functional mobility. Reason for Services/Other Comments:  · Patient continues to require skilled intervention due to decreased strength, decreased balance, decreased functional tolerance, decreased cardiopulmonary endurance affecting participation in basic ADLs and functional tasks. .   Use of outcome tool(s) and clinical judgement create a POC that gives a: Clear prediction of patient's progress: LOW COMPLEXITY                 TREATMENT:      Pre-treatment Symptoms/Complaints:  None  Pain: Initial:   Pain Intensity 1: 0  Post Session:  0/10       Therapeutic Activity: (    8 minutes): Therapeutic activities including Bed transfers, Ambulation on level ground and pursed lip breathing to improve mobility, strength, balance and activity tolerance. Required minimal verbal cuing   to promote static and dynamic balance in standing and and proper breathing techniques. Braces/Orthotics/Lines/Etc:   · Room air  Treatment/Session Assessment:    · Response to Treatment:  Pt eager for discharge and took motivation to participate. · Interdisciplinary Collaboration:  · Physical Therapist  · Registered Nurse  · After treatment position/precautions:  · Bed/Chair-wheels locked, Bed in low position, Family at bedside and Sitting edge of bed  · Compliance with Program/Exercises: Will assess as treatment progresses. · Recommendations/Intent for next treatment session: \"Next visit will focus on advancements to more challenging activities and reduction in assistance provided\".   Total Treatment Duration:  PT Patient Time In/Time Out  Time In: 1038  Time Out: [address], PT, DPT

## 2017-05-05 ENCOUNTER — PATIENT OUTREACH (OUTPATIENT)
Dept: CASE MANAGEMENT | Age: 82
End: 2017-05-05

## 2017-05-05 LAB
BACTERIA SPEC CULT: NORMAL
SERVICE CMNT-IMP: NORMAL

## 2017-05-05 NOTE — PROGRESS NOTES
[facility] outreach postponed for 21 days due to discharge to non-preferred network SNF. This note will not be viewable in [address]

## 2017-05-07 LAB
BACTERIA SPEC CULT: NORMAL
BACTERIA SPEC CULT: NORMAL
SERVICE CMNT-IMP: NORMAL
SERVICE CMNT-IMP: NORMAL

## 2017-06-02 ENCOUNTER — PATIENT OUTREACH (OUTPATIENT)
Dept: CASE MANAGEMENT | Age: 82
End: 2017-06-02

## 2017-06-02 NOTE — PROGRESS NOTES
Initial SNF f/u outreach attempt to patient was unsuccessful. Second outreach attempt will be made within 24 hours. This note will not be viewable in [address]

## 2017-06-04 ENCOUNTER — PATIENT OUTREACH (OUTPATIENT)
Dept: CASE MANAGEMENT | Age: 82
End: 2017-06-04

## 2017-06-04 NOTE — PROGRESS NOTES
Second SNF f/u outreach attempt to patient was unsuccessful. Will close case and re-open if patient returns calls. This note will not be viewable in [address]

## 2017-07-08 ENCOUNTER — HOSPITAL ENCOUNTER (INPATIENT)
Age: 82
LOS: 3 days | Discharge: NURSING, REHAB OR LONG-TERM CARE | DRG: 481 | End: 2017-07-11
Attending: EMERGENCY MEDICINE | Admitting: INTERNAL MEDICINE
Payer: MEDICARE

## 2017-07-08 ENCOUNTER — ANESTHESIA EVENT (OUTPATIENT)
Dept: SURGERY | Age: 82
DRG: 481 | End: 2017-07-08
Payer: MEDICARE

## 2017-07-08 ENCOUNTER — APPOINTMENT (OUTPATIENT)
Dept: GENERAL RADIOLOGY | Age: 82
DRG: 481 | End: 2017-07-08
Attending: EMERGENCY MEDICINE
Payer: MEDICARE

## 2017-07-08 DIAGNOSIS — M54.2 NECK PAIN: ICD-10-CM

## 2017-07-08 DIAGNOSIS — M25.552 LEFT HIP PAIN: ICD-10-CM

## 2017-07-08 DIAGNOSIS — S72.141A FRACTURE, INTERTROCHANTERIC, RIGHT FEMUR, CLOSED, INITIAL ENCOUNTER (HCC): Primary | ICD-10-CM

## 2017-07-08 DIAGNOSIS — T79.6XXA TRAUMATIC RHABDOMYOLYSIS, INITIAL ENCOUNTER (HCC): ICD-10-CM

## 2017-07-08 LAB
ABO + RH BLD: NORMAL
ALBUMIN SERPL BCP-MCNC: 3.3 G/DL (ref 3.2–4.6)
ALBUMIN/GLOB SERPL: 0.8 {RATIO} (ref 1.2–3.5)
ALP SERPL-CCNC: 128 U/L (ref 50–136)
ALT SERPL-CCNC: 33 U/L (ref 12–65)
ANION GAP BLD CALC-SCNC: 9 MMOL/L (ref 7–16)
APTT PPP: 26.9 SEC (ref 23.5–31.7)
AST SERPL W P-5'-P-CCNC: 47 U/L (ref 15–37)
ATRIAL RATE: 90 BPM
BACTERIA SPEC CULT: NORMAL
BASOPHILS # BLD AUTO: 0 K/UL (ref 0–0.2)
BASOPHILS # BLD: 0 % (ref 0–2)
BILIRUB SERPL-MCNC: 0.4 MG/DL (ref 0.2–1.1)
BLOOD GROUP ANTIBODIES SERPL: NORMAL
BUN SERPL-MCNC: 24 MG/DL (ref 8–23)
CALCIUM SERPL-MCNC: 8.8 MG/DL (ref 8.3–10.4)
CALCIUM SERPL-MCNC: 9.1 MG/DL (ref 8.3–10.4)
CALCULATED P AXIS, ECG09: 93 DEGREES
CALCULATED R AXIS, ECG10: 35 DEGREES
CALCULATED T AXIS, ECG11: 76 DEGREES
CHLORIDE SERPL-SCNC: 107 MMOL/L (ref 98–107)
CK SERPL-CCNC: 1317 U/L (ref 21–215)
CO2 SERPL-SCNC: 24 MMOL/L (ref 21–32)
CREAT SERPL-MCNC: 0.96 MG/DL (ref 0.6–1)
DIAGNOSIS, 93000: NORMAL
DIFFERENTIAL METHOD BLD: ABNORMAL
EOSINOPHIL # BLD: 0 K/UL (ref 0–0.8)
EOSINOPHIL NFR BLD: 0 % (ref 0.5–7.8)
ERYTHROCYTE [DISTWIDTH] IN BLOOD BY AUTOMATED COUNT: 13.9 % (ref 11.9–14.6)
GLOBULIN SER CALC-MCNC: 4.4 G/DL (ref 2.3–3.5)
GLUCOSE SERPL-MCNC: 202 MG/DL (ref 65–100)
HCT VFR BLD AUTO: 34.9 % (ref 35.8–46.3)
HGB BLD-MCNC: 12.6 G/DL (ref 11.7–15.4)
IMM GRANULOCYTES # BLD: 0 K/UL (ref 0–0.5)
IMM GRANULOCYTES NFR BLD AUTO: 0.3 % (ref 0–5)
INR PPP: 1 (ref 0.9–1.2)
LYMPHOCYTES # BLD AUTO: 15 % (ref 13–44)
LYMPHOCYTES # BLD: 1.5 K/UL (ref 0.5–4.6)
MCH RBC QN AUTO: 29.8 PG (ref 26.1–32.9)
MCHC RBC AUTO-ENTMCNC: 36.1 G/DL (ref 31.4–35)
MCV RBC AUTO: 82.5 FL (ref 79.6–97.8)
MONOCYTES # BLD: 0.6 K/UL (ref 0.1–1.3)
MONOCYTES NFR BLD AUTO: 6 % (ref 4–12)
NEUTS SEG # BLD: 7.8 K/UL (ref 1.7–8.2)
NEUTS SEG NFR BLD AUTO: 79 % (ref 43–78)
P-R INTERVAL, ECG05: 134 MS
PLATELET # BLD AUTO: 161 K/UL (ref 150–450)
PMV BLD AUTO: 9.9 FL (ref 10.8–14.1)
POTASSIUM SERPL-SCNC: 4.1 MMOL/L (ref 3.5–5.1)
PREALB SERPL-MCNC: 20.4 MG/DL (ref 18–35.7)
PROT SERPL-MCNC: 7.7 G/DL (ref 6.3–8.2)
PROTHROMBIN TIME: 10.9 SEC (ref 9.6–12)
PTH-INTACT SERPL-MCNC: 101.4 PG/ML (ref 14–72)
Q-T INTERVAL, ECG07: 364 MS
QRS DURATION, ECG06: 82 MS
QTC CALCULATION (BEZET), ECG08: 433 MS
RBC # BLD AUTO: 4.23 M/UL (ref 4.05–5.25)
SERVICE CMNT-IMP: NORMAL
SODIUM SERPL-SCNC: 140 MMOL/L (ref 136–145)
SPECIMEN EXP DATE BLD: NORMAL
VENTRICULAR RATE, ECG03: 85 BPM
WBC # BLD AUTO: 10 K/UL (ref 4.3–11.1)

## 2017-07-08 PROCEDURE — 74011250637 HC RX REV CODE- 250/637: Performed by: ORTHOPAEDIC SURGERY

## 2017-07-08 PROCEDURE — 73502 X-RAY EXAM HIP UNI 2-3 VIEWS: CPT

## 2017-07-08 PROCEDURE — 99284 EMERGENCY DEPT VISIT MOD MDM: CPT | Performed by: EMERGENCY MEDICINE

## 2017-07-08 PROCEDURE — 85025 COMPLETE CBC W/AUTO DIFF WBC: CPT | Performed by: EMERGENCY MEDICINE

## 2017-07-08 PROCEDURE — 71010 XR CHEST SNGL V: CPT

## 2017-07-08 PROCEDURE — 74011000302 HC RX REV CODE- 302: Performed by: INTERNAL MEDICINE

## 2017-07-08 PROCEDURE — 96375 TX/PRO/DX INJ NEW DRUG ADDON: CPT | Performed by: EMERGENCY MEDICINE

## 2017-07-08 PROCEDURE — 74011250636 HC RX REV CODE- 250/636: Performed by: ORTHOPAEDIC SURGERY

## 2017-07-08 PROCEDURE — 85610 PROTHROMBIN TIME: CPT | Performed by: EMERGENCY MEDICINE

## 2017-07-08 PROCEDURE — 74011250636 HC RX REV CODE- 250/636: Performed by: INTERNAL MEDICINE

## 2017-07-08 PROCEDURE — 85730 THROMBOPLASTIN TIME PARTIAL: CPT | Performed by: EMERGENCY MEDICINE

## 2017-07-08 PROCEDURE — 73552 X-RAY EXAM OF FEMUR 2/>: CPT

## 2017-07-08 PROCEDURE — 86580 TB INTRADERMAL TEST: CPT | Performed by: INTERNAL MEDICINE

## 2017-07-08 PROCEDURE — 86900 BLOOD TYPING SEROLOGIC ABO: CPT | Performed by: EMERGENCY MEDICINE

## 2017-07-08 PROCEDURE — 82550 ASSAY OF CK (CPK): CPT | Performed by: EMERGENCY MEDICINE

## 2017-07-08 PROCEDURE — 83970 ASSAY OF PARATHORMONE: CPT | Performed by: ORTHOPAEDIC SURGERY

## 2017-07-08 PROCEDURE — 87641 MR-STAPH DNA AMP PROBE: CPT | Performed by: INTERNAL MEDICINE

## 2017-07-08 PROCEDURE — 93005 ELECTROCARDIOGRAM TRACING: CPT | Performed by: EMERGENCY MEDICINE

## 2017-07-08 PROCEDURE — 51702 INSERT TEMP BLADDER CATH: CPT | Performed by: EMERGENCY MEDICINE

## 2017-07-08 PROCEDURE — 96374 THER/PROPH/DIAG INJ IV PUSH: CPT | Performed by: EMERGENCY MEDICINE

## 2017-07-08 PROCEDURE — 65270000029 HC RM PRIVATE

## 2017-07-08 PROCEDURE — 96361 HYDRATE IV INFUSION ADD-ON: CPT | Performed by: EMERGENCY MEDICINE

## 2017-07-08 PROCEDURE — 80053 COMPREHEN METABOLIC PANEL: CPT | Performed by: EMERGENCY MEDICINE

## 2017-07-08 PROCEDURE — 77030005520 HC CATH URETH FOL38 BARD -A

## 2017-07-08 PROCEDURE — 82306 VITAMIN D 25 HYDROXY: CPT | Performed by: ORTHOPAEDIC SURGERY

## 2017-07-08 PROCEDURE — 77030027138 HC INCENT SPIROMETER -A

## 2017-07-08 PROCEDURE — 74011250636 HC RX REV CODE- 250/636: Performed by: EMERGENCY MEDICINE

## 2017-07-08 PROCEDURE — 84134 ASSAY OF PREALBUMIN: CPT | Performed by: ORTHOPAEDIC SURGERY

## 2017-07-08 PROCEDURE — 74011250637 HC RX REV CODE- 250/637: Performed by: INTERNAL MEDICINE

## 2017-07-08 RX ORDER — ONDANSETRON 2 MG/ML
4 INJECTION INTRAMUSCULAR; INTRAVENOUS
Status: COMPLETED | OUTPATIENT
Start: 2017-07-08 | End: 2017-07-08

## 2017-07-08 RX ORDER — METOPROLOL TARTRATE 25 MG/1
25 TABLET, FILM COATED ORAL 2 TIMES DAILY
Status: DISCONTINUED | OUTPATIENT
Start: 2017-07-08 | End: 2017-07-11 | Stop reason: HOSPADM

## 2017-07-08 RX ORDER — ONDANSETRON 2 MG/ML
4 INJECTION INTRAMUSCULAR; INTRAVENOUS
Status: DISCONTINUED | OUTPATIENT
Start: 2017-07-08 | End: 2017-07-09 | Stop reason: HOSPADM

## 2017-07-08 RX ORDER — LEVOTHYROXINE SODIUM 100 UG/1
100 TABLET ORAL
Status: DISCONTINUED | OUTPATIENT
Start: 2017-07-09 | End: 2017-07-11 | Stop reason: HOSPADM

## 2017-07-08 RX ORDER — CEFAZOLIN SODIUM IN 0.9 % NACL 2 G/50 ML
2 INTRAVENOUS SOLUTION, PIGGYBACK (ML) INTRAVENOUS
Status: DISCONTINUED | OUTPATIENT
Start: 2017-07-08 | End: 2017-07-08 | Stop reason: SDUPTHER

## 2017-07-08 RX ORDER — SODIUM CHLORIDE 9 MG/ML
250 INJECTION, SOLUTION INTRAVENOUS CONTINUOUS
Status: DISCONTINUED | OUTPATIENT
Start: 2017-07-08 | End: 2017-07-08

## 2017-07-08 RX ORDER — SODIUM CHLORIDE 9 MG/ML
2000 INJECTION, SOLUTION INTRAVENOUS CONTINUOUS
Status: DISCONTINUED | OUTPATIENT
Start: 2017-07-08 | End: 2017-07-11 | Stop reason: HOSPADM

## 2017-07-08 RX ORDER — HYDROMORPHONE HYDROCHLORIDE 1 MG/ML
0.5 INJECTION, SOLUTION INTRAMUSCULAR; INTRAVENOUS; SUBCUTANEOUS
Status: DISCONTINUED | OUTPATIENT
Start: 2017-07-08 | End: 2017-07-11 | Stop reason: HOSPADM

## 2017-07-08 RX ORDER — ACETAMINOPHEN 325 MG/1
650 TABLET ORAL EVERY 8 HOURS
Status: DISCONTINUED | OUTPATIENT
Start: 2017-07-08 | End: 2017-07-09 | Stop reason: HOSPADM

## 2017-07-08 RX ORDER — TRAMADOL HYDROCHLORIDE 50 MG/1
50 TABLET ORAL
Status: DISCONTINUED | OUTPATIENT
Start: 2017-07-08 | End: 2017-07-11 | Stop reason: HOSPADM

## 2017-07-08 RX ORDER — ACETAMINOPHEN 325 MG/1
650 TABLET ORAL EVERY 8 HOURS
Status: DISCONTINUED | OUTPATIENT
Start: 2017-07-08 | End: 2017-07-08 | Stop reason: SDUPTHER

## 2017-07-08 RX ORDER — SODIUM CHLORIDE 9 MG/ML
75 INJECTION, SOLUTION INTRAVENOUS CONTINUOUS
Status: DISCONTINUED | OUTPATIENT
Start: 2017-07-08 | End: 2017-07-09 | Stop reason: HOSPADM

## 2017-07-08 RX ORDER — BENAZEPRIL HYDROCHLORIDE 10 MG/1
20 TABLET ORAL DAILY
Status: DISCONTINUED | OUTPATIENT
Start: 2017-07-09 | End: 2017-07-11 | Stop reason: HOSPADM

## 2017-07-08 RX ORDER — OXYCODONE HYDROCHLORIDE 5 MG/1
5 TABLET ORAL
Status: DISCONTINUED | OUTPATIENT
Start: 2017-07-08 | End: 2017-07-11 | Stop reason: HOSPADM

## 2017-07-08 RX ORDER — AMLODIPINE BESYLATE 10 MG/1
10 TABLET ORAL DAILY
Status: DISCONTINUED | OUTPATIENT
Start: 2017-07-09 | End: 2017-07-11 | Stop reason: HOSPADM

## 2017-07-08 RX ORDER — SODIUM CHLORIDE 0.9 % (FLUSH) 0.9 %
5-10 SYRINGE (ML) INJECTION AS NEEDED
Status: DISCONTINUED | OUTPATIENT
Start: 2017-07-08 | End: 2017-07-09 | Stop reason: HOSPADM

## 2017-07-08 RX ORDER — HYDROMORPHONE HYDROCHLORIDE 1 MG/ML
0.5 INJECTION, SOLUTION INTRAMUSCULAR; INTRAVENOUS; SUBCUTANEOUS
Status: COMPLETED | OUTPATIENT
Start: 2017-07-08 | End: 2017-07-08

## 2017-07-08 RX ORDER — SODIUM CHLORIDE, SODIUM LACTATE, POTASSIUM CHLORIDE, CALCIUM CHLORIDE 600; 310; 30; 20 MG/100ML; MG/100ML; MG/100ML; MG/100ML
75 INJECTION, SOLUTION INTRAVENOUS
Status: COMPLETED | OUTPATIENT
Start: 2017-07-08 | End: 2017-07-09

## 2017-07-08 RX ORDER — CEFAZOLIN SODIUM IN 0.9 % NACL 2 G/50 ML
2 INTRAVENOUS SOLUTION, PIGGYBACK (ML) INTRAVENOUS
Status: COMPLETED | OUTPATIENT
Start: 2017-07-09 | End: 2017-07-09

## 2017-07-08 RX ORDER — SODIUM CHLORIDE 0.9 % (FLUSH) 0.9 %
5-10 SYRINGE (ML) INJECTION EVERY 8 HOURS
Status: DISCONTINUED | OUTPATIENT
Start: 2017-07-08 | End: 2017-07-09 | Stop reason: HOSPADM

## 2017-07-08 RX ORDER — TRAMADOL HYDROCHLORIDE 50 MG/1
50 TABLET ORAL
Status: DISCONTINUED | OUTPATIENT
Start: 2017-07-08 | End: 2017-07-09 | Stop reason: SDUPTHER

## 2017-07-08 RX ADMIN — SODIUM CHLORIDE 2000 ML: 900 INJECTION, SOLUTION INTRAVENOUS at 16:20

## 2017-07-08 RX ADMIN — SODIUM CHLORIDE 250 ML/HR: 900 INJECTION, SOLUTION INTRAVENOUS at 15:37

## 2017-07-08 RX ADMIN — Medication 10 ML: at 18:00

## 2017-07-08 RX ADMIN — METOPROLOL TARTRATE 25 MG: 25 TABLET ORAL at 21:14

## 2017-07-08 RX ADMIN — OXYCODONE HYDROCHLORIDE 5 MG: 5 TABLET ORAL at 21:14

## 2017-07-08 RX ADMIN — TUBERCULIN PURIFIED PROTEIN DERIVATIVE 5 UNITS: 5 INJECTION, SOLUTION INTRADERMAL at 20:15

## 2017-07-08 RX ADMIN — ONDANSETRON 4 MG: 2 INJECTION INTRAMUSCULAR; INTRAVENOUS at 15:36

## 2017-07-08 RX ADMIN — HYDROMORPHONE HYDROCHLORIDE 0.5 MG: 1 INJECTION, SOLUTION INTRAMUSCULAR; INTRAVENOUS; SUBCUTANEOUS at 15:36

## 2017-07-08 RX ADMIN — Medication 5 ML: at 21:15

## 2017-07-08 RX ADMIN — SODIUM CHLORIDE 250 ML: 900 INJECTION, SOLUTION INTRAVENOUS at 18:00

## 2017-07-08 NOTE — ROUTINE PROCESS
TRANSFER - OUT REPORT:    Verbal report given to receiving RN on Aurora Oleary  being transferred to room 727 Ortho for routine progression of care       Report consisted of patients Situation, Background, Assessment and   Recommendations(SBAR). Information from the following report(s) SBAR and ED Summary was reviewed with the receiving nurse. Lines:   Peripheral IV 07/08/17 Left Antecubital (Active)   Site Assessment Clean, dry, & intact 7/8/2017  5:07 PM   Phlebitis Assessment 0 7/8/2017  5:07 PM   Infiltration Assessment 0 7/8/2017  5:07 PM   Dressing Status Clean, dry, & intact 7/8/2017  5:07 PM        Opportunity for questions and clarification was provided.       Patient transported with:   [employer]

## 2017-07-08 NOTE — H&P
HOSPITALIST H&P/CONSULT  NAME:  Penni Cushing   Age:  80 y.o.  :   3/20/1929   MRN:   [id number]  PCP: Nino Bridges MD  Consulting MD:  Treatment Team: Primary Nurse: [place]ia Becker  HPI:   80F with pmhx of AS s/p TAVR in 2017 on plavix, htn, hlp, hypothyroidism presents after having excructiating pain in right hip after standing abruptly. Laid on the floor o/n till her family found her this afternoon. Has felt in her normal state of health last few weeks.  Of note, had urosepsis in May and spent 2 weeks at rehab following that admission at [place] [id number]      Complete ROS done and is as stated in HPI or otherwise negative  Past Medical History:   Diagnosis Date    Aortic regurgitation     mild to moderate per ECHO 3/28/17    Aortic stenosis     severe per ECHO 3/28/17    Basal cell carcinoma     Bruit (arterial)     CAD (coronary artery disease)     small vessel disease, medical management per cardiologist not     Choledocholithiasis     Hyperlipidemia     Hypertension     Hypothyroidism     Murmur, cardiac     Poor historian     S/P TAVR (transcatheter aortic valve replacement)     3/17    Tricuspid valve regurgitation     moderate per ECHO 3/28/17    Type II or unspecified type diabetes mellitus without mention of complication, not stated as uncontrolled     not medicated, treated w/ diet    Urinary incontinence       Past Surgical History:   Procedure Laterality Date    HX APPENDECTOMY      HX CARPAL TUNNEL RELEASE      patient denies    HX CYSTOCELE REPAIR      HX ERCP  2017    large CBD stones    HX ERCP  2017    laser    HX HYSTERECTOMY      HX KNEE ARTHROSCOPY Right     HX OOPHORECTOMY      Partial    HX ORTHOPAEDIC      2 back-one fusion-low; all fingers-trigger    HX OTHER SURGICAL Right     basal cell nose    HX OTHER SURGICAL  2017    Tavr    HX RECTOCELE REPAIR        Prior to Admission Medications   Prescriptions Last Dose Informant Patient Reported? Taking? ASCORBIC ACID/VITAMIN E (CRANBERRY CONCENTRATE PO)   Yes No   Sig: Take  by mouth daily. amLODIPine (NORVASC) 10 mg tablet   No No   Sig: Take 1 Tab by mouth daily. Patient taking differently: Take 10 mg by mouth every morning. benazepril (LOTENSIN) 20 mg tablet   No No   Sig: Take 1 Tab by mouth daily. clopidogrel (PLAVIX) 75 mg tab   No No   Sig: Take 1 Tab by mouth daily. Patient taking differently: Take 75 mg by mouth every morning. clopidogrel (PLAVIX) 75 mg tab   No No   Sig: TAKE 1 TABLET ONCE DAILY   levothyroxine (SYNTHROID) 100 mcg tablet   No No   Sig: Take 1 Tab by mouth Daily (before breakfast). metoprolol tartrate (LOPRESSOR) 25 mg tablet   No No   Sig: Take 1 Tab by mouth two (2) times a day. nitroglycerin (NITROSTAT) 0.4 mg SL tablet   No No   Si Tab by SubLINGual route every five (5) minutes as needed. rosuvastatin (CRESTOR) 20 mg tablet   No No   Sig: Take 1 Tab by mouth nightly. Patient taking differently: Take 10 mg by mouth nightly. traMADol (ULTRAM) 50 mg tablet   No No   Sig: Take 1 Tab by mouth every six (6) hours as needed for Pain. trimethoprim-sulfamethoxazole (BACTRIM DS) 160-800 mg per tablet   No No   Sig: Take 1 Tab by mouth two (2) times a day.       Facility-Administered Medications: None     Allergies   Allergen Reactions    Aspirin Anaphylaxis     Hives, swelling    Ativan [Lorazepam] Other (comments)     Confused and aggressive    Aleve [Naproxen Sodium] Rash    Carafate [Sucralfate] Hives    Ciprofloxacin Diarrhea    Clorazepate Dipotassium Hives    Flexeril [Cyclobenzaprine] Itching    Robaxin [Methocarbamol] Anaphylaxis     Swelling of tongue, wheezing  Muscle relaxers in general    Vytorin 10-10 [Ezetimibe-Simvastatin] Other (comments)     Muscle soreness    Zantac [Ranitidine Hcl] Itching      Social History   Substance Use Topics    Smoking status: Never Smoker    Smokeless tobacco: Never Used    Alcohol use No Family History   Problem Relation Age of Onset    Heart Attack Father      age 73    Heart Disease Father     Arthritis-osteo Mother       Objective:     Visit Vitals    /84    Pulse 97    Temp 98.9 °F (37.2 °C)    Resp 15    Ht 5' 4\" (1.626 m)    Wt 65.8 kg (145 lb)    SpO2 95%    BMI 24.89 kg/m2      Temp (24hrs), Av.9 °F (37.2 °C), Min:98.9 °F (37.2 °C), Max:98.9 °F (37.2 °C)    Oxygen Therapy  O2 Sat (%): 95 % (17 1652)  Pulse via Oximetry: 97 beats per minute (17 1652)  O2 Device: Room air (17 1435)  Physical Exam:  General:    Alert, cooperative, no distress, appears stated age. Head:   Normocephalic, without obvious abnormality, atraumatic. Nose:  Nares normal. No drainage or sinus tenderness. Lungs:   Clear to auscultation bilaterally. No Wheezing or Rhonchi. No rales. Heart:   Regular rate and rhythm,  no murmur, rub or gallop. Abdomen:   Soft, non-tender. Not distended. Bowel sounds normal.   Extremities: No cyanosis. Right lower ext 1+ edema. No clubbing  Skin:     Texture, turgor normal. No rashes or lesions. Not Jaundiced  Neurologic: Alert and oriented x 3, no focal deficits   Data Review:   Recent Results (from the past 24 hour(s))   EKG, 12 LEAD, INITIAL    Collection Time: 17  2:45 PM   Result Value Ref Range    Ventricular Rate 85 BPM    Atrial Rate 90 BPM    P-R Interval 134 ms    QRS Duration 82 ms    Q-T Interval 364 ms    QTC Calculation (Bezet) 433 ms    Calculated P Axis 93 degrees    Calculated R Axis 35 degrees    Calculated T Axis 76 degrees    Diagnosis       !! AGE AND GENDER SPECIFIC ECG ANALYSIS !!   Normal sinus rhythm  Nonspecific ST abnormality  Abnormal ECG  When compared with ECG of 2017 12:13,  Nonspecific T wave abnormality now evident in Lateral leads     CBC WITH AUTOMATED DIFF    Collection Time: 17  2:50 PM   Result Value Ref Range    WBC 10.0 4.3 - 11.1 K/uL    RBC 4.23 4.05 - 5.25 M/uL    HGB 12.6 11.7 - 15.4 g/dL    HCT 34.9 (L) 35.8 - 46.3 %    MCV 82.5 79.6 - 97.8 FL    MCH 29.8 26.1 - 32.9 PG    MCHC 36.1 (H) 31.4 - 35.0 g/dL    RDW 13.9 11.9 - 14.6 %    PLATELET 486 662 - 085 K/uL    MPV 9.9 (L) 10.8 - 14.1 FL    DF AUTOMATED      NEUTROPHILS 79 (H) 43 - 78 %    LYMPHOCYTES 15 13 - 44 %    MONOCYTES 6 4.0 - 12.0 %    EOSINOPHILS 0 (L) 0.5 - 7.8 %    BASOPHILS 0 0.0 - 2.0 %    IMMATURE GRANULOCYTES 0.3 0.0 - 5.0 %    ABS. NEUTROPHILS 7.8 1.7 - 8.2 K/UL    ABS. LYMPHOCYTES 1.5 0.5 - 4.6 K/UL    ABS. MONOCYTES 0.6 0.1 - 1.3 K/UL    ABS. EOSINOPHILS 0.0 0.0 - 0.8 K/UL    ABS. BASOPHILS 0.0 0.0 - 0.2 K/UL    ABS. IMM. GRANS. 0.0 0.0 - 0.5 K/UL   METABOLIC PANEL, COMPREHENSIVE    Collection Time: 07/08/17  2:50 PM   Result Value Ref Range    Sodium 140 136 - 145 mmol/L    Potassium 4.1 3.5 - 5.1 mmol/L    Chloride 107 98 - 107 mmol/L    CO2 24 21 - 32 mmol/L    Anion gap 9 7 - 16 mmol/L    Glucose 202 (H) 65 - 100 mg/dL    BUN 24 (H) 8 - 23 MG/DL    Creatinine 0.96 0.6 - 1.0 MG/DL    GFR est AA >60 >60 ml/min/1.73m2    GFR est non-AA 58 (L) >60 ml/min/1.73m2    Calcium 8.8 8.3 - 10.4 MG/DL    Bilirubin, total 0.4 0.2 - 1.1 MG/DL    ALT (SGPT) 33 12 - 65 U/L    AST (SGOT) 47 (H) 15 - 37 U/L    Alk.  phosphatase 128 50 - 136 U/L    Protein, total 7.7 6.3 - 8.2 g/dL    Albumin 3.3 3.2 - 4.6 g/dL    Globulin 4.4 (H) 2.3 - 3.5 g/dL    A-G Ratio 0.8 (L) 1.2 - 3.5     PROTHROMBIN TIME + INR    Collection Time: 07/08/17  2:50 PM   Result Value Ref Range    Prothrombin time 10.9 9.6 - 12.0 sec    INR 1.0 0.9 - 1.2     PTT    Collection Time: 07/08/17  2:50 PM   Result Value Ref Range    aPTT 26.9 23.5 - 31.7 SEC   CK    Collection Time: 07/08/17  2:50 PM   Result Value Ref Range    CK 1317 (H) 21 - 215 U/L   TYPE & SCREEN    Collection Time: 07/08/17  2:55 PM   Result Value Ref Range    Crossmatch Expiration 07/11/2017     ABO/Rh(D) Alecia Dinorah POSITIVE     Antibody screen NEG      Imaging [facility] /Studies   Final result (Exam End: 7/8/2017  3:18 PM) Open        Study Result      Right femur 2 views dated 7/8/2017     CLINICAL INFORMATION: Severe pain after fall     There is an intertrochanteric fracture proximal right femur with medial  angulation of the distal fracture fragment. No fracture to the mid or distal  femur. There is a knee prosthesis. Alignment is unremarkable. Small sclerotic  areas present in the right iliac bone just above the acetabulum. This measures  1.7 cm.           IMPRESSION  IMPRESSION: Intertrochanteric fracture proximal right femur         Final result (Exam End: 7/8/2017  3:18 PM) Open        Study Result      PA chest dated 7/8/2017     Comparison chest x-ray 4/30/2017     CLINICAL INFORMATION: Preop for hip fracture     Heart is upper limits of normal in size. Mediastinum unremarkable. Lungs clear  and pulmonary vascularity normal. No pleural effusion.     IMPRESSION  IMPRESSION: No acute abnormality     Final result (Exam End: 7/8/2017  3:18 PM) Open        Study Result      Right hip 3 view dated 7/8/2017     CLINICAL INFORMATION: Severe pain after fall today     There is an intertrochanteric fracture proximal right femur with medial  angulation of the distal fracture fragment. No dislocation. No narrowing of the  right hip joint. 1.7 cm focus of sclerosis is present right iliac bone just  above the acetabulum.     IMPRESSION  IMPRESSION: Intertrochanteric fracture proximal right femur           Assessment and Plan:   There are no active hospital problems to display for this patient. A/P  - right hip fracture - fx orderset. Px mgmt as per ortho  - hx of TAVR - April 2017. Hold plavix pre-op, will need to resume as soon as able post op. Have d/w Dr. Gerald Ruvalcaba and she does not require any bridging.   - HTN - resume home meds  - HLP - hold statin alexx-op  - hypothyroidism - synthroid    Code Status: full code    Anticipated discharge: 3-4 days      Patient is of mod/high risk for hip repair.  No further risk stratification needed prior to procedure.      Signed By: Brady Stewartr, DO     July 8, 2017

## 2017-07-08 NOTE — PROGRESS NOTES
ORTHO:    PATIENT TO BE ADMITTED  BY HOSPITALIST FOR REPAIR OF RIGHT HIP FRACTURE. PLEASE KEEP NPO AFTER MIDNIGHT. SURGICAL REPAIR BY DR. POWERS ON 7/9/2017.

## 2017-07-08 NOTE — ED TRIAGE NOTES
Patient fell at 9:30 last night was on the floor until on her left side, neighbor came to check on the patient and she was not able to get to the door, EMS was called. She was given 4mg morphine at home before getting to stretcher, then in route she was given 4mg more of morphine.

## 2017-07-08 NOTE — ED PROVIDER NOTES
HPI Comments: Fall from standing position when she got up to get clothes out of the dryer last night around 9 PM.  Had all right hip pain and was unable to get up. She denies any syncope to me. No recent cough vomiting diarrhea chest pain or shortness of breath. History of heart valve surgery along with hypertension. Borderline diabetes. EMS gave 8 mg morphine in route. She is laying on the floor for approximately 18 hours. Patient is a 80 y.o. female presenting with hip pain. The history is provided by the patient. Hip Pain    This is a new problem. The current episode started 12 to 24 hours ago. The problem occurs constantly. The problem has not changed since onset. The pain is present in the right hip. The pain is moderate. Associated symptoms include limited range of motion. Pertinent negatives include no back pain and no neck pain. There has been a history of trauma.         Past Medical History:   Diagnosis Date    Aortic regurgitation     mild to moderate per ECHO 3/28/17    Aortic stenosis     severe per ECHO 3/28/17    Basal cell carcinoma     Bruit (arterial)     CAD (coronary artery disease)     small vessel disease, medical management per cardiologist not 4/17    Choledocholithiasis     Hyperlipidemia     Hypertension     Hypothyroidism     Murmur, cardiac     Poor historian     S/P TAVR (transcatheter aortic valve replacement)     3/17    Tricuspid valve regurgitation     moderate per ECHO 3/28/17    Type II or unspecified type diabetes mellitus without mention of complication, not stated as uncontrolled     not medicated, treated w/ diet    Urinary incontinence        Past Surgical History:   Procedure Laterality Date    HX APPENDECTOMY      HX CARPAL TUNNEL RELEASE      patient denies    HX CYSTOCELE REPAIR      HX ERCP  02/20/2017    large CBD stones    HX ERCP  04/28/2017    laser    HX HYSTERECTOMY      HX KNEE ARTHROSCOPY Right     HX OOPHORECTOMY      Partial    HX ORTHOPAEDIC      2 back-one fusion-low; all fingers-trigger    HX OTHER SURGICAL Right     basal cell nose    HX OTHER SURGICAL  03/28/2017    Tavr    HX RECTOCELE REPAIR           Family History:   Problem Relation Age of Onset    Heart Attack Father      age 68    Heart Disease Father     Arthritis-osteo Mother        Social History     Social History    Marital status:      Spouse name: N/A    Number of children: N/A    Years of education: N/A     Occupational History    Not on file. Social History Main Topics    Smoking status: Never Smoker    Smokeless tobacco: Never Used    Alcohol use No    Drug use: No    Sexual activity: Not on file     Other Topics Concern    Not on file     Social History Narrative         ALLERGIES: Aspirin; Ativan [lorazepam]; Aleve [naproxen sodium]; Carafate [sucralfate]; Ciprofloxacin; Clorazepate dipotassium; Flexeril [cyclobenzaprine]; Robaxin [methocarbamol]; Vytorin 10-10 [ezetimibe-simvastatin]; and Zantac [ranitidine hcl]    Review of Systems   Constitutional: Negative for chills and fever. Respiratory: Negative for cough and shortness of breath. Cardiovascular: Negative for chest pain and palpitations. Gastrointestinal: Negative for abdominal pain, diarrhea and vomiting. Genitourinary: Negative for dysuria and flank pain. Musculoskeletal: Negative for back pain and neck pain. Skin: Negative for color change and rash. Neurological: Negative for syncope and headaches. All other systems reviewed and are negative. Vitals:    07/08/17 1429   BP: 165/77   Pulse: 84   Resp: 16   Temp: 98.9 °F (37.2 °C)   SpO2: 97%   Weight: 65.8 kg (145 lb)   Height: 5' 4\" (1.626 m)            Physical Exam   Constitutional: She is oriented to person, place, and time. She appears well-developed and well-nourished. No distress. HENT:   Head: Normocephalic and atraumatic.    Mouth/Throat: Oropharynx is clear and moist.   Eyes: EOM are normal. Pupils are equal, round, and reactive to light. Neck: Normal range of motion. Neck supple. No muscular tenderness present. Cardiovascular: Normal rate, regular rhythm and normal heart sounds. Pulmonary/Chest: Effort normal and breath sounds normal.   Abdominal: Soft. She exhibits no distension. There is no tenderness. Musculoskeletal:        Right hip: She exhibits decreased range of motion, tenderness and bony tenderness. Right knee: Tenderness found. Right upper arm: She exhibits tenderness. Ecchymosis right arm without bony tenderness. Tender right hip with pain with internal/external rotation. Also, tenderness to palpation of the knee. Neurological: She is alert and oriented to person, place, and time. Speech normal   Nursing note and vitals reviewed. MDM  Number of Diagnoses or Management Options  Diagnosis management comments: Suspected fracture. We'll give femur x-ray to include area about the knee. Upper extremity imaging not needed.        Amount and/or Complexity of Data Reviewed  Clinical lab tests: ordered and reviewed  Tests in the radiology section of CPT®: ordered and reviewed  Tests in the medicine section of CPT®: ordered and reviewed  Discuss the patient with other providers: yes  Independent visualization of images, tracings, or specimens: yes    Risk of Complications, Morbidity, and/or Mortality  Presenting problems: moderate  Diagnostic procedures: low  Management options: moderate    Patient Progress  Patient progress: stable    ED Course       Procedures      Results Include:    Recent Results (from the past 24 hour(s))   EKG, 12 LEAD, INITIAL    Collection Time: 07/08/17  2:45 PM   Result Value Ref Range    Ventricular Rate 85 BPM    Atrial Rate 90 BPM    P-R Interval 134 ms    QRS Duration 82 ms    Q-T Interval 364 ms    QTC Calculation (Bezet) 433 ms    Calculated P Axis 93 degrees    Calculated R Axis 35 degrees    Calculated T Axis 76 degrees    Diagnosis !! AGE AND GENDER SPECIFIC ECG ANALYSIS !! Normal sinus rhythm  Nonspecific ST abnormality  Abnormal ECG  When compared with ECG of 30-APR-2017 12:13,  Nonspecific T wave abnormality now evident in Lateral leads     CBC WITH AUTOMATED DIFF    Collection Time: 07/08/17  2:50 PM   Result Value Ref Range    WBC 10.0 4.3 - 11.1 K/uL    RBC 4.23 4.05 - 5.25 M/uL    HGB 12.6 11.7 - 15.4 g/dL    HCT 34.9 (L) 35.8 - 46.3 %    MCV 82.5 79.6 - 97.8 FL    MCH 29.8 26.1 - 32.9 PG    MCHC 36.1 (H) 31.4 - 35.0 g/dL    RDW 13.9 11.9 - 14.6 %    PLATELET 801 898 - 205 K/uL    MPV 9.9 (L) 10.8 - 14.1 FL    DF AUTOMATED      NEUTROPHILS 79 (H) 43 - 78 %    LYMPHOCYTES 15 13 - 44 %    MONOCYTES 6 4.0 - 12.0 %    EOSINOPHILS 0 (L) 0.5 - 7.8 %    BASOPHILS 0 0.0 - 2.0 %    IMMATURE GRANULOCYTES 0.3 0.0 - 5.0 %    ABS. NEUTROPHILS 7.8 1.7 - 8.2 K/UL    ABS. LYMPHOCYTES 1.5 0.5 - 4.6 K/UL    ABS. MONOCYTES 0.6 0.1 - 1.3 K/UL    ABS. EOSINOPHILS 0.0 0.0 - 0.8 K/UL    ABS. BASOPHILS 0.0 0.0 - 0.2 K/UL    ABS. IMM. GRANS. 0.0 0.0 - 0.5 K/UL   METABOLIC PANEL, COMPREHENSIVE    Collection Time: 07/08/17  2:50 PM   Result Value Ref Range    Sodium 140 136 - 145 mmol/L    Potassium 4.1 3.5 - 5.1 mmol/L    Chloride 107 98 - 107 mmol/L    CO2 24 21 - 32 mmol/L    Anion gap 9 7 - 16 mmol/L    Glucose 202 (H) 65 - 100 mg/dL    BUN 24 (H) 8 - 23 MG/DL    Creatinine 0.96 0.6 - 1.0 MG/DL    GFR est AA >60 >60 ml/min/1.73m2    GFR est non-AA 58 (L) >60 ml/min/1.73m2    Calcium 8.8 8.3 - 10.4 MG/DL    Bilirubin, total PENDING MG/DL    ALT (SGPT) 33 12 - 65 U/L    AST (SGOT) 47 (H) 15 - 37 U/L    Alk.  phosphatase PENDING U/L    Protein, total PENDING g/dL    Albumin 3.3 3.2 - 4.6 g/dL    Globulin PENDING g/dL    A-G Ratio PENDING     PROTHROMBIN TIME + INR    Collection Time: 07/08/17  2:50 PM   Result Value Ref Range    Prothrombin time 10.9 9.6 - 12.0 sec    INR 1.0 0.9 - 1.2     PTT    Collection Time: 07/08/17  2:50 PM   Result Value Ref Range aPTT 26.9 23.5 - 31.7 SEC            Right intertrochanteric fracture per my interpretation. Chest x-ray appears clear. We'll start hydration in place catheter. Spoke with orthopedics and hospitalist regarding admission.

## 2017-07-09 ENCOUNTER — APPOINTMENT (OUTPATIENT)
Dept: GENERAL RADIOLOGY | Age: 82
DRG: 481 | End: 2017-07-09
Attending: ORTHOPAEDIC SURGERY
Payer: MEDICARE

## 2017-07-09 ENCOUNTER — ANESTHESIA (OUTPATIENT)
Dept: SURGERY | Age: 82
DRG: 481 | End: 2017-07-09
Payer: MEDICARE

## 2017-07-09 PROBLEM — S72.001A CLOSED RIGHT HIP FRACTURE (HCC): Status: ACTIVE | Noted: 2017-07-09

## 2017-07-09 LAB
ANION GAP BLD CALC-SCNC: 8 MMOL/L (ref 7–16)
BUN SERPL-MCNC: 19 MG/DL (ref 8–23)
CALCIUM SERPL-MCNC: 8 MG/DL (ref 8.3–10.4)
CHLORIDE SERPL-SCNC: 110 MMOL/L (ref 98–107)
CO2 SERPL-SCNC: 24 MMOL/L (ref 21–32)
CREAT SERPL-MCNC: 0.84 MG/DL (ref 0.6–1)
ERYTHROCYTE [DISTWIDTH] IN BLOOD BY AUTOMATED COUNT: 14.2 % (ref 11.9–14.6)
GLUCOSE BLD STRIP.AUTO-MCNC: 167 MG/DL (ref 65–100)
GLUCOSE SERPL-MCNC: 166 MG/DL (ref 65–100)
HCT VFR BLD AUTO: 31.6 % (ref 35.8–46.3)
HGB BLD-MCNC: 10.9 G/DL (ref 11.7–15.4)
MCH RBC QN AUTO: 29.2 PG (ref 26.1–32.9)
MCHC RBC AUTO-ENTMCNC: 34.5 G/DL (ref 31.4–35)
MCV RBC AUTO: 84.7 FL (ref 79.6–97.8)
MM INDURATION POC: 0 MM (ref 0–5)
PLATELET # BLD AUTO: 140 K/UL (ref 150–450)
PMV BLD AUTO: 10.7 FL (ref 10.8–14.1)
POTASSIUM SERPL-SCNC: 4.1 MMOL/L (ref 3.5–5.1)
PPD POC: NEGATIVE NEGATIVE
RBC # BLD AUTO: 3.73 M/UL (ref 4.05–5.25)
SODIUM SERPL-SCNC: 142 MMOL/L (ref 136–145)
WBC # BLD AUTO: 7.7 K/UL (ref 4.3–11.1)

## 2017-07-09 PROCEDURE — 77030011640 HC PAD GRND REM COVD -A: Performed by: ORTHOPAEDIC SURGERY

## 2017-07-09 PROCEDURE — 77030008467 HC STPLR SKN COVD -B: Performed by: ORTHOPAEDIC SURGERY

## 2017-07-09 PROCEDURE — 77030020143 HC AIRWY LARYN INTUB CGAS -A: Performed by: ANESTHESIOLOGY

## 2017-07-09 PROCEDURE — 76060000032 HC ANESTHESIA 0.5 TO 1 HR: Performed by: ORTHOPAEDIC SURGERY

## 2017-07-09 PROCEDURE — 76010000160 HC OR TIME 0.5 TO 1 HR INTENSV-TIER 1: Performed by: ORTHOPAEDIC SURGERY

## 2017-07-09 PROCEDURE — 73552 X-RAY EXAM OF FEMUR 2/>: CPT

## 2017-07-09 PROCEDURE — 77030014405 HC GD ROD RMR SYNT -C: Performed by: ORTHOPAEDIC SURGERY

## 2017-07-09 PROCEDURE — 74011250636 HC RX REV CODE- 250/636: Performed by: ORTHOPAEDIC SURGERY

## 2017-07-09 PROCEDURE — 76210000016 HC OR PH I REC 1 TO 1.5 HR: Performed by: ORTHOPAEDIC SURGERY

## 2017-07-09 PROCEDURE — 74011250637 HC RX REV CODE- 250/637: Performed by: ORTHOPAEDIC SURGERY

## 2017-07-09 PROCEDURE — 74011250637 HC RX REV CODE- 250/637: Performed by: INTERNAL MEDICINE

## 2017-07-09 PROCEDURE — 77030020782 HC GWN BAIR PAWS FLX 3M -B: Performed by: ANESTHESIOLOGY

## 2017-07-09 PROCEDURE — 74011250636 HC RX REV CODE- 250/636

## 2017-07-09 PROCEDURE — 0QS636Z REPOSITION RIGHT UPPER FEMUR WITH INTRAMEDULLARY INTERNAL FIXATION DEVICE, PERCUTANEOUS APPROACH: ICD-10-PCS | Performed by: ORTHOPAEDIC SURGERY

## 2017-07-09 PROCEDURE — 65270000029 HC RM PRIVATE

## 2017-07-09 PROCEDURE — 74011000258 HC RX REV CODE- 258: Performed by: ORTHOPAEDIC SURGERY

## 2017-07-09 PROCEDURE — 77030035168: Performed by: ORTHOPAEDIC SURGERY

## 2017-07-09 PROCEDURE — 74011250636 HC RX REV CODE- 250/636: Performed by: ANESTHESIOLOGY

## 2017-07-09 PROCEDURE — 82962 GLUCOSE BLOOD TEST: CPT

## 2017-07-09 PROCEDURE — 36415 COLL VENOUS BLD VENIPUNCTURE: CPT | Performed by: INTERNAL MEDICINE

## 2017-07-09 PROCEDURE — 74011250637 HC RX REV CODE- 250/637: Performed by: ANESTHESIOLOGY

## 2017-07-09 PROCEDURE — 97162 PT EVAL MOD COMPLEX 30 MIN: CPT

## 2017-07-09 PROCEDURE — 77010033678 HC OXYGEN DAILY

## 2017-07-09 PROCEDURE — 80048 BASIC METABOLIC PNL TOTAL CA: CPT | Performed by: INTERNAL MEDICINE

## 2017-07-09 PROCEDURE — 74011000250 HC RX REV CODE- 250

## 2017-07-09 PROCEDURE — 94760 N-INVAS EAR/PLS OXIMETRY 1: CPT

## 2017-07-09 PROCEDURE — 77030018836 HC SOL IRR NACL ICUM -A: Performed by: ORTHOPAEDIC SURGERY

## 2017-07-09 PROCEDURE — C1713 ANCHOR/SCREW BN/BN,TIS/BN: HCPCS | Performed by: ORTHOPAEDIC SURGERY

## 2017-07-09 PROCEDURE — 77030002933 HC SUT MCRYL J&J -A: Performed by: ORTHOPAEDIC SURGERY

## 2017-07-09 PROCEDURE — C1769 GUIDE WIRE: HCPCS | Performed by: ORTHOPAEDIC SURGERY

## 2017-07-09 PROCEDURE — 85027 COMPLETE CBC AUTOMATED: CPT | Performed by: INTERNAL MEDICINE

## 2017-07-09 DEVICE — IMPLANTABLE DEVICE: Type: IMPLANTABLE DEVICE | Site: FEMUR | Status: FUNCTIONAL

## 2017-07-09 DEVICE — NAIL IM L380MM DIA10MM 130DEG LNG R PROX FEM GRN TI CANN: Type: IMPLANTABLE DEVICE | Site: FEMUR | Status: FUNCTIONAL

## 2017-07-09 RX ORDER — LIDOCAINE HYDROCHLORIDE 10 MG/ML
0.1 INJECTION INFILTRATION; PERINEURAL AS NEEDED
Status: DISCONTINUED | OUTPATIENT
Start: 2017-07-09 | End: 2017-07-09 | Stop reason: HOSPADM

## 2017-07-09 RX ORDER — OXYCODONE HYDROCHLORIDE 5 MG/1
5 TABLET ORAL
Status: DISCONTINUED | OUTPATIENT
Start: 2017-07-09 | End: 2017-07-09 | Stop reason: HOSPADM

## 2017-07-09 RX ORDER — CLOPIDOGREL BISULFATE 75 MG/1
75 TABLET ORAL DAILY
Status: DISCONTINUED | OUTPATIENT
Start: 2017-07-10 | End: 2017-07-11 | Stop reason: HOSPADM

## 2017-07-09 RX ORDER — SODIUM CHLORIDE, SODIUM LACTATE, POTASSIUM CHLORIDE, CALCIUM CHLORIDE 600; 310; 30; 20 MG/100ML; MG/100ML; MG/100ML; MG/100ML
100 INJECTION, SOLUTION INTRAVENOUS CONTINUOUS
Status: DISCONTINUED | OUTPATIENT
Start: 2017-07-09 | End: 2017-07-09 | Stop reason: HOSPADM

## 2017-07-09 RX ORDER — NALOXONE HYDROCHLORIDE 0.4 MG/ML
0.1 INJECTION, SOLUTION INTRAMUSCULAR; INTRAVENOUS; SUBCUTANEOUS AS NEEDED
Status: DISCONTINUED | OUTPATIENT
Start: 2017-07-09 | End: 2017-07-09 | Stop reason: HOSPADM

## 2017-07-09 RX ORDER — OXYCODONE HYDROCHLORIDE 5 MG/1
5 TABLET ORAL
Status: DISCONTINUED | OUTPATIENT
Start: 2017-07-09 | End: 2017-07-09 | Stop reason: SDUPTHER

## 2017-07-09 RX ORDER — DIPHENHYDRAMINE HYDROCHLORIDE 50 MG/ML
12.5 INJECTION, SOLUTION INTRAMUSCULAR; INTRAVENOUS
Status: DISCONTINUED | OUTPATIENT
Start: 2017-07-09 | End: 2017-07-09 | Stop reason: HOSPADM

## 2017-07-09 RX ORDER — SODIUM CHLORIDE 0.9 % (FLUSH) 0.9 %
5-10 SYRINGE (ML) INJECTION AS NEEDED
Status: DISCONTINUED | OUTPATIENT
Start: 2017-07-09 | End: 2017-07-11 | Stop reason: HOSPADM

## 2017-07-09 RX ORDER — SODIUM CHLORIDE 0.9 % (FLUSH) 0.9 %
5-10 SYRINGE (ML) INJECTION EVERY 8 HOURS
Status: DISCONTINUED | OUTPATIENT
Start: 2017-07-09 | End: 2017-07-11 | Stop reason: HOSPADM

## 2017-07-09 RX ORDER — ENOXAPARIN SODIUM 100 MG/ML
30 INJECTION SUBCUTANEOUS EVERY 24 HOURS
Status: DISCONTINUED | OUTPATIENT
Start: 2017-07-10 | End: 2017-07-11 | Stop reason: HOSPADM

## 2017-07-09 RX ORDER — PROPOFOL 10 MG/ML
INJECTION, EMULSION INTRAVENOUS AS NEEDED
Status: DISCONTINUED | OUTPATIENT
Start: 2017-07-09 | End: 2017-07-09 | Stop reason: HOSPADM

## 2017-07-09 RX ORDER — HYDROMORPHONE HYDROCHLORIDE 2 MG/ML
0.5 INJECTION, SOLUTION INTRAMUSCULAR; INTRAVENOUS; SUBCUTANEOUS
Status: DISCONTINUED | OUTPATIENT
Start: 2017-07-09 | End: 2017-07-09 | Stop reason: HOSPADM

## 2017-07-09 RX ORDER — ONDANSETRON 2 MG/ML
4 INJECTION INTRAMUSCULAR; INTRAVENOUS
Status: DISCONTINUED | OUTPATIENT
Start: 2017-07-09 | End: 2017-07-11 | Stop reason: HOSPADM

## 2017-07-09 RX ORDER — LIDOCAINE HYDROCHLORIDE 20 MG/ML
INJECTION, SOLUTION EPIDURAL; INFILTRATION; INTRACAUDAL; PERINEURAL AS NEEDED
Status: DISCONTINUED | OUTPATIENT
Start: 2017-07-09 | End: 2017-07-09 | Stop reason: HOSPADM

## 2017-07-09 RX ORDER — ALBUTEROL SULFATE 0.83 MG/ML
2.5 SOLUTION RESPIRATORY (INHALATION) AS NEEDED
Status: DISCONTINUED | OUTPATIENT
Start: 2017-07-09 | End: 2017-07-09 | Stop reason: HOSPADM

## 2017-07-09 RX ORDER — FENTANYL CITRATE 50 UG/ML
INJECTION, SOLUTION INTRAMUSCULAR; INTRAVENOUS AS NEEDED
Status: DISCONTINUED | OUTPATIENT
Start: 2017-07-09 | End: 2017-07-09 | Stop reason: HOSPADM

## 2017-07-09 RX ORDER — SODIUM CHLORIDE, SODIUM LACTATE, POTASSIUM CHLORIDE, CALCIUM CHLORIDE 600; 310; 30; 20 MG/100ML; MG/100ML; MG/100ML; MG/100ML
75 INJECTION, SOLUTION INTRAVENOUS CONTINUOUS
Status: DISCONTINUED | OUTPATIENT
Start: 2017-07-09 | End: 2017-07-11 | Stop reason: HOSPADM

## 2017-07-09 RX ORDER — ONDANSETRON 2 MG/ML
4 INJECTION INTRAMUSCULAR; INTRAVENOUS ONCE
Status: DISCONTINUED | OUTPATIENT
Start: 2017-07-09 | End: 2017-07-09 | Stop reason: HOSPADM

## 2017-07-09 RX ORDER — FERROUS SULFATE, DRIED 160(50) MG
1 TABLET, EXTENDED RELEASE ORAL
Status: DISCONTINUED | OUTPATIENT
Start: 2017-07-09 | End: 2017-07-11 | Stop reason: HOSPADM

## 2017-07-09 RX ORDER — FENTANYL CITRATE 50 UG/ML
100 INJECTION, SOLUTION INTRAMUSCULAR; INTRAVENOUS ONCE
Status: DISCONTINUED | OUTPATIENT
Start: 2017-07-09 | End: 2017-07-09 | Stop reason: HOSPADM

## 2017-07-09 RX ORDER — ACETAMINOPHEN 325 MG/1
650 TABLET ORAL EVERY 8 HOURS
Status: DISCONTINUED | OUTPATIENT
Start: 2017-07-09 | End: 2017-07-11 | Stop reason: HOSPADM

## 2017-07-09 RX ORDER — MAG HYDROX/ALUMINUM HYD/SIMETH 200-200-20
30 SUSPENSION, ORAL (FINAL DOSE FORM) ORAL
Status: DISCONTINUED | OUTPATIENT
Start: 2017-07-09 | End: 2017-07-11 | Stop reason: HOSPADM

## 2017-07-09 RX ORDER — ONDANSETRON 2 MG/ML
INJECTION INTRAMUSCULAR; INTRAVENOUS AS NEEDED
Status: DISCONTINUED | OUTPATIENT
Start: 2017-07-09 | End: 2017-07-09 | Stop reason: HOSPADM

## 2017-07-09 RX ORDER — OXYCODONE HYDROCHLORIDE 5 MG/1
10 TABLET ORAL
Status: DISCONTINUED | OUTPATIENT
Start: 2017-07-09 | End: 2017-07-09 | Stop reason: HOSPADM

## 2017-07-09 RX ADMIN — TRAMADOL HYDROCHLORIDE 50 MG: 50 TABLET, FILM COATED ORAL at 12:05

## 2017-07-09 RX ADMIN — PROPOFOL 50 MG: 10 INJECTION, EMULSION INTRAVENOUS at 09:36

## 2017-07-09 RX ADMIN — FENTANYL CITRATE 25 MCG: 50 INJECTION, SOLUTION INTRAMUSCULAR; INTRAVENOUS at 09:53

## 2017-07-09 RX ADMIN — LIDOCAINE HYDROCHLORIDE 100 MG: 20 INJECTION, SOLUTION EPIDURAL; INFILTRATION; INTRACAUDAL; PERINEURAL at 09:36

## 2017-07-09 RX ADMIN — METOPROLOL TARTRATE 25 MG: 25 TABLET ORAL at 06:01

## 2017-07-09 RX ADMIN — SODIUM CHLORIDE, SODIUM LACTATE, POTASSIUM CHLORIDE, AND CALCIUM CHLORIDE: 600; 310; 30; 20 INJECTION, SOLUTION INTRAVENOUS at 09:32

## 2017-07-09 RX ADMIN — OYSTER SHELL CALCIUM WITH VITAMIN D 1 TABLET: 500; 200 TABLET, FILM COATED ORAL at 16:00

## 2017-07-09 RX ADMIN — OXYCODONE HYDROCHLORIDE 5 MG: 5 TABLET ORAL at 10:59

## 2017-07-09 RX ADMIN — Medication 10 ML: at 20:29

## 2017-07-09 RX ADMIN — FENTANYL CITRATE 25 MCG: 50 INJECTION, SOLUTION INTRAMUSCULAR; INTRAVENOUS at 10:03

## 2017-07-09 RX ADMIN — CEFAZOLIN SODIUM 1 G: 1 INJECTION, POWDER, FOR SOLUTION INTRAMUSCULAR; INTRAVENOUS at 17:14

## 2017-07-09 RX ADMIN — OXYCODONE HYDROCHLORIDE 5 MG: 5 TABLET ORAL at 13:26

## 2017-07-09 RX ADMIN — OXYCODONE HYDROCHLORIDE 5 MG: 5 TABLET ORAL at 17:26

## 2017-07-09 RX ADMIN — OXYCODONE HYDROCHLORIDE 5 MG: 5 TABLET ORAL at 05:24

## 2017-07-09 RX ADMIN — ONDANSETRON 4 MG: 2 INJECTION INTRAMUSCULAR; INTRAVENOUS at 09:57

## 2017-07-09 RX ADMIN — LEVOTHYROXINE SODIUM 100 MCG: 100 TABLET ORAL at 05:25

## 2017-07-09 RX ADMIN — SODIUM CHLORIDE, SODIUM LACTATE, POTASSIUM CHLORIDE, AND CALCIUM CHLORIDE 75 ML/HR: 600; 310; 30; 20 INJECTION, SOLUTION INTRAVENOUS at 12:10

## 2017-07-09 RX ADMIN — Medication 10 ML: at 05:25

## 2017-07-09 RX ADMIN — OXYCODONE HYDROCHLORIDE 5 MG: 5 TABLET ORAL at 22:24

## 2017-07-09 RX ADMIN — FENTANYL CITRATE 50 MCG: 50 INJECTION, SOLUTION INTRAMUSCULAR; INTRAVENOUS at 09:34

## 2017-07-09 RX ADMIN — CEFAZOLIN 2 G: 1 INJECTION, POWDER, FOR SOLUTION INTRAMUSCULAR; INTRAVENOUS; PARENTERAL at 09:33

## 2017-07-09 RX ADMIN — ACETAMINOPHEN 650 MG: 325 TABLET ORAL at 20:11

## 2017-07-09 RX ADMIN — SODIUM CHLORIDE, SODIUM LACTATE, POTASSIUM CHLORIDE, AND CALCIUM CHLORIDE 75 ML/HR: 600; 310; 30; 20 INJECTION, SOLUTION INTRAVENOUS at 08:35

## 2017-07-09 RX ADMIN — Medication 10 ML: at 12:09

## 2017-07-09 NOTE — ANESTHESIA PREPROCEDURE EVALUATION
Anesthetic History   No history of anesthetic complications            Review of Systems / Medical History  Patient summary reviewed, nursing notes reviewed and pertinent labs reviewed    Pulmonary          Shortness of breath         Neuro/Psych   Within defined limits           Cardiovascular    Hypertension: well controlled  Valvular problems/murmurs: aortic stenosis        CAD (Diffuse small vessel dz not amenable to intervention) and hyperlipidemia    Exercise tolerance: <4 METS  Comments: S/p TAVR 4/17      Normal EF    LHC: LM WNL; LAD 40%; LCx 70% distal; RCA 20%   GI/Hepatic/Renal     GERD: well controlled          Comments: Bile duct stone Endo/Other    Diabetes: well controlled, type 2  Hypothyroidism: well controlled  Arthritis     Other Findings   Comments: Bilary obstruction           Physical Exam    Airway  Mallampati: I  TM Distance: 4 - 6 cm  Neck ROM: normal range of motion   Mouth opening: Normal     Cardiovascular    Rhythm: regular  Rate: normal         Dental    Dentition: Full lower dentures and Full upper dentures     Pulmonary  Breath sounds clear to auscultation               Abdominal         Other Findings            Anesthetic Plan    ASA: 3  Anesthesia type: general          Induction: Intravenous  Anesthetic plan and risks discussed with: Patient      On plavix

## 2017-07-09 NOTE — PERIOP NOTES
TRANSFER - IN REPORT:    Verbal report received from [place], RN on [employer]  being received from 7th floor for ordered procedure      Report consisted of patients Situation, Background, Assessment and   Recommendations(SBAR). Information from the following report(s) Kardex, MAR and Recent Results was reviewed with the receiving nurse. Opportunity for questions and clarification was provided. Assessment completed upon patients arrival to unit and care assumed.

## 2017-07-09 NOTE — PROGRESS NOTES
TRANSFER - IN REPORT:    Verbal report received from [place], RN on [employer]  being received from PACU for routine post - op      Report consisted of patients Situation, Background, Assessment and   Recommendations(SBAR). Information from the following report(s) SBAR, Kardex, OR Summary, Procedure Summary, Intake/Output and MAR was reviewed with the receiving nurse. Opportunity for questions and clarification was provided.

## 2017-07-09 NOTE — PROGRESS NOTES
Hospitalist Progress Note    2017  Admit Date: 2017  2:24 PM   NAME: Claudia Lewis   :  3/20/1929   MRN:  [id number]   Attending: Luana Sloan DO  PCP:  Mey Rodriguez MD    SUBJECTIVE:     Claudia Lewis is a 80yoF with AS s/p TAVR (2017) admitted on  with R hip fx. Repaired today. Seen afterwards with family at bedside. Doing well post-op. Mild leg pain, only complaint is hunger. Review of Systems negative with exception of pertinent positives noted above      PHYSICAL EXAM       Visit Vitals    /51 (BP 1 Location: Left arm, BP Patient Position: Sitting)    Pulse 68    Temp 98.5 °F (36.9 °C)    Resp 16    Ht 5' 4\" (1.626 m)    Wt 65.8 kg (145 lb)    SpO2 93%    BMI 24.89 kg/m2      Temp (24hrs), Av.6 °F (37 °C), Min:97.4 °F (36.3 °C), Max:99.7 °F (37.6 °C)    Oxygen Therapy  O2 Sat (%): 93 % (17 1624)  Pulse via Oximetry: 80 beats per minute (17 1128)  O2 Device: Nasal cannula (17 1252)  O2 Flow Rate (L/min): 2 l/min (17 1113)    Intake/Output Summary (Last 24 hours) at 17 1644  Last data filed at 17 1113   Gross per 24 hour   Intake              650 ml   Output             1160 ml   Net             -510 ml      General: No acute distress. Head:  Atraumatic Normocephalic. ENT:  NC in place  Lungs:  CTA Bilaterally. Nonlabored  CVS:  RRR  Abdomen: Soft, NTTP, +BS  Neurologic:  AOx3. No focal deficits    Recent Results (from the past 24 hour(s))   MSSA/MRSA SC BY PCR, NASAL SWAB    Collection Time: 17  8:10 PM   Result Value Ref Range    Special Requests: NO SPECIAL REQUESTS      Culture result:        SA target not detected. A MRSA NEGATIVE, SA NEGATIVE test result does not preclude MRSA or SA nasal colonization.    CBC W/O DIFF    Collection Time: 17  7:59 AM   Result Value Ref Range    WBC 7.7 4.3 - 11.1 K/uL    RBC 3.73 (L) 4.05 - 5.25 M/uL    HGB 10.9 (L) 11.7 - 15.4 g/dL    HCT 31.6 (L) 35.8 - 46.3 %    MCV 84.7 79.6 - 97.8 FL    MCH 29.2 26.1 - 32.9 PG    MCHC 34.5 31.4 - 35.0 g/dL    RDW 14.2 11.9 - 14.6 %    PLATELET 327 (L) 964 - 450 K/uL    MPV 10.7 (L) 10.8 - 15.7 FL   METABOLIC PANEL, BASIC    Collection Time: 07/09/17  7:59 AM   Result Value Ref Range    Sodium 142 136 - 145 mmol/L    Potassium 4.1 3.5 - 5.1 mmol/L    Chloride 110 (H) 98 - 107 mmol/L    CO2 24 21 - 32 mmol/L    Anion gap 8 7 - 16 mmol/L    Glucose 166 (H) 65 - 100 mg/dL    BUN 19 8 - 23 MG/DL    Creatinine 0.84 0.6 - 1.0 MG/DL    GFR est AA >60 >60 ml/min/1.73m2    GFR est non-AA >60 >60 ml/min/1.73m2    Calcium 8.0 (L) 8.3 - 10.4 MG/DL   GLUCOSE, POC    Collection Time: 07/09/17  8:35 AM   Result Value Ref Range    Glucose (POC) 167 (H) 65 - 100 mg/dL         Imaging /Procedures /Studies   XR FEMUR RT 2 VS   Final Result   IMPRESSION: ORIF intertrochanteric fracture on the right. XR CHEST SNGL V   Final Result   IMPRESSION: No acute abnormality      XR HIP RT W OR WO PELV 2-3 VWS   Final Result   IMPRESSION: Intertrochanteric fracture proximal right femur      XR FEMUR RT 2 VS   Final Result   IMPRESSION: Intertrochanteric fracture proximal right femur            ASSESSMENT      Hospital Problems as of 7/9/2017  Date Reviewed: 6/7/2017          Codes Class Noted - Resolved POA    * (Principal)Closed right hip fracture ([facility] [address] ICD-10-CM: S72.001A  ICD-9-CM: 820.8  7/9/2017 - Present Yes        S/P TAVR (transcatheter aortic valve replacement) ICD-10-CM: Z95.2  ICD-9-CM: V43.3  3/29/2017 - Present Yes    Overview Signed 4/3/2017  8:30 PM by Jhon Ross MD     1. TAVR (3/28/17):  20 mm Luis S3 valve.               CKD (chronic kidney disease) stage 3, GFR 30-59 ml/min ICD-10-CM: N18.3  ICD-9-CM: 585.3  1/20/2017 - Present Yes                  Plan:  - R hip fx: repaired 7/9, post-op per ortho  - s/p TAVR: restart plavix tomorrow AM. Monitor Hgb closely as much higher risk for bleeding given anti-platelet  - HTN: BP well controlled post-op.  Continue norvasc, benazepril, lopressor  - CKD: recheck BMP in AM    DVT Prophylaxis: Lovenox to start tomorrow  Dispo: likely STR at rehab    Diana Holguin MD

## 2017-07-09 NOTE — PROGRESS NOTES
Dual skin assessment with Mika Lam RN. Scar to right knee. Reddened dimple like area to left hip that patient states is due to lying in the floor on it all night. Bruise to right shoulder that patient states is from the fall. Sacrum intact, no redness. No other skin abnormalities noted.

## 2017-07-09 NOTE — PERIOP NOTES
TRANSFER - OUT REPORT:    Verbal report given to [facility] RN on [employer]  being transferred to  for routine post - op       Report consisted of patients Situation, Background, Assessment and   Recommendations(SBAR). Information from the following report(s) SBAR, OR Summary, Procedure Summary, Intake/Output, Recent Results and Cardiac Rhythm Sinus Rhythm was reviewed with the receiving nurse. Opportunity for questions and clarification was provided.       Patient transported with:   O2 @ 2 liters   VTE:scd's, teds, lovenox  Family member updated per phone

## 2017-07-09 NOTE — ANESTHESIA POSTPROCEDURE EVALUATION
Post-Anesthesia Evaluation and Assessment    Patient: Rohit Franklin MRN: [id number]  SSN: [id number]    YOB: 1929  Age: 80 y.o. Sex: female       Cardiovascular Function/Vital Signs  Visit Vitals    /77    Pulse 76    Temp 37.4 °C (99.4 °F)    Resp 16    Ht 5' 4\" (1.626 m)    Wt 65.8 kg (145 lb)    SpO2 96%    BMI 24.89 kg/m2       Patient is status post general anesthesia for Procedure(s): FEMUR INSERTION INTRA MEDULLARY NAIL RIGHT. Nausea/Vomiting: None    Postoperative hydration reviewed and adequate. Pain:  Pain Scale 1: Numeric (0 - 10) (07/09/17 1029)  Pain Intensity 1: 0 (07/09/17 1029)   Managed    Neurological Status:   Neuro (WDL): Exceptions to WDL (07/09/17 1029)  Neuro  Neurologic State: Drowsy (07/09/17 1029)  Orientation Level: Oriented X4 (07/08/17 1930)  Cognition: Follows commands (07/08/17 1930)  RLE Motor Response: Weak (07/09/17 0728)   At baseline    Mental Status and Level of Consciousness: Arousable    Pulmonary Status:   O2 Device: Nasal cannula (07/09/17 1029)   Adequate oxygenation and airway patent    Complications related to anesthesia: None    Post-anesthesia assessment completed.  No concerns    Signed By: Gavin Lozano MD     July 9, 2017

## 2017-07-09 NOTE — PROGRESS NOTES
Problem: Mobility Impaired (Adult and Pediatric)  Goal: *Acute Goals and Plan of Care (Insert Text)  STG:  (1.)Ms. Betito Short will move from supine to sit and sit to supine , scoot up and down and roll side to side with MINIMAL ASSIST within 3 day(s). (2.)Ms. Betito Short will transfer from bed to chair and chair to bed with MODERATE ASSIST using the least restrictive device within 3 day(s). (3.)Ms. Oleary will ambulate with MODERATE ASSIST for 15 feet with the least restrictive device within 3 day(s). (4.)Ms. Betito Short will participate in therapeutic activity/exerices x 12 minutes for increased strength within 3 days. LTG:  (1.)Ms. Betito Short will move from supine to sit and sit to supine , scoot up and down and roll side to side in bed with MODIFIED INDEPENDENCE within 7 day(s). (2.)Ms. Betito Short will transfer from bed to chair and chair to bed with STAND BY ASSIST using the least restrictive device within 7 day(s). (3.)Ms. Oleary will ambulate with CONTACT GUARD ASSIST for 30+ feet with the least restrictive device within 7 day(s). (4.)Ms. Betito Short will participate in therapeutic activity/exerices x 23 minutes for increased strength within 7 days.     ________________________________________________________________________________________________      PHYSICAL THERAPY: INITIAL ASSESSMENT, PM 7/9/2017  INPATIENT: Hospital Day: 2  [place] / [place] / Product Type: Medicare /    R BARON OLGUIN     NAME/AGE/GENDER: Gary Fulton is a 80 y.o. female   PRIMARY DIAGNOSIS: Intra-Troch fracture  Closed right hip fracture (HCC) <principal problem not specified> <principal problem not specified>  Procedure(s) (LRB):  FEMUR INSERTION INTRA MEDULLARY NAIL RIGHT (Right)  Day of Surgery  ICD-10: Treatment Diagnosis:       · Generalized Muscle Weakness (M62.81)  · Difficulty in walking, Not elsewhere classified (R26.2)  · Other abnormalities of gait and mobility (R26.89)   Precaution/Allergies:  Aspirin; Ativan [lorazepam]; Aleve [naproxen sodium]; Carafate [sucralfate]; Ciprofloxacin; Clorazepate dipotassium; Flexeril [cyclobenzaprine]; Robaxin [methocarbamol]; Vytorin 10-10 [ezetimibe-simvastatin]; and Zantac [ranitidine hcl]       ASSESSMENT:      Ms. Anthony Childers is a 80 y.o. female in the hospital for the above who was supine in bed on supplemental O2 upon arrival.  Pt placed on room air for initial few minutes and noted to desaturate to 88% SpO2. Pt placed back on supplemental O2 for the rest of evaluation. Ms. Anthony Childers reports that she lives alone in a one story house and is independent with ADLs at baseline. She stated that she will use a cane for ambulating in the community. Ms. Anthony Childers presents to PT with generally decreased strength/AROM in B LEs (R>L). Her R knee extension and L hip flexion MMT was measured at 3-/5. Pt reports intact sensation in B LEs to light touch and was given education on R LE WBAT status. Pt required mod assist x 2 to come to sitting on edge of bed and struggled with moving either LE (R>L). Ms. Anthony Childers came to standing with max assist x 2 and use of RW. Her standing balance is poor and pt exhibits increased trunk and hip flexion. Pt was able to perform stand pivot transfer to bedside chair with RW and mod-max assist x 2. She attempted to sit too early and was unable to maintain upright position for long requiring max assist x 2 to ensure stand to sit safely. Pt was also unable to scoot herself to back of chair and required max assist x 2. Ms. Anthony Childers could benefit from skilled PT as she appears to be functioning well below her reported baseline. This section established at most recent assessment   PROBLEM LIST (Impairments causing functional limitations):  1. Decreased Strength  2. Decreased ADL/Functional Activities  3. Decreased Transfer Abilities  4. Decreased Ambulation Ability/Technique  5. Decreased Balance  6. Increased Pain  7.  Decreased Activity Tolerance INTERVENTIONS PLANNED: (Benefits and precautions of physical therapy have been discussed with the patient.)  1. Balance Exercise  2. Bed Mobility  3. Family Education  4. Gait Training  5. Neuromuscular Re-education/Strengthening  6. Therapeutic Activites  7. Therapeutic Exercise/Strengthening  8. Transfer Training  9. Group Therapy      TREATMENT PLAN: Frequency/Duration: twice daily for duration of hospital stay  Rehabilitation Potential For Stated Goals: GOOD      RECOMMENDED REHABILITATION/EQUIPMENT: (at time of discharge pending progress): Continue Skilled Therapy. HISTORY:   History of Present Injury/Illness (Reason for Referral):  S/P FEMUR INSERTION INTRA MEDULLARY NAIL RIGHT (Right)  Past Medical History/Comorbidities:   Ms. Viki Johnson  has a past medical history of Aortic regurgitation; Aortic stenosis; Basal cell carcinoma; Bruit (arterial); CAD (coronary artery disease); Choledocholithiasis; Hyperlipidemia; Hypertension; Hypothyroidism; Murmur, cardiac; Poor historian; S/P TAVR (transcatheter aortic valve replacement); Tricuspid valve regurgitation; Type II or unspecified type diabetes mellitus without mention of complication, not stated as uncontrolled; and Urinary incontinence. Ms. Viki Johnson  has a past surgical history that includes cystocele repair; rectocele repair; appendectomy; carpal tunnel release; ercp (02/20/2017); hysterectomy; oophorectomy; other surgical (Right); orthopaedic; knee arthroscopy (Right); ercp (04/28/2017); and other surgical (03/28/2017).   Social History/Living Environment:   Home Environment: Private residence  One/Two Story Residence: One story  Living Alone: Yes  Support Systems: Child(mayo), Family member(s)  Patient Expects to be Discharged to[de-identified] Rehabilitation facility  Current DME Used/Available at Home: Commode, bedside, Cane, straight, Walker, rolling  Tub or Shower Type: Shower  Prior Level of Function/Work/Activity:  Pt lives alone in a one story house and reports being independent with ADLs. She uses a cane for ambulation in community. Number of Personal Factors/Comorbidities that affect the Plan of Care:  DM II  Lives alone 1-2: MODERATE COMPLEXITY   EXAMINATION:   Most Recent Physical Functioning:   Gross Assessment:  AROM: Generally decreased, functional (R knee ext and L hip flexion (3-/5))  Strength: Generally decreased, functional (B LEs (R>L))  Sensation: Intact               Posture:  Posture (WDL): Exceptions to WDL  Posture Assessment: Trunk flexion, Increased  Balance:  Sitting: Impaired  Sitting - Static: Prop sitting  Sitting - Dynamic: Fair (occasional)  Standing: Impaired  Standing - Static: Poor  Standing - Dynamic : Poor Bed Mobility:  Supine to Sit: Moderate assistance;Assist x2  Scooting: Moderate assistance;Maximum assistance  Wheelchair Mobility:     Transfers:  Sit to Stand: Moderate assistance;Assist x2  Stand to Sit: Maximum assistance;Assist x2  Bed to Chair: Moderate assistance;Maximum assistance;Assist x2  Gait:             Body Structures Involved:  1. Lungs  2. Bones  3. Joints  4. Muscles Body Functions Affected:  1. Sensory/Pain  2. Respiratory  3. Neuromusculoskeletal  4. Movement Related Activities and Participation Affected:  1. General Tasks and Demands  2. Mobility  3. Self Care  4. Domestic Life  5. Community, Social and [place]   Number of elements that affect the Plan of Care: 4+: HIGH COMPLEXITY   CLINICAL PRESENTATION:   Presentation: Evolving clinical presentation with changing clinical characteristics: MODERATE COMPLEXITY   CLINICAL DECISION MAKIN [facility] Mobility Inpatient Short Form  How much difficulty does the patient currently have. .. Unable A Lot A Little None   1. Turning over in bed (including adjusting bedclothes, sheets and blankets)? [ ] 1   [X] 2   [ ] 3   [ ] 4   2.   Sitting down on and standing up from a chair with arms ( e.g., wheelchair, bedside commode, etc.)   [ ] 1   [X] 2   [ ] 3   [ ] 4   3. Moving from lying on back to sitting on the side of the bed? [ ] 1   [X] 2   [ ] 3   [ ] 4   How much help from another person does the patient currently need. .. Total A Lot A Little None   4. Moving to and from a bed to a chair (including a wheelchair)? [ ] 1   [X] 2   [ ] 3   [ ] 4   5. Need to walk in hospital room? [ ] 1   [X] 2   [ ] 3   [ ] 4   6. Climbing 3-5 steps with a railing? [ ] 1   [ ] 2   [ ] 3   [ ] 4   © 2007, Trustees of [address] [place] [address] [address] [address], under license to [employer]. All rights reserved    Score:  Initial: 11 Most Recent: X (Date: -- )     Interpretation of Tool:  Represents activities that are increasingly more difficult (i.e. Bed mobility, Transfers, Gait). Score 24 23 22-20 19-15 14-10 9-7 6       Modifier CH CI CJ CK CL CM CN         · Mobility - Walking and Moving Around:               - CURRENT STATUS:    CL - 60%-79% impaired, limited or restricted               - GOAL STATUS:           CK - 40%-59% impaired, limited or restricted               - D/C STATUS:                       ---------------To be determined---------------  [place] / [place] / Product Type: Medicare /       Medical Necessity:     · Patient demonstrates good rehab potential due to higher previous functional level. Reason for Services/Other Comments:  · Patient continues to require skilled intervention due to decreased functional mobility, balance, and ambulation.    Use of outcome tool(s) and clinical judgement create a POC that gives a: Questionable prediction of patient's progress: MODERATE COMPLEXITY                 TREATMENT:   (In addition to Assessment/Re-Assessment sessions the following treatments were rendered)   Pre-treatment Symptoms/Complaints:  R hip pain  Pain: Initial:   Pain Intensity 1: 7  Pain Location 1: Hip  Pain Orientation 1: Right  Post Session:  8-9/10 (RN alerted) Assessment/Reassessment only, no treatment provided today     Braces/Orthotics/Lines/Etc:   · IV  · campos catheter  · O2 Device: Nasal cannula  Treatment/Session Assessment:    · Response to Treatment:  Pt tolerated treatment fairly given hesitance for out of bed activity and decreased endurance. · Interdisciplinary Collaboration:  · Physical Therapist  · Registered Nurse  · Rehabilitation Attendant  · After treatment position/precautions:  · Up in chair  · Bed/Chair-wheels locked  · Call light within reach  · RN notified  · Family at bedside  · Posey alarm activated  · Compliance with Program/Exercises: Will assess as treatment progresses. · Recommendations/Intent for next treatment session: \"Next visit will focus on advancements to more challenging activities and reduction in assistance provided\".   Total Treatment Duration:  PT Patient Time In/Time Out  Time In: 7748  Time Out: [facility] Andrew Zhang PT, DPT

## 2017-07-09 NOTE — PROGRESS NOTES
TRANSFER - IN REPORT:    Verbal report received from [address], RN(name) on [address]  being received from ED(unit) for routine progression of care    Report consisted of patients Situation, Background, Assessment and   Recommendations(SBAR). Information from the following report(s) SBAR, MAR and Recent Results was reviewed with the receiving nurse. Opportunity for questions and clarification was provided. Assessment to be completed upon patients arrival to unit and care assumed.

## 2017-07-09 NOTE — PROGRESS NOTES
TRANSFER - OUT REPORT:    Verbal report given to SABRINA Beth(name) on Aurora Oleary  being transferred to PRE[facility](unit) for routine progression of care       Report consisted of patients Situation, Background, Assessment and   Recommendations(SBAR). Information from the following report(s) SBAR, Kardex, Intake/Output and MAR was reviewed with the receiving nurse. Lines:   Peripheral IV 07/08/17 Left Antecubital (Active)   Site Assessment Clean, dry, & intact 7/8/2017  7:30 PM   Phlebitis Assessment 0 7/8/2017  7:30 PM   Infiltration Assessment 0 7/8/2017  7:30 PM   Dressing Status Clean, dry, & intact 7/8/2017  7:30 PM   Dressing Type Tape;Transparent 7/8/2017  7:30 PM   Hub Color/Line Status Infusing 7/8/2017  7:30 PM        Opportunity for questions and clarification was provided.       Patient transported with:   Patient-specific medications from Pharmacy

## 2017-07-10 PROBLEM — D62 ACUTE BLOOD LOSS ANEMIA: Status: ACTIVE | Noted: 2017-07-10

## 2017-07-10 LAB
ANION GAP BLD CALC-SCNC: 8 MMOL/L (ref 7–16)
BASOPHILS # BLD AUTO: 0 K/UL (ref 0–0.2)
BASOPHILS # BLD: 0 % (ref 0–2)
BUN SERPL-MCNC: 16 MG/DL (ref 8–23)
CALCIUM SERPL-MCNC: 8 MG/DL (ref 8.3–10.4)
CHLORIDE SERPL-SCNC: 106 MMOL/L (ref 98–107)
CO2 SERPL-SCNC: 26 MMOL/L (ref 21–32)
CREAT SERPL-MCNC: 0.82 MG/DL (ref 0.6–1)
DIFFERENTIAL METHOD BLD: ABNORMAL
EOSINOPHIL # BLD: 0.1 K/UL (ref 0–0.8)
EOSINOPHIL NFR BLD: 1 % (ref 0.5–7.8)
ERYTHROCYTE [DISTWIDTH] IN BLOOD BY AUTOMATED COUNT: 14.2 % (ref 11.9–14.6)
GLUCOSE SERPL-MCNC: 177 MG/DL (ref 65–100)
HCT VFR BLD AUTO: 25.8 % (ref 35.8–46.3)
HGB BLD-MCNC: 8.9 G/DL (ref 11.7–15.4)
IMM GRANULOCYTES # BLD: 0 K/UL (ref 0–0.5)
IMM GRANULOCYTES NFR BLD AUTO: 0.1 % (ref 0–5)
LYMPHOCYTES # BLD AUTO: 33 % (ref 13–44)
LYMPHOCYTES # BLD: 2.2 K/UL (ref 0.5–4.6)
MCH RBC QN AUTO: 29.5 PG (ref 26.1–32.9)
MCHC RBC AUTO-ENTMCNC: 34.5 G/DL (ref 31.4–35)
MCV RBC AUTO: 85.4 FL (ref 79.6–97.8)
MM INDURATION POC: 0 MM (ref 0–5)
MONOCYTES # BLD: 0.8 K/UL (ref 0.1–1.3)
MONOCYTES NFR BLD AUTO: 12 % (ref 4–12)
NEUTS SEG # BLD: 3.6 K/UL (ref 1.7–8.2)
NEUTS SEG NFR BLD AUTO: 54 % (ref 43–78)
PLATELET # BLD AUTO: 102 K/UL (ref 150–450)
PMV BLD AUTO: 10.1 FL (ref 10.8–14.1)
POTASSIUM SERPL-SCNC: 4 MMOL/L (ref 3.5–5.1)
PPD POC: NEGATIVE NEGATIVE
RBC # BLD AUTO: 3.02 M/UL (ref 4.05–5.25)
SODIUM SERPL-SCNC: 140 MMOL/L (ref 136–145)
WBC # BLD AUTO: 6.7 K/UL (ref 4.3–11.1)

## 2017-07-10 PROCEDURE — 65270000029 HC RM PRIVATE

## 2017-07-10 PROCEDURE — 80048 BASIC METABOLIC PNL TOTAL CA: CPT | Performed by: ORTHOPAEDIC SURGERY

## 2017-07-10 PROCEDURE — 74011250636 HC RX REV CODE- 250/636: Performed by: ORTHOPAEDIC SURGERY

## 2017-07-10 PROCEDURE — 74011250637 HC RX REV CODE- 250/637: Performed by: INTERNAL MEDICINE

## 2017-07-10 PROCEDURE — 74011250637 HC RX REV CODE- 250/637: Performed by: ORTHOPAEDIC SURGERY

## 2017-07-10 PROCEDURE — 97530 THERAPEUTIC ACTIVITIES: CPT

## 2017-07-10 PROCEDURE — 97165 OT EVAL LOW COMPLEX 30 MIN: CPT

## 2017-07-10 PROCEDURE — 97116 GAIT TRAINING THERAPY: CPT

## 2017-07-10 PROCEDURE — 85025 COMPLETE CBC W/AUTO DIFF WBC: CPT | Performed by: ORTHOPAEDIC SURGERY

## 2017-07-10 PROCEDURE — 36415 COLL VENOUS BLD VENIPUNCTURE: CPT | Performed by: ORTHOPAEDIC SURGERY

## 2017-07-10 PROCEDURE — 74011000258 HC RX REV CODE- 258: Performed by: ORTHOPAEDIC SURGERY

## 2017-07-10 PROCEDURE — 97150 GROUP THERAPEUTIC PROCEDURES: CPT

## 2017-07-10 PROCEDURE — 99221 1ST HOSP IP/OBS SF/LOW 40: CPT | Performed by: PHYSICAL MEDICINE & REHABILITATION

## 2017-07-10 RX ADMIN — OXYCODONE HYDROCHLORIDE 5 MG: 5 TABLET ORAL at 14:45

## 2017-07-10 RX ADMIN — OYSTER SHELL CALCIUM WITH VITAMIN D 1 TABLET: 500; 200 TABLET, FILM COATED ORAL at 11:51

## 2017-07-10 RX ADMIN — OXYCODONE HYDROCHLORIDE 5 MG: 5 TABLET ORAL at 18:45

## 2017-07-10 RX ADMIN — ACETAMINOPHEN 650 MG: 325 TABLET ORAL at 21:21

## 2017-07-10 RX ADMIN — METOPROLOL TARTRATE 25 MG: 25 TABLET ORAL at 08:32

## 2017-07-10 RX ADMIN — Medication 10 ML: at 06:18

## 2017-07-10 RX ADMIN — CEFAZOLIN SODIUM 1 G: 1 INJECTION, POWDER, FOR SOLUTION INTRAMUSCULAR; INTRAVENOUS at 06:18

## 2017-07-10 RX ADMIN — LEVOTHYROXINE SODIUM 100 MCG: 100 TABLET ORAL at 06:18

## 2017-07-10 RX ADMIN — Medication 10 ML: at 21:22

## 2017-07-10 RX ADMIN — ENOXAPARIN SODIUM 30 MG: 30 INJECTION SUBCUTANEOUS at 11:51

## 2017-07-10 RX ADMIN — OYSTER SHELL CALCIUM WITH VITAMIN D 1 TABLET: 500; 200 TABLET, FILM COATED ORAL at 08:32

## 2017-07-10 RX ADMIN — ACETAMINOPHEN 650 MG: 325 TABLET ORAL at 13:16

## 2017-07-10 RX ADMIN — CLOPIDOGREL BISULFATE 75 MG: 75 TABLET ORAL at 08:32

## 2017-07-10 RX ADMIN — OXYCODONE HYDROCHLORIDE 5 MG: 5 TABLET ORAL at 10:26

## 2017-07-10 RX ADMIN — ACETAMINOPHEN 650 MG: 325 TABLET ORAL at 06:18

## 2017-07-10 RX ADMIN — AMLODIPINE BESYLATE 10 MG: 10 TABLET ORAL at 08:32

## 2017-07-10 RX ADMIN — OXYCODONE HYDROCHLORIDE 5 MG: 5 TABLET ORAL at 22:43

## 2017-07-10 RX ADMIN — BENAZEPRIL HYDROCHLORIDE 20 MG: 10 TABLET, FILM COATED ORAL at 08:31

## 2017-07-10 RX ADMIN — SODIUM CHLORIDE, SODIUM LACTATE, POTASSIUM CHLORIDE, AND CALCIUM CHLORIDE 75 ML/HR: 600; 310; 30; 20 INJECTION, SOLUTION INTRAVENOUS at 06:18

## 2017-07-10 RX ADMIN — METOPROLOL TARTRATE 25 MG: 25 TABLET ORAL at 21:21

## 2017-07-10 RX ADMIN — OXYCODONE HYDROCHLORIDE 5 MG: 5 TABLET ORAL at 06:21

## 2017-07-10 RX ADMIN — OYSTER SHELL CALCIUM WITH VITAMIN D 1 TABLET: 500; 200 TABLET, FILM COATED ORAL at 17:33

## 2017-07-10 NOTE — CONSULTS
PM&R Rehab Consult    Subjective:     Date of Consultation:  July 10, 2017    Referring Physician: Dr Cornelio Henriquez    Patient is a 80 y.o. female who is being seen for rehab recommendations s/p Right IT fx    Principal Problem:    Closed right hip fracture ([address] (7/9/2017)    Active Problems:    CKD (chronic kidney disease) stage 3, GFR 30-59 ml/min (1/20/2017)      S/P TAVR (transcatheter aortic valve replacement) (3/29/2017)      Overview: 1. TAVR (3/28/17):  20 mm Luis S3 valve. HPI. Ms. Sancho Palma is a chronically debilitated but functionally mod I 89RV WF with recent stay at the [facility] s/p TAVR in April of this year. Glendy Terrazas She was admitted from home on 7/8 after falling the evening before while doing laundery. She had laid on the floor for 18hrs before she found. Suprisingly, she had no significant rhabdo. Her renal fxn was normal but her CK was elevated at 1317. She was unable to bear wt on her RLE. She is now POD#1 from an IMN fixation by Dr. Fran Elizondo on 7/9. She is WBAT. She was evaluated by PT on day of surgery and was noted to be mod to max A with bed mobility and mod A of 2 for STS and mod/max A for transfers. She is non ambulatory. IR is being asked to assess rehab potential and needs.     Past Medical History:   Diagnosis Date    Aortic regurgitation     mild to moderate per ECHO 3/28/17    Aortic stenosis     severe per ECHO 3/28/17    Basal cell carcinoma     Bruit (arterial)     CAD (coronary artery disease)     small vessel disease, medical management per cardiologist not 4/17    Choledocholithiasis     Hyperlipidemia     Hypertension     Hypothyroidism     Murmur, cardiac     Poor historian     S/P TAVR (transcatheter aortic valve replacement)     3/17    Tricuspid valve regurgitation     moderate per ECHO 3/28/17    Type II or unspecified type diabetes mellitus without mention of complication, not stated as uncontrolled     not medicated, treated w/ diet    Urinary incontinence Family History   Problem Relation Age of Onset    Heart Attack Father      age 68    Heart Disease Father     Arthritis-osteo Mother       Social History   Substance Use Topics    Smoking status: Never Smoker    Smokeless tobacco: Never Used    Alcohol use No     Past Surgical History:   Procedure Laterality Date    HX APPENDECTOMY      HX CARPAL TUNNEL RELEASE      patient denies    HX CYSTOCELE REPAIR      HX ERCP  02/20/2017    large CBD stones    HX ERCP  04/28/2017    laser    HX HYSTERECTOMY      HX KNEE ARTHROSCOPY Right     HX OOPHORECTOMY      Partial    HX ORTHOPAEDIC      2 back-one fusion-low; all fingers-trigger    HX OTHER SURGICAL Right     basal cell nose    HX OTHER SURGICAL  03/28/2017    Tavr    HX RECTOCELE REPAIR        Prior to Admission medications    Medication Sig Start Date End Date Taking? Authorizing Provider   clopidogrel (PLAVIX) 75 mg tab TAKE 1 TABLET ONCE DAILY 6/23/17   Rosie Leal MD   traMADol Charlynn Serge) 50 mg tablet Take 1 Tab by mouth every six (6) hours as needed for Pain. 6/7/17   Rosie Leal MD   trimethoprim-sulfamethoxazole (BACTRIM DS) 160-800 mg per tablet Take 1 Tab by mouth two (2) times a day. 6/7/17   Rosie Leal MD   clopidogrel (PLAVIX) 75 mg tab Take 1 Tab by mouth daily. Patient taking differently: Take 75 mg by mouth every morning. 3/30/17   ANTHONY Gonzalez   amLODIPine (NORVASC) 10 mg tablet Take 1 Tab by mouth daily. Patient taking differently: Take 10 mg by mouth every morning. 1/6/17   Jericho Minor MD   levothyroxine (SYNTHROID) 100 mcg tablet Take 1 Tab by mouth Daily (before breakfast). 10/18/16   Rosie Leal MD   benazepril (LOTENSIN) 20 mg tablet Take 1 Tab by mouth daily. 10/18/16   Rosie Leal MD   metoprolol tartrate (LOPRESSOR) 25 mg tablet Take 1 Tab by mouth two (2) times a day. 10/18/16   Rosie Leal MD   rosuvastatin (CRESTOR) 20 mg tablet Take 1 Tab by mouth nightly.   Patient taking differently: Take 10 mg by mouth nightly. 10/18/16   Scarlette Riedel, MD   nitroglycerin (NITROSTAT) 0.4 mg SL tablet 1 Tab by SubLINGual route every five (5) minutes as needed. 10/18/16   Scarlette Riedel, MD   ASCORBIC ACID/VITAMIN E (CRANBERRY CONCENTRATE PO) Take  by mouth daily. Historical Provider     Allergies   Allergen Reactions    Aspirin Anaphylaxis     Hives, swelling    Ativan [Lorazepam] Other (comments)     Confused and aggressive    Aleve [Naproxen Sodium] Rash    Carafate [Sucralfate] Hives    Ciprofloxacin Diarrhea    Clorazepate Dipotassium Hives    Flexeril [Cyclobenzaprine] Itching    Robaxin [Methocarbamol] Anaphylaxis     Swelling of tongue, wheezing  Muscle relaxers in general    Vytorin 10-10 [Ezetimibe-Simvastatin] Other (comments)     Muscle soreness    Zantac [Ranitidine Hcl] Itching        Review of Systems:      Constitutional: Negative for chills and fever. Respiratory: Negative for cough and shortness of breath. Cardiovascular: Negative for chest pain and palpitations. Gastrointestinal: Negative for abdominal pain, diarrhea and vomiting. Genitourinary: Negative for dysuria and flank pain. Musculoskeletal: Negative for back pain and neck pain. Skin: Negative for color change and rash. Neurological: Negative for syncope and headaches. All other systems reviewed and are negative.          Objective:     Vitals:  Blood pressure 124/54, pulse 79, temperature 98.2 °F (36.8 °C), resp. rate 18, height 5' 4\" (1.626 m), weight 145 lb (65.8 kg), SpO2 93 %. Temp (24hrs), Av.5 °F (36.9 °C), Min:97.4 °F (36.3 °C), Max:99.4 °F (37.4 °C)      Intake and Output:   1901 - 07/10 0700  In: 650 [P.O.:50; I.V.:600]  Out: 9196 [Urine:1410]    Physical Exam:  General:  Alert, oriented and mood affect appropriate; well developed and well nourished   Lungs:   Clear to auscultation bilaterally. Heart:  Regular rate and rhythm, S1, S2 stable, no murmur, click, rub or gallop.    Abdomen:   Soft, non-tender. Bowel sounds present. No masses,  No organomegaly. Genitourinary: deferred   Neuro Muscular: Generalized weakness. Right hip flexion 2, DF 4, distal sensation intact. Painful right knee   Skin:  No rashes, lesions, or signs/symptoms or infection. Bruising right arm; right hip inc c/d/i         Labs/Studies:Recent Results (from the past 72 hour(s))   EKG, 12 LEAD, INITIAL    Collection Time: 07/08/17  2:45 PM   Result Value Ref Range    Ventricular Rate 85 BPM    Atrial Rate 90 BPM    P-R Interval 134 ms    QRS Duration 82 ms    Q-T Interval 364 ms    QTC Calculation (Bezet) 433 ms    Calculated P Axis 93 degrees    Calculated R Axis 35 degrees    Calculated T Axis 76 degrees    Diagnosis       !! AGE AND GENDER SPECIFIC ECG ANALYSIS !! Normal sinus rhythm  Nonspecific ST abnormality  Abnormal ECG  When compared with ECG of 30-APR-2017 12:13,  Nonspecific T wave abnormality now evident in Lateral leads  Confirmed by ADDISON HARDEN MD ([facility]), MARIE BOYCE ([id number]) on 7/8/2017 7:18:18 PM     CBC WITH AUTOMATED DIFF    Collection Time: 07/08/17  2:50 PM   Result Value Ref Range    WBC 10.0 4.3 - 11.1 K/uL    RBC 4.23 4.05 - 5.25 M/uL    HGB 12.6 11.7 - 15.4 g/dL    HCT 34.9 (L) 35.8 - 46.3 %    MCV 82.5 79.6 - 97.8 FL    MCH 29.8 26.1 - 32.9 PG    MCHC 36.1 (H) 31.4 - 35.0 g/dL    RDW 13.9 11.9 - 14.6 %    PLATELET 830 450 - 962 K/uL    MPV 9.9 (L) 10.8 - 14.1 FL    DF AUTOMATED      NEUTROPHILS 79 (H) 43 - 78 %    LYMPHOCYTES 15 13 - 44 %    MONOCYTES 6 4.0 - 12.0 %    EOSINOPHILS 0 (L) 0.5 - 7.8 %    BASOPHILS 0 0.0 - 2.0 %    IMMATURE GRANULOCYTES 0.3 0.0 - 5.0 %    ABS. NEUTROPHILS 7.8 1.7 - 8.2 K/UL    ABS. LYMPHOCYTES 1.5 0.5 - 4.6 K/UL    ABS. MONOCYTES 0.6 0.1 - 1.3 K/UL    ABS. EOSINOPHILS 0.0 0.0 - 0.8 K/UL    ABS. BASOPHILS 0.0 0.0 - 0.2 K/UL    ABS. IMM.  GRANS. 0.0 0.0 - 0.5 K/UL   METABOLIC PANEL, COMPREHENSIVE    Collection Time: 07/08/17  2:50 PM   Result Value Ref Range    Sodium 140 136 - 145 mmol/L Potassium 4.1 3.5 - 5.1 mmol/L    Chloride 107 98 - 107 mmol/L    CO2 24 21 - 32 mmol/L    Anion gap 9 7 - 16 mmol/L    Glucose 202 (H) 65 - 100 mg/dL    BUN 24 (H) 8 - 23 MG/DL    Creatinine 0.96 0.6 - 1.0 MG/DL    GFR est AA >60 >60 ml/min/1.73m2    GFR est non-AA 58 (L) >60 ml/min/1.73m2    Calcium 8.8 8.3 - 10.4 MG/DL    Bilirubin, total 0.4 0.2 - 1.1 MG/DL    ALT (SGPT) 33 12 - 65 U/L    AST (SGOT) 47 (H) 15 - 37 U/L    Alk. phosphatase 128 50 - 136 U/L    Protein, total 7.7 6.3 - 8.2 g/dL    Albumin 3.3 3.2 - 4.6 g/dL    Globulin 4.4 (H) 2.3 - 3.5 g/dL    A-G Ratio 0.8 (L) 1.2 - 3.5     PROTHROMBIN TIME + INR    Collection Time: 07/08/17  2:50 PM   Result Value Ref Range    Prothrombin time 10.9 9.6 - 12.0 sec    INR 1.0 0.9 - 1.2     PTT    Collection Time: 07/08/17  2:50 PM   Result Value Ref Range    aPTT 26.9 23.5 - 31.7 SEC   CK    Collection Time: 07/08/17  2:50 PM   Result Value Ref Range    CK 1317 (H) 21 - 215 U/L   PREALBUMIN    Collection Time: 07/08/17  2:50 PM   Result Value Ref Range    Prealbumin 20.4 18.0 - 35.7 MG/DL   PTH INTACT    Collection Time: 07/08/17  2:50 PM   Result Value Ref Range    Calcium 9.1 8.3 - 10.4 MG/DL    PTH, Intact 101.4 (H) 14.0 - 72.0 pg/mL   TYPE & SCREEN    Collection Time: 07/08/17  2:55 PM   Result Value Ref Range    Crossmatch Expiration 07/11/2017     ABO/Rh(D) Beryle Raoul POSITIVE     Antibody screen NEG    MSSA/MRSA SC BY PCR, NASAL SWAB    Collection Time: 07/08/17  8:10 PM   Result Value Ref Range    Special Requests: NO SPECIAL REQUESTS      Culture result:        SA target not detected. A MRSA NEGATIVE, SA NEGATIVE test result does not preclude MRSA or SA nasal colonization.    CBC W/O DIFF    Collection Time: 07/09/17  7:59 AM   Result Value Ref Range    WBC 7.7 4.3 - 11.1 K/uL    RBC 3.73 (L) 4.05 - 5.25 M/uL    HGB 10.9 (L) 11.7 - 15.4 g/dL    HCT 31.6 (L) 35.8 - 46.3 %    MCV 84.7 79.6 - 97.8 FL    MCH 29.2 26.1 - 32.9 PG    MCHC 34.5 31.4 - 35.0 g/dL    RDW 14.2 11.9 - 14.6 %    PLATELET 206 (L) 126 - 450 K/uL    MPV 10.7 (L) 10.8 - 49.7 FL   METABOLIC PANEL, BASIC    Collection Time: 07/09/17  7:59 AM   Result Value Ref Range    Sodium 142 136 - 145 mmol/L    Potassium 4.1 3.5 - 5.1 mmol/L    Chloride 110 (H) 98 - 107 mmol/L    CO2 24 21 - 32 mmol/L    Anion gap 8 7 - 16 mmol/L    Glucose 166 (H) 65 - 100 mg/dL    BUN 19 8 - 23 MG/DL    Creatinine 0.84 0.6 - 1.0 MG/DL    GFR est AA >60 >60 ml/min/1.73m2    GFR est non-AA >60 >60 ml/min/1.73m2    Calcium 8.0 (L) 8.3 - 10.4 MG/DL   GLUCOSE, POC    Collection Time: 07/09/17  8:35 AM   Result Value Ref Range    Glucose (POC) 167 (H) 65 - 100 mg/dL   PLEASE READ & DOCUMENT PPD TEST IN 24 HRS    Collection Time: 07/09/17  8:15 PM   Result Value Ref Range    PPD Negative Negative    mm Induration 0 mm   CBC WITH AUTOMATED DIFF    Collection Time: 07/10/17  7:40 AM   Result Value Ref Range    WBC 6.7 4.3 - 11.1 K/uL    RBC 3.02 (L) 4.05 - 5.25 M/uL    HGB 8.9 (L) 11.7 - 15.4 g/dL    HCT 25.8 (L) 35.8 - 46.3 %    MCV 85.4 79.6 - 97.8 FL    MCH 29.5 26.1 - 32.9 PG    MCHC 34.5 31.4 - 35.0 g/dL    RDW 14.2 11.9 - 14.6 %    PLATELET 279 (L) 266 - 450 K/uL    MPV 10.1 (L) 10.8 - 14.1 FL    DF AUTOMATED      NEUTROPHILS 54 43 - 78 %    LYMPHOCYTES 33 13 - 44 %    MONOCYTES 12 4.0 - 12.0 %    EOSINOPHILS 1 0.5 - 7.8 %    BASOPHILS 0 0.0 - 2.0 %    IMMATURE GRANULOCYTES 0.1 0.0 - 5.0 %    ABS. NEUTROPHILS 3.6 1.7 - 8.2 K/UL    ABS. LYMPHOCYTES 2.2 0.5 - 4.6 K/UL    ABS. MONOCYTES 0.8 0.1 - 1.3 K/UL    ABS. EOSINOPHILS 0.1 0.0 - 0.8 K/UL    ABS. BASOPHILS 0.0 0.0 - 0.2 K/UL    ABS. IMM.  GRANS. 0.0 0.0 - 0.5 K/UL   METABOLIC PANEL, BASIC    Collection Time: 07/10/17  7:40 AM   Result Value Ref Range    Sodium 140 136 - 145 mmol/L    Potassium 4.0 3.5 - 5.1 mmol/L    Chloride 106 98 - 107 mmol/L    CO2 26 21 - 32 mmol/L    Anion gap 8 7 - 16 mmol/L    Glucose 177 (H) 65 - 100 mg/dL    BUN 16 8 - 23 MG/DL Creatinine 0.82 0.6 - 1.0 MG/DL    GFR est AA >60 >60 ml/min/1.73m2    GFR est non-AA >60 >60 ml/min/1.73m2    Calcium 8.0 (L) 8.3 - 10.4 MG/DL       Ambulation:  Activity and Safety  Activity Level: Up with Assistance  Activity: Family/Visitors present, In bed, Resting quietly  Activity Assistance: Partial (two people)  Weight Bearing Status: WBAT (Weight Bearing as Tolerated)  NWB (Non Weight Bearing extremities: RLE (Right Lower Extremity)  Mode of Transportation: Stretcher, Oxygen  Repositioned: Head of bed elevated (degrees)  Patient Turned: Turns self  Head of Bed Elevated: HOB 30  Assistive Device: Fall prevention device, Walker (comment)  Safety Measures: Bed/Chair alarm on, Bed/Chair-Wheels locked, Bed in low position, Call light within reach, Family at bedside, Side rails X2     Impression/Plan:     Principal Problem:    Closed right hip fracture ([address] (7/9/2017)    Active Problems:    CKD (chronic kidney disease) stage 3, GFR 30-59 ml/min (1/20/2017)      S/P TAVR (transcatheter aortic valve replacement) (3/29/2017)      Overview: 1. TAVR (3/28/17):  20 mm Luis S3 valve.       POD #1 IMN fixation of Right IT fx due to fall    Recommendations: Continue Acute Rehab Program  Coordination of rehab/medical care  Counseling of PM & R care issues management  Monitoring and management of medical conditions per plan of care/orders   -pt does not meet medical necessity for [facility]  -recommend SNF, she is familiar with [facility] due to recent stay.  -cont DVT prophylaxis, pain control, PT and OT  -rehab potential ; fair  Discussion with Family/Caregiver/Staff  Reviewed Therapies/Labs/Meds/Records    Signed By:  Gina Soto MD     July 10, 2017

## 2017-07-10 NOTE — PROGRESS NOTES
Problem: Mobility Impaired (Adult and Pediatric)  Goal: *Acute Goals and Plan of Care (Insert Text)  STG:  (1.)Ms. Veronica Hernandez will move from supine to sit and sit to supine , scoot up and down and roll side to side with MINIMAL ASSIST within 3 day(s). (2.)Ms. Veronica Hernandez will transfer from bed to chair and chair to bed with MODERATE ASSIST using the least restrictive device within 3 day(s). (3.)Ms. Oleary will ambulate with MODERATE ASSIST for 15 feet with the least restrictive device within 3 day(s). (4.)Ms. Veronica Hernandez will participate in therapeutic activity/exerices x 12 minutes for increased strength within 3 days. LTG:  (1.)Ms. Veronica Hernandez will move from supine to sit and sit to supine , scoot up and down and roll side to side in bed with MODIFIED INDEPENDENCE within 7 day(s). (2.)Ms. Veronica Hernandez will transfer from bed to chair and chair to bed with STAND BY ASSIST using the least restrictive device within 7 day(s). (3.)Ms. Oleary will ambulate with CONTACT GUARD ASSIST for 30+ feet with the least restrictive device within 7 day(s). (4.)Ms. Veronica Hernandez will participate in therapeutic activity/exerices x 23 minutes for increased strength within 7 days. PHYSICAL THERAPY: Daily Note, Treatment Day: 1st and AM 7/10/2017  INPATIENT: Hospital Day: 3  [place] / [place] / Product Type: Medicare /    R LE WBAT     NAME/AGE/GENDER: Tiffanie Damian is a 80 y.o. female   PRIMARY DIAGNOSIS: Intra-Troch fracture  Closed right hip fracture (HCC) Closed right hip fracture (HCC) Closed right hip fracture (HCC)  Procedure(s) (LRB):  FEMUR INSERTION INTRA MEDULLARY NAIL RIGHT (Right)  1 Day Post-Op  ICD-10: Treatment Diagnosis:       · Generalized Muscle Weakness (M62.81)  · Difficulty in walking, Not elsewhere classified (R26.2)  · Other abnormalities of gait and mobility (R26.89)   Precaution/Allergies:  Aspirin; Ativan [lorazepam]; Aleve [naproxen sodium]; Carafate [sucralfate];  Ciprofloxacin; Clorazepate dipotassium; Flexeril [cyclobenzaprine]; Robaxin [methocarbamol]; Vytorin 10-10 [ezetimibe-simvastatin]; and Zantac [ranitidine hcl]       ASSESSMENT:      Ms. Elfego Blel is a 80 y.o. female s/p above surgery who is now WBAT on RLE. Patient supine upon contact and agreeable to PT. Patient able to perform supine to sit with mod assist x 2, additional time, and cues for proper technique. Transfers to standing with mod assist x 2 and cues for proper technique/improved foot/hand placement. Patient unable to take steps to recliner chair due to decreased weight bearing onto RLE and poor UE support on rolling walker but able to shuffle feet without foot clearance. Poor balance and posture noted in standing. Patient was later rolled to therapy gym to participate in group therapeutic strengthening exercises to improve functional strength for transfers, gait and overall mobility. Patient requires cues and assistance to perform exercises correctly. Overall slow progress towards physical therapy goals. No goals have been met thus far. Will continue efforts. This section established at most recent assessment   PROBLEM LIST (Impairments causing functional limitations):  1. Decreased Strength  2. Decreased ADL/Functional Activities  3. Decreased Transfer Abilities  4. Decreased Ambulation Ability/Technique  5. Decreased Balance  6. Increased Pain  7. Decreased Activity Tolerance    INTERVENTIONS PLANNED: (Benefits and precautions of physical therapy have been discussed with the patient.)  1. Balance Exercise  2. Bed Mobility  3. Family Education  4. Gait Training  5. Neuromuscular Re-education/Strengthening  6. Therapeutic Activites  7. Therapeutic Exercise/Strengthening  8. Transfer Training  9.  Group Therapy      TREATMENT PLAN: Frequency/Duration: twice daily for duration of hospital stay  Rehabilitation Potential For Stated Goals: GOOD      RECOMMENDED REHABILITATION/EQUIPMENT: (at time of discharge pending progress): Continue Skilled Therapy. HISTORY:   History of Present Injury/Illness (Reason for Referral):  S/P FEMUR INSERTION INTRA MEDULLARY NAIL RIGHT (Right)  Past Medical History/Comorbidities:   Ms. Veronica Hernandez  has a past medical history of Aortic regurgitation; Aortic stenosis; Basal cell carcinoma; Bruit (arterial); CAD (coronary artery disease); Choledocholithiasis; Hyperlipidemia; Hypertension; Hypothyroidism; Murmur, cardiac; Poor historian; S/P TAVR (transcatheter aortic valve replacement); Tricuspid valve regurgitation; Type II or unspecified type diabetes mellitus without mention of complication, not stated as uncontrolled; and Urinary incontinence. Ms. Veronica Hernandez  has a past surgical history that includes cystocele repair; rectocele repair; appendectomy; carpal tunnel release; ercp (02/20/2017); hysterectomy; oophorectomy; other surgical (Right); orthopaedic; knee arthroscopy (Right); ercp (04/28/2017); and other surgical (03/28/2017). Social History/Living Environment:   Home Environment: Private residence  One/Two Story Residence: One story  Living Alone: Yes  Support Systems: Child(mayo), Family member(s)  Patient Expects to be Discharged to[de-identified] Rehabilitation facility  Current DME Used/Available at Home: Commode, bedside, Cane, straight, Walker, rolling  Tub or Shower Type: Shower  Prior Level of Function/Work/Activity:  Pt lives alone in a one story house and reports being independent with ADLs. She uses a cane for ambulation in community.       Number of Personal Factors/Comorbidities that affect the Plan of Care:  DM II  Lives alone 1-2: MODERATE COMPLEXITY   EXAMINATION:   Most Recent Physical Functioning:   Gross Assessment:                  Posture:     Balance:  Sitting: Impaired  Sitting - Static: Fair (occasional)  Sitting - Dynamic: Fair (occasional)  Standing: Impaired  Standing - Static: Poor  Standing - Dynamic : Poor Bed Mobility:  Supine to Sit: Moderate assistance;Assist x2  Sit to Supine:  (NT)  Wheelchair Mobility:     Transfers:  Sit to Stand: Moderate assistance;Assist x2  Stand to Sit: Moderate assistance;Assist x2  Bed to Chair: Moderate assistance;Assist x2  Gait:             Body Structures Involved:  1. Lungs  2. Bones  3. Joints  4. Muscles Body Functions Affected:  1. Sensory/Pain  2. Respiratory  3. Neuromusculoskeletal  4. Movement Related Activities and Participation Affected:  1. General Tasks and Demands  2. Mobility  3. Self Care  4. Domestic Life  5. Community, Social and [place]   Number of elements that affect the Plan of Care: 4+: HIGH COMPLEXITY   CLINICAL PRESENTATION:   Presentation: Evolving clinical presentation with changing clinical characteristics: MODERATE COMPLEXITY   CLINICAL DECISION MAKIN [facility] Inpatient Short Form  How much difficulty does the patient currently have. .. Unable A Lot A Little None   1. Turning over in bed (including adjusting bedclothes, sheets and blankets)? [ ] 1   [X] 2   [ ] 3   [ ] 4   2. Sitting down on and standing up from a chair with arms ( e.g., wheelchair, bedside commode, etc.)   [ ] 1   [X] 2   [ ] 3   [ ] 4   3. Moving from lying on back to sitting on the side of the bed? [ ] 1   [X] 2   [ ] 3   [ ] 4   How much help from another person does the patient currently need. .. Total A Lot A Little None   4. Moving to and from a bed to a chair (including a wheelchair)? [ ] 1   [X] 2   [ ] 3   [ ] 4   5. Need to walk in hospital room? [ ] 1   [X] 2   [ ] 3   [ ] 4   6. Climbing 3-5 steps with a railing? [ ] 1   [ ] 2   [ ] 3   [ ] 4   © 2007, Trustees of [address] [place] [address] [address] [address], under license to [employer]. All rights reserved    Score:  Initial: 11 Most Recent: X (Date: -- )     Interpretation of Tool:  Represents activities that are increasingly more difficult (i.e. Bed mobility, Transfers, Gait).        Score 24 23 22-20 19-15 14-10 9-7 6       Modifier CH CI CJ CK CL CM CN         · Mobility - Walking and Moving Around:               - CURRENT STATUS:    CL - 60%-79% impaired, limited or restricted               - GOAL STATUS:           CK - 40%-59% impaired, limited or restricted               - D/C STATUS:                       ---------------To be determined---------------  [place] / Product Type: Medicare /       Medical Necessity:     · Patient demonstrates good rehab potential due to higher previous functional level. Reason for Services/Other Comments:  · Patient continues to require skilled intervention due to decreased functional mobility, balance, and ambulation. Use of outcome tool(s) and clinical judgement create a POC that gives a: Questionable prediction of patient's progress: MODERATE COMPLEXITY                 TREATMENT:   (In addition to Assessment/Re-Assessment sessions the following treatments were rendered)   Pre-treatment Symptoms/Complaints:  None  Pain: Initial:   Pain Intensity 1: 0  Post Session:  0/10 visually post treatment      Therapeutic Activity: (    14 Minutes): Therapeutic activities including bed mobility training, transfer training, static/dynamic standing balance training, posture training, scooting, instruction in proper walker manipulation, and patient education to improve mobility, strength and balance. Required moderate   to promote static and dynamic balance in standing, promote coordination of bilateral, lower extremity(s) and to promote improved technique with bed mobility, transfers, and walker manipulation/use. Group Therapeutic Exercise: (  10 Minutes):  Exercises per grid below to improve mobility, strength, balance and stiffness/soreness. Required moderate visual, verbal, manual and tactile cues to promote proper body alignment, promote proper body posture and promote proper body mechanics. Progressed range, repetitions and complexity of movement as indicated. Date:  7/10/17 Date:   Date:     ACTIVITY/EXERCISE AM PM AM PM AM PM   Ambulation:           Distance  Device  Duration         Seated Heel Raises x15B A        Seated Toe Raises x15B A        Seated Long Arc Quads x15B A        Seated Marching x15B  AA-R, A-L        Seated Hip Abduction x15B  AA-R., A-L                 B = bilateral; AA = active assistive; A = active; P = passive             Braces/Orthotics/Lines/Etc:   · IV  Treatment/Session Assessment:    · Response to Treatment:  See above  · Interdisciplinary Collaboration:  · Physical Therapy Assistant, Registered Nurse and Certified Nursing Assistant/Patient Care Technician  · After treatment position/precautions:  · Up in chair, Bed alarm/tab alert on, Bed/Chair-wheels locked, Call light within reach, RN notified and Family at bedside  · Compliance with Program/Exercises: Will assess as treatment progresses. · Recommendations/Intent for next treatment session: \"Next visit will focus on advancements to more challenging activities and reduction in assistance provided\".   Total Treatment Duration:PT Patient Time In/Time Out  Time In: 5411 (1120)  Time Out: 1049 (6487)     Geovani Weber, PTA

## 2017-07-10 NOTE — BRIEF OP NOTE
BRIEF OPERATIVE NOTE    Date of Procedure: 7/9/2017   Preoperative Diagnosis: Intra-Troch fracture  Postoperative Diagnosis: Right intertrochanteric femur fracture    Procedure(s): FEMUR INSERTION INTRA MEDULLARY NAIL RIGHT  Surgeon(s) and Role:     * Eunice Julian MD - Primary         Assistant Staff:       Surgical Staff:  Circ-1: Little Sauceda RN  Scrub Tech-1: Tima Hill  Scrub Tech-2: Gianfranco Berumen  Scrub Tech-3: Omero Mcdonnell  Event Time In   Incision Start 0957   Incision Close 1022     Anesthesia: Spinal   Estimated Blood Loss: 100 CC  Specimens: * No specimens in log *   Findings: NONE   Complications: NONE  Implants:   Implant Name Type Inv.  Item Serial No.  Lot No. LRB No. Used Action   NAIL TIMMY 130D [id number] RT -- [employer] STRL - [id number]  NAIL TIMMY 130D [id number] RT -- [employer] [facility] [id number] Right 1 Implanted   BLADE HELCL [employer] 95MM STRL --  - [id number]   BLADE HELCL TFNA 95MM STRL --    [facility] [id number] Right 1 Implanted

## 2017-07-10 NOTE — CDMP QUERY
Please clarify if this patient is being treated/managed for:    QUERY 1 of 2:    RHABDOMYOLYSIS in the setting of 87yo who fell and was on the floor for 18hr prior to help arriving, CK 1317 being managed with IVF.    -------------------------------------------------------------------------------  QUERY 2 of 2:    ACUTE BLOOD LOSS ANEMIA in the setting of 87yo, s/p surgery, HGB 12.6=> 8.9, EBL 100ml being managed with serial HGB monitoring. =>Other Explanation of clinical findings  =>Unable to Determine (no explanation of clinical findings)    The medical record reflects the following:    Risk Factors:   Query 1: fell, stayed on floor for 18hr prior to arrival of help   Query 2: 87yo, s/p surgery    Clinical Indicators:   Query 1:  CK 1317  Query 2: HGB 12.6=> 8.9, EBL 100ml    Treatment:   Query 1: IVF  Query 2: serial HGB monitoring    Please clarify and document your clinical opinion in the progress notes and discharge summary including the definitive and/or presumptive diagnosis, (suspected or probable), related to the above clinical findings. Please include clinical findings supporting your diagnosis.     Thank you,  Nikkie Urbina RN  [phone]

## 2017-07-10 NOTE — PROGRESS NOTES
Hospitalist Progress Note    7/10/2017  Admit Date: 2017  2:24 PM   NAME: Anuj Mcneil   :  3/20/1929   MRN:  [id number]   Attending: Greg Espinosa DO  PCP:  Esther Doll MD    SUBJECTIVE:     Anuj Mcneil is a 80yoF with AS s/p TAVR (2017) admitted on  with R hip fx. Repaired . Doing well today. Eating more at lunch. She denies dizziness/LH on standing. Pain is relatively well controlled. Review of Systems negative with exception of pertinent positives noted above      PHYSICAL EXAM       Visit Vitals    /62 (BP 1 Location: Right arm, BP Patient Position: Sitting)    Pulse 69    Temp 98 °F (36.7 °C)    Resp 19    Ht 5' 4\" (1.626 m)    Wt 65.8 kg (145 lb)    SpO2 95%    BMI 24.89 kg/m2      Temp (24hrs), Av.6 °F (37 °C), Min:98 °F (36.7 °C), Max:99.4 °F (37.4 °C)    Oxygen Therapy  O2 Sat (%): 95 % (07/10/17 1147)  Pulse via Oximetry: 80 beats per minute (17 1128)  O2 Device: Nasal cannula (07/10/17 1147)  O2 Flow Rate (L/min): 2 l/min (07/10/17 1147)    Intake/Output Summary (Last 24 hours) at 07/10/17 1302  Last data filed at 07/10/17 1149   Gross per 24 hour   Intake              120 ml   Output              275 ml   Net             -155 ml      General: No acute distress. Head:  Atraumatic Normocephalic. ENT:  2L NC in place  Lungs:  CTA Bilaterally. Nonlabored  CVS:  RRR  Abdomen: Soft, NTTP, +BS  Neurologic:  AOx3.  No focal deficits    Recent Results (from the past 24 hour(s))   PLEASE READ & DOCUMENT PPD TEST IN 24 HRS    Collection Time: 17  8:15 PM   Result Value Ref Range    PPD Negative Negative    mm Induration 0 mm   CBC WITH AUTOMATED DIFF    Collection Time: 07/10/17  7:40 AM   Result Value Ref Range    WBC 6.7 4.3 - 11.1 K/uL    RBC 3.02 (L) 4.05 - 5.25 M/uL    HGB 8.9 (L) 11.7 - 15.4 g/dL    HCT 25.8 (L) 35.8 - 46.3 %    MCV 85.4 79.6 - 97.8 FL    MCH 29.5 26.1 - 32.9 PG    MCHC 34.5 31.4 - 35.0 g/dL    RDW 14.2 11.9 - 14.6 %    PLATELET 860 (L) 150 - 450 K/uL    MPV 10.1 (L) 10.8 - 14.1 FL    DF AUTOMATED      NEUTROPHILS 54 43 - 78 %    LYMPHOCYTES 33 13 - 44 %    MONOCYTES 12 4.0 - 12.0 %    EOSINOPHILS 1 0.5 - 7.8 %    BASOPHILS 0 0.0 - 2.0 %    IMMATURE GRANULOCYTES 0.1 0.0 - 5.0 %    ABS. NEUTROPHILS 3.6 1.7 - 8.2 K/UL    ABS. LYMPHOCYTES 2.2 0.5 - 4.6 K/UL    ABS. MONOCYTES 0.8 0.1 - 1.3 K/UL    ABS. EOSINOPHILS 0.1 0.0 - 0.8 K/UL    ABS. BASOPHILS 0.0 0.0 - 0.2 K/UL    ABS. IMM. GRANS. 0.0 0.0 - 0.5 K/UL   METABOLIC PANEL, BASIC    Collection Time: 07/10/17  7:40 AM   Result Value Ref Range    Sodium 140 136 - 145 mmol/L    Potassium 4.0 3.5 - 5.1 mmol/L    Chloride 106 98 - 107 mmol/L    CO2 26 21 - 32 mmol/L    Anion gap 8 7 - 16 mmol/L    Glucose 177 (H) 65 - 100 mg/dL    BUN 16 8 - 23 MG/DL    Creatinine 0.82 0.6 - 1.0 MG/DL    GFR est AA >60 >60 ml/min/1.73m2    GFR est non-AA >60 >60 ml/min/1.73m2    Calcium 8.0 (L) 8.3 - 10.4 MG/DL         Imaging /Procedures /Studies   XR FEMUR RT 2 VS   Final Result   IMPRESSION: ORIF intertrochanteric fracture on the right. XR CHEST SNGL V   Final Result   IMPRESSION: No acute abnormality      XR HIP RT W OR WO PELV 2-3 VWS   Final Result   IMPRESSION: Intertrochanteric fracture proximal right femur      XR FEMUR RT 2 VS   Final Result   IMPRESSION: Intertrochanteric fracture proximal right femur            ASSESSMENT      Hospital Problems as of 7/10/2017  Date Reviewed: 6/7/2017          Codes Class Noted - Resolved POA    Acute blood loss anemia ICD-10-CM: D62  ICD-9-CM: 285.1  7/10/2017 - Present No        * (Principal)Closed right hip fracture (HCC) ICD-10-CM: S72.001A  ICD-9-CM: 820.8  7/9/2017 - Present Yes        S/P TAVR (transcatheter aortic valve replacement) ICD-10-CM: Z95.2  ICD-9-CM: V43.3  3/29/2017 - Present Yes    Overview Signed 4/3/2017  8:30 PM by Mary Hdz MD     1. TAVR (3/28/17):  20 mm Luis S3 valve.               CKD (chronic kidney disease) stage 3, GFR 30-59 ml/min ICD-10-CM: N18.3  ICD-9-CM: 585.3  1/20/2017 - Present Yes                  Plan:  - R hip fx: repaired 7/9, post-op per ortho  - s/p TAVR: restarted plavix today. Monitor Hgb closely as much higher risk for bleeding given anti-platelet  - HTN: BP well controlled.  Continue norvasc, benazepril, lopressor  - CKD: Cr stable at baseline    DVT Prophylaxis: Lovenox  Dispo: likely STR at rehab, PT/OT following, PPD placed on admission    Bo Justin MD

## 2017-07-10 NOTE — PROGRESS NOTES
Problem: Nutrition Deficit  Goal: *Optimize nutritional status  Nutrition  Reason for assessment: Referral received from patient experience manager concerning daughter's request for nutrition supplement. Assessment:   Diet order(s): Regular  Food/Nutrition Patient History:  Daughter says she knows that protein is important for healing and she is concerned that pt is not getting enough protein. She reports that she encouraged pt to eat BBQ pork at lunch today and she ate 1/3 at best. Pt is diet controlled diabetic but does not use artifical sweeteners as they make her have a headache. Daughter thinks pt would like a milkshake type supplement as a nutritional supplement. Pt agreeable. Anthropometrics:Height: 5' 4\" (162.6 cm),  Weight: 65.8 kg (145 lb), Weight Source: Patient stated, Body mass index is 24.89 kg/(m^2). BMI class of normal weight. Macronutrient needs:  EER:  1504-7697 kcal /day (20-25 kcal/kg stated BW)  EPR:  65-82 grams protein/day (1.2-1.5 grams/kg IBW)  Intake/Comparative Standards:  Average intake for past 2 day(s)/1 recorded meal(s): 25%. This potentially meets ~42% of kcal and ~36% of protein needs     Nutrition Diagnosis: Inadequate oral intake r/t decreased ability to consume sufficient oral intake as evidenced by intake as above     Intervention:  Meals and snacks: Continue current diet. Nutrition Supplement Therapy: Ensure Enlive tid. Provided pt with bottle to drink this afternoon.       Noemí Sylvester, [address] [place] [address], [facility] [address] [place] [address]

## 2017-07-10 NOTE — PROGRESS NOTES
Care Management Interventions  PCP Verified by CM: Yes (Dr. Tim Miller)  Last Visit to PCP: 06/07/17  Mode of Transport at Discharge: BLS  Transition of Care Consult (CM Consult): Discharge Planning  Discharge Durable Medical Equipment: No  Physical Therapy Consult: Yes  Occupational Therapy Consult: Yes  Speech Therapy Consult: No  Current Support Network: Own Home, Lives Alone, Family Lives Nearby  Confirm Follow Up Transport: Family  Plan discussed with Pt/Family/Caregiver: Yes  Freedom of Choice Offered: Yes  Discharge Location  Discharge Placement: Rehab Unit Subacute ([facility])    Pt is an 81 yo female admitted for surgical repair of a hip fracture. Pt would benefit from STR services at discharge. Physiatrist has evaluated the pt and recommends subacute rehab in a SNF. SW met with pt/dtr to discuss choice of subacute facility. They requested referrals to, in order of preference, [facility] and [facility]. Referral submitted to [facility] requesting a bed for Tuesday. Awaiting a response. PPD placed 7/8/17. SW following to facilitate pt's transfer to rehab at discharge. 1649:  Pt has been accepted for admission to [facility] for Tuesday if she is medically cleared for discharge. SW notified pt/dtr of bed offer and plan and they are in agreement.

## 2017-07-10 NOTE — PROGRESS NOTES
Problem: Self Care Deficits Care Plan (Adult)  Goal: *Acute Goals and Plan of Care (Insert Text)  1. Patient will complete functional transfers with minimal assistance while maintaining WBAT status in RLE and with adaptive equipment as needed. 2. Patient will complete lower body bathing and dressing with minimal assistance and adaptive equipment as needed. 3. Patient will complete toileting and toilet transfer with minimal assistance. 4. Patient will tolerate 20 minutes of OT treatment with no rest breaks to increase activity tolerance for ADLs. 5. Patient will participate in at least 20 minutes of BUE therapeutic exercises to strengthen BUE for functional transfers. Timeframe: 7 visits       OCCUPATIONAL THERAPY: Initial Assessment 7/10/2017  INPATIENT: Hospital Day: 3  [place] / [place] / Product Type: Medicare /      NAME/AGE/GENDER: Taina Rojas is a 80 y.o. female   PRIMARY DIAGNOSIS:  Intra-Troch fracture  Closed right hip fracture (HCC) Closed right hip fracture (HCC) Closed right hip fracture (HCC)  Procedure(s) (LRB):  FEMUR INSERTION INTRA MEDULLARY NAIL RIGHT (Right)  1 Day Post-Op  ICD-10: Treatment Diagnosis:        · Pain in Right Hip (M25.551)  · Stiffness of Right Hip, Not elsewhere classified (M25.651)  · History of falling (Z91.81)   Precautions/Allergies:        WBAT RLE   Aspirin; Ativan [lorazepam]; Robaxin [methocarbamol]; Aleve [naproxen sodium]; Carafate [sucralfate]; Ciprofloxacin; Clorazepate dipotassium; Flexeril [cyclobenzaprine]; Vytorin 10-10 [ezetimibe-simvastatin]; and Zantac [ranitidine hcl]       ASSESSMENT:      Ms. Mak Patel is an 80year old female admitted after fall, found to have R hip fracture and is now s/p above procedure and WBAT in RLE. Patient lives alone at baseline and is typically independent with ADLs. She uses a cane outdoors only. She does not drive. Patient seated in chair upon arrival, agreeable to OT evaluation. Reports pain 6/10 in hip. BUE assessment reveals decreased  strength in L hand from previous injury. Required moderate assistance to stand from chair with rolling walker. Demonstrates poor standing balance with posterior lean and maximal cues for upright posture. Attempted to take steps but patient was unable. Patient is currently functioning below her baseline and would benefit from continued OT to increase independence and safety. Will follow. This section established at most recent assessment   PROBLEM LIST (Impairments causing functional limitations):  1. Decreased Strength  2. Decreased ADL/Functional Activities  3. Decreased Transfer Abilities  4. Decreased Ambulation Ability/Technique  5. Decreased Balance  6. Increased Pain  7. Decreased Activity Tolerance  8. Decreased Flexibility/Joint Mobility  9. Decreased Knowledge of Precautions    INTERVENTIONS PLANNED: (Benefits and precautions of occupational therapy have been discussed with the patient.)  1. Activities of daily living training  2. Adaptive equipment training  3. Group therapy  4. Therapeutic activity  5. Therapeutic exercise      TREATMENT PLAN: Frequency/Duration: Follow patient 6x/ week to address above goals. Rehabilitation Potential For Stated Goals: GOOD      RECOMMENDED REHABILITATION/EQUIPMENT: (at time of discharge pending progress): Continue Skilled Therapy. OCCUPATIONAL PROFILE AND HISTORY:   History of Present Injury/Illness (Reason for Referral):  Per H&P, \"19C with pmhx of AS s/p TAVR in April 2017 on plavix, htn, hlp, hypothyroidism presents after having excructiating pain in right hip after standing abruptly. Laid on the floor o/n till her family found her this afternoon. Has felt in her normal state of health last few weeks.  Of note, had urosepsis in May and spent 2 weeks at rehab following that admission at [facility]\"  Past Medical History/Comorbidities:   Ms. Suresh Adame  has a past medical history of Aortic regurgitation; Aortic stenosis; Basal cell carcinoma; Bruit (arterial); CAD (coronary artery disease); Choledocholithiasis; Hyperlipidemia; Hypertension; Hypothyroidism; Murmur, cardiac; Poor historian; S/P TAVR (transcatheter aortic valve replacement); Tricuspid valve regurgitation; Type II or unspecified type diabetes mellitus without mention of complication, not stated as uncontrolled; and Urinary incontinence. Ms. Viki Johnson  has a past surgical history that includes cystocele repair; rectocele repair; appendectomy; carpal tunnel release; ercp (02/20/2017); hysterectomy; oophorectomy; other surgical (Right); orthopaedic; knee arthroscopy (Right); ercp (04/28/2017); and other surgical (03/28/2017). Social History/Living Environment:   Home Environment: Private residence  One/Two Story Residence: One story  Living Alone: Yes  Support Systems: Child(mayo), Family member(s)  Patient Expects to be Discharged to[de-identified] Unknown  Current DME Used/Available at Home: Commode, bedside, Cane, straight, Walker, rolling  Tub or Shower Type: Shower  Prior Level of Function/Work/Activity:  Lives alone and is typically independent with ADLs. Uses a cane outdoors only. Does not drive. Number of Personal Factors/Comorbidities that affect the Plan of Care: Brief history (0):  LOW COMPLEXITY   ASSESSMENT OF OCCUPATIONAL PERFORMANCE[de-identified]   Activities of Daily Living:          Basic ADLs (From Assessment) Complex ADLs (From Assessment)   Basic ADL  Feeding: Setup  Oral Facial Hygiene/Grooming: Setup  Bathing: Maximum assistance  Upper Body Dressing: Setup  Lower Body Dressing: Maximum assistance  Toileting: Maximum assistance Instrumental ADL  Meal Preparation: Total assistance  Homemaking: Total assistance   Grooming/Bathing/Dressing Activities of Daily Living     Cognitive Retraining  Safety/Judgement: Awareness of environment; Fall prevention                       Bed/Mat Mobility  Supine to Sit: Moderate assistance;Assist x2  Sit to Supine: Moderate assistance;Assist x2  Sit to Stand: Moderate assistance  Bed to Chair: Moderate assistance;Assist x2  Scooting: Minimum assistance          Most Recent Physical Functioning:   Gross Assessment:  AROM: Generally decreased, functional (BUE)  Strength: Generally decreased, functional (BUE, decreased L  strength)               Posture:  Posture (WDL): Exceptions to WDL  Posture Assessment: Trunk flexion, Increased  Balance:  Sitting: Impaired  Sitting - Static: Fair (occasional)  Sitting - Dynamic: Fair (occasional)  Standing: Impaired  Standing - Static: Poor  Standing - Dynamic : Poor Bed Mobility:  Supine to Sit: Moderate assistance;Assist x2  Sit to Supine: Moderate assistance;Assist x2  Scooting: Minimum assistance  Wheelchair Mobility:     Transfers:  Sit to Stand: Moderate assistance  Stand to Sit: Moderate assistance  Bed to Chair: Moderate assistance;Assist x2                    Patient Vitals for the past 6 hrs:       BP BP Patient Position SpO2 O2 Flow Rate (L/min) Pulse   07/10/17 1147 125/62 Sitting 95 % 2 l/min 69        Mental Status  Neurologic State: Alert  Orientation Level: Oriented to person, Oriented to place, Oriented to situation  Cognition: Follows commands  Perception: Appears intact  Perseveration: No perseveration noted  Safety/Judgement: Awareness of environment, Fall prevention                               Physical Skills Involved:  1. Range of Motion  2. Balance  3. Strength  4. Activity Tolerance  5. Pain (acute) Cognitive Skills Affected (resulting in the inability to perform in a timely and safe manner):  1. None Psychosocial Skills Affected:  1. Habits/Routines  2. Environmental Adaptation  3. Emotional Regulation   Number of elements that affect the Plan of Care: 5+:  HIGH COMPLEXITY   CLINICAL DECISION MAKIN [facility] [address] [id number] AM-PAC 6 Clicks   Daily Activity Inpatient Short Form  How much help from another person does the patient currently need. ..  Total A Lot A Little None   1. Putting on and taking off regular lower body clothing?   [ ] 1   [X] 2   [ ] 3   [ ] 4   2. Bathing (including washing, rinsing, drying)? [ ] 1   [X] 2   [ ] 3   [ ] 4   3. Toileting, which includes using toilet, bedpan or urinal?   [ ] 1   [X] 2   [ ] 3   [ ] 4   4. Putting on and taking off regular upper body clothing?   [ ] 1   [ ] 2   [X] 3   [ ] 4   5. Taking care of personal grooming such as brushing teeth? [ ] 1   [ ] 2   [X] 3   [ ] 4   6. Eating meals? [ ] 1   [ ] 2   [X] 3   [ ] 4   © 2007, Trustees of [address] [place] [address] [address] [address], under license to [employer]. All rights reserved    Score:  Initial: 15 Most Recent: X (Date: -- )     Interpretation of Tool:  Represents activities that are increasingly more difficult (i.e. Bed mobility, Transfers, Gait). Score 24 23 22-20 19-15 14-10 9-7 6       Modifier CH CI CJ CK CL CM CN         · Self Care:               - CURRENT STATUS:    CK - 40%-59% impaired, limited or restricted               - GOAL STATUS:           CJ - 20%-39% impaired, limited or restricted               - D/C STATUS:                       ---------------To be determined---------------  [place] / [place] / Product Type: Medicare /       Medical Necessity:     · Patient demonstrates good rehab potential due to higher previous functional level. Reason for Services/Other Comments:  · Patient continues to require present interventions due to patient's inability to care for self at baseline level of function.    Use of outcome tool(s) and clinical judgement create a POC that gives a: MODERATE COMPLEXITY             TREATMENT:   (In addition to Assessment/Re-Assessment sessions the following treatments were rendered)      Pre-treatment Symptoms/Complaints:  None   Pain: Initial:   Pain Intensity 1: 6  Pain Location 1: Hip  Pain Intervention(s) 1: Repositioned  Post Session:  None       Assessment/Reassessment only, no treatment provided today     Braces/Orthotics/Lines/Etc:   · IV  · O2 Device: Nasal cannula  Treatment/Session Assessment:    · Response to Treatment:  Tolerated well  · Interdisciplinary Collaboration:  · Occupational Therapist  · Registered Nurse  · After treatment position/precautions:  · Up in chair  · Bed/Chair-wheels locked  · Call light within reach  · RN notified  · about to begin group therapy session with PTA  · Compliance with Program/Exercises: compliant all of the time. · Recommendations/Intent for next treatment session: \"Next visit will focus on advancements to more challenging activities and reduction in assistance provided\".   Total Treatment Duration:  OT Patient Time In/Time Out  Time In: 1055  Time Out: 6322 [facility] AMY MelendezR/L

## 2017-07-10 NOTE — PROGRESS NOTES
ORTH FRACTURE PROGRESS NOTE    July 10, 2017  Admit Date:   2017    Post Op day: 1 Day Post-Op    Subjective:    Aurora Oleary PATIENT IN BED     PT/OT:   Gait:  Gait  Base of Support: Center of gravity altered, Narrowed  Speed/Blanca: Slow, Shuffled  Step Length: Left shortened, Right shortened  Gait Abnormalities: Decreased step clearance, Shuffling gait, Step to gait  Ambulation - Level of Assistance: Minimal assistance  Distance (ft): 8 Feet (ft)  Assistive Device: Walker, rolling  Interventions: Safety awareness training, Tactile cues, Verbal cues            Interventions: Safety awareness training, Tactile cues, Verbal cues    Vital Signs:    Patient Vitals for the past 8 hrs:   BP Temp Pulse Resp SpO2   07/10/17 1447 128/68 98 °F (36.7 °C) 60 20 98 %   07/10/17 1147 125/62 98 °F (36.7 °C) 69 19 95 %     Temp (24hrs), Av.5 °F (36.9 °C), Min:98 °F (36.7 °C), Max:99.4 °F (37.4 °C)      Pain Control:   Pain Assessment  Pain Scale 1: Numeric (0 - 10)  Pain Intensity 1: 5  Pain Onset 1: post-op  Pain Location 1: Hip  Pain Orientation 1: Right  Pain Description 1: Aching  Pain Intervention(s) 1: Medication (see MAR)    Meds:    Current Facility-Administered Medications   Medication Dose Route Frequency    lactated Ringers infusion  75 mL/hr IntraVENous CONTINUOUS    sodium chloride (NS) flush 5-10 mL  5-10 mL IntraVENous Q8H    sodium chloride (NS) flush 5-10 mL  5-10 mL IntraVENous PRN    ondansetron (ZOFRAN) injection 4 mg  4 mg IntraVENous Q4H PRN    alum-mag hydroxide-simeth (MYLANTA) oral suspension 30 mL  30 mL Oral Q4H PRN    calcium-vitamin D (OS-ROB) 500 mg-200 unit tablet  1 Tab Oral TID WITH MEALS    acetaminophen (TYLENOL) tablet 650 mg  650 mg Oral Q8H    enoxaparin (LOVENOX) injection 30 mg  30 mg SubCUTAneous Q24H    PPD read  1 Each Other Q24H    clopidogrel (PLAVIX) tablet 75 mg  75 mg Oral DAILY    amLODIPine (NORVASC) tablet 10 mg  10 mg Oral DAILY    benazepril (LOTENSIN) tablet 20 mg  20 mg Oral DAILY    levothyroxine (SYNTHROID) tablet 100 mcg  100 mcg Oral ACB    metoprolol tartrate (LOPRESSOR) tablet 25 mg  25 mg Oral BID    0.9% sodium chloride infusion 2,000 mL  2,000 mL IntraVENous CONTINUOUS    traMADol (ULTRAM) tablet 50 mg  50 mg Oral Q6H PRN    oxyCODONE IR (ROXICODONE) tablet 5 mg  5 mg Oral Q4H PRN    HYDROmorphone (PF) (DILAUDID) injection 0.5 mg  0.5 mg IntraVENous Q4H PRN       LAB:    Recent Labs      07/10/17   0740   07/08/17   1450   HCT  25.8*   < >  34.9*   HGB  8.9*   < >  12.6   INR   --    --   1.0    < > = values in this interval not displayed. 24 Hour Assessment Issues:    Oriented    Discharge Planning: SNF    Transfuse PRBC's:      Assessment & Physician's Comment:  Dressing is clean, dry, and intact  Neurovascular checks within normal limits    Principal Problem:    Closed right hip fracture ([address] (7/9/2017)    Active Problems:    CKD (chronic kidney disease) stage 3, GFR 30-59 ml/min (1/20/2017)      S/P TAVR (transcatheter aortic valve replacement) (3/29/2017)      Overview: 1. TAVR (3/28/17):  20 mm Luis S3 valve.        Acute blood loss anemia (7/10/2017)        Plan:  [facility] [place]       Camille Ortiz MD

## 2017-07-10 NOTE — PROGRESS NOTES
Problem: Mobility Impaired (Adult and Pediatric)  Goal: *Acute Goals and Plan of Care (Insert Text)  STG:  (1.)Ms. Celso Sun will move from supine to sit and sit to supine , scoot up and down and roll side to side with MINIMAL ASSIST within 3 day(s). (2.)Ms. Celso Sun will transfer from bed to chair and chair to bed with MODERATE ASSIST using the least restrictive device within 3 day(s). (3.)Ms. Oleary will ambulate with MODERATE ASSIST for 15 feet with the least restrictive device within 3 day(s). (4.)Ms. Celso Sun will participate in therapeutic activity/exerices x 12 minutes for increased strength within 3 days. LTG:  (1.)Ms. Celso Sun will move from supine to sit and sit to supine , scoot up and down and roll side to side in bed with MODIFIED INDEPENDENCE within 7 day(s). (2.)Ms. Celso Sun will transfer from bed to chair and chair to bed with STAND BY ASSIST using the least restrictive device within 7 day(s). (3.)Ms. Oleary will ambulate with CONTACT GUARD ASSIST for 30+ feet with the least restrictive device within 7 day(s). (4.)Ms. Celso Sun will participate in therapeutic activity/exerices x 23 minutes for increased strength within 7 days. PHYSICAL THERAPY: Daily Note, Treatment Day: 1st and PM 7/10/2017  INPATIENT: Hospital Day: 3  [place] / [place] / Product Type: Medicare /    R BARON [facility]     NAME/AGE/GENDER: Shawn Robin is a 80 y.o. female   PRIMARY DIAGNOSIS: Intra-Troch fracture  Closed right hip fracture (HCC) Closed right hip fracture (HCC) Closed right hip fracture (HCC)  Procedure(s) (LRB):  FEMUR INSERTION INTRA MEDULLARY NAIL RIGHT (Right)  1 Day Post-Op  ICD-10: Treatment Diagnosis:       · Generalized Muscle Weakness (M62.81)  · Difficulty in walking, Not elsewhere classified (R26.2)  · Other abnormalities of gait and mobility (R26.89)   Precaution/Allergies:  Aspirin; Ativan [lorazepam]; Robaxin [methocarbamol]; Aleve [naproxen sodium];  Carafate [sucralfate]; Ciprofloxacin; Clorazepate dipotassium; Flexeril [cyclobenzaprine]; Vytorin 10-10 [ezetimibe-simvastatin]; and Zantac [ranitidine hcl]       ASSESSMENT:      Ms. Veronica Hernandez is a 80 y.o. female s/p above surgery who is now WBAT on RLE. Patient was sitting up in recliner chair upon contact and agreeable to PT. Patient able to transfer to standing with mod assist, additional time, and cues for proper technique/hand placement. Once standing patient requires increased time and cues to improve standing balance and posture. Patient was then able to perform gait training x 8' with use of rolling walker, min assist and below cues in gait section. Patient returns to EOB and to supine with mod assist x 2 and cues for proper technique. Overall good progress towards physical therapy goals as noted by reduced assistance needed for transfers and patients ability to perform gait training. No goals have been met thus far. Will continue efforts. This section established at most recent assessment   PROBLEM LIST (Impairments causing functional limitations):  1. Decreased Strength  2. Decreased ADL/Functional Activities  3. Decreased Transfer Abilities  4. Decreased Ambulation Ability/Technique  5. Decreased Balance  6. Increased Pain  7. Decreased Activity Tolerance    INTERVENTIONS PLANNED: (Benefits and precautions of physical therapy have been discussed with the patient.)  1. Balance Exercise  2. Bed Mobility  3. Family Education  4. Gait Training  5. Neuromuscular Re-education/Strengthening  6. Therapeutic Activites  7. Therapeutic Exercise/Strengthening  8. Transfer Training  9. Group Therapy      TREATMENT PLAN: Frequency/Duration: twice daily for duration of hospital stay  Rehabilitation Potential For Stated Goals: GOOD      RECOMMENDED REHABILITATION/EQUIPMENT: (at time of discharge pending progress): Continue Skilled Therapy.                    HISTORY:   History of Present Injury/Illness (Reason for Referral):  S/P FEMUR INSERTION INTRA MEDULLARY NAIL RIGHT (Right)  Past Medical History/Comorbidities:   Ms. Celso Sun  has a past medical history of Aortic regurgitation; Aortic stenosis; Basal cell carcinoma; Bruit (arterial); CAD (coronary artery disease); Choledocholithiasis; Hyperlipidemia; Hypertension; Hypothyroidism; Murmur, cardiac; Poor historian; S/P TAVR (transcatheter aortic valve replacement); Tricuspid valve regurgitation; Type II or unspecified type diabetes mellitus without mention of complication, not stated as uncontrolled; and Urinary incontinence. Ms. Celso Sun  has a past surgical history that includes cystocele repair; rectocele repair; appendectomy; carpal tunnel release; ercp (02/20/2017); hysterectomy; oophorectomy; other surgical (Right); orthopaedic; knee arthroscopy (Right); ercp (04/28/2017); and other surgical (03/28/2017). Social History/Living Environment:   Home Environment: Private residence  One/Two Story Residence: One story  Living Alone: Yes  Support Systems: Child(mayo), Family member(s)  Patient Expects to be Discharged to[de-identified] Rehabilitation facility  Current DME Used/Available at Home: Commode, bedside, Cane, straight, Walker, rolling  Tub or Shower Type: Shower  Prior Level of Function/Work/Activity:  Pt lives alone in a one story house and reports being independent with ADLs. She uses a cane for ambulation in community. Number of Personal Factors/Comorbidities that affect the Plan of Care:  DM II  Lives alone 1-2: MODERATE COMPLEXITY   EXAMINATION:   Most Recent Physical Functioning:   Gross Assessment:                  Posture:     Balance:  Sitting: Impaired  Sitting - Static: Fair (occasional)  Sitting - Dynamic: Fair (occasional)  Standing: Impaired  Standing - Static: Poor  Standing - Dynamic : Poor Bed Mobility:  Supine to Sit: Moderate assistance;Assist x2  Sit to Supine: Moderate assistance;Assist x2  Wheelchair Mobility:     Transfers:  Sit to Stand:  Moderate assistance  Stand to Sit: Moderate assistance  Bed to Chair: Moderate assistance;Assist x2  Gait:     Base of Support: Center of gravity altered;Narrowed  Speed/Blanca: Slow;Shuffled  Step Length: Left shortened;Right shortened  Gait Abnormalities: Decreased step clearance;Shuffling gait; Step to gait  Distance (ft): 8 Feet (ft)  Assistive Device: Walker, rolling  Ambulation - Level of Assistance: Minimal assistance  Interventions: Safety awareness training; Tactile cues; Verbal cues       Body Structures Involved:  1. Lungs  2. Bones  3. Joints  4. Muscles Body Functions Affected:  1. Sensory/Pain  2. Respiratory  3. Neuromusculoskeletal  4. Movement Related Activities and Participation Affected:  1. General Tasks and Demands  2. Mobility  3. Self Care  4. Domestic Life  5. Community, Social and [place]   Number of elements that affect the Plan of Care: 4+: HIGH COMPLEXITY   CLINICAL PRESENTATION:   Presentation: Evolving clinical presentation with changing clinical characteristics: MODERATE COMPLEXITY   CLINICAL DECISION MAKIN [facility] Mobility Inpatient Short Form  How much difficulty does the patient currently have. .. Unable A Lot A Little None   1. Turning over in bed (including adjusting bedclothes, sheets and blankets)? [ ] 1   [X] 2   [ ] 3   [ ] 4   2. Sitting down on and standing up from a chair with arms ( e.g., wheelchair, bedside commode, etc.)   [ ] 1   [X] 2   [ ] 3   [ ] 4   3. Moving from lying on back to sitting on the side of the bed? [ ] 1   [X] 2   [ ] 3   [ ] 4   How much help from another person does the patient currently need. .. Total A Lot A Little None   4. Moving to and from a bed to a chair (including a wheelchair)? [ ] 1   [X] 2   [ ] 3   [ ] 4   5. Need to walk in hospital room? [ ] 1   [X] 2   [ ] 3   [ ] 4   6. Climbing 3-5 steps with a railing? [ ] 1   [ ] 2   [ ] 3   [ ] 4   © , Trustees of [address] [address] [address] [address], under license to [employer].  All rights reserved    Score:  Initial: 11 Most Recent: X (Date: -- )     Interpretation of Tool:  Represents activities that are increasingly more difficult (i.e. Bed mobility, Transfers, Gait). Score 24 23 22-20 19-15 14-10 9-7 6       Modifier CH CI CJ CK CL CM CN         · Mobility - Walking and Moving Around:               - CURRENT STATUS:    CL - 60%-79% impaired, limited or restricted               - GOAL STATUS:           CK - 40%-59% impaired, limited or restricted               - D/C STATUS:                       ---------------To be determined---------------  [place] / Product Type: Medicare /       Medical Necessity:     · Patient demonstrates good rehab potential due to higher previous functional level. Reason for Services/Other Comments:  · Patient continues to require skilled intervention due to decreased functional mobility, balance, and ambulation. Use of outcome tool(s) and clinical judgement create a POC that gives a: Questionable prediction of patient's progress: MODERATE COMPLEXITY                 TREATMENT:   (In addition to Assessment/Re-Assessment sessions the following treatments were rendered)   Pre-treatment Symptoms/Complaints:  None  Pain: Initial:   Pain Intensity 1: 0  Post Session:  0/10 visually post treatment      Therapeutic Activity: (    15 Minutes): Therapeutic activities including bed mobility training, transfer training, static/dynamic standing balance training, posture training, scooting, instruction in proper walker manipulation, and patient education to improve mobility, strength and balance. Required moderate Safety awareness training; Tactile cues; Verbal cues to promote static and dynamic balance in standing, promote coordination of bilateral, lower extremity(s) and to promote improved technique with bed mobility, transfers, and walker manipulation/use.      Gait Training (  10 Minutes):  Gait training to improve and/or restore physical functioning as related to mobility, strength and balance. Ambulated 8 Feet (ft) with Minimal assistance using a Walker, rolling and moderate Safety awareness training; Tactile cues; Verbal cues related to their sequencing, walker manipulation, step length, weight shifts, posture, UE support on rolling walker, and foot placement to promote proper body alignment, promote proper body posture and promote proper body mechanics. Instruction in performance of the above deficits to correct overall gait quality and functional mobility. Group Therapeutic Exercise: (   Minutes):  Exercises per grid below to improve mobility, strength, balance and stiffness/soreness. Required moderate visual, verbal, manual and tactile cues to promote proper body alignment, promote proper body posture and promote proper body mechanics. Progressed range, repetitions and complexity of movement as indicated. Date:  7/10/17 Date:   Date:     ACTIVITY/EXERCISE AM PM AM PM AM PM   Ambulation:           Distance  Device  Duration         Seated Heel Raises x15B A        Seated Toe Raises x15B A        Seated Long Arc Quads x15B A        Seated Marching x15B  AA-R, A-L        Seated Hip Abduction x15B  AA-R., A-L                 B = bilateral; AA = active assistive; A = active; P = passive             Braces/Orthotics/Lines/Etc:   · IV  Treatment/Session Assessment:    · Response to Treatment:  See above  · Interdisciplinary Collaboration:  · Physical Therapy Assistant, Registered Nurse and Certified Nursing Assistant/Patient Care Technician  · After treatment position/precautions:  · Supine in bed, Bed alarm/tab alert on, Bed/Chair-wheels locked, Bed in low position, Call light within reach, RN notified and Family at bedside  · Compliance with Program/Exercises: Will assess as treatment progresses. · Recommendations/Intent for next treatment session:   \"Next visit will focus on advancements to more challenging activities and reduction in assistance provided\".   Total Treatment Duration:  PT Patient Time In/Time Out  Time In: 1307  Time Out: [address] [place] [facility] [place]

## 2017-07-10 NOTE — OP NOTES
Operative Report     Patient: Humera Mayberry MRN: [id number]  SSN: [id number]    YOB: 1929  Age: 80 y.o. Sex: female      Indications: Humera Mayberry was admitted to the hospital with a brief history of right intertrochanteric hip fracture. The patient now presents for ORIF. Date of Surgery: 7/9/2017     Preoperative Diagnosis:  Intra-Troch fracture    Postoperative Diagnosis: Right intertrochanteric femur fracture    Surgeon(s) and Role:     * Doroteo Wilkes MD - Primary     Anesthesia: Spinal    Procedures: Procedure(s): FEMUR INSERTION INTRA MEDULLARY NAIL RIGHT       Procedure in Detail:  The patient was brought to the operative suite and placed in supine position. After adequate anesthesia was achieved, the patient was placed upon the fracture table. Peroneal post and foot holders were well padded. The patient underwent a closed reduction of the right intertrochanteric hip fracture. This was achieved by longitudinal traction, internal rotation, and adduction. AP and lateral fluoroscopic images confirmed the fracture was now reduced anatomically. right hip was then prepped and draped in the usual sterile fashion. A 3 cm incision was made over the tip of the greater trochanter. Hemostasis was achieved with Bovie cautery. Guide pin for a [employer]ynthes trochanteric fixation nail was inserted through the tip of the trochanter, across the fracture site, and into the canal of the femur. Its position was confirmed by fluoroscopy. The proximal reamer was then passed by hand over this guide pin in order to open up a proximal portal.  Guide pin and reamer were removed a ball-tip guide wire was inserted through this portal, across the fracture site, and down to the epiphyseal scar of the knee. The position of the guide wire was confirmed by AP and lateral fluoroscopic images. The 11.5 mm reamer was passed as a sound, and reaming was only performed by power if necessary.   The [employer] trochanteric fixation nail was then passed over the guide wire without difficulty. The guide wire was removed and the targeting guide was inserted through a stab wound in the lateral aspect of the proximal femur. Guide pin was then inserted through the lateral cortex into the neck and head. It stopped just short of the subchondral bone. AP and lateral fluoroscopic images confirmed that the position of the guide pin was centered in the head. Depth gauge was used to determine the appropriate length helical blade. The reamers were passed over the guide pin in order to open up the lateral cortex neck and head. The appropriate length helical blade was then passed over the guide wire without difficulty. The set screw was passed from above with a spring wrench. Traction was removed. The position of the helical blade was confirmed by fluoroscopy. The fracture was then compressed to a stable position, using the proximal targeting guide. At this point, the targeting guides were removed. AP and lateral fluoroscopic images confirmed the fracture was reduced anatomically. Wounds were then irrigated with normal saline and closed in layers. Sterile, compressive dressings were applied. The patient was then removed from the fracture table and transferred to the postanesthesia care unit, alert and oriented, under the care of anesthesia. Estimated Blood Loss: 100 CC    Specimens: * No specimens in log *      Implants:   Implant Name Type Inv.  Item Serial No.  Lot No. LRB No. Used Action   NAIL TIMMY 130D [id number] RT -- TFNA STRL - [id number]  NAIL TIMMY 130D [id number] RT -- TFNA [facility] [id number] Right 1 Implanted   BLADE HELCL TFNA 95MM STRL --  - [id number]   BLADE HELCL TFNA 95MM STRL --    [employer] [place] [id number] Right 1 Implanted       Tourniquet Time: * No tourniquets in log *     Closure: Primary    Complications: None     Signed By: Jeremy Salinas MD     July 9, 2017

## 2017-07-10 NOTE — PROGRESS NOTES
Problem: Interdisciplinary Rounds  Goal: Interdisciplinary Rounds  Outcome: Progressing Towards Goal  Interdisciplinary team rounds were held 7/10/2017 with the following team members:Care Management, Occupational Therapy, Physician and . Plan of care discussed. See clinical pathway and/or care plan for interventions and desired outcomes.

## 2017-07-11 VITALS
WEIGHT: 145 LBS | HEIGHT: 64 IN | HEART RATE: 77 BPM | TEMPERATURE: 99 F | RESPIRATION RATE: 20 BRPM | OXYGEN SATURATION: 93 % | SYSTOLIC BLOOD PRESSURE: 125 MMHG | BODY MASS INDEX: 24.75 KG/M2 | DIASTOLIC BLOOD PRESSURE: 49 MMHG

## 2017-07-11 LAB
25(OH)D3+25(OH)D2 SERPL-MCNC: 12.7 NG/ML (ref 30–100)
BASOPHILS # BLD AUTO: 0 K/UL (ref 0–0.2)
BASOPHILS # BLD: 0 % (ref 0–2)
DIFFERENTIAL METHOD BLD: ABNORMAL
EOSINOPHIL # BLD: 0 K/UL (ref 0–0.8)
EOSINOPHIL NFR BLD: 1 % (ref 0.5–7.8)
ERYTHROCYTE [DISTWIDTH] IN BLOOD BY AUTOMATED COUNT: 14 % (ref 11.9–14.6)
HCT VFR BLD AUTO: 23.2 % (ref 35.8–46.3)
HGB BLD-MCNC: 8.1 G/DL (ref 11.7–15.4)
IMM GRANULOCYTES # BLD: 0 K/UL (ref 0–0.5)
IMM GRANULOCYTES NFR BLD AUTO: 0.2 % (ref 0–5)
LYMPHOCYTES # BLD AUTO: 28 % (ref 13–44)
LYMPHOCYTES # BLD: 1.8 K/UL (ref 0.5–4.6)
MCH RBC QN AUTO: 29.6 PG (ref 26.1–32.9)
MCHC RBC AUTO-ENTMCNC: 34.9 G/DL (ref 31.4–35)
MCV RBC AUTO: 84.7 FL (ref 79.6–97.8)
MONOCYTES # BLD: 0.5 K/UL (ref 0.1–1.3)
MONOCYTES NFR BLD AUTO: 8 % (ref 4–12)
NEUTS SEG # BLD: 4.1 K/UL (ref 1.7–8.2)
NEUTS SEG NFR BLD AUTO: 63 % (ref 43–78)
PLATELET # BLD AUTO: 103 K/UL (ref 150–450)
PMV BLD AUTO: 10.7 FL (ref 10.8–14.1)
RBC # BLD AUTO: 2.74 M/UL (ref 4.05–5.25)
WBC # BLD AUTO: 6.5 K/UL (ref 4.3–11.1)

## 2017-07-11 PROCEDURE — 97150 GROUP THERAPEUTIC PROCEDURES: CPT

## 2017-07-11 PROCEDURE — 74011250637 HC RX REV CODE- 250/637: Performed by: ORTHOPAEDIC SURGERY

## 2017-07-11 PROCEDURE — 74011250637 HC RX REV CODE- 250/637: Performed by: INTERNAL MEDICINE

## 2017-07-11 PROCEDURE — 85025 COMPLETE CBC W/AUTO DIFF WBC: CPT | Performed by: ORTHOPAEDIC SURGERY

## 2017-07-11 PROCEDURE — 36415 COLL VENOUS BLD VENIPUNCTURE: CPT | Performed by: ORTHOPAEDIC SURGERY

## 2017-07-11 PROCEDURE — 97116 GAIT TRAINING THERAPY: CPT

## 2017-07-11 RX ORDER — OXYCODONE HYDROCHLORIDE 5 MG/1
5 TABLET ORAL
Qty: 20 TAB | Refills: 0 | Status: SHIPPED
Start: 2017-07-11 | End: 2017-07-11

## 2017-07-11 RX ORDER — TRAMADOL HYDROCHLORIDE 50 MG/1
50 TABLET ORAL
Qty: 10 TAB | Refills: 0 | Status: SHIPPED | OUTPATIENT
Start: 2017-07-11 | End: 2017-08-04 | Stop reason: SDUPTHER

## 2017-07-11 RX ORDER — ENOXAPARIN SODIUM 100 MG/ML
30 INJECTION SUBCUTANEOUS EVERY 24 HOURS
Qty: 8.4 ML | Refills: 0 | Status: SHIPPED
Start: 2017-07-11 | End: 2017-08-08

## 2017-07-11 RX ORDER — FERROUS SULFATE, DRIED 160(50) MG
1 TABLET, EXTENDED RELEASE ORAL
Qty: 90 TAB | Refills: 1 | Status: ON HOLD
Start: 2017-07-11 | End: 2018-03-22

## 2017-07-11 RX ORDER — OXYCODONE HYDROCHLORIDE 5 MG/1
5 TABLET ORAL
Qty: 20 TAB | Refills: 0 | Status: SHIPPED | OUTPATIENT
Start: 2017-07-11 | End: 2017-08-04

## 2017-07-11 RX ADMIN — OXYCODONE HYDROCHLORIDE 5 MG: 5 TABLET ORAL at 09:26

## 2017-07-11 RX ADMIN — Medication 10 ML: at 05:23

## 2017-07-11 RX ADMIN — AMLODIPINE BESYLATE 10 MG: 10 TABLET ORAL at 08:42

## 2017-07-11 RX ADMIN — OYSTER SHELL CALCIUM WITH VITAMIN D 1 TABLET: 500; 200 TABLET, FILM COATED ORAL at 08:42

## 2017-07-11 RX ADMIN — LEVOTHYROXINE SODIUM 100 MCG: 100 TABLET ORAL at 05:23

## 2017-07-11 RX ADMIN — OXYCODONE HYDROCHLORIDE 5 MG: 5 TABLET ORAL at 05:23

## 2017-07-11 RX ADMIN — METOPROLOL TARTRATE 25 MG: 25 TABLET ORAL at 08:42

## 2017-07-11 RX ADMIN — CLOPIDOGREL BISULFATE 75 MG: 75 TABLET ORAL at 08:42

## 2017-07-11 RX ADMIN — BENAZEPRIL HYDROCHLORIDE 20 MG: 10 TABLET, FILM COATED ORAL at 08:42

## 2017-07-11 RX ADMIN — ACETAMINOPHEN 650 MG: 325 TABLET ORAL at 05:22

## 2017-07-11 NOTE — DISCHARGE SUMMARY
Hospitalist Discharge Summary     Patient ID:  Isabel Cohen  [id number]  17 y.o.  3/20/1929  Admit date: 7/8/2017  2:24 PM  Discharge date and time: 7/11/2017  Attending: Jenna Hale DO  PCP:  Rosemarie Day MD  Treatment Team: Attending Provider: Jenna Hale DO; Consulting Provider: Sandralee Favre, MD; Consulting Provider: Anthony Chavez MD; Care Manager: Leno Cast LMSW    Principal Diagnosis Closed right hip fracture [facility])   Hospital Problems as of 7/11/2017  Date Reviewed: 6/7/2017          Codes Class Noted - Resolved POA    Acute blood loss anemia ICD-10-CM: D62  ICD-9-CM: 285.1  7/10/2017 - Present No        * (Principal)Closed right hip fracture ([address] ICD-10-CM: S72.001A  ICD-9-CM: 820.8  7/9/2017 - Present Yes        S/P TAVR (transcatheter aortic valve replacement) ICD-10-CM: Z95.2  ICD-9-CM: V43.3  3/29/2017 - Present Yes    Overview Signed 4/3/2017  8:30 PM by Charlene Damon MD     1. TAVR (3/28/17):  20 mm Luis S3 valve. CKD (chronic kidney disease) stage 3, GFR 30-59 ml/min ICD-10-CM: N18.3  ICD-9-CM: 585.3  1/20/2017 - Present Yes              HPI: 88F with pmhx of AS s/p TAVR in April 2017 on plavix, htn, hlp, hypothyroidism presents after having excructiating pain in right hip after standing abruptly. Laid on the floor o/n till her family found her this afternoon. Has felt in her normal state of health last few weeks. Of note, had urosepsis in May and spent 2 weeks at rehab following that admission at [facility] Course: Admitted on 7/8 with R hip fx. Repaired on 7/9. Because of her recent TAVR, held her plavix once ok'd by cardiology. Restart plavix on POD1. Has had a drop in Hgb, but has not required blood transfusions while inpatient. Doing well post-op. Will discharge to [facility] today.     Significant Diagnostic Studies:   Labs: Results:       Chemistry Recent Labs      07/10/17   0740  07/09/17   0759  07/08/17   1450   GLU  177*  166*  202*   NA 140  142  140   K  4.0  4.1  4.1   CL  106  110*  107   CO2  26  24  24   BUN  16  19  24*   CREA  0.82  0.84  0.96   CA  8.0*  8.0*  9.1  8.8   AGAP  8  8  9   AP   --    --   128   TP   --    --   7.7   ALB   --    --   3.3   GLOB   --    --   4.4*   AGRAT   --    --   0.8*      CBC w/Diff Recent Labs      07/11/17   0804  07/10/17   0740  07/09/17   0759  07/08/17   1450   WBC  6.5  6.7  7.7  10.0   RBC  2.74*  3.02*  3.73*  4.23   HGB  8.1*  8.9*  10.9*  12.6   HCT  23.2*  25.8*  31.6*  34.9*   PLT  103*  102*  140*  161   GRANS  63  54   --   79*   LYMPH  28  33   --   15   EOS  1  1   --   0*      Cardiac Enzymes Recent Labs      07/08/17   1450   CPK  1317*      Coagulation Recent Labs      07/08/17   1450   PTP  10.9   INR  1.0   APTT  26.9       Lipid Panel Lab Results   Component Value Date/Time    Cholesterol, total 109 03/29/2017 03:30 AM    HDL Cholesterol 37 03/29/2017 03:30 AM    LDL, calculated 45.6 03/29/2017 03:30 AM    VLDL, calculated 26.4 03/29/2017 03:30 AM    Triglyceride 132 03/29/2017 03:30 AM    CHOL/HDL Ratio 2.9 03/29/2017 03:30 AM      BNP No results for input(s): BNPP in the last 72 hours.    Liver Enzymes Recent Labs      07/08/17   1450   TP  7.7   ALB  3.3   AP  128   SGOT  47*      Thyroid Studies Lab Results   Component Value Date/Time    TSH 0.433 10/13/2016 09:46 AM            Discharge Exam:  Visit Vitals    /49    Pulse 77    Temp 99 °F (37.2 °C)    Resp 20    Ht 5' 4\" (1.626 m)    Wt 65.8 kg (145 lb)    SpO2 93%    BMI 24.89 kg/m2     General appearance: alert, cooperative, NAD  Lungs: clear to auscultation bilaterally  Heart: regular rate and rhythm  Abdomen: soft, NTTP, +BS  Extremities: no edema, mild tenderness of R hip  Neurologic: Grossly normal    Disposition: rehab  Discharge Condition: stable  Patient Instructions:   Current Discharge Medication List      START taking these medications    Details   calcium-vitamin D (OYSTER SHELL) 500 mg(1,250mg) -200 unit per tablet Take 1 Tab by mouth three (3) times daily (with meals). Qty: 90 Tab, Refills: 1      enoxaparin (LOVENOX) 30 mg/0.3 mL injection 0.3 mL by SubCUTAneous route every twenty-four (24) hours for 28 days. Qty: 8.4 mL, Refills: 0      oxyCODONE IR (ROXICODONE) 5 mg immediate release tablet Take 1 Tab by mouth every four (4) hours as needed. Max Daily Amount: 30 mg.  Qty: 20 Tab, Refills: 0         CONTINUE these medications which have CHANGED    Details   traMADol (ULTRAM) 50 mg tablet Take 1 Tab by mouth every six (6) hours as needed for Pain. Qty: 10 Tab, Refills: 0    Associated Diagnoses: Left hip pain; Neck pain         CONTINUE these medications which have NOT CHANGED    Details   clopidogrel (PLAVIX) 75 mg tab Take 1 Tab by mouth daily. Qty: 90 Tab, Refills: 5    Associated Diagnoses: Coronary artery disease due to lipid rich plaque; Essential hypertension, benign; Aorta disorder (HCC)      amLODIPine (NORVASC) 10 mg tablet Take 1 Tab by mouth daily. Qty: 90 Tab, Refills: 3      levothyroxine (SYNTHROID) 100 mcg tablet Take 1 Tab by mouth Daily (before breakfast). Qty: 90 Tab, Refills: 1    Associated Diagnoses: Hypothyroidism due to acquired atrophy of thyroid      benazepril (LOTENSIN) 20 mg tablet Take 1 Tab by mouth daily. Qty: 90 Tab, Refills: 1    Associated Diagnoses: Essential hypertension, benign; Coronary artery disease due to lipid rich plaque      metoprolol tartrate (LOPRESSOR) 25 mg tablet Take 1 Tab by mouth two (2) times a day. Qty: 180 Tab, Refills: 3    Associated Diagnoses: Essential hypertension, benign; Coronary artery disease due to lipid rich plaque      rosuvastatin (CRESTOR) 20 mg tablet Take 1 Tab by mouth nightly. Qty: 30 Tab, Refills: 5    Associated Diagnoses: Mixed hyperlipidemia; Coronary artery disease due to lipid rich plaque      nitroglycerin (NITROSTAT) 0.4 mg SL tablet 1 Tab by SubLINGual route every five (5) minutes as needed.   Qty: 25 Tab, Refills: 11 Associated Diagnoses: Coronary artery disease due to lipid rich plaque; Aortic valve stenosis, unspecified etiology; Aorta disorder (HCC)      ASCORBIC ACID/VITAMIN E (CRANBERRY CONCENTRATE PO) Take  by mouth daily.     Associated Diagnoses: Coronary artery disease due to lipid rich plaque             Activity: PT/OT Eval and Treat  Diet: Regular Diet  Wound Care: Keep wound clean and dry and Reinforce dressing PRN    Follow-up  -   PCP in 1-2 weeks  -   Ortho as scheduled    Signed:  Fina Martinez MD  7/11/2017  9:50 AM

## 2017-07-11 NOTE — PROGRESS NOTES
TRANSFER - OUT REPORT:    Verbal report given to Nivia Osler RN on [employer]  being transferred to [facility] for routine progression of care       Report consisted of patients Situation, Background, Assessment and   Recommendations(SBAR). Information from the following report(s) SBAR, Kardex, OR Summary, Intake/Output, MAR and Recent Results was reviewed with the receiving nurse. Opportunity for questions and clarification was provided.

## 2017-07-11 NOTE — PROGRESS NOTES
Problem: Self Care Deficits Care Plan (Adult)  Goal: *Acute Goals and Plan of Care (Insert Text)  1. Patient will complete functional transfers with minimal assistance while maintaining WBAT status in RLE and with adaptive equipment as needed. 2. Patient will complete lower body bathing and dressing with minimal assistance and adaptive equipment as needed. 3. Patient will complete toileting and toilet transfer with minimal assistance. 4. Patient will tolerate 20 minutes of OT treatment with no rest breaks to increase activity tolerance for ADLs. 5. Patient will participate in at least 20 minutes of BUE therapeutic exercises to strengthen BUE for functional transfers. Timeframe: 7 visits       OCCUPATIONAL THERAPY: Daily Note, Treatment Day: 1st and AM    7/11/2017  INPATIENT: Hospital Day: 4  [place] / Product Type: Medicare /      NAME/AGE/GENDER: Mainor Christopher is a 80 y.o. female   PRIMARY DIAGNOSIS:  Intra-Troch fracture  Closed right hip fracture (HCC) Closed right hip fracture (HCC) Closed right hip fracture (HCC)  Procedure(s) (LRB):  FEMUR INSERTION INTRA MEDULLARY NAIL RIGHT (Right)  2 Days Post-Op  ICD-10: Treatment Diagnosis:        · Pain in Right Hip (M25.551)  · Stiffness of Right Hip, Not elsewhere classified (M25.651)  · History of falling (Z91.81)   Precautions/Allergies:        WBAT RLE   Aspirin; Ativan [lorazepam]; Robaxin [methocarbamol]; Aleve [naproxen sodium]; Carafate [sucralfate]; Ciprofloxacin; Clorazepate dipotassium; Flexeril [cyclobenzaprine]; Vytorin 10-10 [ezetimibe-simvastatin]; and Zantac [ranitidine hcl]       ASSESSMENT:      Ms. Sumit Shukla is an 80year old female admitted after fall, found to have R hip fracture and is now s/p above procedure and WBAT in RLE. Patient lives alone at baseline and is typically independent with ADLs. She uses a cane outdoors only. She does not drive.  7/11/2017 Pt was transferred down to the gym in her recliner chair to participate in group exercises to increase strength for mobility and ADL's. Pt required visual, verbal, and manual cues to complete exercises with proper form. Pt was taken back to her room post treatment by the PTA. Pt participated well and will continue to benefit from OT to increase progression toward above goals. This section established at most recent assessment   PROBLEM LIST (Impairments causing functional limitations):  1. Decreased Strength  2. Decreased ADL/Functional Activities  3. Decreased Transfer Abilities  4. Decreased Ambulation Ability/Technique  5. Decreased Balance  6. Increased Pain  7. Decreased Activity Tolerance  8. Decreased Flexibility/Joint Mobility  9. Decreased Knowledge of Precautions    INTERVENTIONS PLANNED: (Benefits and precautions of occupational therapy have been discussed with the patient.)  1. Activities of daily living training  2. Adaptive equipment training  3. Group therapy  4. Therapeutic activity  5. Therapeutic exercise      TREATMENT PLAN: Frequency/Duration: Follow patient 6x/ week to address above goals. Rehabilitation Potential For Stated Goals: GOOD      RECOMMENDED REHABILITATION/EQUIPMENT: (at time of discharge pending progress): Continue Skilled Therapy. OCCUPATIONAL PROFILE AND HISTORY:   History of Present Injury/Illness (Reason for Referral):  Per H&P, \"01H with pmhx of AS s/p TAVR in April 2017 on plavix, htn, hlp, hypothyroidism presents after having excructiating pain in right hip after standing abruptly. Laid on the floor o/n till her family found her this afternoon. Has felt in her normal state of health last few weeks. Of note, had urosepsis in May and spent 2 weeks at rehab following that admission at [facility]\"  Past Medical History/Comorbidities:   Ms. Betito Short  has a past medical history of Aortic regurgitation; Aortic stenosis;  Basal cell carcinoma; Bruit (arterial); CAD (coronary artery disease); Choledocholithiasis; Hyperlipidemia; Hypertension; Hypothyroidism; Murmur, cardiac; Poor historian; S/P TAVR (transcatheter aortic valve replacement); Tricuspid valve regurgitation; Type II or unspecified type diabetes mellitus without mention of complication, not stated as uncontrolled; and Urinary incontinence. Ms. Mak Patel  has a past surgical history that includes cystocele repair; rectocele repair; appendectomy; carpal tunnel release; ercp (02/20/2017); hysterectomy; oophorectomy; other surgical (Right); orthopaedic; knee arthroscopy (Right); ercp (04/28/2017); and other surgical (03/28/2017). Social History/Living Environment:   Home Environment: Private residence  One/Two Story Residence: One story  Living Alone: Yes  Support Systems: Child(mayo), Family member(s)  Patient Expects to be Discharged to[de-identified] Unknown  Current DME Used/Available at Home: Commode, bedside, Cane, straight, Walker, rolling  Tub or Shower Type: Shower  Prior Level of Function/Work/Activity:  Lives alone and is typically independent with ADLs. Uses a cane outdoors only. Does not drive. Number of Personal Factors/Comorbidities that affect the Plan of Care: Brief history (0):  LOW COMPLEXITY   ASSESSMENT OF OCCUPATIONAL PERFORMANCE[de-identified]   Activities of Daily Living:          Basic ADLs (From Assessment) Complex ADLs (From Assessment)   Basic ADL  Feeding: Setup  Oral Facial Hygiene/Grooming: Setup  Bathing: Maximum assistance  Upper Body Dressing: Setup  Lower Body Dressing: Maximum assistance  Toileting: Maximum assistance Instrumental ADL  Meal Preparation: Total assistance  Homemaking: Total assistance   Grooming/Bathing/Dressing Activities of Daily Living                             Bed/Mat Mobility  Supine to Sit: Moderate assistance; Additional time  Sit to Supine:  (NT)  Sit to Stand: Moderate assistance; Additional time          Most Recent Physical Functioning:   Gross Assessment:                  Posture:  Posture (WDL): Exceptions to WDL  Posture Assessment: Trunk flexion, Increased  Balance:  Sitting: Impaired  Sitting - Static: Fair (occasional)  Sitting - Dynamic: Fair (occasional)  Standing: Impaired  Standing - Static: Poor  Standing - Dynamic : Poor Bed Mobility:  Supine to Sit: Moderate assistance; Additional time  Sit to Supine:  (NT)  Wheelchair Mobility:     Transfers:  Sit to Stand: Moderate assistance; Additional time  Stand to Sit: Minimum assistance                    Patient Vitals for the past 6 hrs:   BP SpO2 Pulse   17 0733 125/49 93 % 77        Mental Status  Neurologic State: Alert  Orientation Level: Oriented X4  Cognition: Follows commands  Perception: Appears intact  Perseveration: No perseveration noted  Safety/Judgement: Awareness of environment, Fall prevention                               Physical Skills Involved:  1. Range of Motion  2. Balance  3. Strength  4. Activity Tolerance  5. Pain (acute) Cognitive Skills Affected (resulting in the inability to perform in a timely and safe manner):  1. None Psychosocial Skills Affected:  1. Habits/Routines  2. Environmental Adaptation  3. Emotional Regulation   Number of elements that affect the Plan of Care: 5+:  HIGH COMPLEXITY   CLINICAL DECISION MAKIN [facility] [address] [id number] AM-PAC 6 Clicks   Daily Activity Inpatient Short Form  How much help from another person does the patient currently need. .. Total A Lot A Little None   1. Putting on and taking off regular lower body clothing?   [ ] 1   [X] 2   [ ] 3   [ ] 4   2. Bathing (including washing, rinsing, drying)? [ ] 1   [X] 2   [ ] 3   [ ] 4   3. Toileting, which includes using toilet, bedpan or urinal?   [ ] 1   [X] 2   [ ] 3   [ ] 4   4. Putting on and taking off regular upper body clothing?   [ ] 1   [ ] 2   [X] 3   [ ] 4   5. Taking care of personal grooming such as brushing teeth? [ ] 1   [ ] 2   [X] 3   [ ] 4   6. Eating meals?    [ ] 1   [ ] 2   [X] 3   [ ] 4   © , Trustees of [employer] [employer] [facility], under license to [employer]. All rights reserved    Score:  Initial: 15 Most Recent: X (Date: -- )     Interpretation of Tool:  Represents activities that are increasingly more difficult (i.e. Bed mobility, Transfers, Gait). Score 24 23 22-20 19-15 14-10 9-7 6       Modifier CH CI CJ CK CL CM CN         · Self Care:               - CURRENT STATUS:    CK - 40%-59% impaired, limited or restricted               - GOAL STATUS:           CJ - 20%-39% impaired, limited or restricted               - D/C STATUS:                       ---------------To be determined---------------  [place] / Product Type: Medicare /       Medical Necessity:     · Patient demonstrates good rehab potential due to higher previous functional level. Reason for Services/Other Comments:  · Patient continues to require present interventions due to patient's inability to care for self at baseline level of function. Use of outcome tool(s) and clinical judgement create a POC that gives a: MODERATE COMPLEXITY             TREATMENT:   (In addition to Assessment/Re-Assessment sessions the following treatments were rendered)      Pre-treatment Symptoms/Complaints:  None   Pain: Initial:   Pain Intensity 1: 0  Post Session:  None       Group Therapeutic Exercise: (10 minutes):  Exercises per grid below to improve mobility and strength. Required minimal visual, verbal and tactile cues to promote proper body mechanics. Progressed range and repetitions as indicated.    Date:  7/11/17 Date:   Date:     Activity/Exercise Parameters Parameters Parameters   Shoulder flexion/extension 20 reps with yellow theraband     Shoulder horizontal add/abb 20 reps with yellow theraband     Punches 20 reps with yellow theraband     Elbow flexion/extension 20 reps with yellow theraband     Tricep extension      Balloon tapping      Ball toss                 Braces/Orthotics/Lines/Etc: · IV  · O2 Device: Nasal cannula  Treatment/Session Assessment:    · Response to Treatment:  Tolerated well  · Interdisciplinary Collaboration:  · Certified Occupational Therapy Assistant and Registered Nurse  · After treatment position/precautions:  · Up in chair, RN notified and back to room by the PTA  · Compliance with Program/Exercises: compliant all of the time. · Recommendations/Intent for next treatment session: \"Next visit will focus on advancements to more challenging activities and reduction in assistance provided\".   Total Treatment Duration:OT Patient Time In/Time Out  Time In: 1050  Time Out: 1 [facility]

## 2017-07-11 NOTE — PROGRESS NOTES
Pt is medically cleared for discharge today and will transfer to [facility] for STR services. Orders have been faxed to the facility and originals will be forwarded to facility via [facility] transport staff. YOANA spoke with pt/dtr, who was at the bedside, to notify them of pt's discharge and transport time.

## 2017-07-11 NOTE — PROGRESS NOTES
Problem: Mobility Impaired (Adult and Pediatric)  Goal: *Acute Goals and Plan of Care (Insert Text)  STG:  (1.)Ms. Linda Swanson will move from supine to sit and sit to supine , scoot up and down and roll side to side with MINIMAL ASSIST within 3 day(s). (2.)Ms. Linda Swanson will transfer from bed to chair and chair to bed with MODERATE ASSIST using the least restrictive device within 3 day(s). (3.)Ms. Oleary will ambulate with MODERATE ASSIST for 15 feet with the least restrictive device within 3 day(s). (4.)Ms. Linda Swanson will participate in therapeutic activity/exerices x 12 minutes for increased strength within 3 days. LTG:  (1.)Ms. Linda Swanson will move from supine to sit and sit to supine , scoot up and down and roll side to side in bed with MODIFIED INDEPENDENCE within 7 day(s). (2.)Ms. Linda Swanson will transfer from bed to chair and chair to bed with STAND BY ASSIST using the least restrictive device within 7 day(s). (3.)Ms. Oleary will ambulate with CONTACT GUARD ASSIST for 30+ feet with the least restrictive device within 7 day(s). (4.)Ms. Linda Swanson will participate in therapeutic activity/exerices x 23 minutes for increased strength within 7 days. PHYSICAL THERAPY: Daily Note, Treatment Day: 2nd and AM 7/11/2017  INPATIENT: Hospital Day: 4  [place] / Product Type: Medicare /    [facility]     NAME/AGE/GENDER: Claudia Lewis is a 80 y.o. female   PRIMARY DIAGNOSIS: Intra-Troch fracture  Closed right hip fracture (HCC) Closed right hip fracture (HCC) Closed right hip fracture (HCC)  Procedure(s) (LRB):  FEMUR INSERTION INTRA MEDULLARY NAIL RIGHT (Right)  2 Days Post-Op  ICD-10: Treatment Diagnosis:       · Generalized Muscle Weakness (M62.81)  · Difficulty in walking, Not elsewhere classified (R26.2)  · Other abnormalities of gait and mobility (R26.89)   Precaution/Allergies:  Aspirin; Ativan [lorazepam]; Robaxin [methocarbamol]; Aleve [naproxen sodium];  Carafate [sucralfate]; Ciprofloxacin; Clorazepate dipotassium; Flexeril [cyclobenzaprine]; Vytorin 10-10 [ezetimibe-simvastatin]; and Zantac [ranitidine hcl]       ASSESSMENT:      Ms. Mi Rothman is a 80 y.o. female s/p above surgery who is now WBAT on RLE. Patient was supine upon contact and agreeable to PT. Patient able to perform bed mobility and transfer to standing with mod assist, additional time, and cues for proper technique/hand placement. Once standing patient requires increased time and cues to improve standing balance and posture. Patient was then able to perform gait training x 12' with use of rolling walker, min assist and below cues in gait section. Patient was later rolled to therapy gym to participate in group therapeutic strengthening exercises to improve functional strength for transfers, gait and overall mobility. Patient requires cues and assistance to perform exercises correctly. Overall good progress towards physical therapy goals as noted by reduced assistance needed for bed mobility and patients increased gait distance. No goals have been met thus far. Will continue efforts. This section established at most recent assessment   PROBLEM LIST (Impairments causing functional limitations):  1. Decreased Strength  2. Decreased ADL/Functional Activities  3. Decreased Transfer Abilities  4. Decreased Ambulation Ability/Technique  5. Decreased Balance  6. Increased Pain  7. Decreased Activity Tolerance    INTERVENTIONS PLANNED: (Benefits and precautions of physical therapy have been discussed with the patient.)  1. Balance Exercise  2. Bed Mobility  3. Family Education  4. Gait Training  5. Neuromuscular Re-education/Strengthening  6. Therapeutic Activites  7. Therapeutic Exercise/Strengthening  8. Transfer Training  9.  Group Therapy      TREATMENT PLAN: Frequency/Duration: twice daily for duration of hospital stay  Rehabilitation Potential For Stated Goals: GOOD      RECOMMENDED REHABILITATION/EQUIPMENT: (at time of discharge pending progress): Continue Skilled Therapy. HISTORY:   History of Present Injury/Illness (Reason for Referral):  S/P FEMUR INSERTION INTRA MEDULLARY NAIL RIGHT (Right)  Past Medical History/Comorbidities:   Ms. Aram Dunn  has a past medical history of Aortic regurgitation; Aortic stenosis; Basal cell carcinoma; Bruit (arterial); CAD (coronary artery disease); Choledocholithiasis; Hyperlipidemia; Hypertension; Hypothyroidism; Murmur, cardiac; Poor historian; S/P TAVR (transcatheter aortic valve replacement); Tricuspid valve regurgitation; Type II or unspecified type diabetes mellitus without mention of complication, not stated as uncontrolled; and Urinary incontinence. Ms. Aram Dunn  has a past surgical history that includes cystocele repair; rectocele repair; appendectomy; carpal tunnel release; ercp (02/20/2017); hysterectomy; oophorectomy; other surgical (Right); orthopaedic; knee arthroscopy (Right); ercp (04/28/2017); and other surgical (03/28/2017). Social History/Living Environment:   Home Environment: Private residence  One/Two Story Residence: One story  Living Alone: Yes  Support Systems: Child(mayo), Family member(s)  Patient Expects to be Discharged to[de-identified] Unknown  Current DME Used/Available at Home: Commode, bedside, Cane, straight, Walker, rolling  Tub or Shower Type: Shower  Prior Level of Function/Work/Activity:  Pt lives alone in a one story house and reports being independent with ADLs. She uses a cane for ambulation in community.       Number of Personal Factors/Comorbidities that affect the Plan of Care:  DM II  Lives alone 1-2: MODERATE COMPLEXITY   EXAMINATION:   Most Recent Physical Functioning:   Gross Assessment:                  Posture:     Balance:  Sitting: Impaired  Sitting - Static: Fair (occasional)  Sitting - Dynamic: Fair (occasional)  Standing: Impaired  Standing - Static: Poor  Standing - Dynamic : Poor Bed Mobility:  Supine to Sit: Moderate assistance; Additional time  Sit to Supine:  (NT)  Wheelchair Mobility:     Transfers:  Sit to Stand: Moderate assistance; Additional time  Stand to Sit: Minimum assistance  Gait:     Base of Support: Narrowed; Center of gravity altered  Speed/Blanca: Shuffled; Slow  Step Length: Right shortened;Left shortened  Gait Abnormalities: Decreased step clearance;Shuffling gait; Step to gait  Distance (ft): 12 Feet (ft)  Assistive Device: Walker, rolling  Ambulation - Level of Assistance: Minimal assistance  Interventions: Safety awareness training; Tactile cues; Verbal cues       Body Structures Involved:  1. Lungs  2. Bones  3. Joints  4. Muscles Body Functions Affected:  1. Sensory/Pain  2. Respiratory  3. Neuromusculoskeletal  4. Movement Related Activities and Participation Affected:  1. General Tasks and Demands  2. Mobility  3. Self Care  4. Domestic Life  5. Community, Social and Titus Hassell   Number of elements that affect the Plan of Care: 4+: HIGH COMPLEXITY   CLINICAL PRESENTATION:   Presentation: Evolving clinical presentation with changing clinical characteristics: MODERATE COMPLEXITY   CLINICAL DECISION MAKIN [facility] Mobility Inpatient Short Form  How much difficulty does the patient currently have. .. Unable A Lot A Little None   1. Turning over in bed (including adjusting bedclothes, sheets and blankets)? [ ] 1   [X] 2   [ ] 3   [ ] 4   2. Sitting down on and standing up from a chair with arms ( e.g., wheelchair, bedside commode, etc.)   [ ] 1   [X] 2   [ ] 3   [ ] 4   3. Moving from lying on back to sitting on the side of the bed? [ ] 1   [X] 2   [ ] 3   [ ] 4   How much help from another person does the patient currently need. .. Total A Lot A Little None   4. Moving to and from a bed to a chair (including a wheelchair)? [ ] 1   [X] 2   [ ] 3   [ ] 4   5. Need to walk in hospital room? [ ] 1   [X] 2   [ ] 3   [ ] 4   6. Climbing 3-5 steps with a railing?    [ ] 1   [ ] 2   [ ] 3   [ ] 4   © 2007, Trustees of [address] [place] [address] [address] [id number], under license to [employer]. All rights reserved    Score:  Initial: 11 Most Recent: X (Date: -- )     Interpretation of Tool:  Represents activities that are increasingly more difficult (i.e. Bed mobility, Transfers, Gait). Score 24 23 22-20 19-15 14-10 9-7 6       Modifier CH CI CJ CK CL CM CN         · Mobility - Walking and Moving Around:               - CURRENT STATUS:    CL - 60%-79% impaired, limited or restricted               - GOAL STATUS:           CK - 40%-59% impaired, limited or restricted               - D/C STATUS:                       ---------------To be determined---------------  [place] / Product Type: Medicare /       Medical Necessity:     · Patient demonstrates good rehab potential due to higher previous functional level. Reason for Services/Other Comments:  · Patient continues to require skilled intervention due to decreased functional mobility, balance, and ambulation. Use of outcome tool(s) and clinical judgement create a POC that gives a: Questionable prediction of patient's progress: MODERATE COMPLEXITY                 TREATMENT:   (In addition to Assessment/Re-Assessment sessions the following treatments were rendered)   Pre-treatment Symptoms/Complaints:  None  Pain: Initial:   Pain Intensity 1: 0  Post Session:  0/10 visually post treatment      Therapeutic Activity: (    Minutes): Therapeutic activities including bed mobility training, transfer training, static/dynamic standing balance training, posture training, scooting, instruction in proper walker manipulation, and patient education to improve mobility, strength and balance. Required moderate Safety awareness training; Tactile cues; Verbal cues to promote static and dynamic balance in standing, promote coordination of bilateral, lower extremity(s) and to promote improved technique with bed mobility, transfers, and walker manipulation/use. Gait Training (  10 Minutes):  Gait training to improve and/or restore physical functioning as related to mobility, strength and balance. Ambulated 12 Feet (ft) with Minimal assistance using a Walker, rolling and moderate Safety awareness training; Tactile cues; Verbal cues related to their sequencing, walker manipulation, step length, weight shifts, posture, UE support on rolling walker, and foot placement to promote proper body alignment, promote proper body posture and promote proper body mechanics. Instruction in performance of the above deficits to correct overall gait quality and functional mobility. Group Therapeutic Exercise: (  10 Minutes):  Exercises per grid below to improve mobility, strength, balance and stiffness/soreness. Required moderate visual, verbal, manual and tactile cues to promote proper body alignment, promote proper body posture and promote proper body mechanics. Progressed range, repetitions and complexity of movement as indicated. Date:  7/10/17 Date:  7/11/17 Date:     ACTIVITY/EXERCISE AM PM AM PM AM PM   Ambulation:           Distance  Device  Duration         Seated Heel Raises x15B A  x15B A      Seated Toe Raises x15B A  x15B A      Seated Long Arc Quads x15B A  x15B A      Seated Marching x15B  AA-R, A-L  x15B   AA-R, A-L      Seated Hip Abduction x15B  AA-R., A-L  x15B  AA-R, A-L               B = bilateral; AA = active assistive; A = active; P = passive             Braces/Orthotics/Lines/Etc:   · IV  Treatment/Session Assessment:    · Response to Treatment:  See above  · Interdisciplinary Collaboration:  · Physical Therapy Assistant and Registered Nurse  · After treatment position/precautions:  · Up in chair, Bed alarm/tab alert on, Bed/Chair-wheels locked, Call light within reach, RN notified and Family at bedside  · Compliance with Program/Exercises:  Will assess as treatment progresses. · Recommendations/Intent for next treatment session: \"Next visit will focus on advancements to more challenging activities and reduction in assistance provided\".   Total Treatment Duration:  PT Patient Time In/Time Out  Time In: 0912 (1100)  Time Out: 0927 (1110)     Jacqualyn Libman Binkley-Vance, PTA

## 2017-07-12 ENCOUNTER — PATIENT OUTREACH (OUTPATIENT)
Dept: CASE MANAGEMENT | Age: 82
End: 2017-07-12

## 2017-07-12 NOTE — PROGRESS NOTES
This note will not be viewable in [address] Patient discharged to [facility] for STR. ALONZO outreach postponed. This Care Coordinator will schedule ALONZO outreach in 7 days per protocol.

## 2017-07-18 NOTE — CONSULTS
Geriatric Fracture Consult  Patient: Penni Cushing  YOB: 1929   MRN: [id number]      Consult Date: 7/9/2017     Consulting Physician: DR. Rhona Bertrand    Chief Complaint: RIGHT INTERTROCHANTERIC HIP FRACTURE; OSTEOPOROSIS  History of Present Illness: Aliyah Fitzpatrick is an 80 y.o.  female who is being seen for right hip pain after sustaining a fall at home while getting clothes out of the drawer. Onset of symptoms was abrupt with unchanged course since that time. The pain is located in the right hip. Patient describes the pain as continuous and rated as moderate. Pain has been associated with movement. Patient denies other injuries. Review of Systems: A comprehensive review of systems was negative except for that written in the History of Present Illness. ED Presentation Time: < 8 hours  Mechanism of Injury: Fall from standing  Ambulatory Status: Cane  Past Medical History:   Past Medical History:   Diagnosis Date    Aortic regurgitation     mild to moderate per ECHO 3/28/17    Aortic stenosis     severe per ECHO 3/28/17    Basal cell carcinoma     Bruit (arterial)     CAD (coronary artery disease)     small vessel disease, medical management per cardiologist not 4/17    Choledocholithiasis     Hyperlipidemia     Hypertension     Hypothyroidism     Murmur, cardiac     Poor historian     S/P TAVR (transcatheter aortic valve replacement)     3/17    Tricuspid valve regurgitation     moderate per ECHO 3/28/17    Type II or unspecified type diabetes mellitus without mention of complication, not stated as uncontrolled     not medicated, treated w/ diet    Urinary incontinence        Allergies:    Allergies   Allergen Reactions    Aspirin Anaphylaxis     Hives, swelling    Ativan [Lorazepam] Other (comments)     Confused and aggressive    Robaxin [Methocarbamol] Anaphylaxis     Swelling of tongue, wheezing  Muscle relaxers in general    Aleve [Naproxen Sodium] Rash    Carafate [Sucralfate] Hives    Ciprofloxacin Diarrhea    Clorazepate Dipotassium Hives    Flexeril [Cyclobenzaprine] Itching    Vytorin 10-10 [Ezetimibe-Simvastatin] Other (comments)     Muscle soreness    Zantac [Ranitidine Hcl] Itching      Past Surgical History:   Past Surgical History:   Procedure Laterality Date    HX APPENDECTOMY      HX CARPAL TUNNEL RELEASE      patient denies    HX CYSTOCELE REPAIR      HX ERCP  02/20/2017    large CBD stones    HX ERCP  04/28/2017    laser    HX HYSTERECTOMY      HX KNEE ARTHROSCOPY Right     HX OOPHORECTOMY      Partial    HX ORTHOPAEDIC      2 back-one fusion-low; all fingers-trigger    HX OTHER SURGICAL Right     basal cell nose    HX OTHER SURGICAL  03/28/2017    Tavr    HX RECTOCELE REPAIR        Social History:   Social History     Social History    Marital status:      Spouse name: N/A    Number of children: N/A    Years of education: N/A     Occupational History    Not on file. Social History Main Topics    Smoking status: Never Smoker    Smokeless tobacco: Never Used    Alcohol use No    Drug use: No    Sexual activity: Not on file     Other Topics Concern    Not on file     Social History Narrative      FAMILY HISTORY - REVIEWED - NO PERTINENT FAMILY HISTORY  Dwelling Status: Alone  Current Anticoagulant Medications: Plavix 75 mg/day  History of falls: YES  Prior Fractures: DENIES  Osteoporosis Medications: none  Bone Density Tests: UNKNOWN  X-RAYS: Right Intertrochanteric Fracture  Physical Exam:   PATIENT COMPLAINING OF RIGHT HIP PAIN AFTER A FALL AT HOME. FOCUSED MUSCULOSKELETAL EXAM REVEALS DECREASED ROM TO RIGHT LE. THERE IS SHORTENING AND EXTERNAL ROTATION NOTED TO RIGHT LE. PATIENT IS TENDER TO PALPATION OVER RIGHT HIP AND GROIN. PATIENT HAS PAIN WITH LOG ROLLING. N/V INTACT. DENIES OTHER INJURIES.       Assessment / Plan: ORIF RIGHT IT FRACTURE WITH IM NAIL; CONSULT PT/OT - WBAT RIGHT LE; STR  RISKS AND BENEFITS WERE ADDRESSED WITH PATIENT AND FAMILY  Labs:    Lab Results   Component Value Date/Time    HGB 8.1 07/11/2017 08:04 AM    WBC 6.5 07/11/2017 08:04 AM    INR 1.0 07/08/2017 02:50 PM    Albumin 3.3 07/08/2017 02:50 PM      Preoperative Clearance: YES by Hospitalist          Signed by: Paolo Martinez NP   Today's Date: July 18, 2017

## 2017-07-19 ENCOUNTER — PATIENT OUTREACH (OUTPATIENT)
Dept: CASE MANAGEMENT | Age: 82
End: 2017-07-19

## 2017-07-19 NOTE — PROGRESS NOTES
This note will not be viewable in [address] [facility]  Initial outreach attempt unsuccessful. Left message for call back. Will attempt 2nd outreach within 24 hrs.

## 2017-07-24 ENCOUNTER — PATIENT OUTREACH (OUTPATIENT)
Dept: CASE MANAGEMENT | Age: 82
End: 2017-07-24

## 2017-07-28 NOTE — PROGRESS NOTES
This note will not be viewable in [address] [facility]  Final outreach call unsuccessful. Unable to reach patient after several attempts. Will close case at this time. Will reopen case if return call is received.

## 2017-09-11 ENCOUNTER — DOCUMENTATION ONLY (OUTPATIENT)
Dept: ONCOLOGY | Age: 82
End: 2017-09-11

## 2018-03-13 PROBLEM — E11.40 TYPE 2 DIABETES MELLITUS WITH DIABETIC NEUROPATHY (HCC): Status: ACTIVE | Noted: 2018-03-13

## 2018-03-22 ENCOUNTER — APPOINTMENT (OUTPATIENT)
Dept: GENERAL RADIOLOGY | Age: 83
DRG: 684 | End: 2018-03-22
Attending: EMERGENCY MEDICINE
Payer: MEDICARE

## 2018-03-22 ENCOUNTER — HOSPITAL ENCOUNTER (INPATIENT)
Age: 83
LOS: 4 days | Discharge: HOME HEALTH | DRG: 684 | End: 2018-03-26
Attending: EMERGENCY MEDICINE | Admitting: INTERNAL MEDICINE
Payer: MEDICARE

## 2018-03-22 DIAGNOSIS — E16.2 HYPOGLYCEMIA: Primary | ICD-10-CM

## 2018-03-22 DIAGNOSIS — N17.9 ACUTE RENAL FAILURE, UNSPECIFIED ACUTE RENAL FAILURE TYPE (HCC): ICD-10-CM

## 2018-03-22 LAB
ALBUMIN SERPL-MCNC: 3.4 G/DL (ref 3.2–4.6)
ALBUMIN/GLOB SERPL: 0.8 {RATIO} (ref 1.2–3.5)
ALP SERPL-CCNC: 285 U/L (ref 50–136)
ALT SERPL-CCNC: 22 U/L (ref 12–65)
ANION GAP SERPL CALC-SCNC: 15 MMOL/L (ref 7–16)
APPEARANCE UR: NORMAL
AST SERPL-CCNC: 25 U/L (ref 15–37)
ATRIAL RATE: 85 BPM
BASOPHILS # BLD: 0 K/UL (ref 0–0.2)
BASOPHILS NFR BLD: 0 % (ref 0–2)
BILIRUB SERPL-MCNC: 0.1 MG/DL (ref 0.2–1.1)
BILIRUB UR QL: NEGATIVE
BUN SERPL-MCNC: 71 MG/DL (ref 8–23)
CALCIUM SERPL-MCNC: 7.7 MG/DL (ref 8.3–10.4)
CALCULATED P AXIS, ECG09: 45 DEGREES
CALCULATED R AXIS, ECG10: 35 DEGREES
CALCULATED T AXIS, ECG11: 12 DEGREES
CHLORIDE SERPL-SCNC: 109 MMOL/L (ref 98–107)
CO2 SERPL-SCNC: 20 MMOL/L (ref 21–32)
COLOR UR: YELLOW
CREAT SERPL-MCNC: 2.08 MG/DL (ref 0.6–1)
DIAGNOSIS, 93000: NORMAL
DIFFERENTIAL METHOD BLD: ABNORMAL
EOSINOPHIL # BLD: 0 K/UL (ref 0–0.8)
EOSINOPHIL NFR BLD: 0 % (ref 0.5–7.8)
ERYTHROCYTE [DISTWIDTH] IN BLOOD BY AUTOMATED COUNT: 14.2 % (ref 11.9–14.6)
GLOBULIN SER CALC-MCNC: 4.3 G/DL (ref 2.3–3.5)
GLUCOSE BLD STRIP.AUTO-MCNC: 183 MG/DL (ref 65–100)
GLUCOSE BLD STRIP.AUTO-MCNC: 63 MG/DL (ref 65–100)
GLUCOSE SERPL-MCNC: 113 MG/DL (ref 65–100)
GLUCOSE UR STRIP.AUTO-MCNC: NEGATIVE MG/DL
HCT VFR BLD AUTO: 38.2 % (ref 35.8–46.3)
HGB BLD-MCNC: 12.6 G/DL (ref 11.7–15.4)
HGB UR QL STRIP: NEGATIVE
IMM GRANULOCYTES # BLD: 0 K/UL (ref 0–0.5)
IMM GRANULOCYTES NFR BLD AUTO: 0 % (ref 0–5)
KETONES UR QL STRIP.AUTO: NEGATIVE MG/DL
LACTATE BLD-SCNC: 3.6 MMOL/L (ref 0.5–1.9)
LEUKOCYTE ESTERASE UR QL STRIP.AUTO: NEGATIVE
LYMPHOCYTES # BLD: 2.4 K/UL (ref 0.5–4.6)
LYMPHOCYTES NFR BLD: 25 % (ref 13–44)
MCH RBC QN AUTO: 30 PG (ref 26.1–32.9)
MCHC RBC AUTO-ENTMCNC: 33 G/DL (ref 31.4–35)
MCV RBC AUTO: 91 FL (ref 79.6–97.8)
MONOCYTES # BLD: 0.5 K/UL (ref 0.1–1.3)
MONOCYTES NFR BLD: 5 % (ref 4–12)
NEUTS SEG # BLD: 6.7 K/UL (ref 1.7–8.2)
NEUTS SEG NFR BLD: 70 % (ref 43–78)
NITRITE UR QL STRIP.AUTO: NEGATIVE
P-R INTERVAL, ECG05: 288 MS
PH UR STRIP: 5 [PH] (ref 5–9)
PLATELET # BLD AUTO: 190 K/UL (ref 150–450)
PMV BLD AUTO: 11.7 FL (ref 10.8–14.1)
POTASSIUM SERPL-SCNC: 4.9 MMOL/L (ref 3.5–5.1)
PROT SERPL-MCNC: 7.7 G/DL (ref 6.3–8.2)
PROT UR STRIP-MCNC: NEGATIVE MG/DL
Q-T INTERVAL, ECG07: 426 MS
QRS DURATION, ECG06: 94 MS
QTC CALCULATION (BEZET), ECG08: 506 MS
RBC # BLD AUTO: 4.2 M/UL (ref 4.05–5.25)
SODIUM SERPL-SCNC: 144 MMOL/L (ref 136–145)
SP GR UR REFRACTOMETRY: 1.02 (ref 1–1.02)
UROBILINOGEN UR QL STRIP.AUTO: 0.2 EU/DL (ref 0.2–1)
VENTRICULAR RATE, ECG03: 85 BPM
WBC # BLD AUTO: 9.6 K/UL (ref 4.3–11.1)

## 2018-03-22 PROCEDURE — 74011000258 HC RX REV CODE- 258: Performed by: INTERNAL MEDICINE

## 2018-03-22 PROCEDURE — 96375 TX/PRO/DX INJ NEW DRUG ADDON: CPT | Performed by: EMERGENCY MEDICINE

## 2018-03-22 PROCEDURE — 81003 URINALYSIS AUTO W/O SCOPE: CPT | Performed by: EMERGENCY MEDICINE

## 2018-03-22 PROCEDURE — 74011000250 HC RX REV CODE- 250: Performed by: EMERGENCY MEDICINE

## 2018-03-22 PROCEDURE — 96374 THER/PROPH/DIAG INJ IV PUSH: CPT | Performed by: EMERGENCY MEDICINE

## 2018-03-22 PROCEDURE — 83605 ASSAY OF LACTIC ACID: CPT

## 2018-03-22 PROCEDURE — 74011250637 HC RX REV CODE- 250/637: Performed by: INTERNAL MEDICINE

## 2018-03-22 PROCEDURE — 65270000029 HC RM PRIVATE

## 2018-03-22 PROCEDURE — 82962 GLUCOSE BLOOD TEST: CPT

## 2018-03-22 PROCEDURE — 80053 COMPREHEN METABOLIC PANEL: CPT | Performed by: EMERGENCY MEDICINE

## 2018-03-22 PROCEDURE — 85025 COMPLETE CBC W/AUTO DIFF WBC: CPT | Performed by: EMERGENCY MEDICINE

## 2018-03-22 PROCEDURE — 87040 BLOOD CULTURE FOR BACTERIA: CPT | Performed by: EMERGENCY MEDICINE

## 2018-03-22 PROCEDURE — 71045 X-RAY EXAM CHEST 1 VIEW: CPT

## 2018-03-22 PROCEDURE — 93005 ELECTROCARDIOGRAM TRACING: CPT | Performed by: EMERGENCY MEDICINE

## 2018-03-22 PROCEDURE — 99285 EMERGENCY DEPT VISIT HI MDM: CPT | Performed by: EMERGENCY MEDICINE

## 2018-03-22 PROCEDURE — 74011250636 HC RX REV CODE- 250/636: Performed by: EMERGENCY MEDICINE

## 2018-03-22 RX ORDER — DEXTROSE MONOHYDRATE AND SODIUM CHLORIDE 5; .45 G/100ML; G/100ML
100 INJECTION, SOLUTION INTRAVENOUS CONTINUOUS
Status: DISCONTINUED | OUTPATIENT
Start: 2018-03-22 | End: 2018-03-23

## 2018-03-22 RX ORDER — GABAPENTIN 100 MG/1
100 CAPSULE ORAL 3 TIMES DAILY
Status: DISCONTINUED | OUTPATIENT
Start: 2018-03-22 | End: 2018-03-22

## 2018-03-22 RX ORDER — DEXTROSE 50 % IN WATER (D50W) INTRAVENOUS SYRINGE
Status: DISCONTINUED
Start: 2018-03-22 | End: 2018-03-22

## 2018-03-22 RX ORDER — ONDANSETRON 2 MG/ML
4 INJECTION INTRAMUSCULAR; INTRAVENOUS
Status: DISCONTINUED | OUTPATIENT
Start: 2018-03-22 | End: 2018-03-26 | Stop reason: HOSPADM

## 2018-03-22 RX ORDER — FUROSEMIDE 20 MG/1
20 TABLET ORAL DAILY
Status: DISCONTINUED | OUTPATIENT
Start: 2018-03-23 | End: 2018-03-23

## 2018-03-22 RX ORDER — ACETAMINOPHEN 325 MG/1
650 TABLET ORAL
Status: DISCONTINUED | OUTPATIENT
Start: 2018-03-22 | End: 2018-03-26 | Stop reason: HOSPADM

## 2018-03-22 RX ORDER — TRAMADOL HYDROCHLORIDE 50 MG/1
50 TABLET ORAL
Status: DISCONTINUED | OUTPATIENT
Start: 2018-03-22 | End: 2018-03-26 | Stop reason: HOSPADM

## 2018-03-22 RX ORDER — INSULIN LISPRO 100 [IU]/ML
INJECTION, SOLUTION INTRAVENOUS; SUBCUTANEOUS
Status: DISCONTINUED | OUTPATIENT
Start: 2018-03-23 | End: 2018-03-26 | Stop reason: HOSPADM

## 2018-03-22 RX ORDER — FERROUS SULFATE, DRIED 160(50) MG
1 TABLET, EXTENDED RELEASE ORAL
Status: DISCONTINUED | OUTPATIENT
Start: 2018-03-23 | End: 2018-03-22

## 2018-03-22 RX ORDER — DEXTROSE 50 % IN WATER (D50W) INTRAVENOUS SYRINGE
50
Status: COMPLETED | OUTPATIENT
Start: 2018-03-22 | End: 2018-03-22

## 2018-03-22 RX ORDER — BENAZEPRIL HYDROCHLORIDE 10 MG/1
20 TABLET ORAL DAILY
Status: DISCONTINUED | OUTPATIENT
Start: 2018-03-23 | End: 2018-03-23

## 2018-03-22 RX ORDER — METOPROLOL TARTRATE 25 MG/1
25 TABLET, FILM COATED ORAL 2 TIMES DAILY
Status: DISCONTINUED | OUTPATIENT
Start: 2018-03-23 | End: 2018-03-26 | Stop reason: HOSPADM

## 2018-03-22 RX ORDER — PREGABALIN 75 MG/1
75 CAPSULE ORAL 3 TIMES DAILY
Status: DISCONTINUED | OUTPATIENT
Start: 2018-03-22 | End: 2018-03-26 | Stop reason: HOSPADM

## 2018-03-22 RX ORDER — AMLODIPINE BESYLATE 10 MG/1
10 TABLET ORAL DAILY
Status: DISCONTINUED | OUTPATIENT
Start: 2018-03-23 | End: 2018-03-26 | Stop reason: HOSPADM

## 2018-03-22 RX ORDER — ROSUVASTATIN CALCIUM 20 MG/1
20 TABLET, COATED ORAL
Status: DISCONTINUED | OUTPATIENT
Start: 2018-03-22 | End: 2018-03-26 | Stop reason: HOSPADM

## 2018-03-22 RX ORDER — LEVOTHYROXINE SODIUM 100 UG/1
100 TABLET ORAL
Status: DISCONTINUED | OUTPATIENT
Start: 2018-03-23 | End: 2018-03-24

## 2018-03-22 RX ORDER — SODIUM CHLORIDE 0.9 % (FLUSH) 0.9 %
5-10 SYRINGE (ML) INJECTION AS NEEDED
Status: DISCONTINUED | OUTPATIENT
Start: 2018-03-22 | End: 2018-03-26 | Stop reason: HOSPADM

## 2018-03-22 RX ORDER — HYDROCODONE BITARTRATE AND ACETAMINOPHEN 10; 325 MG/1; MG/1
1 TABLET ORAL
Status: DISCONTINUED | OUTPATIENT
Start: 2018-03-22 | End: 2018-03-26 | Stop reason: HOSPADM

## 2018-03-22 RX ORDER — HEPARIN SODIUM 5000 [USP'U]/ML
5000 INJECTION, SOLUTION INTRAVENOUS; SUBCUTANEOUS EVERY 12 HOURS
Status: DISCONTINUED | OUTPATIENT
Start: 2018-03-23 | End: 2018-03-26 | Stop reason: HOSPADM

## 2018-03-22 RX ORDER — NITROGLYCERIN 0.4 MG/1
0.4 TABLET SUBLINGUAL
Status: DISCONTINUED | OUTPATIENT
Start: 2018-03-22 | End: 2018-03-22

## 2018-03-22 RX ORDER — SODIUM CHLORIDE 0.9 % (FLUSH) 0.9 %
5-10 SYRINGE (ML) INJECTION EVERY 8 HOURS
Status: DISCONTINUED | OUTPATIENT
Start: 2018-03-22 | End: 2018-03-26 | Stop reason: HOSPADM

## 2018-03-22 RX ORDER — CLOPIDOGREL BISULFATE 75 MG/1
75 TABLET ORAL DAILY
Status: DISCONTINUED | OUTPATIENT
Start: 2018-03-23 | End: 2018-03-26 | Stop reason: HOSPADM

## 2018-03-22 RX ADMIN — Medication 5 ML: at 23:35

## 2018-03-22 RX ADMIN — ROSUVASTATIN CALCIUM 20 MG: 20 TABLET, FILM COATED ORAL at 23:34

## 2018-03-22 RX ADMIN — DEXTROSE MONOHYDRATE AND SODIUM CHLORIDE 100 ML/HR: 5; .45 INJECTION, SOLUTION INTRAVENOUS at 23:34

## 2018-03-22 RX ADMIN — SODIUM CHLORIDE 1000 ML: 900 INJECTION, SOLUTION INTRAVENOUS at 19:21

## 2018-03-22 RX ADMIN — DEXTROSE 25 G: 50 INJECTION, SOLUTION INTRAVENOUS at 18:10

## 2018-03-22 NOTE — ED PROVIDER NOTES
HPI Comments: Patient is an 51-year-old female with a history of hypertension and diabetes. Family members state that she suffered a fall a few weeks ago and she has had some generalized weakness since then. They checked on her several times per day but she otherwise lives alone. This morning daughter checked on her around 10 AM and she seemed a bit weaker than normal but was okay. This afternoon around 4 PM different family member found her very weak and confused and with a reported low blood pressure. EMS was called and she was found to have a blood sugar of 30 to try to give her oral glucose and a quarter amp of D50. She is much more awake now but she does have a mild tremor. Family states she is almost back to her baseline. EMS stated her sugar went up to 300 but I do not believe that was accurate because on arrival here nurses measured a level of 60 and I gave another amp of D50. Patient is a 80 y.o. female presenting with hypoglycemia. The history is provided by the patient and a relative. Low Blood Sugar    This is a new problem. The current episode started 1 to 2 hours ago. The problem has been gradually improving. Associated symptoms include confusion, unresponsiveness and weakness. Pertinent negatives include no seizures. Mental status baseline is normal.  Her past medical history is significant for diabetes.         Past Medical History:   Diagnosis Date    Aortic regurgitation     mild to moderate per ECHO 3/28/17    Aortic stenosis     severe per ECHO 3/28/17    Basal cell carcinoma     Bruit (arterial)     CAD (coronary artery disease)     small vessel disease, medical management per cardiologist not 4/17    Choledocholithiasis     Hyperlipidemia     Hypertension     Hypothyroidism     Murmur, cardiac     Poor historian     S/P TAVR (transcatheter aortic valve replacement)     3/17    Tricuspid valve regurgitation     moderate per ECHO 3/28/17    Type II or unspecified type diabetes mellitus without mention of complication, not stated as uncontrolled     not medicated, treated w/ diet    Urinary incontinence        Past Surgical History:   Procedure Laterality Date    HX APPENDECTOMY      HX CARPAL TUNNEL RELEASE      patient denies    HX CYSTOCELE REPAIR      HX ERCP  02/20/2017    large CBD stones    HX ERCP  04/28/2017    laser    HX HYSTERECTOMY      HX KNEE ARTHROSCOPY Right     HX OOPHORECTOMY      Partial    HX ORTHOPAEDIC      2 back-one fusion-low; all fingers-trigger    HX OTHER SURGICAL Right     basal cell nose    HX OTHER SURGICAL  03/28/2017    Tavr    HX RECTOCELE REPAIR      LAP,CHOLECYSTECTOMY           Family History:   Problem Relation Age of Onset    Heart Attack Father      age 68    Heart Disease Father     Arthritis-osteo Mother     Diabetes Mother        Social History     Social History    Marital status:      Spouse name: N/A    Number of children: N/A    Years of education: N/A     Occupational History    Not on file. Social History Main Topics    Smoking status: Never Smoker    Smokeless tobacco: Never Used    Alcohol use No    Drug use: No    Sexual activity: Not on file     Other Topics Concern    Not on file     Social History Narrative         ALLERGIES: Aspirin; Ativan [lorazepam]; Robaxin [methocarbamol]; Aleve [naproxen sodium]; Carafate [sucralfate]; Ciprofloxacin; Clorazepate dipotassium; Flexeril [cyclobenzaprine]; Vytorin 10-10 [ezetimibe-simvastatin]; and Zantac [ranitidine hcl]    Review of Systems   HENT: Negative. Eyes: Negative. Respiratory: Negative. Cardiovascular: Negative. Gastrointestinal: Negative. Genitourinary: Negative. Musculoskeletal: Negative. Skin: Negative. Neurological: Positive for weakness. Negative for seizures. Psychiatric/Behavioral: Positive for confusion.        Vitals:    03/22/18 1801   BP: 147/67   Pulse: 64   Resp: 18   SpO2: 93%   Weight: 68 kg (150 lb) Height: 5' 4\" (1.626 m)            Physical Exam   Constitutional: She is oriented to person, place, and time. She appears well-developed and well-nourished. HENT:   Head: Normocephalic and atraumatic. Eyes: Conjunctivae and EOM are normal. Pupils are equal, round, and reactive to light. Cardiovascular: Normal rate and regular rhythm. Pulmonary/Chest: Effort normal and breath sounds normal.   Abdominal: Soft. There is no tenderness. Musculoskeletal: She exhibits no edema, tenderness or deformity. Neurological: She is alert and oriented to person, place, and time. She has normal strength. No cranial nerve deficit or sensory deficit. GCS eye subscore is 4. GCS verbal subscore is 5. GCS motor subscore is 6. Mild tremor but no focal neurologic deficits. Skin: There is pallor. Nursing note and vitals reviewed. MDM  Number of Diagnoses or Management Options  Diagnosis management comments: Differential diagnosis includes hypoglycemia, electrolyte abnormality,, dehydration, anemia, infection    EKG shows sinus rhythm at a rate of 85 with a very poor baseline secondary to her tremor       Amount and/or Complexity of Data Reviewed  Clinical lab tests: ordered and reviewed  Tests in the radiology section of CPT®: ordered and reviewed  Review and summarize past medical records: yes  Independent visualization of images, tracings, or specimens: yes    Risk of Complications, Morbidity, and/or Mortality  Presenting problems: moderate  Diagnostic procedures: moderate  Management options: moderate    Patient Progress  Patient progress: improved        ED Course   7:34 PM  Chest x-ray is negative, urinalysis is negative for infection, white blood cell count is normal, lactate is elevated but that may be due to the hypoglycemia and the acute renal failure I see no sign of sepsis and have not given antibiotics at this time.   With the acute renal failure and the hypoglycemia on oral agents I will consult with the hospitalist for admission and further management. Voice dictation software was used during the making of this note. This software is not perfect and grammatical and other typographical errors may be present. This note has been proofread, but may still contain errors.   Mary Holland MD; 3/22/2018 @7:35 PM   ===================================================================        Procedures

## 2018-03-22 NOTE — ED TRIAGE NOTES
Pt arrives EMS from home with c/o confusion, altered, lethargic. Pt's BGL was 31. EMS gave oral glucose and BGL dropped to 23 after oral glucose. Family requested transfer. Pt given 12.5 D50 en route. 250 ccs fluid. Pt is very susceptible to UTI. Currently being treated with bactrim. /74. HR 60s. BGL is 353 after D50. Stroke scale negative with EMS. Pt typically a&o x4.  Per EMS pt is now oriented per her normal.

## 2018-03-22 NOTE — IP AVS SNAPSHOT
Genny Mackey 
 
 
 [address] [facility] 
[phone] Patient: Loc Quinn MRN: [id number] About your hospitalization You were admitted on:  March 22, 2018 You last received care in the:  [facility] You were discharged on:  March 26, 2018 Why you were hospitalized Your primary diagnosis was:  Not on File Your diagnoses also included:  Hypoglycemia, Néstor (Acute Kidney Injury) (Hcc) Follow-up Information Follow up With Details Comments Contact Info Atilio Foster MD On 3/29/2018 10:30 AM 113[address] [facility] 
[phone] [address] [address] 
[phone] Your Scheduled Appointments Thursday March 29, 2018 10:30 AM EDT Office Visit with Atilio Foster MD  
[facility] PRIMARY CARE ([address] [place] [address] 14.  
[phone] Monday April 02, 2018  3:00 PM EDT Office Visit with Nadia Hidalgo NP [facility] Urology 52 (U [facility] UROLOGY) [address] [address]  
[phone] Friday April 13, 2018  9:30 AM EDT  
ECHOCARDIOGRAM with ECHO 36 [facility] CARDIOLOGY [facility] ([address] [place] [address]) [address]  
[phone] Friday April 13, 2018 10:45 AM EDT TransAortic Valve Replacement Follow up with Breana Ferris MD  
[facility] CARDIOLOGY [facility] ([address] [place] [address]) [address]  
[phone] Discharge Orders Procedure Order Date Status Priority Quantity Spec Type Associated Dx [address] [place] 03/26/18 1133 Normal Routine 1 REFERRAL TO PHYSICAL THERAPY 03/26/18 1133 Normal Routine 1    
 CBC W/O DIFF 03/26/18 1133 Normal Routine 1 Whole Blood METABOLIC PANEL, BASIC 05/13/67 1133 Future Routine 1 Blood A check alma indicates which time of day the medication should be taken. My Medications CONTINUE taking these medications Instructions Each Dose to Equal  
 Morning Noon Evening Bedtime  
 amLODIPine 10 mg tablet Commonly known as:  Elias Nearing Take 1 Tab by mouth daily. 10 mg  
    
  
   
   
   
  
 benazepril 20 mg tablet Commonly known as:  LOTENSIN Your next dose is:  Tomorrow Morning Take 1 Tab by mouth daily. 20 mg  
    
  
   
   
   
  
 clopidogrel 75 mg Tab Commonly known as:  PLAVIX Your next dose is:  Tomorrow Morning Take 1 Tab by mouth daily. 75 mg HYDROcodone-acetaminophen  mg tablet Commonly known as:  Mark Fraction Take 1 Tab by mouth every eight (8) hours as needed for Pain. Max Daily Amount: 3 Tabs. 1 Tab  
    
   
   
   
  
 levothyroxine 100 mcg tablet Commonly known as:  SYNTHROID Your next dose is:  Tomorrow Morning Take 1 Tab by mouth Daily (before breakfast). 100 mcg  
    
  
   
   
   
  
 metoprolol tartrate 25 mg tablet Commonly known as:  LOPRESSOR Take 1 Tab by mouth two (2) times a day. 25 mg  
    
  
   
   
  
   
  
 rosuvastatin 20 mg tablet Commonly known as:  CRESTOR Your next dose is: Take tonight Take 1 Tab by mouth nightly. 20 mg  
    
   
   
   
  
  
 traMADol 50 mg tablet Commonly known as:  ULTRAM  
Your last dose was:  3/25 at 10 pm  
   
 Take 1 Tab by mouth every six (6) hours as needed for Pain. 50 mg  
    
   
   
   
  
  
STOP taking these medications   
 furosemide 20 mg tablet Commonly known as:  LASIX  
   
  
 glipiZIDE 10 mg tablet Commonly known as:  GLUCOTROL  
   
  
 nitrofurantoin 100 mg capsule Commonly known as:  MACRODANTIN  
   
  
 pregabalin 75 mg capsule Commonly known as:  Crystal Van trimethoprim-sulfamethoxazole 160-800 mg per tablet Commonly known as:  BACTRIM DS Discharge Instructions Acute Kidney Injury: Care Instructions Your Care Instructions Acute kidney injury (ALBERT) is a sudden decrease in kidney function. This can happen over a period of hours, days or, in some cases, weeks. ALBERT used to be called acute renal failure, but kidney failure doesn't always happen with ALBERT. Common causes of ALBERT are dehydration, blood loss, and medicines. When ALBERT happens, the kidneys have trouble removing waste and excess fluids from the body. The waste and fluids build up and become harmful. Kidney function may return to normal if the cause of ALBERT is treated quickly. Your chance of a full recovery depends on what caused the problem, how quickly the cause was treated, and what other medical problems you have. A machine may be used to help your kidneys remove waste and fluids for a short period of time. This is called dialysis. Follow-up care is a key part of your treatment and safety. Be sure to make and go to all appointments, and call your doctor if you are having problems. It's also a good idea to know your test results and keep a list of the medicines you take. How can you care for yourself at home? · Talk to your doctor about how much fluid you should drink. · Eat a balanced diet. Talk to your doctor or a dietitian about what type of diet may be best for you. You may need to limit sodium, potassium, and phosphorus. · If you need dialysis, follow the instructions and schedule for dialysis that your doctor gives you. · Do not smoke. Smoking can make your condition worse. If you need help quitting, talk to your doctor about stop-smoking programs and medicines. These can increase your chances of quitting for good. · Do not drink alcohol. · Review all of your medicines with your doctor.  Do not take any medicines, including nonsteroidal anti-inflammatory drugs (NSAIDs) such as ibuprofen (Advil, Motrin) or naproxen (Aleve), unless your doctor says it is safe for you to do so. · Make sure that anyone treating you for any health problem knows that you have had ALBERT. When should you call for help? Call 911 anytime you think you may need emergency care. For example, call if: 
? · You passed out (lost consciousness). ?Call your doctor now or seek immediate medical care if: 
? · You have new or worse nausea and vomiting. ? · You have much less urine than normal, or you have no urine. ? · You are feeling confused or cannot think clearly. ? · You have new or more blood in your urine. ? · You have new swelling. ? · You are dizzy or lightheaded, or feel like you may faint. ? Watch closely for changes in your health, and be sure to contact your doctor if: 
? · You do not get better as expected. Where can you learn more? Go to http://angelica-zuri.info/. Enter F097 in the search box to learn more about \"Acute Kidney Injury: Care Instructions. \" Current as of: May 12, 2017 Content Version: 11.4 © 5542-8650 [employer]. Care instructions adapted under license by [employer] (which disclaims liability or warranty for this information). If you have questions about a medical condition or this instruction, always ask your healthcare professional. [address] [address] any warranty or liability for your use of this information. DISCHARGE SUMMARY from Nurse PATIENT INSTRUCTIONS: 
 
After general anesthesia or intravenous sedation, for 24 hours or while taking prescription Narcotics: · Limit your activities · Do not drive and operate hazardous machinery · Do not make important personal or business decisions · Do  not drink alcoholic beverages · If you have not urinated within 8 hours after discharge, please contact your surgeon on call. Report the following to your surgeon: 
· Excessive pain, swelling, redness or odor of or around the surgical area · Temperature over 100.5 · Nausea and vomiting lasting longer than 4 hours or if unable to take medications · Any signs of decreased circulation or nerve impairment to extremity: change in color, persistent  numbness, tingling, coldness or increase pain · Any questions What to do at Home: *  Please give a list of your current medications to your Primary Care Provider. *  Please update this list whenever your medications are discontinued, doses are 
    changed, or new medications (including over-the-counter products) are added. *  Please carry medication information at all times in case of emergency situations. These are general instructions for a healthy lifestyle: No smoking/ No tobacco products/ Avoid exposure to second hand smoke Surgeon General's Warning:  Quitting smoking now greatly reduces serious risk to your health. Obesity, smoking, and sedentary lifestyle greatly increases your risk for illness A healthy diet, regular physical exercise & weight monitoring are important for maintaining a healthy lifestyle You may be retaining fluid if you have a history of heart failure or if you experience any of the following symptoms:  Weight gain of 3 pounds or more overnight or 5 pounds in a week, increased swelling in our hands or feet or shortness of breath while lying flat in bed. Please call your doctor as soon as you notice any of these symptoms; do not wait until your next office visit. Recognize signs and symptoms of STROKE: 
 
F-face looks uneven A-arms unable to move or move unevenly S-speech slurred or non-existent T-time-call 911 as soon as signs and symptoms begin-DO NOT go Back to bed or wait to see if you get better-TIME IS BRAIN. Warning Signs of HEART ATTACK Call 911 if you have these symptoms: ? Chest discomfort. Most heart attacks involve discomfort in the center of the chest that lasts more than a few minutes, or that goes away and comes back. It can feel like uncomfortable pressure, squeezing, fullness, or pain. ? Discomfort in other areas of the upper body. Symptoms can include pain or discomfort in one or both arms, the back, neck, jaw, or stomach. ? Shortness of breath with or without chest discomfort. ? Other signs may include breaking out in a cold sweat, nausea, or lightheadedness. Don't wait more than five minutes to call [address]! Fast action can save your life. Calling 911 is almost always the fastest way to get lifesaving treatment. Emergency Medical Services staff can begin treatment when they arrive  up to an hour sooner than if someone gets to the hospital by car. The discharge information has been reviewed with the patient. The patient verbalized understanding. Discharge medications reviewed with the patient and appropriate educational materials and side effects teaching were provided. ___________________________________________________________________________________________________________________________________ ACO Transitions of Care Introducing [employer] [address] Radha Cope offers a voluntary care coordination program to provide high quality service and care to [facility] fee-for-service beneficiaries. Pradeep Hines was designed to help you enhance your health and well-being through the following services: ? Transitions of Care  support for individuals who are transitioning from one care setting to another (example: Hospital to home). ? Chronic and Complex Care Coordination  support for individuals and caregivers of those with serious or chronic illnesses or with more than one chronic (ongoing) condition and those who take a number of different medications. If you meet specific medical criteria, a [facility] may call you directly to coordinate your care with your primary care physician and your other care providers. For questions about the [facility] programs, please, contact your physicians office. For general questions or additional information about Accountable Care Organizations: 
Please visit www.medicare.gov/acos. html or call 1-800-MEDICARE (4-158.170.7571) TTY users should call 4-880.783.9591. [employer] Announcement We are excited to announce that we are making your provider's discharge notes available to you in [employer]. You will see these notes when they are completed and signed by the physician that discharged you from your recent hospital stay. If you have any questions or concerns about any information you see in [employer], please call the Health Information Department where you were seen or reach out to your Primary Care Provider for more information about your plan of care. Introducing [facility]! Dear Jurgen Sams: Thank you for requesting a [employer] account. Our records indicate that you already have an active [employer] account. You can access your account anytime at [email or web address] Did you know that you can access your hospital and ER discharge instructions at any time in [employer]? You can also review all of your test results from your hospital stay or ER visit. Additional Information If you have questions, please visit the Frequently Asked Questions section of the [employer] website at [email or web address] Remember, [employer] is NOT to be used for urgent needs. For medical emergencies, dial 911. Now available from your iPhone and Android! Unresulted Labs-Please follow up with your PCP about these lab tests Order Current Status CULTURE, BLOOD Preliminary result Providers Seen During Your Hospitalization Provider Specialty Primary office phone Keith Gomez MD Emergency Medicine [phone] Blanco Aponte MD Internal Medicine [phone] Immunizations Administered for This Admission Name Date  
 TB Skin Test (PPD) Intradermal  Deferred (),  Deferred (), 3/23/2018 Your Primary Care Physician (PCP) Primary Care Physician Office Phone Office Fax [facility] [phone] You are allergic to the following Allergen Reactions Aspirin Anaphylaxis Hives, swelling Ativan (Lorazepam) Other (comments) Confused and aggressive Robaxin (Methocarbamol) Anaphylaxis Swelling of tongue, wheezing Muscle relaxers in general  
    
 Aleve (Naproxen Sodium) Rash Carafate (Sucralfate) Hives Ciprofloxacin Diarrhea Clorazepate Dipotassium Hives Flexeril (Cyclobenzaprine) Itching Vytorin 10-10 (Ezetimibe-Simvastatin) Other (comments) Muscle soreness Zantac (Ranitidine Hcl) Itching Recent Documentation Height Weight Breastfeeding? BMI OB Status Smoking Status 1.626 m 73.7 kg No 27.89 kg/m2 Hysterectomy Never Smoker Emergency Contacts Name Discharge Info Relation Home Work Mobile Rita Blum DISCHARGE CAREGIVER [3] Daughter [21] [phone] 0 [facility] CAREGIVER [3] Son [22] [phone] Patient Belongings The following personal items are in your possession at time of discharge: 
  Dental Appliances: None         Home Medications: None   Jewelry: None  Clothing: Pajamas, With patient    Other Valuables: None Please provide this summary of care documentation to your next provider. Signatures-by signing, you are acknowledging that this After Visit Summary has been reviewed with you and you have received a copy.   
  
 
  
    
    
 Patient Signature: ____________________________________________________________ Date:  ____________________________________________________________  
  
Juan Luis Mealing Provider Signature:  ____________________________________________________________ Date:  ____________________________________________________________

## 2018-03-23 LAB
ANION GAP SERPL CALC-SCNC: 15 MMOL/L (ref 7–16)
BUN SERPL-MCNC: 57 MG/DL (ref 8–23)
CALCIUM SERPL-MCNC: 6.9 MG/DL (ref 8.3–10.4)
CHLORIDE SERPL-SCNC: 112 MMOL/L (ref 98–107)
CO2 SERPL-SCNC: 19 MMOL/L (ref 21–32)
CREAT SERPL-MCNC: 1.83 MG/DL (ref 0.6–1)
ERYTHROCYTE [DISTWIDTH] IN BLOOD BY AUTOMATED COUNT: 14.3 % (ref 11.9–14.6)
GLUCOSE BLD STRIP.AUTO-MCNC: 120 MG/DL (ref 65–100)
GLUCOSE BLD STRIP.AUTO-MCNC: 124 MG/DL (ref 65–100)
GLUCOSE BLD STRIP.AUTO-MCNC: 167 MG/DL (ref 65–100)
GLUCOSE BLD STRIP.AUTO-MCNC: 33 MG/DL (ref 65–100)
GLUCOSE BLD STRIP.AUTO-MCNC: 42 MG/DL (ref 65–100)
GLUCOSE BLD STRIP.AUTO-MCNC: 76 MG/DL (ref 65–100)
GLUCOSE BLD STRIP.AUTO-MCNC: 87 MG/DL (ref 65–100)
GLUCOSE SERPL-MCNC: 74 MG/DL (ref 65–100)
HCT VFR BLD AUTO: 35.9 % (ref 35.8–46.3)
HGB BLD-MCNC: 11.4 G/DL (ref 11.7–15.4)
LACTATE SERPL-SCNC: 1.5 MMOL/L (ref 0.4–2)
MCH RBC QN AUTO: 28.7 PG (ref 26.1–32.9)
MCHC RBC AUTO-ENTMCNC: 31.8 G/DL (ref 31.4–35)
MCV RBC AUTO: 90.4 FL (ref 79.6–97.8)
PLATELET # BLD AUTO: 151 K/UL (ref 150–450)
PMV BLD AUTO: 11.7 FL (ref 10.8–14.1)
POTASSIUM SERPL-SCNC: 4.2 MMOL/L (ref 3.5–5.1)
RBC # BLD AUTO: 3.97 M/UL (ref 4.05–5.25)
SODIUM SERPL-SCNC: 146 MMOL/L (ref 136–145)
TSH SERPL DL<=0.005 MIU/L-ACNC: 0.22 UIU/ML (ref 0.36–3.74)
WBC # BLD AUTO: 6.4 K/UL (ref 4.3–11.1)

## 2018-03-23 PROCEDURE — 97161 PT EVAL LOW COMPLEX 20 MIN: CPT

## 2018-03-23 PROCEDURE — 82962 GLUCOSE BLOOD TEST: CPT

## 2018-03-23 PROCEDURE — 86580 TB INTRADERMAL TEST: CPT | Performed by: INTERNAL MEDICINE

## 2018-03-23 PROCEDURE — 65270000029 HC RM PRIVATE

## 2018-03-23 PROCEDURE — 74011000250 HC RX REV CODE- 250

## 2018-03-23 PROCEDURE — 84443 ASSAY THYROID STIM HORMONE: CPT | Performed by: PHYSICIAN ASSISTANT

## 2018-03-23 PROCEDURE — 74011000258 HC RX REV CODE- 258: Performed by: HOSPITALIST

## 2018-03-23 PROCEDURE — 85027 COMPLETE CBC AUTOMATED: CPT | Performed by: INTERNAL MEDICINE

## 2018-03-23 PROCEDURE — 36415 COLL VENOUS BLD VENIPUNCTURE: CPT | Performed by: INTERNAL MEDICINE

## 2018-03-23 PROCEDURE — 74011000250 HC RX REV CODE- 250: Performed by: INTERNAL MEDICINE

## 2018-03-23 PROCEDURE — 74011000302 HC RX REV CODE- 302: Performed by: INTERNAL MEDICINE

## 2018-03-23 PROCEDURE — 83605 ASSAY OF LACTIC ACID: CPT | Performed by: INTERNAL MEDICINE

## 2018-03-23 PROCEDURE — 74011250636 HC RX REV CODE- 250/636: Performed by: INTERNAL MEDICINE

## 2018-03-23 PROCEDURE — 74011250637 HC RX REV CODE- 250/637: Performed by: INTERNAL MEDICINE

## 2018-03-23 PROCEDURE — 80048 BASIC METABOLIC PNL TOTAL CA: CPT | Performed by: INTERNAL MEDICINE

## 2018-03-23 RX ORDER — DEXTROSE 50 % IN WATER (D50W) INTRAVENOUS SYRINGE
50 AS NEEDED
Status: DISCONTINUED | OUTPATIENT
Start: 2018-03-23 | End: 2018-03-26 | Stop reason: HOSPADM

## 2018-03-23 RX ORDER — DEXTROSE 50 % IN WATER (D50W) INTRAVENOUS SYRINGE
Status: COMPLETED
Start: 2018-03-23 | End: 2018-03-23

## 2018-03-23 RX ORDER — DEXTROSE MONOHYDRATE 50 MG/ML
125 INJECTION, SOLUTION INTRAVENOUS CONTINUOUS
Status: DISCONTINUED | OUTPATIENT
Start: 2018-03-23 | End: 2018-03-23

## 2018-03-23 RX ORDER — DEXTROSE MONOHYDRATE AND SODIUM CHLORIDE 5; .9 G/100ML; G/100ML
75 INJECTION, SOLUTION INTRAVENOUS CONTINUOUS
Status: DISCONTINUED | OUTPATIENT
Start: 2018-03-23 | End: 2018-03-24

## 2018-03-23 RX ADMIN — DEXTROSE: 50 INJECTION, SOLUTION INTRAVENOUS at 05:20

## 2018-03-23 RX ADMIN — DEXTROSE MONOHYDRATE 25 G: 25 INJECTION, SOLUTION INTRAVENOUS at 10:55

## 2018-03-23 RX ADMIN — DEXTROSE MONOHYDRATE: 25 INJECTION, SOLUTION INTRAVENOUS at 05:20

## 2018-03-23 RX ADMIN — DEXTROSE MONOHYDRATE AND SODIUM CHLORIDE 75 ML/HR: 5; .9 INJECTION, SOLUTION INTRAVENOUS at 18:30

## 2018-03-23 RX ADMIN — TUBERCULIN PURIFIED PROTEIN DERIVATIVE 5 UNITS: 5 INJECTION, SOLUTION INTRADERMAL at 17:20

## 2018-03-23 RX ADMIN — Medication 10 ML: at 21:39

## 2018-03-23 RX ADMIN — TRAMADOL HYDROCHLORIDE 50 MG: 50 TABLET, FILM COATED ORAL at 21:18

## 2018-03-23 RX ADMIN — LEVOTHYROXINE SODIUM 100 MCG: 100 TABLET ORAL at 07:22

## 2018-03-23 RX ADMIN — METOPROLOL TARTRATE 25 MG: 25 TABLET ORAL at 09:04

## 2018-03-23 RX ADMIN — HEPARIN SODIUM 5000 UNITS: 5000 INJECTION, SOLUTION INTRAVENOUS; SUBCUTANEOUS at 21:11

## 2018-03-23 RX ADMIN — Medication 10 ML: at 17:22

## 2018-03-23 RX ADMIN — AMLODIPINE BESYLATE 10 MG: 10 TABLET ORAL at 09:04

## 2018-03-23 RX ADMIN — DEXTROSE MONOHYDRATE 125 ML/HR: 5 INJECTION, SOLUTION INTRAVENOUS at 06:15

## 2018-03-23 RX ADMIN — ROSUVASTATIN CALCIUM 20 MG: 20 TABLET, FILM COATED ORAL at 21:11

## 2018-03-23 RX ADMIN — METOPROLOL TARTRATE 25 MG: 25 TABLET ORAL at 17:20

## 2018-03-23 RX ADMIN — CLOPIDOGREL BISULFATE 75 MG: 75 TABLET ORAL at 09:04

## 2018-03-23 RX ADMIN — HEPARIN SODIUM 5000 UNITS: 5000 INJECTION, SOLUTION INTRAVENOUS; SUBCUTANEOUS at 11:35

## 2018-03-23 NOTE — PROGRESS NOTES
Hospitalist Progress Note    3/23/2018  Admit Date: 3/22/2018  5:57 PM   NAME: Luis Duffy   :  3/20/1929   MRN:  [id number]   Attending: Nixon Leija MD  PCP:  Adelfo Preciado MD    SUBJECTIVE:   79 y/o AA lady with HTN, DM type 2 on Glipizide, Dyslipidemia, Hypothyroidism, Aortic Stenosis s/p recent TAVR, h/o nonobstructive CAD, was brought to the ED at [facility] for generalized weakness and low blood sugar. The patient has been feeling weak, tired and uneasy since the morning. Her daughter initially checked on her in the morning and felt that something was not right, as the patient had a slow speech, but remained alert. Her son went to check on her in the afternoon, and by then, the patient was way different, she was feeling kind of lethargic, was cold and even felt she could have passed out. EMS was then called and found her blood glucose to be only 23. She was given D50 and transported to the ED at [facility] for further evaluation. Her blood glucose on arrival was 63. She was given another infusion of glucose and her blood glucose improved to 163. Of note the patient has been on Glipizide for about 8 months and her last HbA1c was 6.1. She was also recently started on Bactrim for LE cellulitis and on Furosemide for leg swelling. Basic labs obtained in the ED noted a Cr of 2.08 and a lactic acid of 3.6. Her most recent baseline Cr was 0.8. UA was normal and CXR did not show any acute infiltrate. The patient was seen in the ED and is basically back to her baseline, currently denying any significant complaints. Based on her clinical presentation, the Hospitalist service was contacted and the patient was admitted to the Medical Floor for Hypoglycemia and ALBERT. 3/23: Pt. Is sitting up in bed with family members to include daughter, son-in law and son at bedside. Pt. States she \"feels pretty good\". Daughter is concerned because she states her mother Jesse Dutta is not quite right\".  Blood glucose today still on low normal side even with eating. Glipizide has been discontinued. Renal function improving but not yet down to baseline with cessation of Bactrim and lasix. Pt. Reports she was taking Lasix for \"leg swelling\". She denies any leg pain, chest pain or SOB. She is oriented to place but does not know the year, the month or the president. Echo performed 2017 revealed preserved EF. Patient denies chills or fever but states she was \"very cold\" yesterday when she was hypoglycemic and hypothermic. Review of Systems negative with exception of pertinent positives noted above  PHYSICAL EXAM   Patient Vitals for the past 24 hrs:   Temp Pulse Resp BP SpO2   18 1124 96 °F (35.6 °C) 70 20 111/58 94 %   18 0755 97.4 °F (36.3 °C) 70 18 134/66 99 %   18 0315 98.5 °F (36.9 °C) 65 20 105/50 93 %   18 2240 97.3 °F (36.3 °C) 64 20 117/57 100 %   18 2143 98.1 °F (36.7 °C) 70 16 150/68 96 %   18 1924 97.6 °F (36.4 °C) - - - -   18 1905 - 68 21 - 95 %   18 1900 - 63 19 - 94 %   18 1815 - 63 - 147/67 99 %   18 1801 - 64 18 147/67 93 %   18 1615 (!) 93.1 °F (33.9 °C) - - - -     Temp (24hrs), Av.9 °F (36.1 °C), Min:93.1 °F (33.9 °C), Max:98.5 °F (36.9 °C)    Oxygen Therapy  O2 Sat (%): 94 % (18 1124)  Pulse via Oximetry: 63 beats per minute (18 1905)  O2 Device: Room air (18 2143)    Intake/Output Summary (Last 24 hours) at 18 1612  Last data filed at 18 0945   Gross per 24 hour   Intake              240 ml   Output              200 ml   Net               40 ml        General:                  Frail, elderly female sitting in bed in NAD. Lungs:                    CTA bilaterally. Resp even and non labored  Heart:                      S1S2 present II/VI RANDY  Abdomen:              Soft, non tender, non distended. BS present  Extremities:           Lower legs with trace-1+ pitting edema. Left lower leg mildly erythematous. Non-tender.   Skin: Warm, dry, Intact. Few scabbed lesions to left lower leg. Neurologic:            Alert but pleasantly confused.               Data Review:  I have reviewed all labs, meds,  and studies from the last 24 hours. ASSESSMENT /PLAN     1. Hypoglycemia: Improved but still low normal. Continue with D5% infusion at 125 ml/hr. Follow labs. May need to decrease IV fluid if patient starts to show signs of fluid overload. Encourage eating often. Maintain PO intake at regular intervals. 2. ALBERT: Improved with cessation of Lasix and Bactrim. 3. Type 2 DM controlled despite hypoglycemia. Last A1C 6.1. SSI as per protocol. Keep Glipizide discontinued. 4. Hypothermia: May have been secondary to Hypoglycemia. 5. Hypothyroidism: TSH pending. Continue Levothyroxine.         DVT Prophylaxis: Heparin SQ    Cassie Cruz Mingus, PA

## 2018-03-23 NOTE — H&P
Hospitalist H&P Note     Admit Date:  3/22/2018  5:57 PM   Name:  Gorge Francois   Age:  80 y.o.  :  3/20/1929   MRN:  [id number]   PCP:  Daphne Rosado MD  Treatment Team: Attending Provider: Jonita Severe, MD    HPI:     The patient is an 81 y/o lady with HTN, DM type 2 on Glipizide, Dyslipidemia, Hypothyroidism, Aortic Stenosis s/p TAVR, h/o nonobstructive CAD, was brought to the ED at [facility] for generalized weakness and low blood sugar. The patient has been feeling weak, tired and uneasy since the morning. Her daughter initially checked on her in the morning and felt that something was not right, as the patient had a slow speech, but remained alert. Her son went to check on her in the afternoon, and by then, the patient was way different, she was feeling kind of lethargic, was cold and even felt she could have passed out. EMS was then called and found her blood glucose to be only 23. She was given D50 and transported to the ED at [facility] for further evaluation. Her blood glucose on arrival was 63. She was given another infusion of glucose and her blood glucose improved to 163. Of note the patient has been on Glipizide for about 8 months and her last HbA1c was 6.1. She was also recently started on Bactrim for LE cellulitis and on Furosemide for leg swelling. Basic labs obtained in the ED noted a Cr of 2.08 and a lactic acid of 3.6. Her most recent baseline Cr was 0.8. UA was normal and CXR did not show any acute infiltrate. The patient was seen in the ED and is basically back to her baseline, currently denying any significant complaints. Based on her clinical presentation, the Hospitalist service was contacted and the patient was admitted to the Medical Floor for Hypoglycemia and ALBERT. 10 systems reviewed and negative except as noted in HPI.   Past Medical History:   Diagnosis Date    Aortic regurgitation     mild to moderate per ECHO 3/28/17    Aortic stenosis     severe per ECHO 3/28/17    Basal cell carcinoma     Bruit (arterial)     CAD (coronary artery disease)     small vessel disease, medical management per cardiologist not 4/17    Choledocholithiasis     Hyperlipidemia     Hypertension     Hypothyroidism     Murmur, cardiac     Poor historian     S/P TAVR (transcatheter aortic valve replacement)     3/17    Tricuspid valve regurgitation     moderate per ECHO 3/28/17    Type II or unspecified type diabetes mellitus without mention of complication, not stated as uncontrolled     not medicated, treated w/ diet    Urinary incontinence       Past Surgical History:   Procedure Laterality Date    HX APPENDECTOMY      HX CARPAL TUNNEL RELEASE      patient denies    HX CYSTOCELE REPAIR      HX ERCP  02/20/2017    large CBD stones    HX ERCP  04/28/2017    laser    HX HYSTERECTOMY      HX KNEE ARTHROSCOPY Right     HX OOPHORECTOMY      Partial    HX ORTHOPAEDIC      2 back-one fusion-low; all fingers-trigger    HX OTHER SURGICAL Right     basal cell nose    HX OTHER SURGICAL  03/28/2017    Tavr    HX RECTOCELE REPAIR      LAP,CHOLECYSTECTOMY        Allergies   Allergen Reactions    Aspirin Anaphylaxis     Hives, swelling    Ativan [Lorazepam] Other (comments)     Confused and aggressive    Robaxin [Methocarbamol] Anaphylaxis     Swelling of tongue, wheezing  Muscle relaxers in general    Aleve [Naproxen Sodium] Rash    Carafate [Sucralfate] Hives    Ciprofloxacin Diarrhea    Clorazepate Dipotassium Hives    Flexeril [Cyclobenzaprine] Itching    Vytorin 10-10 [Ezetimibe-Simvastatin] Other (comments)     Muscle soreness    Zantac [Ranitidine Hcl] Itching      Social History   Substance Use Topics    Smoking status: Never Smoker    Smokeless tobacco: Never Used    Alcohol use No      Family History   Problem Relation Age of Onset    Heart Attack Father      age 68    Heart Disease Father     Arthritis-osteo Mother     Diabetes Mother       Immunization History   Administered Date(s) Administered    Influenza Vaccine (Quad) Mdck Pf 09/11/2017    Pneumococcal Conjugate (PCV-13) 09/29/2015    Pneumococcal Polysaccharide (PPSV-23) 09/11/2017    TB Skin Test (PPD) Intradermal 04/30/2017, 07/08/2017     PTA Medications:  Prior to Admission Medications   Prescriptions Last Dose Informant Patient Reported? Taking? HYDROcodone-acetaminophen (NORCO)  mg tablet 3/15/2018 at Unknown time  No Yes   Sig: Take 1 Tab by mouth every eight (8) hours as needed for Pain. Max Daily Amount: 3 Tabs. amLODIPine (NORVASC) 10 mg tablet 3/22/2018 at Unknown time  No Yes   Sig: Take 1 Tab by mouth daily. benazepril (LOTENSIN) 20 mg tablet 3/22/2018 at Unknown time  No Yes   Sig: Take 1 Tab by mouth daily. clopidogrel (PLAVIX) 75 mg tab 3/22/2018 at Unknown time  No Yes   Sig: Take 1 Tab by mouth daily. furosemide (LASIX) 20 mg tablet 3/22/2018 at Unknown time  No Yes   Sig: For swelling as needed one or two pills daily po.   glipiZIDE (GLUCOTROL) 10 mg tablet 3/22/2018 at Unknown time  No Yes   Sig: Take 1 Tab by mouth two (2) times a day. levothyroxine (SYNTHROID) 100 mcg tablet 3/22/2018 at Unknown time  No Yes   Sig: Take 1 Tab by mouth Daily (before breakfast). metoprolol tartrate (LOPRESSOR) 25 mg tablet 3/22/2018 at Unknown time  No Yes   Sig: Take 1 Tab by mouth two (2) times a day. nitrofurantoin (MACRODANTIN) 100 mg capsule 3/21/2018 at Unknown time  No Yes   Sig: Take 1 Cap by mouth nightly. pregabalin (LYRICA) 75 mg capsule 3/21/2018 at Unknown time  No Yes   Sig: Take 1 Cap by mouth three (3) times daily. Max Daily Amount: 225 mg.   rosuvastatin (CRESTOR) 20 mg tablet 3/21/2018 at Unknown time  No Yes   Sig: Take 1 Tab by mouth nightly. traMADol (ULTRAM) 50 mg tablet 2/22/2018 at Unknown time  No Yes   Sig: Take 1 Tab by mouth every six (6) hours as needed for Pain.    trimethoprim-sulfamethoxazole (BACTRIM DS) 160-800 mg per tablet 3/22/2018 at Unknown time  No Yes   Sig: Take 1 Tab by mouth two (2) times a day. Facility-Administered Medications: None       Objective:   Patient Vitals for the past 24 hrs:   Temp Pulse Resp BP SpO2   03/22/18 2240 97.3 °F (36.3 °C) 64 20 117/57 100 %   03/22/18 2143 98.1 °F (36.7 °C) 70 16 150/68 96 %   03/22/18 1924 97.6 °F (36.4 °C) - - - -   03/22/18 1905 - 68 21 - 95 %   03/22/18 1900 - 63 19 - 94 %   03/22/18 1815 - 63 - 147/67 99 %   03/22/18 1801 - 64 18 147/67 93 %   03/22/18 1615 (!) 93.1 °F (33.9 °C) - - - -     Oxygen Therapy  O2 Sat (%): 100 % (03/22/18 2240)  Pulse via Oximetry: 63 beats per minute (03/22/18 1905)  O2 Device: Room air (03/22/18 2143)  No intake or output data in the 24 hours ending 03/22/18 2340    Physical Exam:  General:    Well nourished. Alert. Eyes:   Normal sclera. Extraocular movements intact. ENT:  Normocephalic, atraumatic. Dry mucous membranes  CV:   Normal S1S2, RRR. Lungs:  CTAB. No wheezing, rhonchi, or rales. Abdomen: Soft, nontender, nondistended. Bowel sounds normal.   Extremities: Warm and dry. No cyanosis. Mild edema and erythema in the LE. Neurologic: CN II-XII grossly intact. Sensation intact. Skin:     No rashes or jaundice, except for minimal erythema in the LE. Psych:  Normal mood and affect. I reviewed the labs, imaging, EKGs, telemetry, and other studies done this admission.   Data Review:   Recent Results (from the past 24 hour(s))   GLUCOSE, POC    Collection Time: 03/22/18  6:06 PM   Result Value Ref Range    Glucose (POC) 63 (L) 65 - 100 mg/dL   CBC WITH AUTOMATED DIFF    Collection Time: 03/22/18  6:15 PM   Result Value Ref Range    WBC 9.6 4.3 - 11.1 K/uL    RBC 4.20 4.05 - 5.25 M/uL    HGB 12.6 11.7 - 15.4 g/dL    HCT 38.2 35.8 - 46.3 %    MCV 91.0 79.6 - 97.8 FL    MCH 30.0 26.1 - 32.9 PG    MCHC 33.0 31.4 - 35.0 g/dL    RDW 14.2 11.9 - 14.6 %    PLATELET 626 939 - 950 K/uL    MPV 11.7 10.8 - 14.1 FL    DF AUTOMATED      NEUTROPHILS 70 43 - 78 %    LYMPHOCYTES 25 13 - 44 %    MONOCYTES 5 4.0 - 12.0 %    EOSINOPHILS 0 (L) 0.5 - 7.8 %    BASOPHILS 0 0.0 - 2.0 %    IMMATURE GRANULOCYTES 0 0.0 - 5.0 %    ABS. NEUTROPHILS 6.7 1.7 - 8.2 K/UL    ABS. LYMPHOCYTES 2.4 0.5 - 4.6 K/UL    ABS. MONOCYTES 0.5 0.1 - 1.3 K/UL    ABS. EOSINOPHILS 0.0 0.0 - 0.8 K/UL    ABS. BASOPHILS 0.0 0.0 - 0.2 K/UL    ABS. IMM. GRANS. 0.0 0.0 - 0.5 K/UL   METABOLIC PANEL, COMPREHENSIVE    Collection Time: 03/22/18  6:15 PM   Result Value Ref Range    Sodium 144 136 - 145 mmol/L    Potassium 4.9 3.5 - 5.1 mmol/L    Chloride 109 (H) 98 - 107 mmol/L    CO2 20 (L) 21 - 32 mmol/L    Anion gap 15 7 - 16 mmol/L    Glucose 113 (H) 65 - 100 mg/dL    BUN 71 (H) 8 - 23 MG/DL    Creatinine 2.08 (H) 0.6 - 1.0 MG/DL    GFR est AA 29 (L) >60 ml/min/1.73m2    GFR est non-AA 24 (L) >60 ml/min/1.73m2    Calcium 7.7 (L) 8.3 - 10.4 MG/DL    Bilirubin, total 0.1 (L) 0.2 - 1.1 MG/DL    ALT (SGPT) 22 12 - 65 U/L    AST (SGOT) 25 15 - 37 U/L    Alk.  phosphatase 285 (H) 50 - 136 U/L    Protein, total 7.7 6.3 - 8.2 g/dL    Albumin 3.4 3.2 - 4.6 g/dL    Globulin 4.3 (H) 2.3 - 3.5 g/dL    A-G Ratio 0.8 (L) 1.2 - 3.5     EKG, 12 LEAD, INITIAL    Collection Time: 03/22/18  6:18 PM   Result Value Ref Range    Ventricular Rate 85 BPM    Atrial Rate 85 BPM    P-R Interval 288 ms    QRS Duration 94 ms    Q-T Interval 426 ms    QTC Calculation (Bezet) 506 ms    Calculated P Axis 45 degrees    Calculated R Axis 35 degrees    Calculated T Axis 12 degrees    Diagnosis       !! AGE AND GENDER SPECIFIC ECG ANALYSIS !!  !!! Poor data quality, interpretation may be adversely affected  Possible Sinus  Possible Left atrial enlargement  Low voltage QRS  Cannot rule out Anterior infarct , age undetermined  ST & T wave abnormality, consider inferolateral ischemia  Prolonged QT  Abnormal ECG  When compared with ECG of 08-JUL-2017 14:45,  Significant changes have occurred  Confirmed by Desirae Schultz MD (), [facility]IVERS ([id number]) on 3/22/2018 8:37:04 PM URINALYSIS W/ RFLX MICROSCOPIC    Collection Time: 03/22/18  6:32 PM   Result Value Ref Range    Color YELLOW      Appearance TURBID      Specific gravity 1.017 1.001 - 1.023      pH (UA) 5.0 5.0 - 9.0      Protein NEGATIVE  NEG mg/dL    Glucose NEGATIVE  mg/dL    Ketone NEGATIVE  NEG mg/dL    Bilirubin NEGATIVE  NEG      Blood NEGATIVE  NEG      Urobilinogen 0.2 0.2 - 1.0 EU/dL    Nitrites NEGATIVE  NEG      Leukocyte Esterase NEGATIVE  NEG     POC LACTIC ACID    Collection Time: 03/22/18  6:37 PM   Result Value Ref Range    Lactic Acid (POC) 3.6 (H) 0.5 - 1.9 mmol/L   GLUCOSE, POC    Collection Time: 03/22/18  6:57 PM   Result Value Ref Range    Glucose (POC) 183 (H) 65 - 100 mg/dL       All Micro Results     Procedure Component Value Units Date/Time    CULTURE, BLOOD [[id number]] Collected:  03/22/18 1923    Order Status:  Completed Specimen:  Blood from Blood Updated:  03/22/18 2035    CULTURE, BLOOD [[id number]]     Order Status:  Canceled Specimen:  Blood from Blood           Other Studies:  Xr Chest Port    Result Date: 3/22/2018  CHEST X-RAY, one view. HISTORY:  Shortness of breath and altered mental status. TECHNIQUE:  AP upright upright view. COMPARISON: 8 July 2017 FINDINGS: The lungs are clear. The heart size is borderline. The costophrenic angles are sharp. The pulmonary vasculature is unremarkable. Included portion of the upper abdomen is unremarkable. There are aortic arch calcifications. IMPRESSION:  Negative for acute change.        Assessment and Plan:     Hospital Problems as of 3/22/2018  Date Reviewed: 3/13/2018          Codes Class Noted - Resolved POA    Hypoglycemia ICD-10-CM: E16.2  ICD-9-CM: 251.2  3/22/2018 - Present Unknown        ALBERT (acute kidney injury) ([facility] [address] ICD-10-CM: N17.9  ICD-9-CM: 584.9  3/22/2018 - Present Unknown            1 - Hypoglycemia, likely due to a combination of taking Glipizide and poor oral intake  2 - ALBERT, likely due to dehydration and recent use of Bactrim and Lasix  3 - HTN  4 - DM type 2, controlled. Last HbA1c 6.1.  5 - Dyslipidemia  6 - Hypothyroidism  7 - AS s/p TAVR  8 - h/o nonobstructive CAD    PLAN:  · Hydrate with D5 1/2 NS at 100 cc/hr  · D/C Glipizide  · Sliding scale insulin  · Encourage oral hydration  · Monitor renal function  · D/C Bactrim and Lasix. Hold ACE inhibitors. · Resume statins, Levothyroxine and other home antihypertensive medications    DVT ppx: With Heparin SQ     Code status:  Full Code  Estimated LOS:  Greater than 2 midnights, possibly 48-72 hours  Risk:  high    Signed:   Melanie Crowley MD

## 2018-03-23 NOTE — PROGRESS NOTES
TRANSFER - IN REPORT:    Verbal report received from Rosa RN(name) on Aurora Oleary  being received from ER(unit) for routine progression of care      Report consisted of patients Situation, Background, Assessment and   Recommendations(SBAR). Information from the following report(s) ED Summary, MAR and Recent Results was reviewed with the receiving nurse. Opportunity for questions and clarification was provided. Assessment completed upon patients arrival to unit and care assumed.

## 2018-03-23 NOTE — PROGRESS NOTES
Received patient via stretcher from ER to room 801 for Dr. Philip Arnold with acute kidney injury and hypoglycemia. Alert and oriented x 3. Pleasant and cooperative. Family at bedside. Heparin well x 2 intact and patent. No complaints. No distress noted.

## 2018-03-23 NOTE — PROGRESS NOTES
Patient resting quietly in bed. No new assessment changes. Encouraging PO intake. Family at bedside. No complaints of pain or needs at this time. Call light in reach. Safety maintained.

## 2018-03-23 NOTE — ROUTINE PROCESS
TRANSFER - OUT REPORT:    Verbal report given to Pieter Fortune RN (name) on David Irwin  being transferred to [facility](unit) for routine progression of care       Report consisted of patients Situation, Background, Assessment and   Recommendations(SBAR). Information from the following report(s) ED Summary was reviewed with the receiving nurse. Opportunity for questions and clarification was provided.       Patient transported with:   hospital transporter

## 2018-03-23 NOTE — PROGRESS NOTES
Daughter states patient heels of feet have been red prior to admission, patient BLE/ floated on pillow.

## 2018-03-23 NOTE — PROGRESS NOTES
Patient is alert and oriented X4 @ this time, resting quietly in bed, on RA, family @ bedside, patient lung sounds clear, denies SOB, IV fluids infusing with +1 pitting edema to BLE, patient admits to voiding well, denies pain or discomfort, denies needs, call light within reach.

## 2018-03-23 NOTE — PROGRESS NOTES
Called to room by son stating his mom is talking crazy again and requested her blood sugar be checked. Blood sugar is 33.  1 amp D50 given IV push and Dr. Luana Rainey was notified.

## 2018-03-23 NOTE — PROGRESS NOTES
Problem: Mobility Impaired (Adult and Pediatric)  Goal: *Acute Goals and Plan of Care (Insert Text)  Goals:  (1.)Ms. Rashard Larios will move from supine to sit and sit to supine , scoot up and down and roll side to side with INDEPENDENT within 5 treatment day(s). (2.)Ms. Rashard Larios will transfer from bed to chair and chair to bed with MODIFIED INDEPENDENCE using the least restrictive device within 5 treatment day(s). (3.)Ms. Oleary will ambulate with MODIFIED INDEPENDENCE for 150 feet with the least restrictive device within 5 treatment day(s). PHYSICAL THERAPY: Initial Assessment, Treatment Day: Day of Assessment, PM 3/23/2018  INPATIENT: Hospital Day: 2  [place] / Product Type: Medicare /      NAME/AGE/GENDER: Arcelia Boswell is a 80 y.o. female   PRIMARY DIAGNOSIS: ALBERT (acute kidney injury) ([facility] [address]  Hypoglycemia <principal problem not specified> <principal problem not specified>        ICD-10: Treatment Diagnosis:   · Generalized Muscle Weakness (M62.81)  · Difficulty in walking, Not elsewhere classified (R26.2)  · History of falling (Z91.81)   Precaution/Allergies:  Aspirin; Ativan [lorazepam]; Robaxin [methocarbamol]; Aleve [naproxen sodium]; Carafate [sucralfate]; Ciprofloxacin; Clorazepate dipotassium; Flexeril [cyclobenzaprine]; Vytorin 10-10 [ezetimibe-simvastatin]; and Zantac [ranitidine hcl]      ASSESSMENT:     Ms. Rashard Larios is an 80year old WF with an admitting diagnosis of ALBERT and Hypoglycemia. She presents today supine in bed with her daughter at the bedside. She states she has had some loose stool in her brief and needs to be go to the bathroom. She reports she lives alone in a 1 level home with 4 steps with rails to enter. She reports her PLOF is modified independence with use of a r/walker and/or straight cane. Her daughter is present at the bedside and appears very invested, but frustrated with her mom.   Daughter reports a significant change in her interactions as compared to how she normally processes info. The daughter frequently speaks over her mom. The patient has BLE AROM WFL. Supine to sit is CGA with good sitting balance on the edge of the bed. Sit to stand is SBA/CGA and the patient is impulsive at times without regard for safety. She was assisted ambulating into the bathroom ~10' to the toilet for completion and cleaning of a BM. She stood 3 times and needed assistance with hygiene after her BM. She ambulated back to the bed to rest, then ambulated another time 27' with the r/walker with SBA/CGA. Her gait was steady and controlled and she returned to the bed with SBA for sit to supine. She was positioned for comfort in supine. She was pleasant and cooperative but very embarrassed about her lack of bowel control. Ms. Jonathan Hernandez appears to be functioning below her PLOF and would benefit from continued skilled PT to maximize her safety and functional mobility. This section established at most recent assessment   PROBLEM LIST (Impairments causing functional limitations):  1. Decreased Strength  2. Decreased ADL/Functional Activities  3. Decreased Transfer Abilities  4. Decreased Ambulation Ability/Technique   INTERVENTIONS PLANNED: (Benefits and precautions of physical therapy have been discussed with the patient.)  1. Bed Mobility  2. Family Education  3. Therapeutic Activites  4. Therapeutic Exercise/Strengthening     TREATMENT PLAN: Frequency/Duration: 3 times a week for duration of hospital stay  Rehabilitation Potential For Stated Goals: Good     RECOMMENDED REHABILITATION/EQUIPMENT: (at time of discharge pending progress): Due to the probability of continued deficits (see above) this patient will not likely need continued skilled physical therapy after discharge. Equipment:    None at this time              HISTORY:   History of Present Injury/Illness (Reason for Referral):   The patient is an 81 y/o lady with HTN, DM type 2 on Glipizide, Dyslipidemia, Hypothyroidism, Aortic Stenosis s/p TAVR, h/o nonobstructive CAD, was brought to the ED at [facility] for generalized weakness and low blood sugar. The patient has been feeling weak, tired and uneasy since the morning. Her daughter initially checked on her in the morning and felt that something was not right, as the patient had a slow speech, but remained alert. Her son went to check on her in the afternoon, and by then, the patient was way different, she was feeling kind of lethargic, was cold and even felt she could have passed out. EMS was then called and found her blood glucose to be only 23. She was given D50 and transported to the ED at [facility] for further evaluation. Her blood glucose on arrival was 63. She was given another infusion of glucose and her blood glucose improved to 163. Of note the patient has been on Glipizide for about 8 months and her last HbA1c was 6.1. She was also recently started on Bactrim for LE cellulitis and on Furosemide for leg swelling. Basic labs obtained in the ED noted a Cr of 2.08 and a lactic acid of 3.6. Her most recent baseline Cr was 0.8. UA was normal and CXR did not show any acute infiltrate. The patient was seen in the ED and is basically back to her baseline, currently denying any significant complaints. Based on her clinical presentation, the Hospitalist service was contacted and the patient was admitted to the Medical Floor for Hypoglycemia and ALBERT. Past Medical History/Comorbidities:   Ms. Villa Allen  has a past medical history of Aortic regurgitation; Aortic stenosis; Basal cell carcinoma; Bruit (arterial); CAD (coronary artery disease); Choledocholithiasis; Hyperlipidemia; Hypertension; Hypothyroidism; Murmur, cardiac; Poor historian; S/P TAVR (transcatheter aortic valve replacement); Tricuspid valve regurgitation; Type II or unspecified type diabetes mellitus without mention of complication, not stated as uncontrolled; and Urinary incontinence.   Ms. Villa Allen  has a past surgical history that includes hx cystocele repair; hx rectocele repair; hx appendectomy; hx carpal tunnel release; hx ercp (02/20/2017); hx hysterectomy; hx oophorectomy; hx other surgical (Right); hx orthopaedic; hx knee arthroscopy (Right); hx ercp (04/28/2017); hx other surgical (03/28/2017); and pr lap,cholecystectomy. Social History/Living Environment:   Home Environment: Private residence  # Steps to Enter: 4  Rails to Enter: Yes  One/Two Story Residence: One story  Living Alone: Yes  Support Systems: Child(mayo)  Patient Expects to be Discharged to[de-identified] Private residence  Current DME Used/Available at Home: Walker, rolling, [employer], straight  Prior Level of Function/Work/Activity:  Patient and daughter report that she has been ambulating with a r/walker and/or cane at home for her mobility. Number of Personal Factors/Comorbidities that affect the Plan of Care: 1-2: MODERATE COMPLEXITY   EXAMINATION:   Most Recent Physical Functioning:   Gross Assessment:  AROM: Within functional limits  PROM: Within functional limits  Strength: Generally decreased, functional  Coordination: Generally decreased, functional  Tone: Normal  Sensation: Intact               Posture:  Posture (WDL): Exceptions to WDL  Posture Assessment:  Forward head, Rounded shoulders  Balance:  Sitting: Intact  Standing: Intact  Standing - Static: Good;Constant support  Standing - Dynamic : Good Bed Mobility:  Rolling: Modified independent  Supine to Sit: Contact guard assistance  Sit to Supine: Contact guard assistance  Scooting: Contact guard assistance  Wheelchair Mobility:     Transfers:  Sit to Stand: Stand-by assistance  Stand to Sit: Stand-by assistance  Bed to Chair: Contact guard assistance;Stand-by assistance  Gait:     Speed/Blanca: Pace decreased (<100 feet/min)  Step Length: Left shortened;Right shortened  Distance (ft): 30 Feet (ft) (10' from bed to bathroom 1st walk)  Assistive Device: Walker, rolling  Ambulation - Level of Assistance: Contact guard assistance;Stand-by assistance  Interventions: Safety awareness training;Verbal cues      Body Structures Involved:  1. Metabolic  2. Endocrine Body Functions Affected:  1. Metobolic/Endocrine Activities and Participation Affected:  1. General Tasks and Demands  2. Mobility   Number of elements that affect the Plan of Care: 1-2: LOW COMPLEXITY   CLINICAL PRESENTATION:   Presentation: Stable and uncomplicated: LOW COMPLEXITY   CLINICAL DECISION MAKIN[address] [place] [address] AM-PAC 6 Clicks   Basic Mobility Inpatient Short Form  How much difficulty does the patient currently have. .. Unable A Lot A Little None   1. Turning over in bed (including adjusting bedclothes, sheets and blankets)? [] 1   [] 2   [] 3   [x] 4   2. Sitting down on and standing up from a chair with arms ( e.g., wheelchair, bedside commode, etc.)   [] 1   [] 2   [] 3   [x] 4   3. Moving from lying on back to sitting on the side of the bed? [] 1   [] 2   [x] 3   [] 4   How much help from another person does the patient currently need. .. Total A Lot A Little None   4. Moving to and from a bed to a chair (including a wheelchair)? [] 1   [] 2   [x] 3   [] 4   5. Need to walk in hospital room? [] 1   [] 2   [x] 3   [] 4   6. Climbing 3-5 steps with a railing? [] 1   [] 2   [x] 3   [] 4   © , Trustees of [address] [place] [address], under license to [employer]. All rights reserved      Score:  Initial: 20 Most Recent: X (Date: -- )    Interpretation of Tool:  Represents activities that are increasingly more difficult (i.e. Bed mobility, Transfers, Gait). Score 24 23 22-20 19-15 14-10 9-7 6     Modifier CH CI CJ CK CL CM CN      ?  Mobility - Walking and Moving Around:     - CURRENT STATUS: CJ - 20%-39% impaired, limited or restricted    - GOAL STATUS: CI - 1%-19% impaired, limited or restricted    - D/C STATUS:  ---------------To be determined---------------  [place] / Product Type: Medicare /      Medical Necessity:     · Patient is expected to demonstrate progress in strength, balance and functional technique to increase independence with SPT and gait with a r/walker. Reason for Services/Other Comments:  · Patient continues to require skilled intervention due to medical complications. Use of outcome tool(s) and clinical judgement create a POC that gives a: Clear prediction of patient's progress: LOW COMPLEXITY            TREATMENT:   (In addition to Assessment/Re-Assessment sessions the following treatments were rendered)   Pre-treatment Symptoms/Complaints:  Patient had no complaints of pain during therapy this afternoon. Pain: Initial:   Pain Intensity 1: 0  Post Session:  0/10     Assessment/Reassessment only, no treatment provided today    Braces/Orthotics/Lines/Etc:   · IV  · O2 Device: Room air  Treatment/Session Assessment:    · Response to Treatment:  Patient participated and tolerated therapy well. She was very focused on having a loose stool in her brief that required her time in the bathroom getting cleaned up with assistance from PT.  · Interdisciplinary Collaboration:   o Physical Therapist  o Registered Nurse  · After treatment position/precautions:   o Supine in bed  o Bed alarm/tab alert on  o Bed/Chair-wheels locked  o Bed in low position  o Call light within reach  o Family at bedside   · Compliance with Program/Exercises: Will assess as treatment progresses. · Recommendations/Intent for next treatment session: \"Next visit will focus on advancements to more challenging activities and reduction in assistance provided\".   Total Treatment Duration:  PT Patient Time In/Time Out  Time In: 1250  Time Out: [address]

## 2018-03-23 NOTE — PROGRESS NOTES
Pt awake and alert & oriented x4. VSS. Respirations even and unlabored. No complaints of pain. No s/s of acute pain or distress. Call light in reach. Instructed patient to call for assistance. Family at bedside. All questions answered and needs addressed at this time. Safety maintained.

## 2018-03-23 NOTE — PROGRESS NOTES
Pt resting comfortably in bed. Respirations even and unlabored. No complaints of pain at this time. No s/s of distress noted. Call light in reach. Bedside report given to oncoming nurse.

## 2018-03-23 NOTE — PROGRESS NOTES
Patient's blood sugar 42 at 1055. 1 amp of D50 given to 20 [facility] IV. Rechecked blood sugar- came up to 87. Encouraging PO intake. Will continue to monitor closely.

## 2018-03-24 LAB
ANION GAP SERPL CALC-SCNC: 11 MMOL/L (ref 7–16)
BUN SERPL-MCNC: 45 MG/DL (ref 8–23)
CALCIUM SERPL-MCNC: 8.2 MG/DL (ref 8.3–10.4)
CHLORIDE SERPL-SCNC: 115 MMOL/L (ref 98–107)
CO2 SERPL-SCNC: 18 MMOL/L (ref 21–32)
CREAT SERPL-MCNC: 1.61 MG/DL (ref 0.6–1)
ERYTHROCYTE [DISTWIDTH] IN BLOOD BY AUTOMATED COUNT: 14.7 % (ref 11.9–14.6)
GLUCOSE BLD STRIP.AUTO-MCNC: 131 MG/DL (ref 65–100)
GLUCOSE BLD STRIP.AUTO-MCNC: 133 MG/DL (ref 65–100)
GLUCOSE BLD STRIP.AUTO-MCNC: 187 MG/DL (ref 65–100)
GLUCOSE BLD STRIP.AUTO-MCNC: 215 MG/DL (ref 65–100)
GLUCOSE BLD STRIP.AUTO-MCNC: 293 MG/DL (ref 65–100)
GLUCOSE SERPL-MCNC: 119 MG/DL (ref 65–100)
HCT VFR BLD AUTO: 33.2 % (ref 35.8–46.3)
HGB BLD-MCNC: 10.7 G/DL (ref 11.7–15.4)
MCH RBC QN AUTO: 29.7 PG (ref 26.1–32.9)
MCHC RBC AUTO-ENTMCNC: 32.2 G/DL (ref 31.4–35)
MCV RBC AUTO: 92.2 FL (ref 79.6–97.8)
MM INDURATION POC: NORMAL 0MM (ref 0–5)
PLATELET # BLD AUTO: 141 K/UL (ref 150–450)
PMV BLD AUTO: 11.4 FL (ref 10.8–14.1)
POTASSIUM SERPL-SCNC: 5 MMOL/L (ref 3.5–5.1)
POTASSIUM SERPL-SCNC: 5.3 MMOL/L (ref 3.5–5.1)
PPD POC: NORMAL NEGATIVE
RBC # BLD AUTO: 3.6 M/UL (ref 4.05–5.25)
SODIUM SERPL-SCNC: 144 MMOL/L (ref 136–145)
WBC # BLD AUTO: 6.6 K/UL (ref 4.3–11.1)

## 2018-03-24 PROCEDURE — 85027 COMPLETE CBC AUTOMATED: CPT | Performed by: INTERNAL MEDICINE

## 2018-03-24 PROCEDURE — 74011000258 HC RX REV CODE- 258: Performed by: HOSPITALIST

## 2018-03-24 PROCEDURE — 74011250637 HC RX REV CODE- 250/637: Performed by: HOSPITALIST

## 2018-03-24 PROCEDURE — 80048 BASIC METABOLIC PNL TOTAL CA: CPT | Performed by: INTERNAL MEDICINE

## 2018-03-24 PROCEDURE — 74011250636 HC RX REV CODE- 250/636: Performed by: INTERNAL MEDICINE

## 2018-03-24 PROCEDURE — 74011636637 HC RX REV CODE- 636/637: Performed by: INTERNAL MEDICINE

## 2018-03-24 PROCEDURE — 84132 ASSAY OF SERUM POTASSIUM: CPT | Performed by: INTERNAL MEDICINE

## 2018-03-24 PROCEDURE — 36415 COLL VENOUS BLD VENIPUNCTURE: CPT | Performed by: INTERNAL MEDICINE

## 2018-03-24 PROCEDURE — 97166 OT EVAL MOD COMPLEX 45 MIN: CPT

## 2018-03-24 PROCEDURE — 97530 THERAPEUTIC ACTIVITIES: CPT

## 2018-03-24 PROCEDURE — 82962 GLUCOSE BLOOD TEST: CPT

## 2018-03-24 PROCEDURE — 65270000029 HC RM PRIVATE

## 2018-03-24 PROCEDURE — 74011250637 HC RX REV CODE- 250/637: Performed by: INTERNAL MEDICINE

## 2018-03-24 RX ORDER — CEPHALEXIN 500 MG/1
500 CAPSULE ORAL EVERY 8 HOURS
Status: DISCONTINUED | OUTPATIENT
Start: 2018-03-24 | End: 2018-03-24

## 2018-03-24 RX ORDER — SODIUM CHLORIDE 9 MG/ML
75 INJECTION, SOLUTION INTRAVENOUS CONTINUOUS
Status: DISCONTINUED | OUTPATIENT
Start: 2018-03-24 | End: 2018-03-26 | Stop reason: HOSPADM

## 2018-03-24 RX ORDER — CEPHALEXIN 500 MG/1
500 CAPSULE ORAL EVERY 6 HOURS
Status: DISCONTINUED | OUTPATIENT
Start: 2018-03-24 | End: 2018-03-24

## 2018-03-24 RX ORDER — LEVOTHYROXINE SODIUM 88 UG/1
88 TABLET ORAL
Status: DISCONTINUED | OUTPATIENT
Start: 2018-03-25 | End: 2018-03-26 | Stop reason: HOSPADM

## 2018-03-24 RX ADMIN — SODIUM CHLORIDE 75 ML/HR: 900 INJECTION, SOLUTION INTRAVENOUS at 13:28

## 2018-03-24 RX ADMIN — CLOPIDOGREL BISULFATE 75 MG: 75 TABLET ORAL at 09:21

## 2018-03-24 RX ADMIN — LEVOTHYROXINE SODIUM 100 MCG: 100 TABLET ORAL at 05:06

## 2018-03-24 RX ADMIN — HEPARIN SODIUM 5000 UNITS: 5000 INJECTION, SOLUTION INTRAVENOUS; SUBCUTANEOUS at 09:21

## 2018-03-24 RX ADMIN — AMLODIPINE BESYLATE 10 MG: 10 TABLET ORAL at 09:21

## 2018-03-24 RX ADMIN — Medication 5 ML: at 05:03

## 2018-03-24 RX ADMIN — INSULIN LISPRO 6 UNITS: 100 INJECTION, SOLUTION INTRAVENOUS; SUBCUTANEOUS at 21:35

## 2018-03-24 RX ADMIN — CEPHALEXIN 500 MG: 500 CAPSULE ORAL at 05:06

## 2018-03-24 RX ADMIN — CEPHALEXIN 500 MG: 500 CAPSULE ORAL at 09:21

## 2018-03-24 RX ADMIN — Medication 10 ML: at 21:34

## 2018-03-24 RX ADMIN — TRAMADOL HYDROCHLORIDE 50 MG: 50 TABLET, FILM COATED ORAL at 21:34

## 2018-03-24 RX ADMIN — HEPARIN SODIUM 5000 UNITS: 5000 INJECTION, SOLUTION INTRAVENOUS; SUBCUTANEOUS at 21:35

## 2018-03-24 RX ADMIN — DEXTROSE MONOHYDRATE AND SODIUM CHLORIDE 75 ML/HR: 5; .9 INJECTION, SOLUTION INTRAVENOUS at 09:19

## 2018-03-24 RX ADMIN — ROSUVASTATIN CALCIUM 20 MG: 20 TABLET, FILM COATED ORAL at 21:35

## 2018-03-24 RX ADMIN — Medication 10 ML: at 15:49

## 2018-03-24 RX ADMIN — METOPROLOL TARTRATE 25 MG: 25 TABLET ORAL at 09:21

## 2018-03-24 RX ADMIN — METOPROLOL TARTRATE 25 MG: 25 TABLET ORAL at 17:32

## 2018-03-24 NOTE — PROGRESS NOTES
Patient resting comfortably in bed. Family at bedside. No new assessment changes. D5NS changed to NS per MD order. No complaints of pain or needs at this time. Call light in reach. Safety maintained.

## 2018-03-24 NOTE — PROGRESS NOTES
Patient is alert and oriented X4, sitting up in bed, family @ bedside, patient on RA, RR even and unlabored, lung sounds clear, +1 LE edema, trace edema to UE, patient without c/o pain or discomfort, IV fluids infusing, denies pain and discomfort, denies needs, call light within reach.

## 2018-03-24 NOTE — PROGRESS NOTES
Patient rested quietly throughout night, po keflex given this a.m. Patient tolerated well, +2 BLE edema, patient without c/o pain or discomfort, denies needs, call light within reach.

## 2018-03-24 NOTE — PROGRESS NOTES
Hospitalist Progress Note     Admit Date:  3/22/2018  5:57 PM   Name:  Cady Norton   Age:  80 y.o.  :  3/20/1929   MRN:  [id number]   PCP:  Jerome Roberts MD  Treatment Team: Attending Provider: Arabella Michelle MD; Utilization Review: Adelfo Morales RN; Care Manager: Trice Gallardo RN    Subjective:   \"62 y/o AA lady with HTN, DM type 2 on Glipizide, Dyslipidemia, Hypothyroidism, Aortic Stenosis s/p recent TAVR, h/o nonobstructive CAD, was brought to the ED at [facility] for generalized weakness and low blood sugar. The patient has been feeling weak, tired and uneasy since the morning. Her daughter initially checked on her in the morning and felt that something was not right, as the patient had a slow speech, but remained alert. Her son went to check on her in the afternoon, and by then, the patient was way different, she was feeling kind of lethargic, was cold and even felt she could have passed out. EMS was then called and found her blood glucose to be only 23. She was given D50 and transported to the ED at [facility] for further evaluation. Her blood glucose on arrival was 63. She was given another infusion of glucose and her blood glucose improved to 163. Of note the patient has been on Glipizide for about 8 months and her last HbA1c was 6.1. She was also recently started on Bactrim for LE cellulitis and on Furosemide for leg swelling. Basic labs obtained in the ED noted a Cr of 2.08 and a lactic acid of 3.6. Her most recent baseline Cr was 0.8. UA was normal and CXR did not show any acute infiltrate. The patient was seen in the ED and is basically back to her baseline, currently denying any significant complaints. Based on her clinical presentation, the Hospitalist service was contacted and the patient was admitted to the Medical Floor for Hypoglycemia and ALBERT. 3/23: Pt. Is sitting up in bed with family members to include daughter, son-in law and son at bedside. Pt. States she \"feels pretty good\". Daughter is concerned because she states her mother Jannie Clayton is not quite right\". Blood glucose today still on low normal side even with eating. Glipizide has been discontinued. Renal function improving but not yet down to baseline with cessation of Bactrim and lasix. Pt. Reports she was taking Lasix for \"leg swelling\". She denies any leg pain, chest pain or SOB. She is oriented to place but does not know the year, the month or the president. Echo performed 5/2017 revealed preserved EF. Patient denies chills or fever but states she was \"very cold\" yesterday when she was hypoglycemic and hypothermic. \"    3/24: Patient sitting up on side of bed eating lunch. Daughter and son-in-law at beside. Patient states she feels good today. Blood glucose 113, Cr trending down at 1.61 today, TSH .221. Patient denies SOB, leg pain, chest pain, weakness, chills. Patient oriented to self, situation, place, and month. Unsure of date. Potassium elevated 5.3. LE edema +1. Objective:   Patient Vitals for the past 24 hrs:   Temp Pulse Resp BP SpO2   03/24/18 1215 97.7 °F (36.5 °C) 63 18 148/75 97 %   03/24/18 0844 98.6 °F (37 °C) 67 20 119/57 95 %   03/24/18 0315 98.4 °F (36.9 °C) 62 18 114/57 94 %   03/23/18 2310 99.1 °F (37.3 °C) 66 18 114/52 94 %   03/23/18 1915 98.8 °F (37.1 °C) 69 20 118/57 94 %   03/23/18 1715 97.6 °F (36.4 °C) 67 20 128/55 98 %     Oxygen Therapy  O2 Sat (%): 97 % (03/24/18 1215)  Pulse via Oximetry: 63 beats per minute (03/22/18 1905)  O2 Device: Room air (03/22/18 2143)    Intake/Output Summary (Last 24 hours) at 03/24/18 1300  Last data filed at 03/24/18 0920   Gross per 24 hour   Intake             1020 ml   Output              600 ml   Net              420 ml         General:    Frail elderly. Alert. CV:   RRR. S1S2 present II/VI RANDY  Lungs:   CTAB. No wheezing, rhonchi, or rales. Abdomen:   Soft, nontender, nondistended. Extremities: Warm and dry. No cyanosis.  +1 pitting edema  Skin:     No rashes or jaundice. Scabs on LLE    Data Review:  I have reviewed all labs, meds, telemetry events, and studies from the last 24 hours.     Recent Results (from the past 24 hour(s))   GLUCOSE, POC    Collection Time: 03/23/18  1:41 PM   Result Value Ref Range    Glucose (POC) 76 65 - 100 mg/dL   TSH 3RD GENERATION    Collection Time: 03/23/18  3:01 PM   Result Value Ref Range    TSH 0.221 (L) 0.358 - 3.740 uIU/mL   GLUCOSE, POC    Collection Time: 03/23/18  4:29 PM   Result Value Ref Range    Glucose (POC) 167 (H) 65 - 100 mg/dL   GLUCOSE, POC    Collection Time: 03/23/18  9:08 PM   Result Value Ref Range    Glucose (POC) 120 (H) 65 - 431 mg/dL   METABOLIC PANEL, BASIC    Collection Time: 03/24/18  5:38 AM   Result Value Ref Range    Sodium 144 136 - 145 mmol/L    Potassium 5.3 (H) 3.5 - 5.1 mmol/L    Chloride 115 (H) 98 - 107 mmol/L    CO2 18 (L) 21 - 32 mmol/L    Anion gap 11 7 - 16 mmol/L    Glucose 119 (H) 65 - 100 mg/dL    BUN 45 (H) 8 - 23 MG/DL    Creatinine 1.61 (H) 0.6 - 1.0 MG/DL    GFR est AA 39 (L) >60 ml/min/1.73m2    GFR est non-AA 32 (L) >60 ml/min/1.73m2    Calcium 8.2 (L) 8.3 - 10.4 MG/DL   CBC W/O DIFF    Collection Time: 03/24/18  5:38 AM   Result Value Ref Range    WBC 6.6 4.3 - 11.1 K/uL    RBC 3.60 (L) 4.05 - 5.25 M/uL    HGB 10.7 (L) 11.7 - 15.4 g/dL    HCT 33.2 (L) 35.8 - 46.3 %    MCV 92.2 79.6 - 97.8 FL    MCH 29.7 26.1 - 32.9 PG    MCHC 32.2 31.4 - 35.0 g/dL    RDW 14.7 (H) 11.9 - 14.6 %    PLATELET 377 (L) 507 - 450 K/uL    MPV 11.4 10.8 - 14.1 FL   GLUCOSE, POC    Collection Time: 03/24/18  5:43 AM   Result Value Ref Range    Glucose (POC) 133 (H) 65 - 100 mg/dL   GLUCOSE, POC    Collection Time: 03/24/18 12:05 PM   Result Value Ref Range    Glucose (POC) 187 (H) 65 - 100 mg/dL        All Micro Results     Procedure Component Value Units Date/Time    CULTURE, BLOOD [[id number]] Collected:  03/22/18 1923    Order Status:  Completed Specimen:  Blood from Blood Updated:  03/24/18 0710 Special Requests: RIGHT ANTECUBITAL        Culture result: NO GROWTH 2 DAYS       CULTURE, BLOOD [[id number]]     Order Status:  Canceled Specimen:  Blood from Blood           Current Meds:  Current Facility-Administered Medications   Medication Dose Route Frequency    cephALEXin (KEFLEX) capsule 500 mg  500 mg Oral Q8H    dextrose (D50W) injection syrg 25 g  50 mL IntraVENous PRN    tuberculin injection 5 Units  5 Units IntraDERMal ONCE    dextrose 5% and 0.9% NaCl infusion  75 mL/hr IntraVENous CONTINUOUS    amLODIPine (NORVASC) tablet 10 mg  10 mg Oral DAILY    clopidogrel (PLAVIX) tablet 75 mg  75 mg Oral DAILY    HYDROcodone-acetaminophen (NORCO)  mg tablet 1 Tab  1 Tab Oral Q8H PRN    levothyroxine (SYNTHROID) tablet 100 mcg  100 mcg Oral ACB    metoprolol tartrate (LOPRESSOR) tablet 25 mg  25 mg Oral BID    pregabalin (LYRICA) capsule 75 mg  75 mg Oral TID    rosuvastatin (CRESTOR) tablet 20 mg  20 mg Oral QHS    traMADol (ULTRAM) tablet 50 mg  50 mg Oral Q6H PRN    sodium chloride (NS) flush 5-10 mL  5-10 mL IntraVENous Q8H    sodium chloride (NS) flush 5-10 mL  5-10 mL IntraVENous PRN    acetaminophen (TYLENOL) tablet 650 mg  650 mg Oral Q4H PRN    ondansetron (ZOFRAN) injection 4 mg  4 mg IntraVENous Q6H PRN    heparin (porcine) injection 5,000 Units  5,000 Units SubCUTAneous Q12H    insulin lispro (HUMALOG) injection   SubCUTAneous AC&HS       Other Studies (last 24 hours):  No results found. Assessment and Plan:     Hospital Problems as of 3/24/2018  Date Reviewed: 3/13/2018          Codes Class Noted - Resolved POA    Hypoglycemia ICD-10-CM: E16.2  ICD-9-CM: 251.2  3/22/2018 - Present Unknown        AL[facility] ([facility] ICD-10-CM: N17.9  ICD-9-CM: 584.9  3/22/2018 - Present Unknown              PLAN:        Hypoglycemia: Improved. Change IV D5NS to NS. Monitor blood glucose levels    ALBERT: Improved Cr.  Continue to monitor    Type II DM: SSI per protocol    Hypothermia: Resolved    Hypothyroidism: Decrease dose from 100mcg to 88mcg.        DC planning/Dispo:  Home with home health  DVT ppx:  Heparin SQ      Discussed plan of care with supervising MD, Dr. Faye Toure, pt, family    Signed:  Venancio Breaux NP

## 2018-03-24 NOTE — PROGRESS NOTES
Pt awake and alert & oriented x4. VSS. Respirations even and unlabored. No complaints of pain. No s/s of acute pain or distress. Daughter at bedside. Call light in reach. Instructed patient to call for assistance. All questions answered and needs addressed at this time. Safety maintained.

## 2018-03-24 NOTE — PROGRESS NOTES
Pt resting comfortably in bed. Respirations even and unlabored. No complaints of pain at this time. No s/s of distress noted. Family at bedside. Call light in reach. Bedside report to be given to oncoming nurse.

## 2018-03-25 LAB
ANION GAP SERPL CALC-SCNC: 11 MMOL/L (ref 7–16)
ANION GAP SERPL CALC-SCNC: 11 MMOL/L (ref 7–16)
BASOPHILS # BLD: 0 K/UL (ref 0–0.2)
BASOPHILS NFR BLD: 0 % (ref 0–2)
BUN SERPL-MCNC: 33 MG/DL (ref 8–23)
BUN SERPL-MCNC: 35 MG/DL (ref 8–23)
CALCIUM SERPL-MCNC: 8 MG/DL (ref 8.3–10.4)
CALCIUM SERPL-MCNC: 8.7 MG/DL (ref 8.3–10.4)
CHLORIDE SERPL-SCNC: 111 MMOL/L (ref 98–107)
CHLORIDE SERPL-SCNC: 114 MMOL/L (ref 98–107)
CO2 SERPL-SCNC: 17 MMOL/L (ref 21–32)
CO2 SERPL-SCNC: 21 MMOL/L (ref 21–32)
CREAT SERPL-MCNC: 1.18 MG/DL (ref 0.6–1)
CREAT SERPL-MCNC: 1.43 MG/DL (ref 0.6–1)
DIFFERENTIAL METHOD BLD: ABNORMAL
EOSINOPHIL # BLD: 0.3 K/UL (ref 0–0.8)
EOSINOPHIL NFR BLD: 4 % (ref 0.5–7.8)
ERYTHROCYTE [DISTWIDTH] IN BLOOD BY AUTOMATED COUNT: 13.9 % (ref 11.9–14.6)
ERYTHROCYTE [DISTWIDTH] IN BLOOD BY AUTOMATED COUNT: 14 % (ref 11.9–14.6)
GLUCOSE BLD STRIP.AUTO-MCNC: 108 MG/DL (ref 65–100)
GLUCOSE BLD STRIP.AUTO-MCNC: 113 MG/DL (ref 65–100)
GLUCOSE BLD STRIP.AUTO-MCNC: 182 MG/DL (ref 65–100)
GLUCOSE BLD STRIP.AUTO-MCNC: 188 MG/DL (ref 65–100)
GLUCOSE BLD STRIP.AUTO-MCNC: 202 MG/DL (ref 65–100)
GLUCOSE SERPL-MCNC: 107 MG/DL (ref 65–100)
GLUCOSE SERPL-MCNC: 172 MG/DL (ref 65–100)
HCT VFR BLD AUTO: 33.1 % (ref 35.8–46.3)
HCT VFR BLD AUTO: 35 % (ref 35.8–46.3)
HGB BLD-MCNC: 10.7 G/DL (ref 11.7–15.4)
HGB BLD-MCNC: 11.8 G/DL (ref 11.7–15.4)
IMM GRANULOCYTES # BLD: 0 K/UL (ref 0–0.5)
IMM GRANULOCYTES NFR BLD AUTO: 0 % (ref 0–5)
LYMPHOCYTES # BLD: 2.4 K/UL (ref 0.5–4.6)
LYMPHOCYTES NFR BLD: 38 % (ref 13–44)
MCH RBC QN AUTO: 29.2 PG (ref 26.1–32.9)
MCH RBC QN AUTO: 30.3 PG (ref 26.1–32.9)
MCHC RBC AUTO-ENTMCNC: 32.3 G/DL (ref 31.4–35)
MCHC RBC AUTO-ENTMCNC: 33.7 G/DL (ref 31.4–35)
MCV RBC AUTO: 89.7 FL (ref 79.6–97.8)
MCV RBC AUTO: 90.4 FL (ref 79.6–97.8)
MM INDURATION POC: 0 MM (ref 0–5)
MONOCYTES # BLD: 0.3 K/UL (ref 0.1–1.3)
MONOCYTES NFR BLD: 6 % (ref 4–12)
NEUTS SEG # BLD: 3.2 K/UL (ref 1.7–8.2)
NEUTS SEG NFR BLD: 52 % (ref 43–78)
PLATELET # BLD AUTO: 111 K/UL (ref 150–450)
PLATELET # BLD AUTO: 125 K/UL (ref 150–450)
PMV BLD AUTO: 10.8 FL (ref 10.8–14.1)
PMV BLD AUTO: 11 FL (ref 10.8–14.1)
POTASSIUM SERPL-SCNC: 5.1 MMOL/L (ref 3.5–5.1)
POTASSIUM SERPL-SCNC: 5.7 MMOL/L (ref 3.5–5.1)
PPD POC: NORMAL NEGATIVE
RBC # BLD AUTO: 3.66 M/UL (ref 4.05–5.25)
RBC # BLD AUTO: 3.9 M/UL (ref 4.05–5.25)
SODIUM SERPL-SCNC: 142 MMOL/L (ref 136–145)
SODIUM SERPL-SCNC: 143 MMOL/L (ref 136–145)
WBC # BLD AUTO: 5.8 K/UL (ref 4.3–11.1)
WBC # BLD AUTO: 6.2 K/UL (ref 4.3–11.1)

## 2018-03-25 PROCEDURE — 36415 COLL VENOUS BLD VENIPUNCTURE: CPT | Performed by: INTERNAL MEDICINE

## 2018-03-25 PROCEDURE — 85027 COMPLETE CBC AUTOMATED: CPT | Performed by: INTERNAL MEDICINE

## 2018-03-25 PROCEDURE — 85025 COMPLETE CBC W/AUTO DIFF WBC: CPT | Performed by: INTERNAL MEDICINE

## 2018-03-25 PROCEDURE — 74011636637 HC RX REV CODE- 636/637: Performed by: INTERNAL MEDICINE

## 2018-03-25 PROCEDURE — 74011250637 HC RX REV CODE- 250/637: Performed by: INTERNAL MEDICINE

## 2018-03-25 PROCEDURE — 82962 GLUCOSE BLOOD TEST: CPT

## 2018-03-25 PROCEDURE — 74011250636 HC RX REV CODE- 250/636: Performed by: INTERNAL MEDICINE

## 2018-03-25 PROCEDURE — 65270000029 HC RM PRIVATE

## 2018-03-25 PROCEDURE — 80048 BASIC METABOLIC PNL TOTAL CA: CPT | Performed by: INTERNAL MEDICINE

## 2018-03-25 RX ADMIN — Medication 10 ML: at 05:08

## 2018-03-25 RX ADMIN — LEVOTHYROXINE SODIUM 88 MCG: 88 TABLET ORAL at 05:07

## 2018-03-25 RX ADMIN — HEPARIN SODIUM 5000 UNITS: 5000 INJECTION, SOLUTION INTRAVENOUS; SUBCUTANEOUS at 21:48

## 2018-03-25 RX ADMIN — CLOPIDOGREL BISULFATE 75 MG: 75 TABLET ORAL at 08:24

## 2018-03-25 RX ADMIN — INSULIN LISPRO 2 UNITS: 100 INJECTION, SOLUTION INTRAVENOUS; SUBCUTANEOUS at 11:41

## 2018-03-25 RX ADMIN — INSULIN LISPRO 2 UNITS: 100 INJECTION, SOLUTION INTRAVENOUS; SUBCUTANEOUS at 16:42

## 2018-03-25 RX ADMIN — HEPARIN SODIUM 5000 UNITS: 5000 INJECTION, SOLUTION INTRAVENOUS; SUBCUTANEOUS at 08:24

## 2018-03-25 RX ADMIN — Medication 5 ML: at 21:49

## 2018-03-25 RX ADMIN — ROSUVASTATIN CALCIUM 20 MG: 20 TABLET, FILM COATED ORAL at 21:49

## 2018-03-25 RX ADMIN — SODIUM CHLORIDE 75 ML/HR: 900 INJECTION, SOLUTION INTRAVENOUS at 16:42

## 2018-03-25 RX ADMIN — AMLODIPINE BESYLATE 10 MG: 10 TABLET ORAL at 08:24

## 2018-03-25 RX ADMIN — METOPROLOL TARTRATE 25 MG: 25 TABLET ORAL at 08:24

## 2018-03-25 RX ADMIN — METOPROLOL TARTRATE 25 MG: 25 TABLET ORAL at 16:42

## 2018-03-25 RX ADMIN — INSULIN LISPRO 4 UNITS: 100 INJECTION, SOLUTION INTRAVENOUS; SUBCUTANEOUS at 21:48

## 2018-03-25 RX ADMIN — TRAMADOL HYDROCHLORIDE 50 MG: 50 TABLET, FILM COATED ORAL at 21:49

## 2018-03-25 NOTE — PROGRESS NOTES
Hospitalist Progress Note     Admit Date:  3/22/2018  5:57 PM   Name:  Arcelia Boswell   Age:  80 y.o.  :  3/20/1929   MRN:  [id number]   PCP:  Ana Bradley MD  Treatment Team: Attending Provider: Alphonso Tamayo MD; Utilization Review: Jonas Shay RN; Care Manager: Amol Pina RN    Subjective:   \"35 y/o AA lady with HTN, DM type 2 on Glipizide, Dyslipidemia, Hypothyroidism, Aortic Stenosis s/p recent TAVR, h/o nonobstructive CAD, was brought to the ED at [facility] for generalized weakness and low blood sugar. The patient has been feeling weak, tired and uneasy since the morning. Her daughter initially checked on her in the morning and felt that something was not right, as the patient had a slow speech, but remained alert. Her son went to check on her in the afternoon, and by then, the patient was way different, she was feeling kind of lethargic, was cold and even felt she could have passed out. EMS was then called and found her blood glucose to be only 23. She was given D50 and transported to the ED at [facility] for further evaluation. Her blood glucose on arrival was 63. She was given another infusion of glucose and her blood glucose improved to 163. Of note the patient has been on Glipizide for about 8 months and her last HbA1c was 6.1. She was also recently started on Bactrim for LE cellulitis and on Furosemide for leg swelling. Basic labs obtained in the ED noted a Cr of 2.08 and a lactic acid of 3.6. Her most recent baseline Cr was 0.8. UA was normal and CXR did not show any acute infiltrate. The patient was seen in the ED and is basically back to her baseline, currently denying any significant complaints. Based on her clinical presentation, the Hospitalist service was contacted and the patient was admitted to the Medical Floor for Hypoglycemia and ALBERT. 3/23: Pt. Is sitting up in bed with family members to include daughter, son-in law and son at bedside. Pt. States she \"feels pretty good\". Daughter is concerned because she states her mother Gustavo Borjas is not quite right\". Blood glucose today still on low normal side even with eating. Glipizide has been discontinued. Renal function improving but not yet down to baseline with cessation of Bactrim and lasix. Pt. Reports she was taking Lasix for \"leg swelling\". She denies any leg pain, chest pain or SOB. She is oriented to place but does not know the year, the month or the president. Echo performed 5/2017 revealed preserved EF. Patient denies chills or fever but states she was \"very cold\" yesterday when she was hypoglycemic and hypothermic. \"    3/24: Patient sitting up on side of bed eating lunch. Daughter and son-in-law at beside. Patient states she feels good today. Blood glucose 113, Cr trending down at 1.61 today, TSH .221. Patient denies SOB, leg pain, chest pain, weakness, chills. Patient oriented to self, situation, place, and month. Unsure of date. Potassium elevated 5.3. LE edema +1.     3/25: Patient lying in bed alert. Son at bedside. Patient states she feels fine but is upset that she has to have people stay with her at home when discharged. Blood glucose 108 this morning. Cr. 1.43. Baseline 2/16/18 0.79. Potassium 5.1.          Objective:     Patient Vitals for the past 24 hrs:   Temp Pulse Resp BP SpO2   03/25/18 1056 97.9 °F (36.6 °C) 68 18 147/75 96 %   03/25/18 0821 98 °F (36.7 °C) 61 18 148/68 96 %   03/25/18 0342 98 °F (36.7 °C) 65 19 155/65 95 %   03/24/18 2325 98 °F (36.7 °C) 64 22 133/61 95 %   03/24/18 1923 98.3 °F (36.8 °C) 68 20 130/63 96 %   03/24/18 1614 97.7 °F (36.5 °C) 70 20 150/68 94 %   03/24/18 1215 97.7 °F (36.5 °C) 63 18 148/75 97 %     Oxygen Therapy  O2 Sat (%): 96 % (03/25/18 1056)  Pulse via Oximetry: 63 beats per minute (03/22/18 1905)  O2 Device: Room air (03/22/18 2213)    Intake/Output Summary (Last 24 hours) at 03/25/18 1114  Last data filed at 03/25/18 1014   Gross per 24 hour   Intake             1315 ml   Output 100 ml   Net             1215 ml         General:    Frail elderly. Alert. CV:   RRR. S1S2 present II/VI RANDY  Lungs:   CTAB. No wheezing, rhonchi, or rales. Abdomen:   Soft, nontender, nondistended. Extremities: Warm and dry. No cyanosis. No edema  Skin:     No rashes or jaundice. Scabs on LLE    Data Review:  I have reviewed all labs, meds, telemetry events, and studies from the last 24 hours.     Recent Results (from the past 24 hour(s))   GLUCOSE, POC    Collection Time: 03/24/18 12:05 PM   Result Value Ref Range    Glucose (POC) 187 (H) 65 - 100 mg/dL   POTASSIUM    Collection Time: 03/24/18  2:31 PM   Result Value Ref Range    Potassium 5.0 3.5 - 5.1 mmol/L   GLUCOSE, POC    Collection Time: 03/24/18  4:35 PM   Result Value Ref Range    Glucose (POC) 215 (H) 65 - 100 mg/dL   PLEASE READ & DOCUMENT PPD TEST IN 24 HRS    Collection Time: 03/24/18  6:00 PM   Result Value Ref Range    PPD  Negative    mm Induration  0mm   GLUCOSE, POC    Collection Time: 03/24/18  9:05 PM   Result Value Ref Range    Glucose (POC) 293 (H) 65 - 100 mg/dL   GLUCOSE, POC    Collection Time: 03/24/18 10:51 PM   Result Value Ref Range    Glucose (POC) 131 (H) 65 - 100 mg/dL   GLUCOSE, POC    Collection Time: 03/25/18 12:00 AM   Result Value Ref Range    Glucose (POC) 113 (H) 65 - 100 mg/dL   GLUCOSE, POC    Collection Time: 03/25/18  5:47 AM   Result Value Ref Range    Glucose (POC) 108 (H) 65 - 970 mg/dL   METABOLIC PANEL, BASIC    Collection Time: 03/25/18  6:38 AM   Result Value Ref Range    Sodium 143 136 - 145 mmol/L    Potassium 5.1 3.5 - 5.1 mmol/L    Chloride 111 (H) 98 - 107 mmol/L    CO2 21 21 - 32 mmol/L    Anion gap 11 7 - 16 mmol/L    Glucose 107 (H) 65 - 100 mg/dL    BUN 35 (H) 8 - 23 MG/DL    Creatinine 1.43 (H) 0.6 - 1.0 MG/DL    GFR est AA 44 (L) >60 ml/min/1.73m2    GFR est non-AA 37 (L) >60 ml/min/1.73m2    Calcium 8.0 (L) 8.3 - 10.4 MG/DL   CBC W/O DIFF    Collection Time: 03/25/18  6:38 AM Result Value Ref Range    WBC 5.8 4.3 - 11.1 K/uL    RBC 3.66 (L) 4.05 - 5.25 M/uL    HGB 10.7 (L) 11.7 - 15.4 g/dL    HCT 33.1 (L) 35.8 - 46.3 %    MCV 90.4 79.6 - 97.8 FL    MCH 29.2 26.1 - 32.9 PG    MCHC 32.3 31.4 - 35.0 g/dL    RDW 14.0 11.9 - 14.6 %    PLATELET 091 (L) 619 - 450 K/uL    MPV 11.0 10.8 - 14.1 FL        All Micro Results     Procedure Component Value Units Date/Time    CULTURE, BLOOD [[id number]] Collected:  03/22/18 1923    Order Status:  Completed Specimen:  Blood from Blood Updated:  03/25/18 0716     Special Requests: RIGHT ANTECUBITAL        Culture result: NO GROWTH 3 DAYS       CULTURE, BLOOD [[id number]]     Order Status:  Canceled Specimen:  Blood from Blood           Current Meds:  Current Facility-Administered Medications   Medication Dose Route Frequency    levothyroxine (SYNTHROID) tablet 88 mcg  88 mcg Oral ACB    0.9% sodium chloride infusion  75 mL/hr IntraVENous CONTINUOUS    dextrose (D50W) injection syrg 25 g  50 mL IntraVENous PRN    amLODIPine (NORVASC) tablet 10 mg  10 mg Oral DAILY    clopidogrel (PLAVIX) tablet 75 mg  75 mg Oral DAILY    HYDROcodone-acetaminophen (NORCO)  mg tablet 1 Tab  1 Tab Oral Q8H PRN    metoprolol tartrate (LOPRESSOR) tablet 25 mg  25 mg Oral BID    pregabalin (LYRICA) capsule 75 mg  75 mg Oral TID    rosuvastatin (CRESTOR) tablet 20 mg  20 mg Oral QHS    traMADol (ULTRAM) tablet 50 mg  50 mg Oral Q6H PRN    sodium chloride (NS) flush 5-10 mL  5-10 mL IntraVENous Q8H    sodium chloride (NS) flush 5-10 mL  5-10 mL IntraVENous PRN    acetaminophen (TYLENOL) tablet 650 mg  650 mg Oral Q4H PRN    ondansetron (ZOFRAN) injection 4 mg  4 mg IntraVENous Q6H PRN    heparin (porcine) injection 5,000 Units  5,000 Units SubCUTAneous Q12H    insulin lispro (HUMALOG) injection   SubCUTAneous AC&HS       Other Studies (last 24 hours):  No results found.     Assessment and Plan:     Hospital Problems as of 3/25/2018  Date Reviewed: 3/13/2018 Codes Class Noted - Resolved POA    Hypoglycemia ICD-10-CM: E16.2  ICD-9-CM: 251.2  3/22/2018 - Present Unknown        [facility] ICD-10-CM: N17.9  ICD-9-CM: 584.9  3/22/2018 - Present Unknown              PLAN:        Hypoglycemia: Improved. Monitor blood glucose levels    ALBERT: Creatinine improving. Continue to monitor. IV ns 75cc/hr    Type II DM: SSI per protocol    Hypothermia: Resolved    Hypothyroidism: Decrease levothyroxine dose from 100mcg to 88mcg yesterday.        DC planning/Dispo:  Home with home health and PT  DVT ppx:  Heparin SQ      Discussed plan of care with supervising MD, Dr. Kelli Segundo, pt, family    Signed:  Kaitlin Velasco NP

## 2018-03-25 NOTE — PROGRESS NOTES
Pt resting in bed with son at bedside. Pt alert oriented time 3 at this time. Pt denies pain or distress at this time. Pt on RA at this time. Son at bedside. Pt encouraged to call for assistance if needed call light in reach, door open will monitor.

## 2018-03-25 NOTE — PROGRESS NOTES
Patient sitting up in bed, did not sleep much last night, IV fluids infusing blood sugar WNL, denies pain and discomfort, son @ bedside, call light within reach.

## 2018-03-25 NOTE — PROGRESS NOTES
Pt resting in bed. No distress noted at this time. Pt with good po intake. Pt encouraged to call for assistance if needed call light in reach, door open will monitor.

## 2018-03-25 NOTE — PROGRESS NOTES
Pt sitting up in chair with daughter at bedside. No distress noted at this time. Pt encouraged to call for assistance if needed call light in reach, door open will monitor.

## 2018-03-26 ENCOUNTER — HOME HEALTH ADMISSION (OUTPATIENT)
Dept: HOME HEALTH SERVICES | Facility: HOME HEALTH | Age: 83
End: 2018-03-26
Payer: MEDICARE

## 2018-03-26 VITALS
WEIGHT: 162.5 LBS | SYSTOLIC BLOOD PRESSURE: 157 MMHG | BODY MASS INDEX: 27.74 KG/M2 | HEART RATE: 66 BPM | HEIGHT: 64 IN | RESPIRATION RATE: 17 BRPM | OXYGEN SATURATION: 97 % | DIASTOLIC BLOOD PRESSURE: 71 MMHG | TEMPERATURE: 98.7 F

## 2018-03-26 LAB
ANION GAP SERPL CALC-SCNC: 7 MMOL/L (ref 7–16)
BASOPHILS # BLD: 0 K/UL (ref 0–0.2)
BASOPHILS NFR BLD: 0 % (ref 0–2)
BUN SERPL-MCNC: 26 MG/DL (ref 8–23)
CALCIUM SERPL-MCNC: 8.7 MG/DL (ref 8.3–10.4)
CHLORIDE SERPL-SCNC: 115 MMOL/L (ref 98–107)
CO2 SERPL-SCNC: 22 MMOL/L (ref 21–32)
CREAT SERPL-MCNC: 1.07 MG/DL (ref 0.6–1)
DIFFERENTIAL METHOD BLD: ABNORMAL
EOSINOPHIL # BLD: 0.3 K/UL (ref 0–0.8)
EOSINOPHIL NFR BLD: 5 % (ref 0.5–7.8)
ERYTHROCYTE [DISTWIDTH] IN BLOOD BY AUTOMATED COUNT: 14.2 % (ref 11.9–14.6)
GLUCOSE BLD STRIP.AUTO-MCNC: 135 MG/DL (ref 65–100)
GLUCOSE BLD STRIP.AUTO-MCNC: 142 MG/DL (ref 65–100)
GLUCOSE SERPL-MCNC: 136 MG/DL (ref 65–100)
HCT VFR BLD AUTO: 34.8 % (ref 35.8–46.3)
HGB BLD-MCNC: 11.4 G/DL (ref 11.7–15.4)
IMM GRANULOCYTES # BLD: 0 K/UL (ref 0–0.5)
IMM GRANULOCYTES NFR BLD AUTO: 0 % (ref 0–5)
LYMPHOCYTES # BLD: 2.3 K/UL (ref 0.5–4.6)
LYMPHOCYTES NFR BLD: 43 % (ref 13–44)
MCH RBC QN AUTO: 29.7 PG (ref 26.1–32.9)
MCHC RBC AUTO-ENTMCNC: 32.8 G/DL (ref 31.4–35)
MCV RBC AUTO: 90.6 FL (ref 79.6–97.8)
MONOCYTES # BLD: 0.5 K/UL (ref 0.1–1.3)
MONOCYTES NFR BLD: 10 % (ref 4–12)
NEUTS SEG # BLD: 2.3 K/UL (ref 1.7–8.2)
NEUTS SEG NFR BLD: 42 % (ref 43–78)
PLATELET # BLD AUTO: 121 K/UL (ref 150–450)
PMV BLD AUTO: 10.8 FL (ref 10.8–14.1)
POTASSIUM SERPL-SCNC: 4.7 MMOL/L (ref 3.5–5.1)
RBC # BLD AUTO: 3.84 M/UL (ref 4.05–5.25)
SODIUM SERPL-SCNC: 144 MMOL/L (ref 136–145)
WBC # BLD AUTO: 5.4 K/UL (ref 4.3–11.1)

## 2018-03-26 PROCEDURE — 85025 COMPLETE CBC W/AUTO DIFF WBC: CPT | Performed by: INTERNAL MEDICINE

## 2018-03-26 PROCEDURE — 80048 BASIC METABOLIC PNL TOTAL CA: CPT | Performed by: INTERNAL MEDICINE

## 2018-03-26 PROCEDURE — 74011250636 HC RX REV CODE- 250/636: Performed by: INTERNAL MEDICINE

## 2018-03-26 PROCEDURE — 36415 COLL VENOUS BLD VENIPUNCTURE: CPT | Performed by: INTERNAL MEDICINE

## 2018-03-26 PROCEDURE — 74011250637 HC RX REV CODE- 250/637: Performed by: INTERNAL MEDICINE

## 2018-03-26 PROCEDURE — 97530 THERAPEUTIC ACTIVITIES: CPT

## 2018-03-26 PROCEDURE — 82962 GLUCOSE BLOOD TEST: CPT

## 2018-03-26 RX ADMIN — HEPARIN SODIUM 5000 UNITS: 5000 INJECTION, SOLUTION INTRAVENOUS; SUBCUTANEOUS at 08:05

## 2018-03-26 RX ADMIN — AMLODIPINE BESYLATE 10 MG: 10 TABLET ORAL at 08:05

## 2018-03-26 RX ADMIN — Medication 5 ML: at 05:28

## 2018-03-26 RX ADMIN — CLOPIDOGREL BISULFATE 75 MG: 75 TABLET ORAL at 08:05

## 2018-03-26 RX ADMIN — METOPROLOL TARTRATE 25 MG: 25 TABLET ORAL at 08:06

## 2018-03-26 RX ADMIN — SODIUM CHLORIDE 75 ML/HR: 900 INJECTION, SOLUTION INTRAVENOUS at 05:27

## 2018-03-26 RX ADMIN — LEVOTHYROXINE SODIUM 88 MCG: 88 TABLET ORAL at 05:27

## 2018-03-26 NOTE — PROGRESS NOTES
Patient is alert and oriented X4, sitting up in bed, on RA, RR even and unlabored, patient +1 BLE edema, patient without c/o pain or discomfort, denies needs, call light within reach.

## 2018-03-26 NOTE — DISCHARGE SUMMARY
Hospitalist Discharge Summary     Admit Date:  3/22/2018  5:57 PM   Name:  Brandon Mitchell   Age:  80 y.o.  :  3/20/1929   MRN:  [id number]   PCP:  Luis Felipe Resendiz MD  Treatment Team: Attending Provider: Inez Benson MD; Utilization Review: Manny Reddy RN; Care Manager: Kvng Palacios RN    Problem List for this Hospitalization:  Hospital Problems as of 3/26/2018  Date Reviewed: 3/13/2018          Codes Class Noted - Resolved POA    Hypoglycemia ICD-10-CM: E16.2  ICD-9-CM: 251.2  3/22/2018 - Present Unknown        ALBERT (acute kidney injury) [facility]) ICD-10-CM: N17.9  ICD-9-CM: 584.9  3/22/2018 - Present Unknown                Admission HPI from 3/22/2018:    79 y/o AA lady with HTN, DM type 2 on Glipizide, Dyslipidemia, Hypothyroidism, Aortic Stenosis s/p recent TAVR, h/o nonobstructive CAD, was brought to the ED at [facility] for generalized weakness and low blood sugar. The patient has been feeling weak, tired and uneasy since the morning. Her daughter initially checked on her in the morning and felt that something was not right, as the patient had a slow speech, but remained alert. Her son went to check on her in the afternoon, and by then, the patient was way different, she was feeling kind of lethargic, was cold and even felt she could have passed out. EMS was then called and found her blood glucose to be only 23. She was given D50 and transported to the ED at [facility] for further evaluation. Her blood glucose on arrival was 63. She was given another infusion of glucose and her blood glucose improved to 163. Of note the patient has been on Glipizide for about 8 months and her last HbA1c was 6.1. She was also recently started on Bactrim for LE cellulitis and on Furosemide for leg swelling. Basic labs obtained in the ED noted a Cr of 2.08 and a lactic acid of 3.6. Her most recent baseline Cr was 0.8. UA was normal and CXR did not show any acute infiltrate.  The patient was seen in the ED and is basically back to her baseline, currently denying any significant complaints. Based on her clinical presentation, the Hospitalist service was contacted and the patient was admitted to the Medical Floor for Hypoglycemia and ALBERT. Hospital Course: Patient hypoglycemic. Home glipizide held. IV fluids started with D5NS and changed to NS. SSI added per protocol. Blood Glucose stabilized, 136 morning of discharge. Patient Creatinine on admission 2.08, lasix  held. 1.07 on discharge. Patient was taking bactrim for cellulitis, bactrim dicontinued and keflex was given IV to finish 7 day course. BLE edema resolved. Follow up instructions and discharge meds at bottom of this note. Plan was discussed with patient and family. All questions answered. Patient was stable at time of discharge. Diagnostic Imaging/Tests:   Xr Chest Port    Result Date: 3/22/2018  CHEST X-RAY, one view. HISTORY:  Shortness of breath and altered mental status. TECHNIQUE:  AP upright upright view. COMPARISON: 8 July 2017 FINDINGS: The lungs are clear. The heart size is borderline. The costophrenic angles are sharp. The pulmonary vasculature is unremarkable. Included portion of the upper abdomen is unremarkable. There are aortic arch calcifications. IMPRESSION:  Negative for acute change. Echocardiogram results:  No results found for this visit on 03/22/18.       All Micro Results     Procedure Component Value Units Date/Time    CULTURE, BLOOD [[id number]] Collected:  03/22/18 1923    Order Status:  Completed Specimen:  Blood from Blood Updated:  03/26/18 0701     Special Requests: RIGHT ANTECUBITAL        Culture result: NO GROWTH 4 DAYS       CULTURE, BLOOD [[id number]]     Order Status:  Canceled Specimen:  Blood from Blood           Labs: Results:       BMP, Mg, Phos Recent Labs      03/26/18   0616  03/25/18   1822  03/25/18   0638   NA  144  142  143   K  4.7  5.7*  5.1   CL  115*  114*  111*   CO2  22  17*  21   AGAP  7  11  11   BUN 26*  33*  35*   CREA  1.07*  1.18*  1.43*   CA  8.7  8.7  8.0*   GLU  136*  172*  107*      CBC Recent Labs      03/26/18   0616  03/25/18   2220  03/25/18   0638   WBC  5.4  6.2  5.8   RBC  3.84*  3.90*  3.66*   HGB  11.4*  11.8  10.7*   HCT  34.8*  35.0*  33.1*   PLT  121*  125*  111*   GRANS  42*  52   --    LYMPH  43  38   --    EOS  5  4   --    MONOS  10  6   --    BASOS  0  0   --    IG  0  0   --    ANEU  2.3  3.2   --    ABL  2.3  2.4   --    KIRSTEN  0.3  0.3   --    ABM  0.5  0.3   --    ABB  0.0  0.0   --    AIG  0.0  0.0   --       LFT No results for input(s): SGOT, ALT, TBIL, AP, TP, ALB, GLOB, AGRAT, GPT in the last 72 hours.    Cardiac Testing Lab Results   Component Value Date/Time     03/27/2017 06:03 PM     03/22/2017 05:26 PM    CK 1317 (H) 07/08/2017 02:50 PM      Coagulation Tests Lab Results   Component Value Date/Time    Prothrombin time 10.9 07/08/2017 02:50 PM    Prothrombin time 10.6 03/27/2017 06:03 PM    Prothrombin time 10.3 01/13/2017 11:50 AM    INR 1.0 07/08/2017 02:50 PM    INR 1.0 03/27/2017 06:03 PM    INR 0.9 01/13/2017 11:50 AM    aPTT 26.9 07/08/2017 02:50 PM    aPTT 27.5 03/27/2017 06:03 PM      A1c Lab Results   Component Value Date/Time    Hemoglobin A1c 6.1 (H) 02/16/2018 09:29 AM    Hemoglobin A1c 6.8 (H) 04/30/2017 05:39 AM    Hemoglobin A1c 7.5 (H) 03/27/2017 06:03 PM      Lipid Panel Lab Results   Component Value Date/Time    Cholesterol, total 250 (H) 02/16/2018 09:29 AM    HDL Cholesterol 28 (L) 02/16/2018 09:29 AM    LDL, calculated Comment 02/16/2018 09:29 AM    VLDL, calculated Comment 02/16/2018 09:29 AM    Triglyceride 469 (H) 02/16/2018 09:29 AM    CHOL/HDL Ratio 2.9 03/29/2017 03:30 AM      Thyroid Panel Lab Results   Component Value Date/Time    TSH 0.221 (L) 03/23/2018 03:01 PM    TSH 1.660 02/16/2018 09:29 AM    T4, Free 1.17 02/16/2018 09:29 AM    T4, Free 1.56 10/13/2016 09:46 AM        Most Recent UA Lab Results   Component Value Date/Time Color YELLOW 03/22/2018 06:32 PM    Appearance TURBID 03/22/2018 06:32 PM    Specific gravity 1.017 03/22/2018 06:32 PM    pH (UA) 5.0 03/22/2018 06:32 PM    Protein NEGATIVE  03/22/2018 06:32 PM    Glucose NEGATIVE  03/22/2018 06:32 PM    Ketone NEGATIVE  03/22/2018 06:32 PM    Bilirubin NEGATIVE  03/22/2018 06:32 PM    Blood NEGATIVE  03/22/2018 06:32 PM    Urobilinogen 0.2 03/22/2018 06:32 PM    Nitrites NEGATIVE  03/22/2018 06:32 PM    Leukocyte Esterase NEGATIVE  03/22/2018 06:32 PM        Allergies   Allergen Reactions    Aspirin Anaphylaxis     Hives, swelling    Ativan [Lorazepam] Other (comments)     Confused and aggressive    Robaxin [Methocarbamol] Anaphylaxis     Swelling of tongue, wheezing  Muscle relaxers in general    Aleve [Naproxen Sodium] Rash    Carafate [Sucralfate] Hives    Ciprofloxacin Diarrhea    Clorazepate Dipotassium Hives    Flexeril [Cyclobenzaprine] Itching    Vytorin 10-10 [Ezetimibe-Simvastatin] Other (comments)     Muscle soreness    Zantac [Ranitidine Hcl] Itching     Immunization History   Administered Date(s) Administered    Influenza Vaccine (Quad) Mdck Pf 09/11/2017    Pneumococcal Conjugate (PCV-13) 09/29/2015    Pneumococcal Polysaccharide (PPSV-23) 09/11/2017    TB Skin Test (PPD) Intradermal 04/30/2017, 07/08/2017, 03/23/2018       All Labs from Last 24 Hrs:  Recent Results (from the past 24 hour(s))   GLUCOSE, POC    Collection Time: 03/25/18  4:07 PM   Result Value Ref Range    Glucose (POC) 188 (H) 65 - 344 mg/dL   METABOLIC PANEL, BASIC    Collection Time: 03/25/18  6:22 PM   Result Value Ref Range    Sodium 142 136 - 145 mmol/L    Potassium 5.7 (H) 3.5 - 5.1 mmol/L    Chloride 114 (H) 98 - 107 mmol/L    CO2 17 (L) 21 - 32 mmol/L    Anion gap 11 7 - 16 mmol/L    Glucose 172 (H) 65 - 100 mg/dL    BUN 33 (H) 8 - 23 MG/DL    Creatinine 1.18 (H) 0.6 - 1.0 MG/DL    GFR est AA 55 (L) >60 ml/min/1.73m2    GFR est non-AA 46 (L) >60 ml/min/1.73m2    Calcium 8.7 8.3 - 10.4 MG/DL   GLUCOSE, POC    Collection Time: 03/25/18  8:33 PM   Result Value Ref Range    Glucose (POC) 202 (H) 65 - 100 mg/dL   CBC WITH AUTOMATED DIFF    Collection Time: 03/25/18 10:20 PM   Result Value Ref Range    WBC 6.2 4.3 - 11.1 K/uL    RBC 3.90 (L) 4.05 - 5.25 M/uL    HGB 11.8 11.7 - 15.4 g/dL    HCT 35.0 (L) 35.8 - 46.3 %    MCV 89.7 79.6 - 97.8 FL    MCH 30.3 26.1 - 32.9 PG    MCHC 33.7 31.4 - 35.0 g/dL    RDW 13.9 11.9 - 14.6 %    PLATELET 158 (L) 066 - 450 K/uL    MPV 10.8 10.8 - 14.1 FL    DF AUTOMATED      NEUTROPHILS 52 43 - 78 %    LYMPHOCYTES 38 13 - 44 %    MONOCYTES 6 4.0 - 12.0 %    EOSINOPHILS 4 0.5 - 7.8 %    BASOPHILS 0 0.0 - 2.0 %    IMMATURE GRANULOCYTES 0 0.0 - 5.0 %    ABS. NEUTROPHILS 3.2 1.7 - 8.2 K/UL    ABS. LYMPHOCYTES 2.4 0.5 - 4.6 K/UL    ABS. MONOCYTES 0.3 0.1 - 1.3 K/UL    ABS. EOSINOPHILS 0.3 0.0 - 0.8 K/UL    ABS. BASOPHILS 0.0 0.0 - 0.2 K/UL    ABS. IMM.  GRANS. 0.0 0.0 - 0.5 K/UL   GLUCOSE, POC    Collection Time: 03/26/18  5:06 AM   Result Value Ref Range    Glucose (POC) 142 (H) 65 - 302 mg/dL   METABOLIC PANEL, BASIC    Collection Time: 03/26/18  6:16 AM   Result Value Ref Range    Sodium 144 136 - 145 mmol/L    Potassium 4.7 3.5 - 5.1 mmol/L    Chloride 115 (H) 98 - 107 mmol/L    CO2 22 21 - 32 mmol/L    Anion gap 7 7 - 16 mmol/L    Glucose 136 (H) 65 - 100 mg/dL    BUN 26 (H) 8 - 23 MG/DL    Creatinine 1.07 (H) 0.6 - 1.0 MG/DL    GFR est AA >60 >60 ml/min/1.73m2    GFR est non-AA 51 (L) >60 ml/min/1.73m2    Calcium 8.7 8.3 - 10.4 MG/DL   CBC WITH AUTOMATED DIFF    Collection Time: 03/26/18  6:16 AM   Result Value Ref Range    WBC 5.4 4.3 - 11.1 K/uL    RBC 3.84 (L) 4.05 - 5.25 M/uL    HGB 11.4 (L) 11.7 - 15.4 g/dL    HCT 34.8 (L) 35.8 - 46.3 %    MCV 90.6 79.6 - 97.8 FL    MCH 29.7 26.1 - 32.9 PG    MCHC 32.8 31.4 - 35.0 g/dL    RDW 14.2 11.9 - 14.6 %    PLATELET 795 (L) 952 - 450 K/uL    MPV 10.8 10.8 - 14.1 FL    DF AUTOMATED      NEUTROPHILS 42 (L) 43 - 78 % LYMPHOCYTES 43 13 - 44 %    MONOCYTES 10 4.0 - 12.0 %    EOSINOPHILS 5 0.5 - 7.8 %    BASOPHILS 0 0.0 - 2.0 %    IMMATURE GRANULOCYTES 0 0.0 - 5.0 %    ABS. NEUTROPHILS 2.3 1.7 - 8.2 K/UL    ABS. LYMPHOCYTES 2.3 0.5 - 4.6 K/UL    ABS. MONOCYTES 0.5 0.1 - 1.3 K/UL    ABS. EOSINOPHILS 0.3 0.0 - 0.8 K/UL    ABS. BASOPHILS 0.0 0.0 - 0.2 K/UL    ABS. IMM. GRANS. 0.0 0.0 - 0.5 K/UL   GLUCOSE, POC    Collection Time: 03/26/18 10:40 AM   Result Value Ref Range    Glucose (POC) 135 (H) 65 - 100 mg/dL       Discharge Exam:  Patient Vitals for the past 24 hrs:   Temp Pulse Resp BP SpO2   03/26/18 1045 98.7 °F (37.1 °C) 66 17 157/71 97 %   03/26/18 0725 97.6 °F (36.4 °C) (!) 56 17 148/80 98 %   03/26/18 0329 98.7 °F (37.1 °C) 61 18 142/70 95 %   03/25/18 2140 98.8 °F (37.1 °C) 65 18 154/72 96 %   03/25/18 1905 98 °F (36.7 °C) 64 18 158/69 96 %   03/25/18 1502 98.8 °F (37.1 °C) 64 18 145/76 98 %     Oxygen Therapy  O2 Sat (%): 97 % (03/26/18 1045)  Pulse via Oximetry: 63 beats per minute (03/22/18 1905)  O2 Device: Room air (03/22/18 2143)    Intake/Output Summary (Last 24 hours) at 03/26/18 1402  Last data filed at 03/26/18 1130   Gross per 24 hour   Intake             5643 ml   Output             1500 ml   Net             4143 ml       General:    Well nourished. Alert. No distress. Eyes:   Normal sclera. Extraocular movements intact. ENT:  Normocephalic, atraumatic. Moist mucous membranes  CV:   Regular rate and rhythm. No murmur, rub, or gallop. Lungs:  Clear to auscultation bilaterally. No wheezing, rhonchi, or rales. Abdomen: Soft, nontender, nondistended. Bowel sounds normal.   Extremities: Warm and dry. No cyanosis or edema. Neurologic: CN II-XII grossly intact. Sensation intact. Skin:     No rashes or jaundice. Psych:  Normal mood and affect.     Discharge Info:   Current Discharge Medication List      CONTINUE these medications which have NOT CHANGED    Details   HYDROcodone-acetaminophen (Christine Marrero)  mg tablet Take 1 Tab by mouth every eight (8) hours as needed for Pain. Max Daily Amount: 3 Tabs. Qty: 30 Tab, Refills: 0    Associated Diagnoses: Right hip pain      levothyroxine (SYNTHROID) 100 mcg tablet Take 1 Tab by mouth Daily (before breakfast). Qty: 90 Tab, Refills: 3    Associated Diagnoses: Hypothyroidism due to acquired atrophy of thyroid      benazepril (LOTENSIN) 20 mg tablet Take 1 Tab by mouth daily. Qty: 90 Tab, Refills: 1    Associated Diagnoses: Essential hypertension, benign; Coronary artery disease due to lipid rich plaque      rosuvastatin (CRESTOR) 20 mg tablet Take 1 Tab by mouth nightly. Qty: 30 Tab, Refills: 5    Associated Diagnoses: Mixed hyperlipidemia; Coronary artery disease due to lipid rich plaque      traMADol (ULTRAM) 50 mg tablet Take 1 Tab by mouth every six (6) hours as needed for Pain. Qty: 60 Tab, Refills: 2    Associated Diagnoses: Left hip pain; Neck pain      metoprolol tartrate (LOPRESSOR) 25 mg tablet Take 1 Tab by mouth two (2) times a day. Qty: 180 Tab, Refills: 3    Associated Diagnoses: Essential hypertension, benign; Coronary artery disease due to lipid rich plaque      clopidogrel (PLAVIX) 75 mg tab Take 1 Tab by mouth daily. Qty: 90 Tab, Refills: 5    Associated Diagnoses: Coronary artery disease due to lipid rich plaque; Essential hypertension, benign; Aorta disorder (HCC)      amLODIPine (NORVASC) 10 mg tablet Take 1 Tab by mouth daily.   Qty: 90 Tab, Refills: 3         STOP taking these medications       furosemide (LASIX) 20 mg tablet Comments:   Reason for Stopping:         pregabalin (LYRICA) 75 mg capsule Comments:   Reason for Stopping:         trimethoprim-sulfamethoxazole (BACTRIM DS) 160-800 mg per tablet Comments:   Reason for Stopping:         glipiZIDE (GLUCOTROL) 10 mg tablet Comments:   Reason for Stopping:         nitrofurantoin (MACRODANTIN) 100 mg capsule Comments:   Reason for Stopping:                 Disposition: home    Activity: Activity as tolerated  Diet: DIET CARDIAC Regular    Follow-up Appointments   Procedures    FOLLOW UP VISIT Appointment in: 3 - 5 Days pcp 3-5 days with labs     pcp 3-5 days with labs     Standing Status:   Standing     Number of Occurrences:   1     Order Specific Question:   Appointment in     Answer:   3 - 5 Days         Follow-up Information     Follow up With Details 120 Jane Dudley MD On 3/29/2018 10:30 AM [address] [place] [address]  [phone]      [address] [address]  June Randhawa [address]          Time spent in patient discharge planning and coordination 35 minutes.     Signed:  Taryn Ross NP

## 2018-03-26 NOTE — PROGRESS NOTES
Spoke with patient's daughter, Elizabet Haro, by phone. She voices understanding that patient is expected to discharge home today. We discussed discharge planning and patient's [facility] Home health ordered for RN, PT, and OT follow-up. No other discharge planning needs identified at this time. Case Management will remain available to assist as needed.     Care Management Interventions  Transition of Care Consult (CM Consult): Discharge Planning, [address]: Yes  Discharge Durable Medical Equipment: No  Physical Therapy Consult: Yes  Occupational Therapy Consult: Yes  Speech Therapy Consult: No  Current Support Network: Own Home, Family Lives Nearby  Confirm Follow Up Transport: Family  Plan discussed with Pt/Family/Caregiver: Yes  Freedom of Choice Offered: Yes  Discharge Location  Discharge Placement: Home with home health

## 2018-03-26 NOTE — DISCHARGE INSTRUCTIONS
Acute Kidney Injury: Care Instructions  Your Care Instructions    Acute kidney injury (ALBERT) is a sudden decrease in kidney function. This can happen over a period of hours, days or, in some cases, weeks. ALBERT used to be called acute renal failure, but kidney failure doesn't always happen with ALBERT. Common causes of ALBERT are dehydration, blood loss, and medicines. When ALBERT happens, the kidneys have trouble removing waste and excess fluids from the body. The waste and fluids build up and become harmful. Kidney function may return to normal if the cause of ALBERT is treated quickly. Your chance of a full recovery depends on what caused the problem, how quickly the cause was treated, and what other medical problems you have. A machine may be used to help your kidneys remove waste and fluids for a short period of time. This is called dialysis. Follow-up care is a key part of your treatment and safety. Be sure to make and go to all appointments, and call your doctor if you are having problems. It's also a good idea to know your test results and keep a list of the medicines you take. How can you care for yourself at home? · Talk to your doctor about how much fluid you should drink. · Eat a balanced diet. Talk to your doctor or a dietitian about what type of diet may be best for you. You may need to limit sodium, potassium, and phosphorus. · If you need dialysis, follow the instructions and schedule for dialysis that your doctor gives you. · Do not smoke. Smoking can make your condition worse. If you need help quitting, talk to your doctor about stop-smoking programs and medicines. These can increase your chances of quitting for good. · Do not drink alcohol. · Review all of your medicines with your doctor. Do not take any medicines, including nonsteroidal anti-inflammatory drugs (NSAIDs) such as ibuprofen (Advil, Motrin) or naproxen (Aleve), unless your doctor says it is safe for you to do so.   · Make sure that anyone treating you for any health problem knows that you have had ALBERT. When should you call for help? Call 911 anytime you think you may need emergency care. For example, call if:  ? · You passed out (lost consciousness). ?Call your doctor now or seek immediate medical care if:  ? · You have new or worse nausea and vomiting. ? · You have much less urine than normal, or you have no urine. ? · You are feeling confused or cannot think clearly. ? · You have new or more blood in your urine. ? · You have new swelling. ? · You are dizzy or lightheaded, or feel like you may faint. ? Watch closely for changes in your health, and be sure to contact your doctor if:  ? · You do not get better as expected. Where can you learn more? Go to http://angelica-zuri.info/. Enter A173 in the search box to learn more about \"Acute Kidney Injury: Care Instructions. \"  Current as of: May 12, 2017  Content Version: 11.4  © 1329-5937 [employer]. Care instructions adapted under license by [employer] (which disclaims liability or warranty for this information). If you have questions about a medical condition or this instruction, always ask your healthcare professional. [address] any warranty or liability for your use of this information. DISCHARGE SUMMARY from Nurse    PATIENT INSTRUCTIONS:    After general anesthesia or intravenous sedation, for 24 hours or while taking prescription Narcotics:  · Limit your activities  · Do not drive and operate hazardous machinery  · Do not make important personal or business decisions  · Do  not drink alcoholic beverages  · If you have not urinated within 8 hours after discharge, please contact your surgeon on call.     Report the following to your surgeon:  · Excessive pain, swelling, redness or odor of or around the surgical area  · Temperature over 100.5  · Nausea and vomiting lasting longer than 4 hours or if unable to take medications  · Any signs of decreased circulation or nerve impairment to extremity: change in color, persistent  numbness, tingling, coldness or increase pain  · Any questions    What to do at Home:    *  Please give a list of your current medications to your Primary Care Provider. *  Please update this list whenever your medications are discontinued, doses are      changed, or new medications (including over-the-counter products) are added. *  Please carry medication information at all times in case of emergency situations. These are general instructions for a healthy lifestyle:    No smoking/ No tobacco products/ Avoid exposure to second hand smoke  Surgeon General's Warning:  Quitting smoking now greatly reduces serious risk to your health. Obesity, smoking, and sedentary lifestyle greatly increases your risk for illness    A healthy diet, regular physical exercise & weight monitoring are important for maintaining a healthy lifestyle    You may be retaining fluid if you have a history of heart failure or if you experience any of the following symptoms:  Weight gain of 3 pounds or more overnight or 5 pounds in a week, increased swelling in our hands or feet or shortness of breath while lying flat in bed. Please call your doctor as soon as you notice any of these symptoms; do not wait until your next office visit. Recognize signs and symptoms of STROKE:    F-face looks uneven    A-arms unable to move or move unevenly    S-speech slurred or non-existent    T-time-call 911 as soon as signs and symptoms begin-DO NOT go       Back to bed or wait to see if you get better-TIME IS BRAIN. Warning Signs of HEART ATTACK     Call 911 if you have these symptoms:   Chest discomfort. Most heart attacks involve discomfort in the center of the chest that lasts more than a few minutes, or that goes away and comes back. It can feel like uncomfortable pressure, squeezing, fullness, or pain.    Discomfort in other areas of the upper body. Symptoms can include pain or discomfort in one or both arms, the back, neck, jaw, or stomach.  Shortness of breath with or without chest discomfort.  Other signs may include breaking out in a cold sweat, nausea, or lightheadedness. Don't wait more than five minutes to call 911 - MINUTES MATTER! Fast action can save your life. Calling 911 is almost always the fastest way to get lifesaving treatment. Emergency Medical Services staff can begin treatment when they arrive -- up to an hour sooner than if someone gets to the hospital by car. The discharge information has been reviewed with the patient. The patient verbalized understanding. Discharge medications reviewed with the patient and appropriate educational materials and side effects teaching were provided.   ___________________________________________________________________________________________________________________________________

## 2018-03-26 NOTE — PROGRESS NOTES
Patient rested quietly throughout night, up with assist to bedside, voiding well, IV fluids infusing, BLE edema present, denies needs, call light within reach.

## 2018-03-26 NOTE — PROGRESS NOTES
Problem: Mobility Impaired (Adult and Pediatric)  Goal: *Acute Goals and Plan of Care (Insert Text)  Goals:  (1.)Ms. Zonia Oliver will move from supine to sit and sit to supine , scoot up and down and roll side to side with INDEPENDENT within 5 treatment day(s). (2.)Ms. Zonia Oliver will transfer from bed to chair and chair to bed with MODIFIED INDEPENDENCE using the least restrictive device within 5 treatment day(s). (3.)Ms. Oleary will ambulate with MODIFIED INDEPENDENCE for 150 feet with the least restrictive device within 5 treatment day(s). PHYSICAL THERAPY: Daily Note, Treatment Day: 1st, AM 3/26/2018  INPATIENT: Hospital Day: 5  [place] / [place] / Product Type: Medicare /      NAME/AGE/GENDER: Brandon Mitchell is a 80 y.o. female   PRIMARY DIAGNOSIS: ALBERT (acute kidney injury) ([facility] [address]  Hypoglycemia <principal problem not specified> <principal problem not specified>        ICD-10: Treatment Diagnosis:   · Generalized Muscle Weakness (M62.81)  · Difficulty in walking, Not elsewhere classified (R26.2)  · History of falling (Z91.81)   Precaution/Allergies:  Aspirin; Ativan [lorazepam]; Robaxin [methocarbamol]; Aleve [naproxen sodium]; Carafate [sucralfate]; Ciprofloxacin; Clorazepate dipotassium; Flexeril [cyclobenzaprine]; Vytorin 10-10 [ezetimibe-simvastatin]; and Zantac [ranitidine hcl]      ASSESSMENT:     Ms. Zonia Oliver is an 80year old WF with an admitting diagnosis of ALBERT and Hypoglycemia. She presents today supine in bed with her daughter at the bedside. She states she has had some loose stool in her brief and needs to be go to the bathroom. She reports she lives alone in a 1 level home with 4 steps with rails to enter. She reports her PLOF is modified independence with use of a r/walker and/or straight cane. Her daughter is present at the bedside and appears very invested, but frustrated with her mom.   Daughter reports a significant change in her interactions as compared to how she normally processes info. The daughter frequently speaks over her mom. The patient has BLE AROM WFL. Supine to sit is CGA with good sitting balance on the edge of the bed. Sit to stand is SBA/CGA and the patient is impulsive at times without regard for safety. She was assisted ambulating into the bathroom ~10' to the toilet for completion and cleaning of a BM. She stood 3 times and needed assistance with hygiene after her BM. She ambulated back to the bed to rest, then ambulated another time 27' with the r/walker with SBA/CGA. Her gait was steady and controlled and she returned to the bed with SBA for sit to supine. She was positioned for comfort in supine. She was pleasant and cooperative but very embarrassed about her lack of bowel control. Ms. Jaimie Dangelo appears to be functioning below her PLOF and would benefit from continued skilled PT to maximize her safety and functional mobility. 3/26, supine on contact and agreeable to therapy. Worked on bed mobility, she asked for help with her left leg due to hip pain-needed CGA. Sit to stand with contact guard assist and took steps with walker to [facility] so she could void. After finishing on BSC worked on walking in the vásquez with contact guard to stand by assist. Rashaad Pleas to continue to be functioning below her baseline level and will continue to benefit srom skilled PT interventions. Pt wanted to sit up in the chair for a while. We will continue to follow. This section established at most recent assessment   PROBLEM LIST (Impairments causing functional limitations):  1. Decreased Strength  2. Decreased ADL/Functional Activities  3. Decreased Transfer Abilities  4. Decreased Ambulation Ability/Technique   INTERVENTIONS PLANNED: (Benefits and precautions of physical therapy have been discussed with the patient.)  1. Bed Mobility  2. Family Education  3. Therapeutic Activites  4.  Therapeutic Exercise/Strengthening     TREATMENT PLAN: Frequency/Duration: 3 times a week for duration of hospital stay  Rehabilitation Potential For Stated Goals: Good     RECOMMENDED REHABILITATION/EQUIPMENT: (at time of discharge pending progress): Due to the probability of continued deficits (see above) this patient will not likely need continued skilled physical therapy after discharge. Equipment:    None at this time              HISTORY:   History of Present Injury/Illness (Reason for Referral): The patient is an 81 y/o lady with HTN, DM type 2 on Glipizide, Dyslipidemia, Hypothyroidism, Aortic Stenosis s/p TAVR, h/o nonobstructive CAD, was brought to the ED at [facility] for generalized weakness and low blood sugar. The patient has been feeling weak, tired and uneasy since the morning. Her daughter initially checked on her in the morning and felt that something was not right, as the patient had a slow speech, but remained alert. Her son went to check on her in the afternoon, and by then, the patient was way different, she was feeling kind of lethargic, was cold and even felt she could have passed out. EMS was then called and found her blood glucose to be only 23. She was given D50 and transported to the ED at [facility] for further evaluation. Her blood glucose on arrival was 63. She was given another infusion of glucose and her blood glucose improved to 163. Of note the patient has been on Glipizide for about 8 months and her last HbA1c was 6.1. She was also recently started on Bactrim for LE cellulitis and on Furosemide for leg swelling. Basic labs obtained in the ED noted a Cr of 2.08 and a lactic acid of 3.6. Her most recent baseline Cr was 0.8. UA was normal and CXR did not show any acute infiltrate. The patient was seen in the ED and is basically back to her baseline, currently denying any significant complaints. Based on her clinical presentation, the Hospitalist service was contacted and the patient was admitted to the Medical Floor for Hypoglycemia and ALBERT.      Past Medical History/Comorbidities:   Ms. Celina Boyd  has a past medical history of Aortic regurgitation; Aortic stenosis; Basal cell carcinoma; Bruit (arterial); CAD (coronary artery disease); Choledocholithiasis; Hyperlipidemia; Hypertension; Hypothyroidism; Murmur, cardiac; Poor historian; S/P TAVR (transcatheter aortic valve replacement); Tricuspid valve regurgitation; Type II or unspecified type diabetes mellitus without mention of complication, not stated as uncontrolled; and Urinary incontinence. Ms. Celina Boyd  has a past surgical history that includes hx cystocele repair; hx rectocele repair; hx appendectomy; hx carpal tunnel release; hx ercp (02/20/2017); hx hysterectomy; hx oophorectomy; hx other surgical (Right); hx orthopaedic; hx knee arthroscopy (Right); hx ercp (04/28/2017); hx other surgical (03/28/2017); and pr lap,cholecystectomy. Social History/Living Environment:   Home Environment: Private residence  # Steps to Enter: 4  Rails to Enter: Yes  Hand Rails : Right  One/Two Story Residence: One story  Living Alone: Yes  Support Systems: Child(mayo), Family member(s)  Patient Expects to be Discharged to[de-identified] Private residence  Current DME Used/Available at Home: Walker, rollator, Grab bars, Glucometer, Blood pressure cuff, Commode, bedside, Shower chair  Tub or Shower Type: Tub/Shower combination  Prior Level of Function/Work/Activity:  Patient and daughter report that she has been ambulating with a r/walker and/or cane at home for her mobility. Number of Personal Factors/Comorbidities that affect the Plan of Care: 1-2: MODERATE COMPLEXITY   EXAMINATION:   Most Recent Physical Functioning:   Gross Assessment:                  Posture:     Balance:  Sitting: Intact  Standing: Intact; With support  Standing - Static: Constant support;Good  Standing - Dynamic : Good (with walker) Bed Mobility:  Supine to Sit: Contact guard assistance (pt needs help with left leg due to hip pain)  Sit to Supine:  (stayed up in chair after therapy)  Wheelchair Mobility:     Transfers:  Sit to Stand: Contact guard assistance  Stand to Sit: Contact guard assistance  Bed to Chair: Contact guard assistance  Gait:     Speed/Blanca: Pace decreased (<100 feet/min)  Step Length: Left shortened;Right shortened  Distance (ft): 30 Feet (ft) (10' from bed to bathroom 1st walk)  Assistive Device: Walker, rolling  Ambulation - Level of Assistance: Contact guard assistance;Stand-by assistance  Interventions: Safety awareness training;Verbal cues      Body Structures Involved:  1. Metabolic  2. Endocrine Body Functions Affected:  1. Metobolic/Endocrine Activities and Participation Affected:  1. General Tasks and Demands  2. Mobility   Number of elements that affect the Plan of Care: 1-2: LOW COMPLEXITY   CLINICAL PRESENTATION:   Presentation: Stable and uncomplicated: LOW COMPLEXITY   CLINICAL DECISION MAKIN[address] [place] [id number] AM-PAC 6 Clicks   Basic Mobility Inpatient Short Form  How much difficulty does the patient currently have. .. Unable A Lot A Little None   1. Turning over in bed (including adjusting bedclothes, sheets and blankets)? [] 1   [] 2   [] 3   [x] 4   2. Sitting down on and standing up from a chair with arms ( e.g., wheelchair, bedside commode, etc.)   [] 1   [] 2   [] 3   [x] 4   3. Moving from lying on back to sitting on the side of the bed? [] 1   [] 2   [x] 3   [] 4   How much help from another person does the patient currently need. .. Total A Lot A Little None   4. Moving to and from a bed to a chair (including a wheelchair)? [] 1   [] 2   [x] 3   [] 4   5. Need to walk in hospital room? [] 1   [] 2   [x] 3   [] 4   6. Climbing 3-5 steps with a railing? [] 1   [] 2   [x] 3   [] 4   © , Trustees of [address] [place] [id number], under license to [employer].  All rights reserved      Score:  Initial: 20 Most Recent: X (Date: -- )    Interpretation of Tool:  Represents activities that are increasingly more difficult (i.e. Bed mobility, Transfers, Gait). Score 24 23 22-20 19-15 14-10 9-7 6     Modifier CH CI CJ CK CL CM CN      ? Mobility - Walking and Moving Around:     - CURRENT STATUS: CJ - 20%-39% impaired, limited or restricted    - GOAL STATUS: CI - 1%-19% impaired, limited or restricted    - D/C STATUS:  ---------------To be determined---------------  [place] / Product Type: Medicare /      Medical Necessity:     · Patient is expected to demonstrate progress in strength, balance and functional technique to increase independence with SPT and gait with a r/walker. Reason for Services/Other Comments:  · Patient continues to require skilled intervention due to medical complications. Use of outcome tool(s) and clinical judgement create a POC that gives a: Clear prediction of patient's progress: LOW COMPLEXITY            TREATMENT:   (In addition to Assessment/Re-Assessment sessions the following treatments were rendered)   Pre-treatment Symptoms/Complaints:  Patient had no complaints of pain during therapy this afternoon. Pain: Initial:   Pain Intensity 1: 0  Post Session:  0/10     Therapeutic Activity: (    23): Therapeutic activities including Bed transfers, Chair transfers, Toilet transfers and Ambulation on level ground to improve mobility, strength, balance and coordination. Required minimal Safety awareness training;Verbal cues to promote static and dynamic balance in standing. Braces/Orthotics/Lines/Etc:   · IV  · O2 Device: Room air  Treatment/Session Assessment:    · Response to Treatment:  Patient participated and tolerated therapy well, no reports of pain  · Interdisciplinary Collaboration:   o Registered Nurse  · After treatment position/precautions:   o Up in chair  o Bed/Chair-wheels locked  o Call light within reach  o RN student notified   · Compliance with Program/Exercises: Will assess as treatment progresses.   · Recommendations/Intent for next treatment session: \"Next visit will focus on advancements to more challenging activities and reduction in assistance provided\".   Total Treatment Duration:  PT Patient Time In/Time Out  Time In: 1000  Time Out: 1700 Luke Farley, PT

## 2018-03-26 NOTE — PROGRESS NOTES
Pt resting in bed with eyes closed. Pt awakens when spoken too. Pt alert oriented times 3 at this time. Daughter at bedside. Pt denies pain or distress at this time. Pt on RA at this time. Pt encouraged to call for assistance if needed call light in reach, door open will monitor.

## 2018-03-26 NOTE — PROGRESS NOTES
[address]  Face to Face Encounter    Patients Name: Angel Luis Eagle    YOB: 1929    Ordering Physician: LOR Ross    Primary Diagnosis: ALBERT (acute kidney injury) [facility])  Hypoglycemia    Date of Face to Face:   3/26/2018                                  Face to Face Encounter findings are related to primary reason for home care:   yes. 1. I certify that the patient needs intermittent care as follows: skilled nursing care:  skilled observation/assessment, patient education and complex care plan management  physical therapy: strengthening, balance training and pt/caregiver education  occupational therapy:  ADL safety (ie. cooking, bathing, dressing) and pt/caregiver education    2. I certify that this patient is homebound, that is: 1) patient requires the use of a walker device, special transportation, or assistance of another to leave the home; or 2) patient's condition makes leaving the home medically contraindicated; and 3) patient has a normal inability to leave the home and leaving the home requires considerable and taxing effort. Patient may leave the home for infrequent and short duration for medical reasons, and occasional absences for non-medical reasons. Homebound status is due to the following functional limitations: Patient's ambulation limited secondary to severe pain and requires the use of an assistive device and the assistance of a caregiver for safe completion. Patient with strength and ROM deficits limiting ambulation endurance requiring the use of an assistive device and the assistance of a caregiver. Patient deemed temporarily homebound secondary to increased risk for infection when leaving home and going out into the community. Patient with strength deficits limiting the performance of all ADL's without caregiver assistance or the use of an assistive device.     3. I certify that this patient is under my care and that I, or a nurse practitioner or [address] [place] [address] assistant, or clinical nurse specialist, or certified nurse midwife, working with me, had a Face-to-Face Encounter that meets the physician Face-to-Face Encounter requirements. The following are the clinical findings from the [address] encounter that support the need for skilled services and is a summary of the encounter: Medical Record     See discharge summary      Kasi Camarena LMSW  3/26/2018      THE FOLLOWING TO BE COMPLETED BY THE COMMUNITY PHYSICIAN:    I concur with the findings described above from the F2F encounter that this patient is homebound and in need of a skilled service.     Certifying Physician: _____________________________________      Printed Certifying Physician Name: _____________________________________    Date: _________________

## 2018-03-27 ENCOUNTER — HOME CARE VISIT (OUTPATIENT)
Dept: SCHEDULING | Facility: HOME HEALTH | Age: 83
End: 2018-03-27
Payer: MEDICARE

## 2018-03-27 ENCOUNTER — PATIENT OUTREACH (OUTPATIENT)
Dept: CASE MANAGEMENT | Age: 83
End: 2018-03-27

## 2018-03-27 LAB
BACTERIA SPEC CULT: NORMAL
SERVICE CMNT-IMP: NORMAL

## 2018-03-27 PROCEDURE — 3331090001 HH PPS REVENUE CREDIT

## 2018-03-27 PROCEDURE — 400013 HH SOC

## 2018-03-27 PROCEDURE — 3331090002 HH PPS REVENUE DEBIT

## 2018-03-27 PROCEDURE — G0299 HHS/HOSPICE OF RN EA 15 MIN: HCPCS

## 2018-03-27 NOTE — PROGRESS NOTES
This note will not be viewable in [address] Transition of Care Discharge Follow-up Questionnaire     Date/Time of Call:     03/27/2018   2:02 pm     What was the patient seen in the hospital for? Hypoglycemia   Does the patient understand his/her diagnosis and/or treatment and what happened during the hospitalization? This Care Coordinator spoke with patient who consented to completing [facility] outreach. Patient verbalized understanding of treatment and diagnosis. Did the patient receive discharge instructions? Yes. Review any discharge instructions (see notes in [facility]). Ask patient if they have any questions? Patient verbalized understanding of discharge instructions. Were home services ordered (nursing, PT, OT, ST, etc.)? Yes  [facility]   If so, has the first visit occurred? If not, why? (Assist with coordination of services if necessary.)   yes   Was any DME ordered? No DME ordered     If so, has it been received? If not, why?  (Assist with coordination of arranging DME orders if necessary.)   No   Complete a review of all medications (new, continued and discontinued meds per the D/C instructions and [facility] medication list reviewed, confirmed and are being taken as directed upon discharge. STOP:  Furosemide/LASIX   20 mg tablet      glipizide/   GLUCOTROL   10 mg tablet       nitrofurantoin /  MACRODANTIN   100 mg capsule       Pregabalin/ LYRICA    75 mg capsule    trimethoprim-sulfamethoxazole /  BACTRIM DS   160-800 mg per tablet     Were all new prescriptions filled? If not, why?  (Assist with obtainment of medications if necessary   No new medications were prescribed. Does the patient understand the purpose and dosing instructions for all medications? (If patient has questions, provide explanation and education.)   Patient voiced understanding of medication dosing and instructions.    Does the patient have any problems in performing ADLs? (If patient is unable to perform ADLs  what is the limiting factor(s)? Do they have a support system that can assist? If no support system is present, discuss possible assistance that they may be able to obtain.)       Patient reports needing some assistance with ADLs. [facility] services in place. Family is available to help if/when needed. Does the patient have all follow-up appointments scheduled? 7 day f/up with PCP?    7-14 day f/up with specialist?    If f/up has not been made  what actions has the care coordinator made to accomplish this? Has transportation been arranged? [facility] Pulmonary follow-up should be within 7 days of discharge; all others should have PCP follow-up within 7 days of discharge; follow-ups with other specialists should be within 7-14 days of discharge.) Yes     Patient declines assistance with scheduling hospital follow up appt with PCP. Patient reports being scheduled for hospital follow up appts. CONFIRMED APPT:  [facility] PRIMARY CARE  Thursday March 29, 2018 10:30 AM   Glory Zuñiga MD         [facility] UROLOGY   Monday April 02, 2018  3:00 PM   Bret Soliz NP      [address] OFFICE  Friday April 13, 2018  9:30 AM   ECHOCARDIOGRAM          [facility] CARDIOLOGY [facility] OFFICE    Friday April 13, 2018 10:45   David Lechuga MD     Patient has transportation to/from appointments. pAny other questions or concerns expressed by the patient? Patient had no questions at time of call. Contact information provided for any questions/concerns prior to follow up call. Schedule next appointment with MAKAYLA Shin or refer to RN Case Manager/  per the workflow guidelines. If referred for CCM  what RN care manager was the referral assigned?        No CCM referral     ALONZO Call Completed By:   TRIPP Albarado  Care Coordinator /  [address] [place] [address]

## 2018-03-28 ENCOUNTER — HOME CARE VISIT (OUTPATIENT)
Dept: SCHEDULING | Facility: HOME HEALTH | Age: 83
End: 2018-03-28
Payer: MEDICARE

## 2018-03-28 VITALS — OXYGEN SATURATION: 96 % | DIASTOLIC BLOOD PRESSURE: 72 MMHG | HEART RATE: 75 BPM | SYSTOLIC BLOOD PRESSURE: 134 MMHG

## 2018-03-28 PROCEDURE — 3331090001 HH PPS REVENUE CREDIT

## 2018-03-28 PROCEDURE — G0151 HHCP-SERV OF PT,EA 15 MIN: HCPCS

## 2018-03-28 PROCEDURE — 3331090002 HH PPS REVENUE DEBIT

## 2018-03-29 VITALS
HEART RATE: 60 BPM | OXYGEN SATURATION: 99 % | TEMPERATURE: 97.6 F | RESPIRATION RATE: 20 BRPM | DIASTOLIC BLOOD PRESSURE: 70 MMHG | SYSTOLIC BLOOD PRESSURE: 132 MMHG

## 2018-03-29 PROBLEM — E11.21 TYPE 2 DIABETES WITH NEPHROPATHY (HCC): Status: ACTIVE | Noted: 2018-03-29

## 2018-03-29 PROCEDURE — 3331090002 HH PPS REVENUE DEBIT

## 2018-03-29 PROCEDURE — 3331090001 HH PPS REVENUE CREDIT

## 2018-03-30 ENCOUNTER — HOME CARE VISIT (OUTPATIENT)
Dept: SCHEDULING | Facility: HOME HEALTH | Age: 83
End: 2018-03-30
Payer: MEDICARE

## 2018-03-30 VITALS
SYSTOLIC BLOOD PRESSURE: 122 MMHG | HEART RATE: 64 BPM | DIASTOLIC BLOOD PRESSURE: 72 MMHG | RESPIRATION RATE: 16 BRPM | TEMPERATURE: 97.3 F

## 2018-03-30 PROCEDURE — G0152 HHCP-SERV OF OT,EA 15 MIN: HCPCS

## 2018-03-30 PROCEDURE — 3331090002 HH PPS REVENUE DEBIT

## 2018-03-30 PROCEDURE — 3331090001 HH PPS REVENUE CREDIT

## 2018-03-31 PROCEDURE — 3331090002 HH PPS REVENUE DEBIT

## 2018-03-31 PROCEDURE — 3331090001 HH PPS REVENUE CREDIT

## 2018-04-01 PROCEDURE — 3331090002 HH PPS REVENUE DEBIT

## 2018-04-01 PROCEDURE — 3331090001 HH PPS REVENUE CREDIT

## 2018-04-02 PROCEDURE — 3331090001 HH PPS REVENUE CREDIT

## 2018-04-02 PROCEDURE — 3331090002 HH PPS REVENUE DEBIT

## 2018-04-03 ENCOUNTER — HOME CARE VISIT (OUTPATIENT)
Dept: SCHEDULING | Facility: HOME HEALTH | Age: 83
End: 2018-04-03
Payer: MEDICARE

## 2018-04-03 VITALS
HEART RATE: 67 BPM | DIASTOLIC BLOOD PRESSURE: 78 MMHG | OXYGEN SATURATION: 98 % | RESPIRATION RATE: 18 BRPM | TEMPERATURE: 97.2 F | SYSTOLIC BLOOD PRESSURE: 124 MMHG

## 2018-04-03 PROCEDURE — G0157 HHC PT ASSISTANT EA 15: HCPCS

## 2018-04-03 PROCEDURE — 3331090001 HH PPS REVENUE CREDIT

## 2018-04-03 PROCEDURE — G0299 HHS/HOSPICE OF RN EA 15 MIN: HCPCS

## 2018-04-03 PROCEDURE — 3331090002 HH PPS REVENUE DEBIT

## 2018-04-04 PROCEDURE — 3331090002 HH PPS REVENUE DEBIT

## 2018-04-04 PROCEDURE — 3331090001 HH PPS REVENUE CREDIT

## 2018-04-05 ENCOUNTER — HOME CARE VISIT (OUTPATIENT)
Dept: SCHEDULING | Facility: HOME HEALTH | Age: 83
End: 2018-04-05
Payer: MEDICARE

## 2018-04-05 VITALS
OXYGEN SATURATION: 98 % | SYSTOLIC BLOOD PRESSURE: 122 MMHG | DIASTOLIC BLOOD PRESSURE: 70 MMHG | RESPIRATION RATE: 18 BRPM | TEMPERATURE: 97 F | HEART RATE: 66 BPM

## 2018-04-05 PROCEDURE — 3331090001 HH PPS REVENUE CREDIT

## 2018-04-05 PROCEDURE — 3331090002 HH PPS REVENUE DEBIT

## 2018-04-05 PROCEDURE — G0299 HHS/HOSPICE OF RN EA 15 MIN: HCPCS

## 2018-04-05 PROCEDURE — G0157 HHC PT ASSISTANT EA 15: HCPCS

## 2018-04-06 PROCEDURE — 3331090001 HH PPS REVENUE CREDIT

## 2018-04-06 PROCEDURE — 3331090002 HH PPS REVENUE DEBIT

## 2018-04-07 PROCEDURE — 3331090002 HH PPS REVENUE DEBIT

## 2018-04-07 PROCEDURE — 3331090001 HH PPS REVENUE CREDIT

## 2018-04-08 VITALS
RESPIRATION RATE: 16 BRPM | TEMPERATURE: 98.4 F | HEART RATE: 82 BPM | OXYGEN SATURATION: 98 % | OXYGEN SATURATION: 94 % | DIASTOLIC BLOOD PRESSURE: 64 MMHG | HEART RATE: 60 BPM | TEMPERATURE: 97.8 F | SYSTOLIC BLOOD PRESSURE: 122 MMHG | SYSTOLIC BLOOD PRESSURE: 124 MMHG | DIASTOLIC BLOOD PRESSURE: 64 MMHG | RESPIRATION RATE: 16 BRPM

## 2018-04-08 PROCEDURE — 3331090002 HH PPS REVENUE DEBIT

## 2018-04-08 PROCEDURE — 3331090001 HH PPS REVENUE CREDIT

## 2018-04-09 ENCOUNTER — HOME CARE VISIT (OUTPATIENT)
Dept: SCHEDULING | Facility: HOME HEALTH | Age: 83
End: 2018-04-09
Payer: MEDICARE

## 2018-04-09 VITALS
HEART RATE: 66 BPM | RESPIRATION RATE: 18 BRPM | DIASTOLIC BLOOD PRESSURE: 68 MMHG | OXYGEN SATURATION: 98 % | TEMPERATURE: 96.6 F | SYSTOLIC BLOOD PRESSURE: 126 MMHG

## 2018-04-09 PROCEDURE — A6402 STERILE GAUZE <= 16 SQ IN: HCPCS

## 2018-04-09 PROCEDURE — 3331090002 HH PPS REVENUE DEBIT

## 2018-04-09 PROCEDURE — G0299 HHS/HOSPICE OF RN EA 15 MIN: HCPCS

## 2018-04-09 PROCEDURE — A6454 SELF-ADHER BAND W>=3" <5"/YD: HCPCS

## 2018-04-09 PROCEDURE — A6260 WOUND CLEANSER ANY TYPE/SIZE: HCPCS

## 2018-04-09 PROCEDURE — 3331090001 HH PPS REVENUE CREDIT

## 2018-04-09 PROCEDURE — A6266 IMPREG GAUZE NO H20/SAL/YARD: HCPCS

## 2018-04-10 PROCEDURE — 3331090002 HH PPS REVENUE DEBIT

## 2018-04-10 PROCEDURE — 3331090001 HH PPS REVENUE CREDIT

## 2018-04-11 ENCOUNTER — HOME CARE VISIT (OUTPATIENT)
Dept: SCHEDULING | Facility: HOME HEALTH | Age: 83
End: 2018-04-11
Payer: MEDICARE

## 2018-04-11 VITALS
HEART RATE: 70 BPM | DIASTOLIC BLOOD PRESSURE: 70 MMHG | TEMPERATURE: 96.9 F | SYSTOLIC BLOOD PRESSURE: 142 MMHG | RESPIRATION RATE: 16 BRPM

## 2018-04-11 PROCEDURE — G0157 HHC PT ASSISTANT EA 15: HCPCS

## 2018-04-11 PROCEDURE — 3331090002 HH PPS REVENUE DEBIT

## 2018-04-11 PROCEDURE — 3331090001 HH PPS REVENUE CREDIT

## 2018-04-12 ENCOUNTER — HOME CARE VISIT (OUTPATIENT)
Dept: SCHEDULING | Facility: HOME HEALTH | Age: 83
End: 2018-04-12
Payer: MEDICARE

## 2018-04-12 PROCEDURE — 3331090001 HH PPS REVENUE CREDIT

## 2018-04-12 PROCEDURE — 3331090002 HH PPS REVENUE DEBIT

## 2018-04-12 NOTE — PROGRESS NOTES
[address]  Face to Face Encounter    Patients Name: Bri Bagley    YOB: 1929    Ordering Physician: Bogdan Krishna    Primary Diagnosis: ALBERT (acute kidney injury) [facility])  Hypoglycemia    Date of Face to Face:   4/12/2018                                  Face to Face Encounter findings are related to primary reason for home care:   yes. 1. I certify that the patient needs intermittent care as follows: skilled nursing care:  skilled observation/assessment, patient education and complex care plan management  physical therapy: strengthening, stretching/ROM, balance training and pt/caregiver education  occupational therapy:  ADL safety (ie. cooking, bathing, dressing), ROM and pt/caregiver education    2. I certify that this patient is homebound, that is: 1) patient requires the use of a cane device, special transportation, or assistance of another to leave the home; or 2) patient's condition makes leaving the home medically contraindicated; and 3) patient has a normal inability to leave the home and leaving the home requires considerable and taxing effort. Patient may leave the home for infrequent and short duration for medical reasons, and occasional absences for non-medical reasons. Homebound status is due to the following functional limitations: Patient's ambulation limited secondary to severe pain and requires the use of an assistive device and the assistance of a caregiver for safe completion. Patient with strength and ROM deficits limiting ambulation endurance requiring the use of an assistive device and the assistance of a caregiver. Patient deemed temporarily homebound secondary to increased risk for infection when leaving home and going out into the community. Patient with increased shortness of breath and elevated heart rate with ambulation greater than 20 feet limiting patient's ability to ambulate safely within the community.   Patient with poor safety awareness and is at risk for falls without assistance of another person and the use of an assistive device. Patient with poor ambulation endurance limiting their safe ability to ascend/descend the required number of steps to leave the home. Patient with increased shortness of breath with all exertional activity limiting ambulation outside the home. Patient with strength deficits limiting the ability to carry portable O2 for distances outside the home without the assistance of a caregiver. 3. I certify that this patient is under my care and that I, or a nurse practitioner or [phone], or clinical nurse specialist, or certified nurse midwife, working with me, had a Face-to-Face Encounter that meets the physician Face-to-Face Encounter requirements. The following are the clinical findings from the [address] [place] [address] encounter that support the need for skilled services and is a summary of the encounter: Medical Record    See discharge summary and summary of the patient's illness      Matt Oscar LMSW  4/12/2018      THE FOLLOWING TO BE COMPLETED BY THE COMMUNITY PHYSICIAN:    I concur with the findings described above from the F2F encounter that this patient is homebound and in need of a skilled service.     Certifying Physician: _____________________________________      Printed Certifying Physician Name: _____________________________________    Date: _________________

## 2018-04-13 ENCOUNTER — HOME CARE VISIT (OUTPATIENT)
Dept: SCHEDULING | Facility: HOME HEALTH | Age: 83
End: 2018-04-13
Payer: MEDICARE

## 2018-04-13 PROBLEM — R60.9 EDEMA: Status: ACTIVE | Noted: 2018-04-13

## 2018-04-13 PROCEDURE — 3331090001 HH PPS REVENUE CREDIT

## 2018-04-13 PROCEDURE — 3331090002 HH PPS REVENUE DEBIT

## 2018-04-13 NOTE — PROGRESS NOTES
[address]  Face to Face Encounter    Patients Name: Isai Rosenberg    YOB: 1929    Ordering Physician: Stephen Sheth    Primary Diagnosis: ALBERT (acute kidney injury) [facility])  Hypoglycemia    Date of Face to Face:  3/26/2018                               Face to Face Encounter findings are related to primary reason for home care:   yes. 1. I certify that the patient needs intermittent care as follows: skilled nursing care:  skilled observation/assessment, patient education and complex care plan management  physical therapy: strengthening, stretching/ROM, balance training and pt/caregiver education  occupational therapy:  ADL safety (ie. cooking, bathing, dressing) and pt/caregiver education    2. I certify that this patient is homebound, that is: 1) patient requires the use of a cane device, special transportation, or assistance of another to leave the home; or 2) patient's condition makes leaving the home medically contraindicated; and 3) patient has a normal inability to leave the home and leaving the home requires considerable and taxing effort. Patient may leave the home for infrequent and short duration for medical reasons, and occasional absences for non-medical reasons. Homebound status is due to the following functional limitations: Limited ambulation secondary to severe pain. Ambulation limited to 20 feet with the use of a cane (assistive device). Patient with increased shortness of breath and elevated heart rate with ambulation greater than 20 feet limiting patient's ability to ambulate safely within the community. Patient with strength deficits limiting the performance of all ADL's without caregiver assistance or the use of an assistive device.     3. I certify that this patient is under my care and that I, or a nurse practitioner or [facility], or clinical nurse specialist, or certified nurse midwife, working with me, had a Face-to-Face Encounter that meets the physician Face-to-Face Encounter requirements. The following are the clinical findings from the [facility] encounter that support the need for skilled services and is a summary of the encounter: Medical Record    See discharge summary and summary of the patient's illness      Jesse García LMSW  4/13/2018      THE FOLLOWING TO BE COMPLETED BY THE COMMUNITY PHYSICIAN:    I concur with the findings described above from the F2F encounter that this patient is homebound and in need of a skilled service.     Certifying Physician: _____________________________________      Printed Certifying Physician Name: _____________________________________    Date: _________________

## 2018-04-14 PROCEDURE — 3331090002 HH PPS REVENUE DEBIT

## 2018-04-14 PROCEDURE — 3331090001 HH PPS REVENUE CREDIT

## 2018-04-15 PROCEDURE — 3331090001 HH PPS REVENUE CREDIT

## 2018-04-15 PROCEDURE — 3331090002 HH PPS REVENUE DEBIT

## 2018-04-16 ENCOUNTER — HOME CARE VISIT (OUTPATIENT)
Dept: SCHEDULING | Facility: HOME HEALTH | Age: 83
End: 2018-04-16
Payer: MEDICARE

## 2018-04-16 PROCEDURE — G0299 HHS/HOSPICE OF RN EA 15 MIN: HCPCS

## 2018-04-16 PROCEDURE — 3331090001 HH PPS REVENUE CREDIT

## 2018-04-16 PROCEDURE — 3331090002 HH PPS REVENUE DEBIT

## 2018-04-16 NOTE — Clinical Note
pt is c/o left hip pain with turning in bed, getting into bed; not as much when sitting or rising from chair. Please address with your next visit.

## 2018-04-16 NOTE — Clinical Note
Pt has trace to 1+ edema biatee. ral feet and lower extremities. Pt will need new prescription for . hose

## 2018-04-17 ENCOUNTER — HOME CARE VISIT (OUTPATIENT)
Dept: SCHEDULING | Facility: HOME HEALTH | Age: 83
End: 2018-04-17
Payer: MEDICARE

## 2018-04-17 VITALS
HEART RATE: 60 BPM | RESPIRATION RATE: 16 BRPM | SYSTOLIC BLOOD PRESSURE: 126 MMHG | DIASTOLIC BLOOD PRESSURE: 68 MMHG | TEMPERATURE: 96.9 F

## 2018-04-17 PROCEDURE — 3331090001 HH PPS REVENUE CREDIT

## 2018-04-17 PROCEDURE — G0157 HHC PT ASSISTANT EA 15: HCPCS

## 2018-04-17 PROCEDURE — 3331090002 HH PPS REVENUE DEBIT

## 2018-04-18 PROCEDURE — 3331090001 HH PPS REVENUE CREDIT

## 2018-04-18 PROCEDURE — 3331090002 HH PPS REVENUE DEBIT

## 2018-04-19 ENCOUNTER — HOME CARE VISIT (OUTPATIENT)
Dept: SCHEDULING | Facility: HOME HEALTH | Age: 83
End: 2018-04-19
Payer: MEDICARE

## 2018-04-19 PROCEDURE — 3331090002 HH PPS REVENUE DEBIT

## 2018-04-19 PROCEDURE — 3331090001 HH PPS REVENUE CREDIT

## 2018-04-19 PROCEDURE — G0299 HHS/HOSPICE OF RN EA 15 MIN: HCPCS

## 2018-04-20 VITALS
SYSTOLIC BLOOD PRESSURE: 140 MMHG | HEART RATE: 76 BPM | TEMPERATURE: 98.4 F | OXYGEN SATURATION: 97 % | DIASTOLIC BLOOD PRESSURE: 62 MMHG | RESPIRATION RATE: 16 BRPM

## 2018-04-20 PROCEDURE — 3331090001 HH PPS REVENUE CREDIT

## 2018-04-20 PROCEDURE — 3331090002 HH PPS REVENUE DEBIT

## 2018-04-21 PROCEDURE — 3331090002 HH PPS REVENUE DEBIT

## 2018-04-21 PROCEDURE — 3331090001 HH PPS REVENUE CREDIT

## 2018-04-22 PROCEDURE — 3331090002 HH PPS REVENUE DEBIT

## 2018-04-22 PROCEDURE — 3331090001 HH PPS REVENUE CREDIT

## 2018-04-23 ENCOUNTER — HOME CARE VISIT (OUTPATIENT)
Dept: SCHEDULING | Facility: HOME HEALTH | Age: 83
End: 2018-04-23
Payer: MEDICARE

## 2018-04-23 PROCEDURE — 3331090002 HH PPS REVENUE DEBIT

## 2018-04-23 PROCEDURE — G0151 HHCP-SERV OF PT,EA 15 MIN: HCPCS

## 2018-04-23 PROCEDURE — 3331090001 HH PPS REVENUE CREDIT

## 2018-04-24 VITALS
DIASTOLIC BLOOD PRESSURE: 68 MMHG | HEART RATE: 73 BPM | SYSTOLIC BLOOD PRESSURE: 128 MMHG | OXYGEN SATURATION: 98 % | RESPIRATION RATE: 20 BRPM

## 2018-04-24 PROCEDURE — 3331090002 HH PPS REVENUE DEBIT

## 2018-04-24 PROCEDURE — 3331090001 HH PPS REVENUE CREDIT

## 2018-04-25 ENCOUNTER — HOME CARE VISIT (OUTPATIENT)
Dept: SCHEDULING | Facility: HOME HEALTH | Age: 83
End: 2018-04-25
Payer: MEDICARE

## 2018-04-25 PROCEDURE — 3331090002 HH PPS REVENUE DEBIT

## 2018-04-25 PROCEDURE — G0299 HHS/HOSPICE OF RN EA 15 MIN: HCPCS

## 2018-04-25 PROCEDURE — 3331090001 HH PPS REVENUE CREDIT

## 2018-04-26 VITALS
DIASTOLIC BLOOD PRESSURE: 62 MMHG | TEMPERATURE: 98.3 F | HEART RATE: 98 BPM | RESPIRATION RATE: 16 BRPM | OXYGEN SATURATION: 98 % | SYSTOLIC BLOOD PRESSURE: 116 MMHG

## 2018-04-26 PROCEDURE — 3331090002 HH PPS REVENUE DEBIT

## 2018-04-26 PROCEDURE — 3331090001 HH PPS REVENUE CREDIT

## 2018-04-27 PROCEDURE — 3331090001 HH PPS REVENUE CREDIT

## 2018-04-27 PROCEDURE — 3331090002 HH PPS REVENUE DEBIT

## 2018-04-28 PROCEDURE — 3331090002 HH PPS REVENUE DEBIT

## 2018-04-28 PROCEDURE — 3331090001 HH PPS REVENUE CREDIT

## 2018-04-29 PROCEDURE — 3331090001 HH PPS REVENUE CREDIT

## 2018-04-29 PROCEDURE — 3331090002 HH PPS REVENUE DEBIT

## 2018-04-30 PROCEDURE — 3331090002 HH PPS REVENUE DEBIT

## 2018-04-30 PROCEDURE — 3331090001 HH PPS REVENUE CREDIT

## 2018-05-01 PROCEDURE — 3331090002 HH PPS REVENUE DEBIT

## 2018-05-01 PROCEDURE — 3331090001 HH PPS REVENUE CREDIT

## 2018-05-02 PROCEDURE — 3331090001 HH PPS REVENUE CREDIT

## 2018-05-02 PROCEDURE — 3331090002 HH PPS REVENUE DEBIT

## 2018-05-03 PROCEDURE — 3331090001 HH PPS REVENUE CREDIT

## 2018-05-03 PROCEDURE — 3331090002 HH PPS REVENUE DEBIT

## 2018-05-04 ENCOUNTER — HOME CARE VISIT (OUTPATIENT)
Dept: HOME HEALTH SERVICES | Facility: HOME HEALTH | Age: 83
End: 2018-05-04
Payer: MEDICARE

## 2018-05-04 PROCEDURE — 3331090001 HH PPS REVENUE CREDIT

## 2018-05-04 PROCEDURE — 3331090002 HH PPS REVENUE DEBIT

## 2018-05-04 PROCEDURE — 3331090003 HH PPS REVENUE ADJ

## 2018-05-05 PROCEDURE — 3331090002 HH PPS REVENUE DEBIT

## 2018-05-05 PROCEDURE — 3331090001 HH PPS REVENUE CREDIT

## 2018-05-06 PROCEDURE — 3331090002 HH PPS REVENUE DEBIT

## 2018-05-06 PROCEDURE — 3331090001 HH PPS REVENUE CREDIT

## 2018-05-07 PROCEDURE — 3331090001 HH PPS REVENUE CREDIT

## 2018-05-07 PROCEDURE — 3331090002 HH PPS REVENUE DEBIT

## 2018-05-08 PROCEDURE — 3331090002 HH PPS REVENUE DEBIT

## 2018-05-08 PROCEDURE — 3331090001 HH PPS REVENUE CREDIT

## 2018-05-09 PROCEDURE — 3331090001 HH PPS REVENUE CREDIT

## 2018-05-09 PROCEDURE — 3331090002 HH PPS REVENUE DEBIT

## 2018-05-10 PROCEDURE — 3331090002 HH PPS REVENUE DEBIT

## 2018-05-10 PROCEDURE — 3331090001 HH PPS REVENUE CREDIT

## 2018-05-11 PROCEDURE — 3331090002 HH PPS REVENUE DEBIT

## 2018-05-11 PROCEDURE — 3331090001 HH PPS REVENUE CREDIT

## 2018-05-12 PROCEDURE — 3331090002 HH PPS REVENUE DEBIT

## 2018-05-12 PROCEDURE — 3331090001 HH PPS REVENUE CREDIT

## 2018-05-13 PROCEDURE — 3331090002 HH PPS REVENUE DEBIT

## 2018-05-13 PROCEDURE — 3331090001 HH PPS REVENUE CREDIT

## 2018-05-14 PROCEDURE — 3331090002 HH PPS REVENUE DEBIT

## 2018-05-14 PROCEDURE — 3331090001 HH PPS REVENUE CREDIT

## 2018-05-15 PROCEDURE — 3331090002 HH PPS REVENUE DEBIT

## 2018-05-15 PROCEDURE — 3331090001 HH PPS REVENUE CREDIT

## 2018-05-16 PROCEDURE — 3331090001 HH PPS REVENUE CREDIT

## 2018-05-16 PROCEDURE — 3331090002 HH PPS REVENUE DEBIT

## 2018-09-27 NOTE — H&P (VIEW-ONLY)
[address], [address] [place] [address]  PHONE: [phone]    Brit Pedraza  3/20/1929    SUBJECTIVE:   Brit Pedraza is a 80 y.o. female seen for a follow up visit  regarding the following:     Chief Complaint   Patient presents with    Coronary Artery Disease    Hypertension       HPI:  Presents for transitional care management after recent transcatheter aortic valve replacement with a 20 mm Luis S3 valve via the right femoral artery. She did well without any complications. She notes since returned home she has had some soreness at her right femoral site but no hematoma bruising or drainage. She has had no palpitations or tachycardia. No dizziness or syncope. She notes her dyspnea has improved. She is compliant with her medical therapy. She has a repeat ERCP scheduled for late April. She is able to stay on her antiplatelet therapy during this procedure based on my discussion with her gastroenterologist.      Past Medical History, Past Surgical History, Family history, Social History, and Medications were all reviewed with the patient today and updated as necessary. Outpatient Prescriptions Marked as Taking for the 4/4/17 encounter (Office Visit) with Maggie Miajres MD   Medication Sig Dispense Refill    clopidogrel (PLAVIX) 75 mg tab Take 1 Tab by mouth daily. 90 Tab 5    HYDROcodone-acetaminophen (NORCO) 5-325 mg per tablet Take 1 Tab by mouth every four (4) hours as needed for Pain. Max Daily Amount: 6 Tabs. 8 Tab 0    EPINEPHrine (EPIPEN) 0.3 mg/0.3 mL injection 0.3 mL by IntraMUSCular route once as needed for up to 1 dose. Indications: Anaphylaxis 2 Syringe 1    amLODIPine (NORVASC) 10 mg tablet Take 1 Tab by mouth daily. 90 Tab 3    traMADol (ULTRAM) 50 mg tablet Take 1 Tab by mouth every six (6) hours as needed for Pain. 90 Tab 2    levothyroxine (SYNTHROID) 100 mcg tablet Take 1 Tab by mouth Daily (before breakfast).  90 Tab 1    benazepril (LOTENSIN) 20 mg tablet Take 1 Tab by mouth daily. 90 Tab 1    furosemide (LASIX) 40 mg tablet Take 1 Tab by mouth daily. 90 Tab 1    metoprolol tartrate (LOPRESSOR) 25 mg tablet Take 1 Tab by mouth two (2) times a day. 180 Tab 3    rosuvastatin (CRESTOR) 20 mg tablet Take 1 Tab by mouth nightly. (Patient taking differently: Take 10 mg by mouth nightly.) 30 Tab 5    nitroglycerin (NITROSTAT) 0.4 mg SL tablet 1 Tab by SubLINGual route every five (5) minutes as needed. 25 Tab 11    FREESTYLE LITE STRIPS strip CHECK BLOOD SUGAR EVERY DAY 50 Strip 11    ASCORBIC ACID/VITAMIN E (CRANBERRY CONCENTRATE PO) Take  by mouth. Allergies   Allergen Reactions    Ativan [Lorazepam] Other (comments)     Confused and aggressive    Aleve [Naproxen Sodium] Rash    Aspirin Anaphylaxis     Hives, swelling    Carafate [Sucralfate] Hives    Clorazepate Dipotassium Hives    Flexeril [Cyclobenzaprine] Itching    Robaxin [Methocarbamol] Anaphylaxis     Swelling of tongue, wheezing  Muscle relaxers in general    Vytorin 10-10 [Ezetimibe-Simvastatin] Other (comments)     Muscle soreness    Zantac [Ranitidine Hcl] Itching       Patient Active Problem List    Diagnosis    S/P TAVR (transcatheter aortic valve replacement)     1. TAVR (3/28/17):  20 mm Luis S3 valve.  Acute cystitis    CKD (chronic kidney disease) stage 3, GFR 30-59 ml/min    Visual disturbances    Coronary artery disease involving native coronary artery of native heart     1. LHC (3/1/13):  LAD: 50-60%. LCX: 80-90% prox. 70% distal.  OM2: 70-75% prox. RCA: 10-20%  2. LHC (1/17): Small vessel disease involving distal left circumflex. Mild to moderate disease in other segments.        Hyperlipidemia    Type II diabetes mellitus, uncontrolled ([address]    Essential hypertension, benign    At risk for falling       Social History   Substance Use Topics    Smoking status: Never Smoker    Smokeless tobacco: Never Used    Alcohol use No ROS:    Review of Systems   Constitution: Negative for chills. Cardiovascular: Negative for chest pain and near-syncope. Respiratory: Negative for cough. Hematologic/Lymphatic: Negative for bleeding problem. Musculoskeletal: Positive for arthritis and back pain. Gastrointestinal: Negative for abdominal pain. Neurological: Negative for focal weakness and loss of balance. PHYSICAL EXAM:     Wt Readings from Last 3 Encounters:   04/04/17 144 lb (65.3 kg)   03/30/17 139 lb 12.8 oz (63.4 kg)   03/22/17 147 lb (66.7 kg)     BP Readings from Last 3 Encounters:   04/04/17 148/64   03/30/17 152/67   03/22/17 163/69     Pulse Readings from Last 3 Encounters:   04/04/17 (!) 56   03/30/17 72   03/22/17 65       Physical Exam   Constitutional: She appears well-developed and well-nourished. HENT:   Head: Normocephalic. Neck: No JVD present. Cardiovascular: Normal rate and regular rhythm. Murmur (Early systolic Grade II/VI RANDY) heard. Cath sites C/d/I. No bruits. Pulmonary/Chest: Effort normal and breath sounds normal.   Abdominal: Soft. Bowel sounds are normal. She exhibits no distension. Musculoskeletal: She exhibits no edema. Skin: No erythema. Psychiatric: She has a normal mood and affect. Medical problems and test results were reviewed with the patient today.      Lab Results   Component Value Date/Time    WBC 6.7 03/29/2017 03:30 AM    HGB 10.2 03/29/2017 03:30 AM    HCT 29.6 03/29/2017 03:30 AM    PLATELET 698 76/55/4712 03:30 AM    MCV 86.5 03/29/2017 03:30 AM       Lab Results   Component Value Date/Time    Sodium 142 03/29/2017 03:30 AM    Potassium 4.2 03/29/2017 03:30 AM    Chloride 110 03/29/2017 03:30 AM    CO2 23 03/29/2017 03:30 AM    Anion gap 9 03/29/2017 03:30 AM    Glucose 160 03/29/2017 03:30 AM    BUN 16 03/29/2017 03:30 AM    Creatinine 1.04 03/29/2017 03:30 AM    BUN/Creatinine ratio 22 10/13/2016 09:46 AM    GFR est AA >60 03/29/2017 03:30 AM    GFR est non-AA 53 03/29/2017 03:30 AM    Calcium 7.5 03/29/2017 03:30 AM       Lab Results   Component Value Date/Time    ALT (SGPT) 17 03/22/2017 05:26 PM    AST (SGOT) 17 03/22/2017 05:26 PM    Alk. phosphatase 120 03/22/2017 05:26 PM    Bilirubin, total 0.4 03/22/2017 05:26 PM       Lab Results   Component Value Date/Time    Cholesterol, total 109 03/29/2017 03:30 AM    HDL Cholesterol 37 03/29/2017 03:30 AM    LDL, calculated 45.6 03/29/2017 03:30 AM    VLDL, calculated 26.4 03/29/2017 03:30 AM    Triglyceride 132 03/29/2017 03:30 AM    CHOL/HDL Ratio 2.9 03/29/2017 03:30 AM             ASSESSMENT and PLAN    1. S/P TAVR (transcatheter aortic valve replacement)  Patient is doing well status post transcatheter aortic valve replacement. She is allergic to aspirin and is maintained on Plavix therapy. This should be continued long-term. She has had no complications at her access sites. We will plan to check echo in 1 month from implantation. I will see her back in the office at this point. She will contact my office with any dizziness, lightheadedness, chest pain or syncope. 2. Coronary artery disease involving native coronary artery of native heart without angina pectoris  Small vessel disease. Continue medical management. 3. Essential hypertension, benign  Discussed a reasonable goal at elderly age is less than 150/90 per [facility] guidelines and JNC 8 recommendations. Currently BP appears to be under acceptable control on current therapy. Discussed monitoring ambulatory BP readings to ensure continued optimal management. If BP becomes elevated, patient is encouraged to contact my office for further titration of therapy. Follow-up Disposition:  Return for Has appt in May. Calvin Velez MD  4/4/2017  2:39 PM    This note may have been dictated using speech recognition software.   As a result, error of speech recognition may have occurred none

## 2019-05-22 ENCOUNTER — HOSPITAL ENCOUNTER (EMERGENCY)
Age: 84
Discharge: HOME | End: 2019-05-23
Payer: MEDICARE

## 2019-05-22 DIAGNOSIS — I10 ESSENTIAL HYPERTENSION: Primary | ICD-10-CM

## 2019-05-22 LAB
ALBUMIN SERPL-MCNC: 4 G/DL (ref 3.2–4.6)
ALBUMIN/GLOB SERPL: 1 {RATIO} (ref 1.2–3.5)
ALP SERPL-CCNC: 112 U/L (ref 50–136)
ALT SERPL-CCNC: 24 U/L (ref 12–65)
ANION GAP SERPL CALC-SCNC: 11 MMOL/L (ref 7–16)
AST SERPL-CCNC: 22 U/L (ref 15–37)
BASOPHILS # BLD: 0 K/UL (ref 0–0.2)
BASOPHILS NFR BLD: 0 % (ref 0–2)
BILIRUB SERPL-MCNC: 0.3 MG/DL (ref 0.2–1.1)
BUN SERPL-MCNC: 15 MG/DL (ref 8–23)
CALCIUM SERPL-MCNC: 8.9 MG/DL (ref 8.3–10.4)
CHLORIDE SERPL-SCNC: 107 MMOL/L (ref 98–107)
CO2 SERPL-SCNC: 23 MMOL/L (ref 21–32)
CREAT SERPL-MCNC: 0.91 MG/DL (ref 0.6–1)
DIFFERENTIAL METHOD BLD: NORMAL
EOSINOPHIL # BLD: 0.1 K/UL (ref 0–0.8)
EOSINOPHIL NFR BLD: 1 % (ref 0.5–7.8)
ERYTHROCYTE [DISTWIDTH] IN BLOOD BY AUTOMATED COUNT: 13.2 % (ref 11.9–14.6)
GLOBULIN SER CALC-MCNC: 4.2 G/DL (ref 2.3–3.5)
GLUCOSE SERPL-MCNC: 191 MG/DL (ref 65–100)
HCT VFR BLD AUTO: 41.1 % (ref 35.8–46.3)
HGB BLD-MCNC: 14 G/DL (ref 11.7–15.4)
IMM GRANULOCYTES # BLD AUTO: 0 K/UL (ref 0–0.5)
IMM GRANULOCYTES NFR BLD AUTO: 0 % (ref 0–5)
LYMPHOCYTES # BLD: 2.6 K/UL (ref 0.5–4.6)
LYMPHOCYTES NFR BLD: 25 % (ref 13–44)
MCH RBC QN AUTO: 31 PG (ref 26.1–32.9)
MCHC RBC AUTO-ENTMCNC: 34.1 G/DL (ref 31.4–35)
MCV RBC AUTO: 90.9 FL (ref 79.6–97.8)
MONOCYTES # BLD: 0.6 K/UL (ref 0.1–1.3)
MONOCYTES NFR BLD: 6 % (ref 4–12)
NEUTS SEG # BLD: 6.9 K/UL (ref 1.7–8.2)
NEUTS SEG NFR BLD: 68 % (ref 43–78)
NRBC # BLD: 0 K/UL (ref 0–0.2)
PLATELET # BLD AUTO: 192 K/UL (ref 150–450)
PMV BLD AUTO: 10 FL (ref 9.4–12.3)
POTASSIUM SERPL-SCNC: 3.6 MMOL/L (ref 3.5–5.1)
PROT SERPL-MCNC: 8.2 G/DL (ref 6.3–8.2)
RBC # BLD AUTO: 4.52 M/UL (ref 4.05–5.2)
SODIUM SERPL-SCNC: 141 MMOL/L (ref 136–145)
WBC # BLD AUTO: 10.2 K/UL (ref 4.3–11.1)

## 2019-05-22 PROCEDURE — 93005 ELECTROCARDIOGRAM TRACING: CPT | Performed by: EMERGENCY MEDICINE

## 2019-05-22 PROCEDURE — 99284 EMERGENCY DEPT VISIT MOD MDM: CPT

## 2019-05-22 PROCEDURE — 80053 COMPREHEN METABOLIC PANEL: CPT

## 2019-05-22 PROCEDURE — 93005 ELECTROCARDIOGRAM TRACING: CPT

## 2019-05-22 PROCEDURE — 74011000250 HC RX REV CODE- 250

## 2019-05-22 PROCEDURE — 85025 COMPLETE CBC W/AUTO DIFF WBC: CPT

## 2019-05-22 PROCEDURE — 96374 THER/PROPH/DIAG INJ IV PUSH: CPT

## 2019-05-22 RX ORDER — LABETALOL HYDROCHLORIDE 5 MG/ML
20 INJECTION, SOLUTION INTRAVENOUS
Status: COMPLETED | OUTPATIENT
Start: 2019-05-22 | End: 2019-05-22

## 2019-05-22 RX ADMIN — LABETALOL HYDROCHLORIDE 20 MG: 5 INJECTION INTRAVENOUS at 23:53

## 2019-05-23 VITALS
SYSTOLIC BLOOD PRESSURE: 146 MMHG | DIASTOLIC BLOOD PRESSURE: 77 MMHG | HEIGHT: 64 IN | OXYGEN SATURATION: 98 % | HEART RATE: 80 BPM | BODY MASS INDEX: 26.32 KG/M2 | RESPIRATION RATE: 16 BRPM | WEIGHT: 154.2 LBS | TEMPERATURE: 98 F

## 2019-05-23 NOTE — ED NOTES
I have reviewed discharge instructions with the patient. The patient verbalized understanding. Patient left ED via Discharge Method: ambulatory to Home with self. Opportunity for questions and clarification provided. Patient given 0 scripts. To continue your aftercare when you leave the hospital, you may receive an automated call from our care team to check in on how you are doing. This is a free service and part of our promise to provide the best care and service to meet your aftercare needs.  If you have questions, or wish to unsubscribe from this service please call [phone]. Thank you for Choosing our [facility] Emergency Department.

## 2019-05-23 NOTE — ED TRIAGE NOTES
Pt states she started having a headache this morning. States she checked her blood pressure 2 hours ago and noticed it was 220/110. States she took a dose of her hydralazine and states it still has not come down. Family states she normally has high and they just recently put her on hydralazine 50mg. States she had a valve replacement in 2017. Denies chest pain or SHOB. Denies nausea or vomiting.

## 2019-05-23 NOTE — ED PROVIDER NOTES
5year-old female complaining of headache and elevated blood pressure. Patient noticed the headache this evening. She is visiting for blood pressure has not missed any doses. There's been no fevers or chills, nausea vomiting or diarrhea. The history is provided by the patient. Hypertension    This is a new problem. Associated symptoms include headaches. Pertinent negatives include no chest pain, no orthopnea, no palpitations, no blurred vision and no shortness of breath. There are no associated agents to hypertension.         Past Medical History:   Diagnosis Date    Aortic regurgitation     mild to moderate per ECHO 3/28/17    Aortic stenosis     severe per ECHO 3/28/17    Basal cell carcinoma     Bruit (arterial)     CAD (coronary artery disease)     small vessel disease, medical management per cardiologist not 4/17    Choledocholithiasis     Hyperlipidemia     Hypertension     Hypothyroidism     Murmur, cardiac     Poor historian     S/P TAVR (transcatheter aortic valve replacement)     3/17    Tricuspid valve regurgitation     moderate per ECHO 3/28/17    Type II or unspecified type diabetes mellitus without mention of complication, not stated as uncontrolled     not medicated, treated w/ diet    Urinary incontinence        Past Surgical History:   Procedure Laterality Date    HX APPENDECTOMY      HX CARPAL TUNNEL RELEASE      patient denies    HX CYSTOCELE REPAIR      HX ERCP  02/20/2017    large CBD stones    HX ERCP  04/28/2017    laser    HX HYSTERECTOMY      HX KNEE ARTHROSCOPY Right     HX OOPHORECTOMY      Partial    HX ORTHOPAEDIC      2 back-one fusion-low; all fingers-trigger    HX OTHER SURGICAL Right     basal cell nose    HX OTHER SURGICAL  03/28/2017    Tavr    HX RECTOCELE REPAIR      LAP,CHOLECYSTECTOMY           Family History:   Problem Relation Age of Onset    Heart Attack Father         age 68    Heart Disease Father     Arthritis-osteo Mother    24 [facility]"" Diabetes Mother        Social History     Socioeconomic History    Marital status:      Spouse name: Not on file    Number of children: Not on file    Years of education: Not on file    Highest education level: Not on file   Occupational History    Not on file   Social Needs    Financial resource strain: Not on file    Food insecurity:     Worry: Not on file     Inability: Not on file    Transportation needs:     Medical: Not on file     Non-medical: Not on file   Tobacco Use    Smoking status: Never Smoker    Smokeless tobacco: Never Used   Substance and Sexual Activity    Alcohol use: No    Drug use: No    Sexual activity: Not on file   Lifestyle    Physical activity:     Days per week: Not on file     Minutes per session: Not on file    Stress: Not on file   Relationships    Social connections:     Talks on phone: Not on file     Gets together: Not on file     Attends Protestant service: Not on file     Active member of club or organization: Not on file     Attends meetings of clubs or organizations: Not on file     Relationship status: Not on file    Intimate partner violence:     Fear of current or ex partner: Not on file     Emotionally abused: Not on file     Physically abused: Not on file     Forced sexual activity: Not on file   Other Topics Concern    Not on file   Social History Narrative    Not on file         ALLERGIES: Aspirin; Ativan [lorazepam]; Robaxin [methocarbamol]; Aleve [naproxen sodium]; Amlodipine; Bactrim [sulfamethoprim]; Carafate [sucralfate]; Ciprofloxacin; Clorazepate dipotassium; Flexeril [cyclobenzaprine]; Vytorin 10-10 [ezetimibe-simvastatin]; and Zantac [ranitidine hcl]    Review of Systems   Constitutional: Negative. Negative for activity change. HENT: Negative. Eyes: Negative. Negative for blurred vision. Respiratory: Negative. Negative for shortness of breath. Cardiovascular: Negative. Negative for chest pain, palpitations and orthopnea. Gastrointestinal: Negative. Genitourinary: Negative. Musculoskeletal: Negative. Skin: Negative. Neurological: Positive for headaches. Psychiatric/Behavioral: Negative. All other systems reviewed and are negative. Vitals:    05/22/19 2230 05/22/19 2249 05/22/19 2255   BP: (!) 196/98 (!) 219/107    Pulse: (!) 111 (!) 110    Resp: 18 20    Temp: 98 °F (36.7 °C)     SpO2: 97%  97%   Weight: 69.9 kg (154 lb 3.2 oz)     Height: 5' 4\" (1.626 m)              Physical Exam   Constitutional: She is oriented to person, place, and time. She appears well-developed and well-nourished. No distress. HENT:   Head: Normocephalic and atraumatic. Right Ear: External ear normal.   Left Ear: External ear normal.   Nose: Nose normal.   Mouth/Throat: Oropharynx is clear and moist. No oropharyngeal exudate. Eyes: Pupils are equal, round, and reactive to light. Conjunctivae and EOM are normal. Right eye exhibits no discharge. Left eye exhibits no discharge. No scleral icterus. Neck: Normal range of motion. Neck supple. No JVD present. No tracheal deviation present. Cardiovascular: Normal rate, regular rhythm and intact distal pulses. Pulmonary/Chest: Effort normal and breath sounds normal. No stridor. No respiratory distress. She has no wheezes. She exhibits no tenderness. Abdominal: Soft. Bowel sounds are normal. She exhibits no distension and no mass. There is no tenderness. Musculoskeletal: Normal range of motion. She exhibits no edema or tenderness. Neurological: She is alert and oriented to person, place, and time. No cranial nerve deficit. Skin: Skin is warm and dry. No rash noted. She is not diaphoretic. No erythema. No pallor. Psychiatric: She has a normal mood and affect. Her behavior is normal. Thought content normal.   Nursing note and vitals reviewed.        MDM  Number of Diagnoses or Management Options  Essential hypertension:   Diagnosis management comments: Assessment hypertension leading to headache. Blood pressure control her headache resolved. She is followed closely with primary care doctor tomorrow.        Amount and/or Complexity of Data Reviewed  Clinical lab tests: ordered and reviewed  Tests in the radiology section of CPT®: reviewed and ordered  Tests in the medicine section of CPT®: ordered and reviewed           Procedures

## 2019-05-23 NOTE — DISCHARGE INSTRUCTIONS
Patient Education        DASH Diet: Care Instructions  Your Care Instructions    The DASH diet is an eating plan that can help lower your blood pressure. DASH stands for Dietary Approaches to Stop Hypertension. Hypertension is high blood pressure. The DASH diet focuses on eating foods that are high in calcium, potassium, and magnesium. These nutrients can lower blood pressure. The foods that are highest in these nutrients are fruits, vegetables, low-fat dairy products, nuts, seeds, and legumes. But taking calcium, potassium, and magnesium supplements instead of eating foods that are high in those nutrients does not have the same effect. The DASH diet also includes whole grains, fish, and poultry. The DASH diet is one of several lifestyle changes your doctor may recommend to lower your high blood pressure. Your doctor may also want you to decrease the amount of sodium in your diet. Lowering sodium while following the DASH diet can lower blood pressure even further than just the DASH diet alone. Follow-up care is a key part of your treatment and safety. Be sure to make and go to all appointments, and call your doctor if you are having problems. It's also a good idea to know your test results and keep a list of the medicines you take. How can you care for yourself at home? Following the DASH diet  · Eat 4 to 5 servings of fruit each day. A serving is 1 medium-sized piece of fruit, ½ cup chopped or canned fruit, 1/4 cup dried fruit, or 4 ounces (½ cup) of fruit juice. Choose fruit more often than fruit juice. · Eat 4 to 5 servings of vegetables each day. A serving is 1 cup of lettuce or raw leafy vegetables, ½ cup of chopped or cooked vegetables, or 4 ounces (½ cup) of vegetable juice. Choose vegetables more often than vegetable juice. · Get 2 to 3 servings of low-fat and fat-free dairy each day. A serving is 8 ounces of milk, 1 cup of yogurt, or 1 ½ ounces of cheese. · Eat 6 to 8 servings of grains each day.  A serving is 1 slice of bread, 1 ounce of dry cereal, or ½ cup of cooked rice, pasta, or cooked cereal. Try to choose whole-grain products as much as possible. · Limit lean meat, poultry, and fish to 2 servings each day. A serving is 3 ounces, about the size of a deck of cards. · Eat 4 to 5 servings of nuts, seeds, and legumes (cooked dried beans, lentils, and split peas) each week. A serving is 1/3 cup of nuts, 2 tablespoons of seeds, or ½ cup of cooked beans or peas. · Limit fats and oils to 2 to 3 servings each day. A serving is 1 teaspoon of vegetable oil or 2 tablespoons of salad dressing. · Limit sweets and added sugars to 5 servings or less a week. A serving is 1 tablespoon jelly or jam, ½ cup sorbet, or 1 cup of lemonade. · Eat less than 2,300 milligrams (mg) of sodium a day. If you limit your sodium to 1,500 mg a day, you can lower your blood pressure even more. Tips for success  · Start small. Do not try to make dramatic changes to your diet all at once. You might feel that you are missing out on your favorite foods and then be more likely to not follow the plan. Make small changes, and stick with them. Once those changes become habit, add a few more changes. · Try some of the following:  ? Make it a goal to eat a fruit or vegetable at every meal and at snacks. This will make it easy to get the recommended amount of fruits and vegetables each day. ? Try yogurt topped with fruit and nuts for a snack or healthy dessert. ? Add lettuce, tomato, cucumber, and onion to sandwiches. ? Combine a ready-made pizza crust with low-fat mozzarella cheese and lots of vegetable toppings. Try using tomatoes, squash, spinach, broccoli, carrots, cauliflower, and onions. ? Have a variety of cut-up vegetables with a low-fat dip as an appetizer instead of chips and dip. ? Sprinkle sunflower seeds or chopped almonds over salads. Or try adding chopped walnuts or almonds to cooked vegetables.   ? Try some vegetarian meals using beans and peas. Add garbanzo or kidney beans to salads. Make burritos and tacos with mashed marsh beans or black beans. Where can you learn more? Go to http://angelica-zuri.info/. Enter V878 in the search box to learn more about \"DASH Diet: Care Instructions. \"  Current as of: July 22, 2018  Content Version: 11.9  © 6976-3378 [employer]. Care instructions adapted under license by [employer] (which disclaims liability or warranty for this information). If you have questions about a medical condition or this instruction, always ask your healthcare professional. [place] [address] any warranty or liability for your use of this information.

## 2019-05-24 LAB
ATRIAL RATE: 102 BPM
CALCULATED P AXIS, ECG09: 82 DEGREES
CALCULATED R AXIS, ECG10: -9 DEGREES
CALCULATED T AXIS, ECG11: 84 DEGREES
DIAGNOSIS, 93000: NORMAL
P-R INTERVAL, ECG05: 154 MS
Q-T INTERVAL, ECG07: 306 MS
QRS DURATION, ECG06: 70 MS
QTC CALCULATION (BEZET), ECG08: 398 MS
VENTRICULAR RATE, ECG03: 102 BPM

## 2019-12-04 PROBLEM — I35.1 NONRHEUMATIC AORTIC VALVE INSUFFICIENCY: Status: ACTIVE | Noted: 2019-12-04

## 2020-10-19 ENCOUNTER — APPOINTMENT (OUTPATIENT)
Dept: CT IMAGING | Age: 85
DRG: 684 | End: 2020-10-19
Attending: STUDENT IN AN ORGANIZED HEALTH CARE EDUCATION/TRAINING PROGRAM
Payer: MEDICARE

## 2020-10-19 ENCOUNTER — HOSPITAL ENCOUNTER (INPATIENT)
Age: 85
LOS: 3 days | Discharge: HOME | DRG: 684 | End: 2020-10-22
Attending: EMERGENCY MEDICINE | Admitting: STUDENT IN AN ORGANIZED HEALTH CARE EDUCATION/TRAINING PROGRAM
Payer: MEDICARE

## 2020-10-19 DIAGNOSIS — N17.9 ACUTE RENAL FAILURE, UNSPECIFIED ACUTE RENAL FAILURE TYPE (HCC): Primary | ICD-10-CM

## 2020-10-19 DIAGNOSIS — R19.7 DIARRHEA, UNSPECIFIED TYPE: ICD-10-CM

## 2020-10-19 LAB
ALBUMIN SERPL-MCNC: 3.7 G/DL (ref 3.2–4.6)
ALBUMIN/GLOB SERPL: 0.9 {RATIO} (ref 1.2–3.5)
ALP SERPL-CCNC: 85 U/L (ref 50–136)
ALT SERPL-CCNC: 13 U/L (ref 12–65)
ANION GAP SERPL CALC-SCNC: 8 MMOL/L (ref 7–16)
AST SERPL-CCNC: 13 U/L (ref 15–37)
BASOPHILS # BLD: 0 K/UL (ref 0–0.2)
BASOPHILS NFR BLD: 0 % (ref 0–2)
BILIRUB SERPL-MCNC: 0.3 MG/DL (ref 0.2–1.1)
BUN SERPL-MCNC: 96 MG/DL (ref 8–23)
CALCIUM SERPL-MCNC: 9.3 MG/DL (ref 8.3–10.4)
CHLORIDE SERPL-SCNC: 113 MMOL/L (ref 98–107)
CO2 SERPL-SCNC: 21 MMOL/L (ref 21–32)
CREAT SERPL-MCNC: 3.13 MG/DL (ref 0.6–1)
DIFFERENTIAL METHOD BLD: ABNORMAL
EOSINOPHIL # BLD: 0.1 K/UL (ref 0–0.8)
EOSINOPHIL NFR BLD: 1 % (ref 0.5–7.8)
ERYTHROCYTE [DISTWIDTH] IN BLOOD BY AUTOMATED COUNT: 12.3 % (ref 11.9–14.6)
GLOBULIN SER CALC-MCNC: 4.2 G/DL (ref 2.3–3.5)
GLUCOSE SERPL-MCNC: 140 MG/DL (ref 65–100)
HCT VFR BLD AUTO: 36.6 % (ref 35.8–46.3)
HGB BLD-MCNC: 12.2 G/DL (ref 11.7–15.4)
IMM GRANULOCYTES # BLD AUTO: 0 K/UL (ref 0–0.5)
IMM GRANULOCYTES NFR BLD AUTO: 0 % (ref 0–5)
LACTATE SERPL-SCNC: 0.5 MMOL/L (ref 0.4–2)
LIPASE SERPL-CCNC: 292 U/L (ref 73–393)
LYMPHOCYTES # BLD: 2.5 K/UL (ref 0.5–4.6)
LYMPHOCYTES NFR BLD: 34 % (ref 13–44)
MCH RBC QN AUTO: 31.9 PG (ref 26.1–32.9)
MCHC RBC AUTO-ENTMCNC: 33.3 G/DL (ref 31.4–35)
MCV RBC AUTO: 95.8 FL (ref 79.6–97.8)
MONOCYTES # BLD: 0.5 K/UL (ref 0.1–1.3)
MONOCYTES NFR BLD: 7 % (ref 4–12)
NEUTS SEG # BLD: 4.4 K/UL (ref 1.7–8.2)
NEUTS SEG NFR BLD: 58 % (ref 43–78)
NRBC # BLD: 0 K/UL (ref 0–0.2)
PLATELET # BLD AUTO: 219 K/UL (ref 150–450)
PMV BLD AUTO: 11.4 FL (ref 9.4–12.3)
POTASSIUM SERPL-SCNC: 4.6 MMOL/L (ref 3.5–5.1)
PROT SERPL-MCNC: 7.9 G/DL (ref 6.3–8.2)
RBC # BLD AUTO: 3.82 M/UL (ref 4.05–5.2)
SODIUM SERPL-SCNC: 142 MMOL/L (ref 136–145)
TSH SERPL DL<=0.005 MIU/L-ACNC: 0.04 UIU/ML (ref 0.36–3.74)
WBC # BLD AUTO: 7.5 K/UL (ref 4.3–11.1)

## 2020-10-19 PROCEDURE — 74011250636 HC RX REV CODE- 250/636: Performed by: EMERGENCY MEDICINE

## 2020-10-19 PROCEDURE — 74176 CT ABD & PELVIS W/O CONTRAST: CPT

## 2020-10-19 PROCEDURE — 99284 EMERGENCY DEPT VISIT MOD MDM: CPT

## 2020-10-19 PROCEDURE — 36415 COLL VENOUS BLD VENIPUNCTURE: CPT

## 2020-10-19 PROCEDURE — 81001 URINALYSIS AUTO W/SCOPE: CPT

## 2020-10-19 PROCEDURE — 65270000029 HC RM PRIVATE

## 2020-10-19 PROCEDURE — 83690 ASSAY OF LIPASE: CPT

## 2020-10-19 PROCEDURE — 84443 ASSAY THYROID STIM HORMONE: CPT

## 2020-10-19 PROCEDURE — 85025 COMPLETE CBC W/AUTO DIFF WBC: CPT

## 2020-10-19 PROCEDURE — 83605 ASSAY OF LACTIC ACID: CPT

## 2020-10-19 PROCEDURE — 74011250636 HC RX REV CODE- 250/636: Performed by: STUDENT IN AN ORGANIZED HEALTH CARE EDUCATION/TRAINING PROGRAM

## 2020-10-19 PROCEDURE — 80053 COMPREHEN METABOLIC PANEL: CPT

## 2020-10-19 RX ORDER — SODIUM CHLORIDE 0.9 % (FLUSH) 0.9 %
5-40 SYRINGE (ML) INJECTION AS NEEDED
Status: DISCONTINUED | OUTPATIENT
Start: 2020-10-19 | End: 2020-10-22 | Stop reason: HOSPADM

## 2020-10-19 RX ORDER — FACIAL-BODY WIPES
10 EACH TOPICAL DAILY PRN
Status: DISCONTINUED | OUTPATIENT
Start: 2020-10-19 | End: 2020-10-22 | Stop reason: HOSPADM

## 2020-10-19 RX ORDER — ONDANSETRON 2 MG/ML
4 INJECTION INTRAMUSCULAR; INTRAVENOUS
Status: DISCONTINUED | OUTPATIENT
Start: 2020-10-19 | End: 2020-10-22 | Stop reason: HOSPADM

## 2020-10-19 RX ORDER — SODIUM CHLORIDE 9 MG/ML
75 INJECTION, SOLUTION INTRAVENOUS CONTINUOUS
Status: DISCONTINUED | OUTPATIENT
Start: 2020-10-19 | End: 2020-10-22

## 2020-10-19 RX ORDER — ACETAMINOPHEN 325 MG/1
650 TABLET ORAL
Status: DISCONTINUED | OUTPATIENT
Start: 2020-10-19 | End: 2020-10-22 | Stop reason: HOSPADM

## 2020-10-19 RX ORDER — SODIUM CHLORIDE 0.9 % (FLUSH) 0.9 %
5-40 SYRINGE (ML) INJECTION EVERY 8 HOURS
Status: DISCONTINUED | OUTPATIENT
Start: 2020-10-19 | End: 2020-10-22 | Stop reason: HOSPADM

## 2020-10-19 RX ORDER — LISINOPRIL 20 MG/1
20 TABLET ORAL DAILY
Status: DISCONTINUED | OUTPATIENT
Start: 2020-10-20 | End: 2020-10-22 | Stop reason: HOSPADM

## 2020-10-19 RX ORDER — CARVEDILOL 6.25 MG/1
6.25 TABLET ORAL 2 TIMES DAILY WITH MEALS
Status: DISCONTINUED | OUTPATIENT
Start: 2020-10-20 | End: 2020-10-22 | Stop reason: HOSPADM

## 2020-10-19 RX ORDER — ACETAMINOPHEN 650 MG/1
650 SUPPOSITORY RECTAL
Status: DISCONTINUED | OUTPATIENT
Start: 2020-10-19 | End: 2020-10-22 | Stop reason: HOSPADM

## 2020-10-19 RX ORDER — POTASSIUM CHLORIDE 7.45 MG/ML
10 INJECTION INTRAVENOUS AS NEEDED
Status: DISCONTINUED | OUTPATIENT
Start: 2020-10-19 | End: 2020-10-22 | Stop reason: HOSPADM

## 2020-10-19 RX ORDER — CLOPIDOGREL BISULFATE 75 MG/1
75 TABLET ORAL DAILY
Status: DISCONTINUED | OUTPATIENT
Start: 2020-10-20 | End: 2020-10-22 | Stop reason: HOSPADM

## 2020-10-19 RX ORDER — POLYETHYLENE GLYCOL 3350 17 G/17G
17 POWDER, FOR SOLUTION ORAL DAILY PRN
Status: DISCONTINUED | OUTPATIENT
Start: 2020-10-19 | End: 2020-10-22 | Stop reason: HOSPADM

## 2020-10-19 RX ORDER — MAGNESIUM SULFATE HEPTAHYDRATE 40 MG/ML
2 INJECTION, SOLUTION INTRAVENOUS AS NEEDED
Status: DISCONTINUED | OUTPATIENT
Start: 2020-10-19 | End: 2020-10-22 | Stop reason: HOSPADM

## 2020-10-19 RX ORDER — LEVOTHYROXINE SODIUM 50 UG/1
100 TABLET ORAL
Status: DISCONTINUED | OUTPATIENT
Start: 2020-10-20 | End: 2020-10-20

## 2020-10-19 RX ORDER — ONDANSETRON 4 MG/1
4 TABLET, ORALLY DISINTEGRATING ORAL
Status: DISCONTINUED | OUTPATIENT
Start: 2020-10-19 | End: 2020-10-22 | Stop reason: HOSPADM

## 2020-10-19 RX ADMIN — Medication 10 ML: at 21:41

## 2020-10-19 RX ADMIN — SODIUM CHLORIDE 75 ML/HR: 900 INJECTION, SOLUTION INTRAVENOUS at 20:05

## 2020-10-19 RX ADMIN — SODIUM CHLORIDE 500 ML: 900 INJECTION, SOLUTION INTRAVENOUS at 17:54

## 2020-10-19 NOTE — ED TRIAGE NOTES
Pt arrives via WC. Sent by PCP for diarrhea since Wednesday. Family denies seeing blood in stool. Family states she has diarrhea at least 6 times a day. Decreased appetite as well. Did not have diarrhea on Saturday but started again today. Family states yesterday pt started to feel nauseous and now is becoming incontinent of bowel, very loose stool. States more brown in color. Pt does take an abx daily, unsure what. NAD. Masked.

## 2020-10-19 NOTE — H&P
Hospitalist Note     Admit Date:  10/19/2020  4:10 PM   Name:  Cesar Gu   Age:  80 y.o.  :  3/20/1929   MRN:  [id number]   PCP:  Nan Jenkins MD  Treatment Team: Attending Provider: Yanni Jiménez MD; Primary Nurse: Romulo Hernandez RN    HPI/Subjective:   A 54-year-old female with PMH of AS s/p TAVR, Hypothyroidism, HTN, CAD, HLD, Type 2 DM, frequent UTI on prophylaxis, CKD3 who presents to the hospital with complaints of persistent diarrhea worsening over the last week and significant weakness. Patient presents with her daughter who gives most of the history and states that since April patient has had suffering from frequent diarrhea with anything that she eats. Over the last week 3 weeks and especially the last week she has been having worsening diarrhea with anything that she eats. She does endorse some nausea with bilat lower abdominal pain, any significant emesis. There are some days where she does 6 times a day. She describes her diarrhea as clear and yellow-brown at times. Not remember the last time she had a colonoscopy and is probably been over 20 years. She does endorse overall weakness and ESTRADA. Denies any fevers, cough, congestion, SOB, chest pain, lightheadedness, dizziness, syncope. Patient does have history of frequent UTIs for which she is on trimethoprim for. She does not endorse any dysuria, urinary frequency, urinary hesitancy, hematuria, flank pain. 10 systems reviewed and negative except as noted in HPI.   Past Medical History:   Diagnosis Date    Aortic regurgitation     mild to moderate per ECHO 3/28/17    Aortic stenosis     severe per ECHO 3/28/17    Basal cell carcinoma     Bruit (arterial)     CAD (coronary artery disease)     small vessel disease, medical management per cardiologist not     Choledocholithiasis     Hyperlipidemia     Hypertension     Hypothyroidism     Murmur, cardiac     Poor historian     S/P TAVR (transcatheter aortic valve replacement)     3/17    Tricuspid valve regurgitation     moderate per ECHO 3/28/17    Type II or unspecified type diabetes mellitus without mention of complication, not stated as uncontrolled     not medicated, treated w/ diet    Urinary incontinence       Past Surgical History:   Procedure Laterality Date    HX APPENDECTOMY      HX CARPAL TUNNEL RELEASE      patient denies    HX CYSTOCELE REPAIR      HX ERCP  02/20/2017    large CBD stones    HX ERCP  04/28/2017    laser    HX HYSTERECTOMY      HX KNEE ARTHROSCOPY Right     HX OOPHORECTOMY      Partial    HX ORTHOPAEDIC      2 back-one fusion-low; all fingers-trigger    HX OTHER SURGICAL Right     basal cell nose    HX OTHER SURGICAL  03/28/2017    Tavr    HX RECTOCELE REPAIR      LAP,CHOLECYSTECTOMY        Allergies   Allergen Reactions    Aspirin Anaphylaxis     Hives, swelling    Ativan [Lorazepam] Other (comments)     Confused and aggressive    Robaxin [Methocarbamol] Anaphylaxis     Swelling of tongue, wheezing  Muscle relaxers in general    Aleve [Naproxen Sodium] Rash    Amlodipine Other (comments)     Edema      Bactrim [Sulfamethoprim] Other (comments)    Carafate [Sucralfate] Hives    Ciprofloxacin Diarrhea     severe    Clorazepate Dipotassium Hives    Flexeril [Cyclobenzaprine] Itching    Vytorin 10-10 [Ezetimibe-Simvastatin] Other (comments)     Muscle soreness    Zantac [Ranitidine Hcl] Itching      Social History     Tobacco Use    Smoking status: Never Smoker    Smokeless tobacco: Never Used   Substance Use Topics    Alcohol use: No      Family History   Problem Relation Age of Onset    Heart Attack Father         age 68    Heart Disease Father     Arthritis-osteo Mother     Diabetes Mother       Family history reviewed and noncontributory.   Immunization History   Administered Date(s) Administered    Influenza Vaccine (Quad) Mdck Pf (>4 Yrs Flucelvax QUAD [id number]) 09/11/2017    Influenza Vaccine (Quad) PF (>6 Mo Flulaval, Fluarix, and >3 Yrs [place], Fluzone 48215) 11/02/2018    Pneumococcal Conjugate (PCV-13) 09/29/2015    Pneumococcal Polysaccharide (PPSV-23) 09/11/2017    TB Skin Test (PPD) Intradermal 04/30/2017, 07/08/2017, 03/23/2018     PTA Medications:  Prior to Admission Medications   Prescriptions Last Dose Informant Patient Reported? Taking?   benazepriL (LOTENSIN) 20 mg tablet   No No   Sig: Take 1 Tab by mouth daily. carvediloL (COREG) 6.25 mg tablet   No No   Sig: Take 1 Tab by mouth two (2) times daily (with meals). chlorthalidone (HYGROTEN) 25 mg tablet   No No   Sig: TAKE ONE TABLET BY MOUTH EVERY DAY   clopidogrel (PLAVIX) 75 mg tab   No No   Sig: TAKE ONE TABLET BY MOUTH EVERY DAY   hydrALAZINE (APRESOLINE) 50 mg tablet   No No   Sig: Take 1 Tab by mouth two (2) times a day. levothyroxine (SYNTHROID) 100 mcg tablet   No No   Sig: Take 1 Tab by mouth Daily (before breakfast). miscellaneous medical supply misc   No No   Sig: Glucose Test Meter - for glucose, once daily testing. ICD - 10 E11.9   miscellaneous medical supply misc   No No   Sig: Glucose Test Strips - Use for once or twice daily glucose testing.   miscellaneous medical supply misc   No No   Sig: Glucose Lancets for once or twice daily glucose testing, icd-10 E11.9   miscellaneous medical supply misc   No No   Sig: Compression hose, medium compression, 20-30mm HGm, fitted to size 2 pair. LE edema.    trimethoprim (TRIMPEX) 100 mg tablet   No No   Sig: TAKE ONE TABLET BY MOUTH EVERY DAY FOR SUPPRESSION      Facility-Administered Medications: None       Objective:     Patient Vitals for the past 24 hrs:   Temp Pulse Resp BP SpO2   10/19/20 1759  78  (!) 192/89 100 %   10/19/20 1659  65  (!) 167/73 98 %   10/19/20 1615  69  (!) 162/68 99 %   10/19/20 1608 97.8 °F (36.6 °C) 71 16 122/66 96 %     Oxygen Therapy  O2 Sat (%): 100 % (10/19/20 1759)  Pulse via Oximetry: 78 beats per minute (10/19/20 1759)  O2 Device: Room air (10/19/20 1602)    Estimated body mass index is 25.92 kg/m² as calculated from the following:    Height as of this encounter: 5' 4\" (1.626 m). Weight as of this encounter: 68.5 kg (151 lb). No intake or output data in the 24 hours ending 10/19/20 5323    *Note that automatically entered I/Os may not be accurate; dependent on patient compliance with collection and accurate  by assistants. Physical Exam:  General:    Well nourished. Alert. Eyes:   Normal sclerae. Extraocular movements intact. HENT:  Normocephalic, atraumatic. Dry mucous membranes. Poor skin tugor  CV:   RRR. No m/r/g. Lungs:  CTAB. No wheezing, rhonchi, or rales. Abdomen: Soft, tenderness in RLQ, LLQ. nondistended. Extremities: Warm and dry. No cyanosis or edema. Neurologic: CN II-XII grossly intact. Sensation intact. Skin:     No rashes or jaundice. Normal coloration  Psych:  Normal mood and affect. I reviewed the labs, imaging, EKGs, telemetry, and other studies done this admission. Data Reviewed:   Recent Results (from the past 24 hour(s))   CBC WITH AUTOMATED DIFF    Collection Time: 10/19/20  4:10 PM   Result Value Ref Range    WBC 7.5 4.3 - 11.1 K/uL    RBC 3.82 (L) 4.05 - 5.2 M/uL    HGB 12.2 11.7 - 15.4 g/dL    HCT 36.6 35.8 - 46.3 %    MCV 95.8 79.6 - 97.8 FL    MCH 31.9 26.1 - 32.9 PG    MCHC 33.3 31.4 - 35.0 g/dL    RDW 12.3 11.9 - 14.6 %    PLATELET 379 853 - 303 K/uL    MPV 11.4 9.4 - 12.3 FL    ABSOLUTE NRBC 0.00 0.0 - 0.2 K/uL    DF AUTOMATED      NEUTROPHILS 58 43 - 78 %    LYMPHOCYTES 34 13 - 44 %    MONOCYTES 7 4.0 - 12.0 %    EOSINOPHILS 1 0.5 - 7.8 %    BASOPHILS 0 0.0 - 2.0 %    IMMATURE GRANULOCYTES 0 0.0 - 5.0 %    ABS. NEUTROPHILS 4.4 1.7 - 8.2 K/UL    ABS. LYMPHOCYTES 2.5 0.5 - 4.6 K/UL    ABS. MONOCYTES 0.5 0.1 - 1.3 K/UL    ABS. EOSINOPHILS 0.1 0.0 - 0.8 K/UL    ABS. BASOPHILS 0.0 0.0 - 0.2 K/UL    ABS. IMM.  GRANS. 0.0 0.0 - 0.5 K/UL   METABOLIC PANEL, COMPREHENSIVE    Collection Time: 10/19/20  4:10 PM   Result Value Ref Range    Sodium 142 136 - 145 mmol/L    Potassium 4.6 3.5 - 5.1 mmol/L    Chloride 113 (H) 98 - 107 mmol/L    CO2 21 21 - 32 mmol/L    Anion gap 8 7 - 16 mmol/L    Glucose 140 (H) 65 - 100 mg/dL    BUN 96 (H) 8 - 23 MG/DL    Creatinine 3.13 (H) 0.6 - 1.0 MG/DL    GFR est AA 18 (L) >60 ml/min/1.73m2    GFR est non-AA 15 (L) >60 ml/min/1.73m2    Calcium 9.3 8.3 - 10.4 MG/DL    Bilirubin, total 0.3 0.2 - 1.1 MG/DL    ALT (SGPT) 13 12 - 65 U/L    AST (SGOT) 13 (L) 15 - 37 U/L    Alk.  phosphatase 85 50 - 136 U/L    Protein, total 7.9 6.3 - 8.2 g/dL    Albumin 3.7 3.2 - 4.6 g/dL    Globulin 4.2 (H) 2.3 - 3.5 g/dL    A-G Ratio 0.9 (L) 1.2 - 3.5     LIPASE    Collection Time: 10/19/20  4:10 PM   Result Value Ref Range    Lipase 292 73 - 393 U/L   LACTIC ACID    Collection Time: 10/19/20  5:43 PM   Result Value Ref Range    Lactic acid 0.5 0.4 - 2.0 MMOL/L       All Micro Results     Procedure Component Value Units Date/Time    C. DIFFICILE/EPI PCR [[id number]]     Order Status:  Sent Specimen:  Stool           Current Facility-Administered Medications   Medication Dose Route Frequency    sodium chloride 0.9 % bolus infusion 500 mL  500 mL IntraVENous ONCE    sodium chloride (NS) flush 5-40 mL  5-40 mL IntraVENous Q8H    sodium chloride (NS) flush 5-40 mL  5-40 mL IntraVENous PRN    potassium chloride 10 mEq in 100 ml IVPB  10 mEq IntraVENous PRN    magnesium sulfate 2 g/50 ml IVPB (premix or compounded)  2 g IntraVENous PRN    acetaminophen (TYLENOL) tablet 650 mg  650 mg Oral Q6H PRN    Or    acetaminophen (TYLENOL) suppository 650 mg  650 mg Rectal Q6H PRN    polyethylene glycol (MIRALAX) packet 17 g  17 g Oral DAILY PRN    bisacodyL (DULCOLAX) suppository 10 mg  10 mg Rectal DAILY PRN    ondansetron (ZOFRAN ODT) tablet 4 mg  4 mg Oral Q8H PRN    Or    ondansetron (ZOFRAN) injection 4 mg  4 mg IntraVENous Q6H PRN     Current Outpatient Medications   Medication Sig    trimethoprim (TRIMPEX) 100 mg tablet TAKE ONE TABLET BY MOUTH EVERY DAY FOR SUPPRESSION    benazepriL (LOTENSIN) 20 mg tablet Take 1 Tab by mouth daily.  carvediloL (COREG) 6.25 mg tablet Take 1 Tab by mouth two (2) times daily (with meals).  chlorthalidone (HYGROTEN) 25 mg tablet TAKE ONE TABLET BY MOUTH EVERY DAY    hydrALAZINE (APRESOLINE) 50 mg tablet Take 1 Tab by mouth two (2) times a day.  levothyroxine (SYNTHROID) 100 mcg tablet Take 1 Tab by mouth Daily (before breakfast).  clopidogrel (PLAVIX) 75 mg tab TAKE ONE TABLET BY MOUTH EVERY DAY    miscellaneous medical supply misc Compression hose, medium compression, 20-30mm HGm, fitted to size 2 pair. LE edema.  miscellaneous medical supply misc Glucose Test Meter - for glucose, once daily testing. ICD - 10 E11.9    miscellaneous medical supply misc Glucose Test Strips - Use for once or twice daily glucose testing.  miscellaneous medical supply misc Glucose Lancets for once or twice daily glucose testing, icd-10 E11.9       Other Studies:  No results found for this visit on 10/19/20. No results found. @Cliq@      Assessment and Plan:     Hospital Problems as of 10/19/2020 Date Reviewed: 10/19/2020          Codes Class Noted - Resolved POA    [facility] ICD-10-CM: N17.9  ICD-9-CM: 584.9  3/22/2018 - Present Unknown              Plan:  ALBERT on CKD  - baseline Cr 1.2-1.4  - Cr today 3.13, likely prerenal from diarrhea  - UA pending  - cont to hydrate back with IVF and recheck Cr in AM    Diarrhea  - cdiff pending  - ordering stool studies  - check TSH  - ordering CT abdpel w/ PO contrast only   - consider GI consult in AM    HTN/CAD - cont home meds  Hypothyroidism - check TSH, cont on home synthroid  h/o TAVR    Discharge planning:  Pending clinical course.      DVT ppx ordered  Code status:  Full  Estimated LOS:  Greater than 2 midnights  Risk:  high    Signed:  Austin Robin MD

## 2020-10-19 NOTE — ED PROVIDER NOTES
Presents with complaint of diarrhea multiple times for 6 days. Patient has had explosive foul-smelling diarrhea over the weekend. She has had nausea and dry heaving. She is on antibiotics for UTI prophylaxis. Patient complains of mild headache and intermittent abdominal pain but none currently. She denies any fever but reports chills. The history is provided by the patient. Diarrhea    This is a new problem. The problem occurs constantly. The problem has been gradually worsening. The pain is located in the generalized abdominal region. The quality of the pain is cramping. The pain is mild. Associated symptoms include diarrhea, nausea and vomiting. Pertinent negatives include no fever and no dysuria. Nothing worsens the pain. The pain is relieved by nothing. The patient's surgical history non-contributory.        Past Medical History:   Diagnosis Date    Aortic regurgitation     mild to moderate per ECHO 3/28/17    Aortic stenosis     severe per ECHO 3/28/17    Basal cell carcinoma     Bruit (arterial)     CAD (coronary artery disease)     small vessel disease, medical management per cardiologist not 4/17    Choledocholithiasis     Hyperlipidemia     Hypertension     Hypothyroidism     Murmur, cardiac     Poor historian     S/P TAVR (transcatheter aortic valve replacement)     3/17    Tricuspid valve regurgitation     moderate per ECHO 3/28/17    Type II or unspecified type diabetes mellitus without mention of complication, not stated as uncontrolled     not medicated, treated w/ diet    Urinary incontinence        Past Surgical History:   Procedure Laterality Date    HX APPENDECTOMY      HX CARPAL TUNNEL RELEASE      patient denies    HX CYSTOCELE REPAIR      HX ERCP  02/20/2017    large CBD stones    HX ERCP  04/28/2017    laser    HX HYSTERECTOMY      HX KNEE ARTHROSCOPY Right     HX OOPHORECTOMY      Partial    HX ORTHOPAEDIC      2 back-one fusion-low; all fingers-trigger    HX OTHER SURGICAL Right     basal cell nose    HX OTHER SURGICAL  03/28/2017    Tavr    HX RECTOCELE REPAIR      LAP,CHOLECYSTECTOMY           Family History:   Problem Relation Age of Onset    Heart Attack Father         age 73    Heart Disease Father     Arthritis-osteo Mother     Diabetes Mother        Social History     Socioeconomic History    Marital status:      Spouse name: Not on file    Number of children: Not on file    Years of education: Not on file    Highest education level: Not on file   Occupational History    Not on file   Social Needs    Financial resource strain: Not on file    Food insecurity     Worry: Not on file     Inability: Not on file    Transportation needs     Medical: Not on file     Non-medical: Not on file   Tobacco Use    Smoking status: Never Smoker    Smokeless tobacco: Never Used   Substance and Sexual Activity    Alcohol use: No    Drug use: No    Sexual activity: Not on file   Lifestyle    Physical activity     Days per week: Not on file     Minutes per session: Not on file    Stress: Not on file   Relationships    Social connections     Talks on phone: Not on file     Gets together: Not on file     Attends [employer] service: Not on file     Active member of club or organization: Not on file     Attends meetings of clubs or organizations: Not on file     Relationship status: Not on file    Intimate partner violence     Fear of current or ex partner: Not on file     Emotionally abused: Not on file     Physically abused: Not on file     Forced sexual activity: Not on file   Other Topics Concern    Not on file   Social History Narrative    Not on file         ALLERGIES: Aspirin; Ativan [lorazepam]; Robaxin [methocarbamol]; Aleve [naproxen sodium]; Amlodipine; Bactrim [sulfamethoprim]; Carafate [sucralfate]; Ciprofloxacin; Clorazepate dipotassium; Flexeril [cyclobenzaprine];  Vytorin 10-10 [ezetimibe-simvastatin]; and Zantac [ranitidine hcl]    Review of Systems   Constitutional: Negative for chills and fever. Gastrointestinal: Positive for diarrhea, nausea and vomiting. Genitourinary: Negative for dysuria. All other systems reviewed and are negative. Vitals:    10/19/20 1608   BP: 122/66   Pulse: 71   Resp: 16   Temp: 97.8 °F (36.6 °C)   SpO2: 96%   Weight: 68.5 kg (151 lb)   Height: 5' 4\" (1.626 m)            Physical Exam  Vitals signs and nursing note reviewed. Constitutional:       General: She is not in acute distress. Appearance: Normal appearance. She is well-developed. She is ill-appearing. She is not diaphoretic. HENT:      Head: Normocephalic and atraumatic. Mouth/Throat:      Mouth: Mucous membranes are dry. Eyes:      General:         Right eye: No discharge. Left eye: No discharge. Conjunctiva/sclera: Conjunctivae normal.   Neck:      Musculoskeletal: Normal range of motion and neck supple. Cardiovascular:      Rate and Rhythm: Normal rate and regular rhythm. Pulmonary:      Effort: Pulmonary effort is normal. No respiratory distress. Breath sounds: Normal breath sounds. Abdominal:      General: Bowel sounds are normal. There is no distension. Palpations: Abdomen is soft. Tenderness: There is no abdominal tenderness. Musculoskeletal: Normal range of motion. Right lower leg: No edema. Left lower leg: No edema. Skin:     General: Skin is warm and dry. Capillary Refill: Capillary refill takes less than 2 seconds. Neurological:      General: No focal deficit present. Mental Status: She is alert and oriented to person, place, and time. Cranial Nerves: No cranial nerve deficit. Motor: No weakness.    Psychiatric:         Mood and Affect: Mood normal.         Behavior: Behavior normal.          MDM  Number of Diagnoses or Management Options  Acute renal failure, unspecified acute renal failure type ([facility] [address]):   Diarrhea, unspecified type:   Diagnosis management comments: Pt has acute renal failure, likely 2/2 dehydration. D/w hospitalist for admission.          Amount and/or Complexity of Data Reviewed  Clinical lab tests: ordered and reviewed  Tests in the radiology section of CPT®: ordered and reviewed  Discuss the patient with other providers: yes    Risk of Complications, Morbidity, and/or Mortality  Presenting problems: high  Diagnostic procedures: minimal  Management options: high    Patient Progress  Patient progress: improved         Procedures

## 2020-10-20 LAB
ANION GAP SERPL CALC-SCNC: 7 MMOL/L (ref 7–16)
APPEARANCE UR: CLEAR
BACTERIA URNS QL MICRO: ABNORMAL /HPF
BASOPHILS # BLD: 0 K/UL (ref 0–0.2)
BASOPHILS NFR BLD: 1 % (ref 0–2)
BILIRUB UR QL: NEGATIVE
BUN SERPL-MCNC: 79 MG/DL (ref 8–23)
CALCIUM SERPL-MCNC: 8.5 MG/DL (ref 8.3–10.4)
CASTS URNS QL MICRO: 0 /LPF
CHLORIDE SERPL-SCNC: 120 MMOL/L (ref 98–107)
CO2 SERPL-SCNC: 19 MMOL/L (ref 21–32)
COLOR UR: YELLOW
CREAT SERPL-MCNC: 2.13 MG/DL (ref 0.6–1)
DIFFERENTIAL METHOD BLD: ABNORMAL
EOSINOPHIL # BLD: 0.1 K/UL (ref 0–0.8)
EOSINOPHIL NFR BLD: 1 % (ref 0.5–7.8)
EPI CELLS #/AREA URNS HPF: ABNORMAL /HPF
ERYTHROCYTE [DISTWIDTH] IN BLOOD BY AUTOMATED COUNT: 12.2 % (ref 11.9–14.6)
GLUCOSE SERPL-MCNC: 138 MG/DL (ref 65–100)
GLUCOSE UR STRIP.AUTO-MCNC: NEGATIVE MG/DL
HCT VFR BLD AUTO: 31.8 % (ref 35.8–46.3)
HGB BLD-MCNC: 10.7 G/DL (ref 11.7–15.4)
HGB UR QL STRIP: NEGATIVE
IMM GRANULOCYTES # BLD AUTO: 0 K/UL (ref 0–0.5)
IMM GRANULOCYTES NFR BLD AUTO: 0 % (ref 0–5)
KETONES UR QL STRIP.AUTO: NEGATIVE MG/DL
LEUKOCYTE ESTERASE UR QL STRIP.AUTO: ABNORMAL
LYMPHOCYTES # BLD: 2 K/UL (ref 0.5–4.6)
LYMPHOCYTES NFR BLD: 35 % (ref 13–44)
MCH RBC QN AUTO: 31.8 PG (ref 26.1–32.9)
MCHC RBC AUTO-ENTMCNC: 33.6 G/DL (ref 31.4–35)
MCV RBC AUTO: 94.4 FL (ref 79.6–97.8)
MONOCYTES # BLD: 0.4 K/UL (ref 0.1–1.3)
MONOCYTES NFR BLD: 8 % (ref 4–12)
NEUTS SEG # BLD: 3.2 K/UL (ref 1.7–8.2)
NEUTS SEG NFR BLD: 56 % (ref 43–78)
NITRITE UR QL STRIP.AUTO: NEGATIVE
NRBC # BLD: 0 K/UL (ref 0–0.2)
PH UR STRIP: 5.5 [PH] (ref 5–9)
PLATELET # BLD AUTO: 159 K/UL (ref 150–450)
PMV BLD AUTO: 11.4 FL (ref 9.4–12.3)
POTASSIUM SERPL-SCNC: 4.7 MMOL/L (ref 3.5–5.1)
PROT UR STRIP-MCNC: NEGATIVE MG/DL
RBC # BLD AUTO: 3.37 M/UL (ref 4.05–5.2)
RBC #/AREA URNS HPF: 0 /HPF
SODIUM SERPL-SCNC: 146 MMOL/L (ref 136–145)
SP GR UR REFRACTOMETRY: 1.01 (ref 1–1.02)
T4 FREE SERPL-MCNC: 1.5 NG/DL (ref 0.9–1.8)
UROBILINOGEN UR QL STRIP.AUTO: 0.2 EU/DL (ref 0.2–1)
WBC # BLD AUTO: 5.8 K/UL (ref 4.3–11.1)
WBC URNS QL MICRO: ABNORMAL /HPF

## 2020-10-20 PROCEDURE — 74011250636 HC RX REV CODE- 250/636: Performed by: STUDENT IN AN ORGANIZED HEALTH CARE EDUCATION/TRAINING PROGRAM

## 2020-10-20 PROCEDURE — 84439 ASSAY OF FREE THYROXINE: CPT

## 2020-10-20 PROCEDURE — 77030040361 HC SLV COMPR DVT MDII -B

## 2020-10-20 PROCEDURE — 85025 COMPLETE CBC W/AUTO DIFF WBC: CPT

## 2020-10-20 PROCEDURE — 80048 BASIC METABOLIC PNL TOTAL CA: CPT

## 2020-10-20 PROCEDURE — 2709999900 HC NON-CHARGEABLE SUPPLY

## 2020-10-20 PROCEDURE — 65270000029 HC RM PRIVATE

## 2020-10-20 PROCEDURE — 77030040830 HC CATH URETH FOL MDII -A

## 2020-10-20 PROCEDURE — 36415 COLL VENOUS BLD VENIPUNCTURE: CPT

## 2020-10-20 PROCEDURE — 74011250637 HC RX REV CODE- 250/637: Performed by: STUDENT IN AN ORGANIZED HEALTH CARE EDUCATION/TRAINING PROGRAM

## 2020-10-20 RX ORDER — LEVOTHYROXINE SODIUM 75 UG/1
75 TABLET ORAL
Status: DISCONTINUED | OUTPATIENT
Start: 2020-10-21 | End: 2020-10-20

## 2020-10-20 RX ORDER — LEVOTHYROXINE SODIUM 50 UG/1
50 TABLET ORAL
Status: DISCONTINUED | OUTPATIENT
Start: 2020-10-21 | End: 2020-10-22 | Stop reason: HOSPADM

## 2020-10-20 RX ORDER — HEPARIN SODIUM 5000 [USP'U]/ML
5000 INJECTION, SOLUTION INTRAVENOUS; SUBCUTANEOUS EVERY 8 HOURS
Status: DISCONTINUED | OUTPATIENT
Start: 2020-10-20 | End: 2020-10-22 | Stop reason: HOSPADM

## 2020-10-20 RX ADMIN — CARVEDILOL 6.25 MG: 6.25 TABLET, FILM COATED ORAL at 10:24

## 2020-10-20 RX ADMIN — CARVEDILOL 6.25 MG: 6.25 TABLET, FILM COATED ORAL at 17:40

## 2020-10-20 RX ADMIN — CLOPIDOGREL BISULFATE 75 MG: 75 TABLET ORAL at 10:24

## 2020-10-20 RX ADMIN — LISINOPRIL 20 MG: 20 TABLET ORAL at 10:24

## 2020-10-20 RX ADMIN — Medication 20 ML: at 14:00

## 2020-10-20 RX ADMIN — Medication 5 ML: at 06:00

## 2020-10-20 RX ADMIN — HEPARIN SODIUM 5000 UNITS: 5000 INJECTION INTRAVENOUS; SUBCUTANEOUS at 17:39

## 2020-10-20 NOTE — PROGRESS NOTES
Care Management Interventions  PCP Verified by CM: Yes  Physical Therapy Consult: No  Occupational Therapy Consult: No  Confirm Follow Up Transport: Family  Freedom of Choice List was Provided with Basic Dialogue that Supports the Patient's Individualized Plan of Care/Goals, Treatment Preferences and Shares the Quality Data Associated with the Providers?: Yes  [place] Resource Information Provided?: No  Discharge Location  Discharge Placement: Home  Patient is alert and oriented in all spheres. Lives alone. Family lives near and takes patient to medical appointments. Patient has no history of HH but did go to rehab after a hip fracture. CM is not anticipating any discharge needs but remains available.

## 2020-10-20 NOTE — PROGRESS NOTES
Hospitalist Note     Admit Date:  10/19/2020  4:10 PM   Name:  Dick Chapa   Age:  80 y.o.  :  3/20/1929   MRN:  [id number]   PCP:  Qasim Miles MD  Treatment Team: Attending Provider: Claudia Powers MD; Utilization Review: Catalina Banda RN; Care Manager: Julita Zuniga    HPI/Subjective:   A 70-year-old female with PMH of AS s/p TAVR, Hypothyroidism, HTN, CAD, HLD, Type 2 DM, frequent UTI on prophylaxis, CKD3 who presents to the hospital with complaints of persistent diarrhea and weakness who was found to have severe ALBERT. 10/20: pt see and examined at bedside. Pt eating diet on my exam this morning with no diarrhea overnight. Nursing reports that patient had had multiple episodes of diarrhea, but have not been able to get urine or stool sample as patient voids both at same time. She states that her weakness has improved since admission. She denies any fevers, chills,, worsening abd pain    No other complaints  Objective:     Patient Vitals for the past 24 hrs:   Temp Pulse Resp BP SpO2   10/20/20 1235 97.6 °F (36.4 °C) 68 20 131/60 98 %   10/20/20 0822 97.9 °F (36.6 °C) 64 20 113/61 99 %   10/20/20 0323 98.1 °F (36.7 °C) 71 18 133/67 97 %   10/19/20 2340 97.7 °F (36.5 °C) 68 17 124/62 95 %   10/19/20 2017 97.7 °F (36.5 °C) 79 18 (!) 152/70 98 %   10/19/20 1918  76  (!) 166/79 95 %   10/19/20 1858    (!) 166/79 99 %   10/19/20 1853    (!) 167/76    10/19/20 1759  78  (!) 192/89 100 %   10/19/20 1659  65  (!) 167/73 98 %   10/19/20 1615  69  (!) 162/68 99 %   10/19/20 1608 97.8 °F (36.6 °C) 71 16 122/66 96 %     Oxygen Therapy  O2 Sat (%): 98 % (10/20/20 1235)  Pulse via Oximetry: 76 beats per minute (10/19/20 191)  O2 Device: Room air (10/20/20 5977)    Estimated body mass index is 25.92 kg/m² as calculated from the following:    Height as of this encounter: 5' 4\" (1.626 m). Weight as of this encounter: 68.5 kg (151 lb).       Intake/Output Summary (Last 24 hours) at 10/20/2020 1504  Last data filed at 10/20/2020 0353  Gross per 24 hour   Intake    Output 500 ml   Net -500 ml       *Note that automatically entered I/Os may not be accurate; dependent on patient compliance with collection and accurate  by techs. General:    Well nourished. Alert. CV:   RRR. No murmur, rub, or gallop. Lungs:   CTAB. No wheezing, rhonchi, or rales. Abdomen:   Soft, mild tenderness in bilateral lower quadrants, nondistended. Extremities: Warm and dry. No cyanosis or edema. Skin:     No rashes or jaundice. Neuro:  No gross focal deficits    Data Reviewed:  I have reviewed all labs, meds, and studies from the last 24 hours:  Recent Results (from the past 24 hour(s))   CBC WITH AUTOMATED DIFF    Collection Time: 10/19/20  4:10 PM   Result Value Ref Range    WBC 7.5 4.3 - 11.1 K/uL    RBC 3.82 (L) 4.05 - 5.2 M/uL    HGB 12.2 11.7 - 15.4 g/dL    HCT 36.6 35.8 - 46.3 %    MCV 95.8 79.6 - 97.8 FL    MCH 31.9 26.1 - 32.9 PG    MCHC 33.3 31.4 - 35.0 g/dL    RDW 12.3 11.9 - 14.6 %    PLATELET 300 898 - 032 K/uL    MPV 11.4 9.4 - 12.3 FL    ABSOLUTE NRBC 0.00 0.0 - 0.2 K/uL    DF AUTOMATED      NEUTROPHILS 58 43 - 78 %    LYMPHOCYTES 34 13 - 44 %    MONOCYTES 7 4.0 - 12.0 %    EOSINOPHILS 1 0.5 - 7.8 %    BASOPHILS 0 0.0 - 2.0 %    IMMATURE GRANULOCYTES 0 0.0 - 5.0 %    ABS. NEUTROPHILS 4.4 1.7 - 8.2 K/UL    ABS. LYMPHOCYTES 2.5 0.5 - 4.6 K/UL    ABS. MONOCYTES 0.5 0.1 - 1.3 K/UL    ABS. EOSINOPHILS 0.1 0.0 - 0.8 K/UL    ABS. BASOPHILS 0.0 0.0 - 0.2 K/UL    ABS. IMM.  GRANS. 0.0 0.0 - 0.5 K/UL   METABOLIC PANEL, COMPREHENSIVE    Collection Time: 10/19/20  4:10 PM   Result Value Ref Range    Sodium 142 136 - 145 mmol/L    Potassium 4.6 3.5 - 5.1 mmol/L    Chloride 113 (H) 98 - 107 mmol/L    CO2 21 21 - 32 mmol/L    Anion gap 8 7 - 16 mmol/L    Glucose 140 (H) 65 - 100 mg/dL    BUN 96 (H) 8 - 23 MG/DL    Creatinine 3.13 (H) 0.6 - 1.0 MG/DL    GFR est AA 18 (L) >60 ml/min/1.73m2    GFR est non-AA 15 (L) >60 ml/min/1.73m2    Calcium 9.3 8.3 - 10.4 MG/DL    Bilirubin, total 0.3 0.2 - 1.1 MG/DL    ALT (SGPT) 13 12 - 65 U/L    AST (SGOT) 13 (L) 15 - 37 U/L    Alk. phosphatase 85 50 - 136 U/L    Protein, total 7.9 6.3 - 8.2 g/dL    Albumin 3.7 3.2 - 4.6 g/dL    Globulin 4.2 (H) 2.3 - 3.5 g/dL    A-G Ratio 0.9 (L) 1.2 - 3.5     LIPASE    Collection Time: 10/19/20  4:10 PM   Result Value Ref Range    Lipase 292 73 - 393 U/L   LACTIC ACID    Collection Time: 10/19/20  5:43 PM   Result Value Ref Range    Lactic acid 0.5 0.4 - 2.0 MMOL/L   TSH 3RD GENERATION    Collection Time: 10/19/20  9:13 PM   Result Value Ref Range    TSH 0.035 (L) 0.358 - 2.936 uIU/mL   METABOLIC PANEL, BASIC    Collection Time: 10/20/20  6:12 AM   Result Value Ref Range    Sodium 146 (H) 136 - 145 mmol/L    Potassium 4.7 3.5 - 5.1 mmol/L    Chloride 120 (H) 98 - 107 mmol/L    CO2 19 (L) 21 - 32 mmol/L    Anion gap 7 7 - 16 mmol/L    Glucose 138 (H) 65 - 100 mg/dL    BUN 79 (H) 8 - 23 MG/DL    Creatinine 2.13 (H) 0.6 - 1.0 MG/DL    GFR est AA 28 (L) >60 ml/min/1.73m2    GFR est non-AA 23 (L) >60 ml/min/1.73m2    Calcium 8.5 8.3 - 10.4 MG/DL   CBC WITH AUTOMATED DIFF    Collection Time: 10/20/20  6:12 AM   Result Value Ref Range    WBC 5.8 4.3 - 11.1 K/uL    RBC 3.37 (L) 4.05 - 5.2 M/uL    HGB 10.7 (L) 11.7 - 15.4 g/dL    HCT 31.8 (L) 35.8 - 46.3 %    MCV 94.4 79.6 - 97.8 FL    MCH 31.8 26.1 - 32.9 PG    MCHC 33.6 31.4 - 35.0 g/dL    RDW 12.2 11.9 - 14.6 %    PLATELET 226 695 - 354 K/uL    MPV 11.4 9.4 - 12.3 FL    ABSOLUTE NRBC 0.00 0.0 - 0.2 K/uL    DF AUTOMATED      NEUTROPHILS 56 43 - 78 %    LYMPHOCYTES 35 13 - 44 %    MONOCYTES 8 4.0 - 12.0 %    EOSINOPHILS 1 0.5 - 7.8 %    BASOPHILS 1 0.0 - 2.0 %    IMMATURE GRANULOCYTES 0 0.0 - 5.0 %    ABS. NEUTROPHILS 3.2 1.7 - 8.2 K/UL    ABS. LYMPHOCYTES 2.0 0.5 - 4.6 K/UL    ABS. MONOCYTES 0.4 0.1 - 1.3 K/UL    ABS. EOSINOPHILS 0.1 0.0 - 0.8 K/UL    ABS.  BASOPHILS 0.0 0.0 - 0.2 K/UL ABS. IMM. GRANS. 0.0 0.0 - 0.5 K/UL   T4, FREE    Collection Time: 10/20/20  6:12 AM   Result Value Ref Range    T4, Free 1.5 0.9 - 1.8 NG/DL        Current Meds:  Current Facility-Administered Medications   Medication Dose Route Frequency    [START ON 10/21/2020] levothyroxine (SYNTHROID) tablet 75 mcg  75 mcg Oral ACB    sodium chloride (NS) flush 5-40 mL  5-40 mL IntraVENous Q8H    sodium chloride (NS) flush 5-40 mL  5-40 mL IntraVENous PRN    potassium chloride 10 mEq in 100 ml IVPB  10 mEq IntraVENous PRN    magnesium sulfate 2 g/50 ml IVPB (premix or compounded)  2 g IntraVENous PRN    acetaminophen (TYLENOL) tablet 650 mg  650 mg Oral Q6H PRN    Or    acetaminophen (TYLENOL) suppository 650 mg  650 mg Rectal Q6H PRN    polyethylene glycol (MIRALAX) packet 17 g  17 g Oral DAILY PRN    bisacodyL (DULCOLAX) suppository 10 mg  10 mg Rectal DAILY PRN    ondansetron (ZOFRAN ODT) tablet 4 mg  4 mg Oral Q8H PRN    Or    ondansetron (ZOFRAN) injection 4 mg  4 mg IntraVENous Q6H PRN    0.9% sodium chloride infusion  75 mL/hr IntraVENous CONTINUOUS    lisinopriL (PRINIVIL, ZESTRIL) tablet 20 mg  20 mg Oral DAILY    carvediloL (COREG) tablet 6.25 mg  6.25 mg Oral BID WITH MEALS    clopidogreL (PLAVIX) tablet 75 mg  75 mg Oral DAILY       Other Studies:  No results found for this visit on 10/19/20. Ct Abd Pelv Wo Cont    Result Date: 10/19/2020  CT abdomen and pelvis without contrast  History: Diarrhea. Technique: Helically acquired images were obtained from the upper abdomen to the ischial tuberosities reconstructed at 5mm intervals without oral contrast. Intravenous contrast was not administered. Coronal reformatted images were submitted. Radiation dose reduction techniques were used for this study:  Our CT scanners use one or all of the following: Automated exposure control, adjustment of the mA and/or kVp according to patient's size, iterative reconstruction.  Comparison: CTA 02/07/2017 CT ABDOMEN: The lack of IV contrast results in an incomplete assessment of the solid abdominal viscera. There is exclusion of superior portions of the liver and spleen. The visualized portions are unremarkable on this noncontrast study. The pancreas and adrenal glands are also unremarkable in noncontrast exam. An upper pole left renal cyst is unchanged. The kidneys are otherwise unremarkable on this noncontrast study. No adenopathy or ascites is present. There are no inflammatory changes. Curvilinear calcifications at the bladder fossa could represent calcified gallbladder wall and/or stones within a collapsed gallbladder lumen. No adenopathy or ascites is present. CT PELVIS: There are several sigmoid diverticula. No adenopathy or ascites is present. There is streak artifact from right hip right hip plasty. There are no inflammatory changes. There are sclerotic changes involving the sacrum which may indicate changes of prior insufficiency tractor's, unchanged. The uterus is absent. IMPRESSION: 1. Sigmoid diverticulosis 2. Curvilinear calcifications at the gallbladder fossa, unchanged.        All Micro Results     Procedure Component Value Units Date/Time    OVA & PARASITES, STOOL [[id number]]     Order Status:  Sent Specimen:  Feces from [address] [place] [address] Rd [[id number]]     Order Status:  Sent Specimen:  Stool     C. DIFFICILE/EPI PCR [[id number]]     Order Status:  Sent Specimen:  Stool           [unfilled]    Assessment and Plan:     Hospital Problems as of 10/20/2020 Date Reviewed: 10/19/2020          Codes Class Noted - Resolved POA    ALBERT (acute kidney injury) ([facility] ICD-10-CM: N17.9  ICD-9-CM: 584.9  3/22/2018 - Present Unknown              Plan:  ALBERT on CKD  - baseline Cr 1.2-1.4, Cr on admission 3.13, likely prerenal from diarrhea  - repeat Cr 2.13 after IVF   - UA still pending  - cont to hydrate back with IVF and recheck Cr in AM     Diarrhea  - cdiff pending  - stool studies pending  - TSH low at 0.035 could be cause of diarrhea, decrease synthroid dose to 75 mcg daily and see if helps diarrhea  - CT abdpel w/ PO contrast showing diverticulosis      HTN/CAD - cont home meds  Hypothyroidism - TSH low, decreasing home synthroid dose  h/o TAVR     DC planning/Dispo:  Pending urine and stool studies, will cont to rehydrate back and monitor for resolution of ALBERT.      Diet:  DIET FULL LIQUID  DVT ppx:  Heparin subq    Signed:  Aretha Magaña MD

## 2020-10-20 NOTE — PROGRESS NOTES
TRANSFER - IN REPORT:    Verbal report received from SABRINA fontaine(name) on Dangelo Co  being received from ED(unit) for routine progression of care      Report consisted of patients Situation, Background, Assessment and   Recommendations(SBAR). Information from the following report(s) SBAR, Kardex, OR Summary, Intake/Output, MAR and Recent Results was reviewed with the receiving nurse. Opportunity for questions and clarification was provided. Assessment completed upon patients arrival to unit and care assumed.

## 2020-10-20 NOTE — PROGRESS NOTES
Received bedside shift report from Eusebia Santos RN. Patient is resting quietly, eyes closed, respirations present. She appears to be in no distress. Will continue to follow.

## 2020-10-20 NOTE — PROGRESS NOTES
Received order to place indwelling catheter. Sims catheter placed using sterile technique without complaint. Jose Robbins second RN.

## 2020-10-21 LAB
ANION GAP SERPL CALC-SCNC: 6 MMOL/L (ref 7–16)
BASOPHILS # BLD: 0 K/UL (ref 0–0.2)
BASOPHILS NFR BLD: 0 % (ref 0–2)
BUN SERPL-MCNC: 52 MG/DL (ref 8–23)
CALCIUM SERPL-MCNC: 8.8 MG/DL (ref 8.3–10.4)
CHLORIDE SERPL-SCNC: 116 MMOL/L (ref 98–107)
CO2 SERPL-SCNC: 23 MMOL/L (ref 21–32)
CREAT SERPL-MCNC: 1.64 MG/DL (ref 0.6–1)
DIFFERENTIAL METHOD BLD: ABNORMAL
EOSINOPHIL # BLD: 0.1 K/UL (ref 0–0.8)
EOSINOPHIL NFR BLD: 1 % (ref 0.5–7.8)
ERYTHROCYTE [DISTWIDTH] IN BLOOD BY AUTOMATED COUNT: 12 % (ref 11.9–14.6)
GLUCOSE BLD STRIP.AUTO-MCNC: 182 MG/DL (ref 65–100)
GLUCOSE SERPL-MCNC: 205 MG/DL (ref 65–100)
HCT VFR BLD AUTO: 33.8 % (ref 35.8–46.3)
HGB BLD-MCNC: 11.3 G/DL (ref 11.7–15.4)
IMM GRANULOCYTES # BLD AUTO: 0 K/UL (ref 0–0.5)
IMM GRANULOCYTES NFR BLD AUTO: 0 % (ref 0–5)
LYMPHOCYTES # BLD: 1.8 K/UL (ref 0.5–4.6)
LYMPHOCYTES NFR BLD: 27 % (ref 13–44)
MCH RBC QN AUTO: 31.9 PG (ref 26.1–32.9)
MCHC RBC AUTO-ENTMCNC: 33.4 G/DL (ref 31.4–35)
MCV RBC AUTO: 95.5 FL (ref 79.6–97.8)
MONOCYTES # BLD: 0.4 K/UL (ref 0.1–1.3)
MONOCYTES NFR BLD: 6 % (ref 4–12)
NEUTS SEG # BLD: 4.6 K/UL (ref 1.7–8.2)
NEUTS SEG NFR BLD: 66 % (ref 43–78)
NRBC # BLD: 0 K/UL (ref 0–0.2)
PLATELET # BLD AUTO: 161 K/UL (ref 150–450)
PMV BLD AUTO: 11.3 FL (ref 9.4–12.3)
POTASSIUM SERPL-SCNC: 4.9 MMOL/L (ref 3.5–5.1)
RBC # BLD AUTO: 3.54 M/UL (ref 4.05–5.2)
SODIUM SERPL-SCNC: 145 MMOL/L (ref 136–145)
WBC # BLD AUTO: 6.9 K/UL (ref 4.3–11.1)
WBC #/AREA STL HPF: NORMAL /HPF (ref 0–4)

## 2020-10-21 PROCEDURE — 80048 BASIC METABOLIC PNL TOTAL CA: CPT

## 2020-10-21 PROCEDURE — 2709999900 HC NON-CHARGEABLE SUPPLY

## 2020-10-21 PROCEDURE — 82962 GLUCOSE BLOOD TEST: CPT

## 2020-10-21 PROCEDURE — 74011250636 HC RX REV CODE- 250/636: Performed by: STUDENT IN AN ORGANIZED HEALTH CARE EDUCATION/TRAINING PROGRAM

## 2020-10-21 PROCEDURE — 74011636637 HC RX REV CODE- 636/637: Performed by: STUDENT IN AN ORGANIZED HEALTH CARE EDUCATION/TRAINING PROGRAM

## 2020-10-21 PROCEDURE — 36415 COLL VENOUS BLD VENIPUNCTURE: CPT

## 2020-10-21 PROCEDURE — 87177 OVA AND PARASITES SMEARS: CPT

## 2020-10-21 PROCEDURE — 87046 STOOL CULTR AEROBIC BACT EA: CPT

## 2020-10-21 PROCEDURE — 74011250637 HC RX REV CODE- 250/637: Performed by: STUDENT IN AN ORGANIZED HEALTH CARE EDUCATION/TRAINING PROGRAM

## 2020-10-21 PROCEDURE — 65270000029 HC RM PRIVATE

## 2020-10-21 PROCEDURE — 89055 LEUKOCYTE ASSESSMENT FECAL: CPT

## 2020-10-21 PROCEDURE — 85025 COMPLETE CBC W/AUTO DIFF WBC: CPT

## 2020-10-21 RX ORDER — HALOPERIDOL 2 MG/1
1 TABLET ORAL ONCE
Status: COMPLETED | OUTPATIENT
Start: 2020-10-21 | End: 2020-10-21

## 2020-10-21 RX ORDER — INSULIN LISPRO 100 [IU]/ML
INJECTION, SOLUTION INTRAVENOUS; SUBCUTANEOUS
Status: DISCONTINUED | OUTPATIENT
Start: 2020-10-21 | End: 2020-10-22 | Stop reason: HOSPADM

## 2020-10-21 RX ORDER — DEXTROSE 40 %
15 GEL (GRAM) ORAL AS NEEDED
Status: DISCONTINUED | OUTPATIENT
Start: 2020-10-21 | End: 2020-10-22 | Stop reason: HOSPADM

## 2020-10-21 RX ORDER — DEXTROSE 50 % IN WATER (D50W) INTRAVENOUS SYRINGE
25-50 AS NEEDED
Status: DISCONTINUED | OUTPATIENT
Start: 2020-10-21 | End: 2020-10-22 | Stop reason: HOSPADM

## 2020-10-21 RX ADMIN — LISINOPRIL 20 MG: 20 TABLET ORAL at 08:40

## 2020-10-21 RX ADMIN — HEPARIN SODIUM 5000 UNITS: 5000 INJECTION INTRAVENOUS; SUBCUTANEOUS at 15:29

## 2020-10-21 RX ADMIN — CARVEDILOL 6.25 MG: 6.25 TABLET, FILM COATED ORAL at 18:05

## 2020-10-21 RX ADMIN — HEPARIN SODIUM 5000 UNITS: 5000 INJECTION INTRAVENOUS; SUBCUTANEOUS at 00:30

## 2020-10-21 RX ADMIN — LEVOTHYROXINE SODIUM 50 MCG: 0.05 TABLET ORAL at 08:40

## 2020-10-21 RX ADMIN — Medication 10 ML: at 22:05

## 2020-10-21 RX ADMIN — Medication 20 ML: at 14:00

## 2020-10-21 RX ADMIN — CLOPIDOGREL BISULFATE 75 MG: 75 TABLET ORAL at 08:40

## 2020-10-21 RX ADMIN — HALOPERIDOL 1 MG: 2 TABLET ORAL at 08:37

## 2020-10-21 RX ADMIN — CARVEDILOL 6.25 MG: 6.25 TABLET, FILM COATED ORAL at 08:40

## 2020-10-21 RX ADMIN — SODIUM CHLORIDE 75 ML/HR: 900 INJECTION, SOLUTION INTRAVENOUS at 15:19

## 2020-10-21 RX ADMIN — INSULIN LISPRO 2 UNITS: 100 INJECTION, SOLUTION INTRAVENOUS; SUBCUTANEOUS at 22:04

## 2020-10-21 NOTE — PROGRESS NOTES
Patient became very fearful with altered mental status. She began screaming and swatting at bedside nurses. Placed call to patient's son. He is coming to help comfort patient. Spoke with Dr. Stephane Gonzalez. He will order Haldol for patient. Mittens applied in order to keep patient from pulling out IV line.

## 2020-10-21 NOTE — PROGRESS NOTES
Sims catheter removed per nursing protocol. Patient has some stress incontinence but is otherwise continent.

## 2020-10-21 NOTE — PROGRESS NOTES
ICU nurse did not come during shift to place IV for patient to have 75ml/hr NS running. Patient is slightly hypotensive with MAP scores WDL. Patient has not had anything to drink tonight. I have encouraged patient to drink fluids. With encouragement, she drank a sip of water for me, but refuses to drink more. Will notify day shift.

## 2020-10-21 NOTE — PROGRESS NOTES
Spoke to ICU charge nurse again to see if she can stick patient. ICU is busy is still busy with transfers and admissions. Nurse states she will still try tonight. Dr. Filomena Peng notified.

## 2020-10-21 NOTE — PROGRESS NOTES
Received bedside shift report from Jami Pierre RN. Patient is resting quietly, respirations present and she appears in no distress.

## 2020-10-21 NOTE — PROGRESS NOTES
Patient produced small formed stool. Sent to lab for ordered studies. Formed stool indicates no CDiff. Received order to remove contact precautions.

## 2020-10-21 NOTE — PROGRESS NOTES
Hospitalist Note     Admit Date:  10/19/2020  4:10 PM   Name:  Maia Lawson   Age:  80 y.o.  :  3/20/1929   MRN:  [id number]   PCP:  Beatrice Morgan MD  Treatment Team: Attending Provider: Alberto Madrigal MD; Utilization Review: Minoo Richardson RN; Care Manager: Liss Loyola.; Primary Nurse: Andrew Katz    HPI/Subjective:   A 72-year-old female with PMH of AS s/p TAVR, Hypothyroidism, HTN, CAD, HLD, Type 2 DM, frequent UTI on prophylaxis, CKD3 who presents to the hospital with complaints of persistent diarrhea and weakness who was found to have severe ALBERT. 10/21: pt see and examined at bedside. Patient had 2 episodes of stool this morning, more formed compared to previous. Did wake up in delirious state this morning, agitated and slightly aggressive with staff. Giving Haldol 1 mg agitation. States that she wants more advanced diet so giving her regular diet for breakfast.  She states that her weakness has improved since admission. UA showed 4+ bacteria, the patient has chronic UTI and is probably colonized. Will not treat for asymptomatic bacteriuria. She denies any fevers, chills,, abd pain. No other complaints  Objective:     Patient Vitals for the past 24 hrs:   Temp Pulse Resp BP SpO2   10/21/20 1527 97.6 °F (36.4 °C) 77 18 (!) 114/48 95 %   10/21/20 1107 97.9 °F (36.6 °C) 60 16 (!) 109/50 98 %   10/21/20 0801 97.9 °F (36.6 °C) (!) 53 15 (!) 100/50 97 %   10/21/20 0624    (!) 103/55    10/21/20 0328 97.6 °F (36.4 °C) 63 14 (!) 109/55 97 %   10/20/20 2327    111/64    10/20/20 2309 97.4 °F (36.3 °C) 64 16 (!) 102/51 95 %   10/20/20 1937 98.1 °F (36.7 °C) 72 18 129/61 98 %     Oxygen Therapy  O2 Sat (%): 95 % (10/21/20 1527)  Pulse via Oximetry: 76 beats per minute (10/19/20 1918)  O2 Device: Room air (10/21/20 1500)    Estimated body mass index is 25.92 kg/m² as calculated from the following:    Height as of this encounter: 5' 4\" (1.626 m).     Weight as of this encounter: 68.5 kg (151 lb). Intake/Output Summary (Last 24 hours) at 10/21/2020 1714  Last data filed at 10/21/2020 1527  Gross per 24 hour   Intake    Output 1275 ml   Net -1275 ml       *Note that automatically entered I/Os may not be accurate; dependent on patient compliance with collection and accurate  by techs. General:    Well nourished. Alert. CV:   RRR. No murmur, rub, or gallop. Lungs:   CTAB. No wheezing, rhonchi, or rales. Abdomen:   Soft, mild tenderness/\"pressure\" in bilateral lower quadrants, nondistended. Extremities: Warm and dry. No cyanosis or edema. Skin:     No rashes or jaundice.    Neuro:  No gross focal deficits    Data Reviewed:  I have reviewed all labs, meds, and studies from the last 24 hours:  Recent Results (from the past 24 hour(s))   WBC, STOOL    Collection Time: 10/21/20  9:55 AM   Result Value Ref Range    White blood cells, stool NO WBCS SEEN 0 - 4 /HPF   METABOLIC PANEL, BASIC    Collection Time: 10/21/20 10:29 AM   Result Value Ref Range    Sodium 145 136 - 145 mmol/L    Potassium 4.9 3.5 - 5.1 mmol/L    Chloride 116 (H) 98 - 107 mmol/L    CO2 23 21 - 32 mmol/L    Anion gap 6 (L) 7 - 16 mmol/L    Glucose 205 (H) 65 - 100 mg/dL    BUN 52 (H) 8 - 23 MG/DL    Creatinine 1.64 (H) 0.6 - 1.0 MG/DL    GFR est AA 38 (L) >60 ml/min/1.73m2    GFR est non-AA 31 (L) >60 ml/min/1.73m2    Calcium 8.8 8.3 - 10.4 MG/DL   CBC WITH AUTOMATED DIFF    Collection Time: 10/21/20 10:29 AM   Result Value Ref Range    WBC 6.9 4.3 - 11.1 K/uL    RBC 3.54 (L) 4.05 - 5.2 M/uL    HGB 11.3 (L) 11.7 - 15.4 g/dL    HCT 33.8 (L) 35.8 - 46.3 %    MCV 95.5 79.6 - 97.8 FL    MCH 31.9 26.1 - 32.9 PG    MCHC 33.4 31.4 - 35.0 g/dL    RDW 12.0 11.9 - 14.6 %    PLATELET 464 197 - 865 K/uL    MPV 11.3 9.4 - 12.3 FL    ABSOLUTE NRBC 0.00 0.0 - 0.2 K/uL    DF AUTOMATED      NEUTROPHILS 66 43 - 78 %    LYMPHOCYTES 27 13 - 44 %    MONOCYTES 6 4.0 - 12.0 %    EOSINOPHILS 1 0.5 - 7.8 % BASOPHILS 0 0.0 - 2.0 %    IMMATURE GRANULOCYTES 0 0.0 - 5.0 %    ABS. NEUTROPHILS 4.6 1.7 - 8.2 K/UL    ABS. LYMPHOCYTES 1.8 0.5 - 4.6 K/UL    ABS. MONOCYTES 0.4 0.1 - 1.3 K/UL    ABS. EOSINOPHILS 0.1 0.0 - 0.8 K/UL    ABS. BASOPHILS 0.0 0.0 - 0.2 K/UL    ABS. IMM. GRANS. 0.0 0.0 - 0.5 K/UL        Current Meds:  Current Facility-Administered Medications   Medication Dose Route Frequency    influenza vaccine 2020-21 (6 mos+)(PF) (FLUARIX/FLULAVAL/FLUZONE QUAD) injection 0.5 mL  0.5 mL IntraMUSCular PRIOR TO DISCHARGE    heparin (porcine) injection 5,000 Units  5,000 Units SubCUTAneous Q8H    levothyroxine (SYNTHROID) tablet 50 mcg  50 mcg Oral ACB    sodium chloride (NS) flush 5-40 mL  5-40 mL IntraVENous Q8H    sodium chloride (NS) flush 5-40 mL  5-40 mL IntraVENous PRN    potassium chloride 10 mEq in 100 ml IVPB  10 mEq IntraVENous PRN    magnesium sulfate 2 g/50 ml IVPB (premix or compounded)  2 g IntraVENous PRN    acetaminophen (TYLENOL) tablet 650 mg  650 mg Oral Q6H PRN    Or    acetaminophen (TYLENOL) suppository 650 mg  650 mg Rectal Q6H PRN    polyethylene glycol (MIRALAX) packet 17 g  17 g Oral DAILY PRN    bisacodyL (DULCOLAX) suppository 10 mg  10 mg Rectal DAILY PRN    ondansetron (ZOFRAN ODT) tablet 4 mg  4 mg Oral Q8H PRN    Or    ondansetron (ZOFRAN) injection 4 mg  4 mg IntraVENous Q6H PRN    0.9% sodium chloride infusion  75 mL/hr IntraVENous CONTINUOUS    lisinopriL (PRINIVIL, ZESTRIL) tablet 20 mg  20 mg Oral DAILY    carvediloL (COREG) tablet 6.25 mg  6.25 mg Oral BID WITH MEALS    clopidogreL (PLAVIX) tablet 75 mg  75 mg Oral DAILY       Other Studies:  No results found for this visit on 10/19/20. No results found.     All Micro Results     Procedure Component Value Units Date/Time    MECHELLE Sorensen [[id number]] Collected:  10/21/20 0955    Order Status:  Completed Specimen:  Feces from Stool Updated:  10/21/20 1036    CAMPLYLOBACTER CULTURE [[id number]] Collected: 10/21/20 0955    Order Status:  Completed Specimen:  Stool Updated:  10/21/20 1036    C. DIFFICILE/EPI PCR [[id number]] Collected:  10/21/20 0955    Order Status:  Canceled Specimen:  Stool           [unfilled]    Assessment and Plan:     Hospital Problems as of 10/21/2020 Date Reviewed: 10/19/2020          Codes Class Noted - Resolved POA    ALBERT (acute kidney [facility]) ([facility] ICD-10-CM: N17.9  ICD-9-CM: 584.9  3/22/2018 - Present Unknown              Plan:  ALBERT on CKD  - baseline Cr 1.2-1.4, Cr on admission 3.13, likely prerenal from diarrhea  - repeat Cr 1.64 after IVF   - UA with 4+ bacteria, no wbc and small LE - likely Asx bacteriuria, will not treat   - cont to hydrate back with IVF, encourage PO intake and recheck Cr in AM     Diarrhea  - cdiff not tested due to formed stool  - stool studies collected and pending, no stool WBC  - TSH low at 0.035 could be cause of diarrhea, decrease synthroid dose to 75 mcg daily and f/u TSh outpt  - CT abdpel w/ PO contrast showing diverticulosis     #Hospital Delirium  - required Haldol 1mg for agitation this AM  - cont to reorient as needed     HTN/CAD - cont home meds  Hypothyroidism - TSH low, decreasing home synthroid dose  h/o TAVR     DC planning/Dispo:  will cont to rehydrate back and monitor for continued improvement in ALBERT. Follow-up with GI outpatient setting.     Diet:  DIET REGULAR  DVT ppx:  Heparin subq    Signed:  Gael Price MD

## 2020-10-21 NOTE — PROGRESS NOTES
Problem: Risk for Spread of Infection  Goal: Prevent transmission of infectious organism to others  Description: Prevent the transmission of infectious organisms to other patients, staff members, and visitors. Outcome: Progressing Towards Goal     Problem: Patient Education:  Go to Education Activity  Goal: Patient/Family Education  Outcome: Progressing Towards Goal     Problem: Falls - Risk of  Goal: *Absence of Falls  Description: Document Toribio Grahamccasin Fall Risk and appropriate interventions in the flowsheet.   Outcome: Progressing Towards Goal  Note: Fall Risk Interventions:     Problem: Patient Education: Go to Patient Education Activity  Goal: Patient/Family Education  Outcome: Progressing Towards Goal     Medication Interventions: Patient to call before getting OOB    Elimination Interventions: Call light in reach

## 2020-10-21 NOTE — PROGRESS NOTES
Patient has been stuck three times trying to get IV. We have been unsuccessful three times. Called ICU charge nurse who says she may be able to come up and place the IV in a little while. Dr. Claude Knight notified.

## 2020-10-21 NOTE — PROGRESS NOTES
Patient was given Haldol. Her son is at the bedside. She is much more comfortable and calm.  She is aware of where she is and the reason she is in the hospital.

## 2020-10-22 VITALS
WEIGHT: 151 LBS | OXYGEN SATURATION: 99 % | BODY MASS INDEX: 25.78 KG/M2 | DIASTOLIC BLOOD PRESSURE: 57 MMHG | RESPIRATION RATE: 18 BRPM | TEMPERATURE: 98.4 F | SYSTOLIC BLOOD PRESSURE: 120 MMHG | HEART RATE: 64 BPM | HEIGHT: 64 IN

## 2020-10-22 LAB
ANION GAP SERPL CALC-SCNC: 7 MMOL/L (ref 7–16)
BASOPHILS # BLD: 0 K/UL (ref 0–0.2)
BASOPHILS NFR BLD: 0 % (ref 0–2)
BUN SERPL-MCNC: 47 MG/DL (ref 8–23)
CALCIUM SERPL-MCNC: 8.4 MG/DL (ref 8.3–10.4)
CHLORIDE SERPL-SCNC: 118 MMOL/L (ref 98–107)
CO2 SERPL-SCNC: 23 MMOL/L (ref 21–32)
CREAT SERPL-MCNC: 1.37 MG/DL (ref 0.6–1)
DIFFERENTIAL METHOD BLD: ABNORMAL
EOSINOPHIL # BLD: 0.1 K/UL (ref 0–0.8)
EOSINOPHIL NFR BLD: 1 % (ref 0.5–7.8)
ERYTHROCYTE [DISTWIDTH] IN BLOOD BY AUTOMATED COUNT: 12.1 % (ref 11.9–14.6)
GLUCOSE BLD STRIP.AUTO-MCNC: 141 MG/DL (ref 65–100)
GLUCOSE BLD STRIP.AUTO-MCNC: 142 MG/DL (ref 65–100)
GLUCOSE BLD STRIP.AUTO-MCNC: 148 MG/DL (ref 65–100)
GLUCOSE BLD STRIP.AUTO-MCNC: 231 MG/DL (ref 65–100)
GLUCOSE SERPL-MCNC: 141 MG/DL (ref 65–100)
HCT VFR BLD AUTO: 31.3 % (ref 35.8–46.3)
HGB BLD-MCNC: 10.2 G/DL (ref 11.7–15.4)
IMM GRANULOCYTES # BLD AUTO: 0 K/UL (ref 0–0.5)
IMM GRANULOCYTES NFR BLD AUTO: 0 % (ref 0–5)
LYMPHOCYTES # BLD: 2.6 K/UL (ref 0.5–4.6)
LYMPHOCYTES NFR BLD: 35 % (ref 13–44)
MCH RBC QN AUTO: 31.8 PG (ref 26.1–32.9)
MCHC RBC AUTO-ENTMCNC: 32.6 G/DL (ref 31.4–35)
MCV RBC AUTO: 97.5 FL (ref 79.6–97.8)
MONOCYTES # BLD: 0.5 K/UL (ref 0.1–1.3)
MONOCYTES NFR BLD: 7 % (ref 4–12)
NEUTS SEG # BLD: 4.2 K/UL (ref 1.7–8.2)
NEUTS SEG NFR BLD: 57 % (ref 43–78)
NRBC # BLD: 0 K/UL (ref 0–0.2)
PLATELET # BLD AUTO: 153 K/UL (ref 150–450)
PMV BLD AUTO: 11.5 FL (ref 9.4–12.3)
POTASSIUM SERPL-SCNC: 4.3 MMOL/L (ref 3.5–5.1)
RBC # BLD AUTO: 3.21 M/UL (ref 4.05–5.2)
SODIUM SERPL-SCNC: 148 MMOL/L (ref 136–145)
WBC # BLD AUTO: 7.4 K/UL (ref 4.3–11.1)

## 2020-10-22 PROCEDURE — 90686 IIV4 VACC NO PRSV 0.5 ML IM: CPT | Performed by: STUDENT IN AN ORGANIZED HEALTH CARE EDUCATION/TRAINING PROGRAM

## 2020-10-22 PROCEDURE — 74011250636 HC RX REV CODE- 250/636: Performed by: STUDENT IN AN ORGANIZED HEALTH CARE EDUCATION/TRAINING PROGRAM

## 2020-10-22 PROCEDURE — 82962 GLUCOSE BLOOD TEST: CPT

## 2020-10-22 PROCEDURE — 80048 BASIC METABOLIC PNL TOTAL CA: CPT

## 2020-10-22 PROCEDURE — 2709999900 HC NON-CHARGEABLE SUPPLY

## 2020-10-22 PROCEDURE — 74011250637 HC RX REV CODE- 250/637: Performed by: STUDENT IN AN ORGANIZED HEALTH CARE EDUCATION/TRAINING PROGRAM

## 2020-10-22 PROCEDURE — 36415 COLL VENOUS BLD VENIPUNCTURE: CPT

## 2020-10-22 PROCEDURE — 90471 IMMUNIZATION ADMIN: CPT

## 2020-10-22 PROCEDURE — 85025 COMPLETE CBC W/AUTO DIFF WBC: CPT

## 2020-10-22 RX ORDER — LEVOTHYROXINE SODIUM 50 UG/1
50 TABLET ORAL
Qty: 30 TAB | Refills: 1 | Status: SHIPPED | OUTPATIENT
Start: 2020-10-23 | End: 2020-11-06 | Stop reason: SDUPTHER

## 2020-10-22 RX ORDER — LOPERAMIDE HYDROCHLORIDE 2 MG/1
2 CAPSULE ORAL ONCE
Status: COMPLETED | OUTPATIENT
Start: 2020-10-22 | End: 2020-10-22

## 2020-10-22 RX ADMIN — INFLUENZA VIRUS VACCINE 0.5 ML: 15; 15; 15; 15 SUSPENSION INTRAMUSCULAR at 16:21

## 2020-10-22 RX ADMIN — HEPARIN SODIUM 5000 UNITS: 5000 INJECTION INTRAVENOUS; SUBCUTANEOUS at 09:34

## 2020-10-22 RX ADMIN — CLOPIDOGREL BISULFATE 75 MG: 75 TABLET ORAL at 09:35

## 2020-10-22 RX ADMIN — Medication 10 ML: at 06:31

## 2020-10-22 RX ADMIN — LOPERAMIDE HYDROCHLORIDE 2 MG: 2 CAPSULE ORAL at 13:16

## 2020-10-22 RX ADMIN — LISINOPRIL 20 MG: 20 TABLET ORAL at 09:35

## 2020-10-22 RX ADMIN — CARVEDILOL 6.25 MG: 6.25 TABLET, FILM COATED ORAL at 09:35

## 2020-10-22 RX ADMIN — LEVOTHYROXINE SODIUM 50 MCG: 0.05 TABLET ORAL at 09:34

## 2020-10-22 RX ADMIN — SODIUM CHLORIDE 75 ML/HR: 900 INJECTION, SOLUTION INTRAVENOUS at 04:01

## 2020-10-22 RX ADMIN — HEPARIN SODIUM 5000 UNITS: 5000 INJECTION INTRAVENOUS; SUBCUTANEOUS at 00:03

## 2020-10-22 NOTE — PROGRESS NOTES
Care Management Interventions  PCP Verified by CM: Yes  Transition of Care Consult (CM Consult): Home Health  Physical Therapy Consult: Yes  Occupational Therapy Consult: No  Current Support Network: Lives Alone  Confirm Follow Up Transport: Family  The Plan for Transition of Care is Related to the Following Treatment Goals : HH RN/PT  The Patient and/or Patient Representative was Provided with a Choice of Provider and Agrees with the Discharge Plan?: Yes  Name of the Patient Representative Who was Provided with a Choice of Provider and Agrees with the Discharge Plan: Aurora palomo  Freedom of Choice List was Provided with Basic Dialogue that Supports the Patient's Individualized Plan of Care/Goals, Treatment Preferences and Shares the Quality Data Associated with the Providers?: Yes  Hooker Resource Information Provided?: No  Discharge Location  Discharge Placement: Home with home health  Patient is discharging to her daughter's home for one night and then to her home tomorrow. Patient and daughter would like [facility] services RN/PT. There were no other needs identified.

## 2020-10-22 NOTE — DISCHARGE INSTRUCTIONS
DISCHARGE SUMMARY from Nurse    PATIENT INSTRUCTIONS:    After general anesthesia or intravenous sedation, for 24 hours or while taking prescription Narcotics:  · Limit your activities  · Do not drive and operate hazardous machinery  · Do not make important personal or business decisions  · Do  not drink alcoholic beverages  · If you have not urinated within 8 hours after discharge, please contact your surgeon on call. Report the following to your surgeon:  · Excessive pain, swelling, redness or odor of or around the surgical area  · Temperature over 100.5  · Nausea and vomiting lasting longer than 4 hours or if unable to take medications  · Any signs of decreased circulation or nerve impairment to extremity: change in color, persistent  numbness, tingling, coldness or increase pain  · Any questions    What to do at Home:  Recommended activity: Activity as tolerated,     If you experience any of the following symptoms any shortness of breath, any chest pain, , please follow up with your doctor. *  Please give a list of your current medications to your Primary Care Provider. *  Please update this list whenever your medications are discontinued, doses are      changed, or new medications (including over-the-counter products) are added. *  Please carry medication information at all times in case of emergency situations. These are general instructions for a healthy lifestyle:    No smoking/ No tobacco products/ Avoid exposure to second hand smoke  Surgeon General's Warning:  Quitting smoking now greatly reduces serious risk to your health.     Obesity, smoking, and sedentary lifestyle greatly increases your risk for illness    A healthy diet, regular physical exercise & weight monitoring are important for maintaining a healthy lifestyle    You may be retaining fluid if you have a history of heart failure or if you experience any of the following symptoms:  Weight gain of 3 pounds or more overnight or 5 pounds in a week, increased swelling in our hands or feet or shortness of breath while lying flat in bed. Please call your doctor as soon as you notice any of these symptoms; do not wait until your next office visit. The discharge information has been reviewed with the patient. The patient verbalized understanding. Discharge medications reviewed with the patient and appropriate educational materials and side effects teaching were provided.   ___________________________________________________________________________________________________________________________________

## 2020-10-22 NOTE — PROGRESS NOTES
Problem: Risk for Spread of Infection  Goal: Prevent transmission of infectious organism to others  Description: Prevent the transmission of infectious organisms to other patients, staff members, and visitors. Outcome: Progressing Towards Goal     Problem: Patient Education:  Go to Education Activity  Goal: Patient/Family Education  Outcome: Progressing Towards Goal     Problem: Falls - Risk of  Goal: *Absence of Falls  Description: Document Opal Ronaldoindia Fall Risk and appropriate interventions in the flowsheet. Outcome: Progressing Towards Goal  Note: Fall Risk Interventions:  Mobility Interventions: Bed/chair exit alarm    Mentation Interventions: Adequate sleep, hydration, pain control, Bed/chair exit alarm    Medication Interventions: Patient to call before getting OOB    Elimination Interventions: Bed/chair exit alarm, Call light in reach              Problem: Patient Education: Go to Patient Education Activity  Goal: Patient/Family Education  Outcome: Progressing Towards Goal     Problem: Pressure Injury - Risk of  Goal: *Prevention of pressure injury  Description: Document Aaron Scale and appropriate interventions in the flowsheet.   Outcome: Progressing Towards Goal  Note: Pressure Injury Interventions:  Sensory Interventions: Assess changes in LOC    Moisture Interventions: Absorbent underpads    Activity Interventions: Pressure redistribution bed/mattress(bed type), Increase time out of bed    Mobility Interventions: Pressure redistribution bed/mattress (bed type), PT/OT evaluation    Nutrition Interventions: Document food/fluid/supplement intake                     Problem: Patient Education: Go to Patient Education Activity  Goal: Patient/Family Education  Outcome: Progressing Towards Goal

## 2020-10-22 NOTE — ROUTINE PROCESS
Shift assessment complete. Respirations present. Even and unlabored. No s/s of distress. Zero c/o pain at this time. Call light within reach. Encouraged patient to call for assistance. Patient verbalizes understanding. See Doc Flowsheet for assessment details. Patient resting in bed. Bed alarm in progress. Door open for observation. Incontinent of urine and stool. Sims to bedside drain with clear yellow urine in drainage bag.

## 2020-10-23 ENCOUNTER — PATIENT OUTREACH (OUTPATIENT)
Dept: CASE MANAGEMENT | Age: 85
End: 2020-10-23

## 2020-10-23 LAB
O+P SPEC MICRO: NORMAL
O+P STL CONC: NORMAL
SPECIMEN SOURCE: NORMAL

## 2020-10-23 NOTE — PROGRESS NOTES
Initial ALONZO outreach attempt to was unsuccessful. Left message. Will attempt second outreach within 24 hours.

## 2020-10-25 LAB
BACTERIA SPEC CULT: NORMAL
CAMPYLOBACTER STL CULT: NORMAL
SPECIMEN SOURCE: NORMAL

## 2020-10-26 ENCOUNTER — PATIENT OUTREACH (OUTPATIENT)
Dept: CASE MANAGEMENT | Age: 85
End: 2020-10-26

## 2020-10-26 NOTE — PROGRESS NOTES
Transition of Care Hospital Discharge Follow-Up      Date/Time:  10/26/2020 3:25 PM    Outreach made within 2 business days of discharge: Yes    Patient was admitted to [facility] on 10/19/2020 and discharged on 10/22/2020for ALBERT. The physician discharge summary was available at the time of outreach. Inpatient RUR score: 40   Was this a readmission? no   Patient stated reason for the readmission: none  Patients top risk factors for readmission: ALBERT      LPN Care Coordinator contacted the family by telephone to perform post hospital discharge assessment. Verified name and  with family as identifiers. Provided introduction to self, and explanation of the Care Coordinator role. Patient received hospital discharge instructions. LPN reviewed discharge instructions and red flags with patient who verbalized understanding. Patient given an opportunity to ask questions and does not have any further questions or concerns at this time. The patient agrees to contact the PCP office for questions related to their healthcare. LPN provided contact information for future reference. Home Health orders at discharge: [address]: [facility]  Date of initial or scheduled visit: 10/24/2020  (Assist with coordination of services if necessary.)    Durable Medical Equipment ordered at discharge: none  [address]:   [address] [facility] received:   (Assist patient in obtaining DME orders &/or equipment if necessary.)    Medication(s):   Medication review was performed with patient, who verbalizes understanding of administration of home medications. There were no barriers to obtaining medications identified at this time. Current Outpatient Medications   Medication Sig    nitrofurantoin, macrocrystal-monohydrate, (MACROBID) 100 mg capsule Take 1 Cap by mouth two (2) times a day.  Indications: bacterial urinary tract infection    levothyroxine (SYNTHROID) 50 mcg tablet Take 1 Tab by mouth Daily (before breakfast).  trimethoprim (TRIMPEX) 100 mg tablet TAKE ONE TABLET BY MOUTH EVERY DAY FOR SUPPRESSION    benazepriL (LOTENSIN) 20 mg tablet Take 1 Tab by mouth daily.  carvediloL (COREG) 6.25 mg tablet Take 1 Tab by mouth two (2) times daily (with meals).  chlorthalidone (HYGROTEN) 25 mg tablet TAKE ONE TABLET BY MOUTH EVERY DAY    hydrALAZINE (APRESOLINE) 50 mg tablet Take 1 Tab by mouth two (2) times a day.  clopidogrel (PLAVIX) 75 mg tab TAKE ONE TABLET BY MOUTH EVERY DAY    [facility] medical supply misc Compression hose, medium compression, 20-30mm HGm, fitted to size 2 pair. LE edema.  miscellaneous medical supply misc Glucose Test Meter - for glucose, once daily testing. ICD - 10 E11.9    [facility] medical supply misc Glucose Test Strips - Use for once or twice daily glucose testing.  miscellaneous medical supply misc Glucose Lancets for once or twice daily glucose testing, icd-10 E11.9     No current facility-administered medications for this visit. There are no discontinued medications. ADL assessment:   (If patient is unable to perform ADLs  what is the limiting factor(s)? Do they have a support system that can assist? If no support system is present, discuss possible assistance that they may be able to obtain. Escalate for SW if ongoing issues are verbalized by pt or anticipated)    [facility] follow up appointment(s):   Future Appointments   Date Time Provider Julio César Frederick   11/6/2020  8:30 AM Beatrice Morgan MD [facility]      Non-[facility] follow up appointment(s):   7 Day follow up with PCP or Specialist: GI physician 10/26/2020  Transportation arranged:     Covid Risk Education    Patient has following risk factors of: ALBERT. Education provided regarding infection prevention, and signs and symptoms of COVID-19 and when to seek medical attention with parent who verbalized understanding.  Discussed exposure protocols and quarantine From CDC: Are you at higher risk for severe illness?  and given an opportunity for questions and concerns. The parent agrees to contact the COVID-19 hotline [phone] or PCP office for questions related to COVID-19. For more information on steps you can take to protect yourself, see CDC's How to Protect Yourself     Patient/family/caregiver given information for [employer] and agrees to enroll no  Patient's preferred e-mail: declines  Patient's preferred phone number: declines      Any other questions or concerns expressed by patient? Patient's daughter voices no concerns at this time    Scheduled next follow up call or referral to CTN/ ACM:  Next follow up call:within 14 days    Goals Addressed                 This Visit's Progress     Maintain medical stability (ie. monitor respiratory rate, temperature, heart rate, blood pressure, and O2 levels, mental status, ability to eat and drink)        Takes Medications as prescribed

## 2020-11-09 ENCOUNTER — PATIENT OUTREACH (OUTPATIENT)
Dept: CASE MANAGEMENT | Age: 85
End: 2020-11-09

## 2020-11-09 NOTE — PROGRESS NOTES
Attempted to call patient's daughter  to f/u on ALONZO call, unable to reach left voice message. According to [facility] patient attended an appointment with Dr. Magen Dudley on 11/6/2020. This Care Coordinator will graduate this patient from this program, patient will be added back if phone call is returned.

## 2021-08-03 PROBLEM — N17.9 ACUTE KIDNEY INJURY (HCC): Status: RESOLVED | Noted: 2017-04-30 | Resolved: 2021-08-03

## 2021-10-22 NOTE — IP AVS SNAPSHOT
[address] [address] 
[phone] Patient: Krissy Harrison MRN: [id number] You are allergic to the following Allergen Reactions Aspirin Anaphylaxis Hives, swelling Ativan (Lorazepam) Other (comments) Confused and aggressive Aleve (Naproxen Sodium) Rash Carafate (Sucralfate) Hives Clorazepate Dipotassium Hives Flexeril (Cyclobenzaprine) Itching Robaxin (Methocarbamol) Anaphylaxis Swelling of tongue, wheezing Muscle relaxers in general  
    
 Vytorin 10-10 (Ezetimibe-Simvastatin) Other (comments) Muscle soreness Zantac (Ranitidine Hcl) Itching Recent Documentation Height Weight Breastfeeding? BMI OB Status Smoking Status 1.626 m 64.9 kg No 24.55 kg/m2 Hysterectomy Never Smoker Emergency Contacts Name Discharge Info Relation Home Work Mobile Rita Blum DISCHARGE CAREGIVER [3] Daughter [21] [phone] [address] [place] [address] CAREGIVER [3] Son [22] [phone] About your hospitalization You were admitted on:  April 28, 2017 You last received care in the:  [facility] PACU You were discharged on:  April 28, 2017 Unit phone number:  [phone] Why you were hospitalized Your primary diagnosis was:  Not on File Providers Seen During Your Hospitalizations Provider Role Specialty Primary office phone Cam Ortega MD Attending Provider Gastroenterology [phone] Your Primary Care Physician (PCP) Primary Care Physician Office Phone Office Fax Linh Alegre, [address] [facility] [phone] Follow-up Information Follow up With Details Comments Contact Info Cam Ortega MD   [address] [facility] [address] [facility] [id number] [phone] Ramya Milian MD   [address] [facility] [place] 3 Leana Stokes 
[phone] Your Appointments Monday May 01, 2017  9:30 AM EDT  
ECHOCARDIOGRAM with SUNITA ECHO [address] [facility] ([address] [place] [address]) [address]  
[phone] Monday May 01, 2017 10:45 AM EDT HOSPITAL FOLLOW-UP with Rayne Rodriguez MD  
[facility] ([address] [place] [address]) [address]  
[phone] Current Discharge Medication List  
  
ASK your doctor about these medications Dose & Instructions Dispensing Information Comments Morning Noon Evening Bedtime  
 amLODIPine 10 mg tablet Commonly known as:  Mesha Mobley Your last dose was: Your next dose is:    
   
   
 Dose:  10 mg Take 1 Tab by mouth daily. Quantity:  90 Tab Refills:  3  
     
   
   
   
  
 benazepril 20 mg tablet Commonly known as:  LOTENSIN Your last dose was: Your next dose is:    
   
   
 Dose:  20 mg Take 1 Tab by mouth daily. Quantity:  90 Tab Refills:  1  
     
   
   
   
  
 clopidogrel 75 mg Tab Commonly known as:  PLAVIX Your last dose was: Your next dose is:    
   
   
 Dose:  75 mg Take 1 Tab by mouth daily. Quantity:  90 Tab Refills:  5 CRANBERRY CONCENTRATE PO Your last dose was: Your next dose is: Take  by mouth daily. Refills:  0  
     
   
   
   
  
 furosemide 40 mg tablet Commonly known as:  LASIX Your last dose was: Your next dose is:    
   
   
 Dose:  40 mg Take 1 Tab by mouth daily. Quantity:  90 Tab Refills:  1 HYDROcodone-acetaminophen 5-325 mg per tablet Commonly known as:  Eve Staple Your last dose was: Your next dose is:    
   
   
 Dose:  1 Tab Take 1 Tab by mouth every four (4) hours as needed for Pain. Max Daily Amount: 6 Tabs. Quantity:  8 Tab Refills:  0  
     
   
   
   
  
 levothyroxine 100 mcg tablet Commonly known as:  SYNTHROID Your last dose was: Your next dose is:    
   
   
 Dose:  100 mcg Take 1 Tab by mouth Daily (before breakfast). Quantity:  90 Tab Refills:  1  
     
   
   
   
  
 metoprolol tartrate 25 mg tablet Commonly known as:  LOPRESSOR Your last dose was: Your next dose is:    
   
   
 Dose:  25 mg Take 1 Tab by mouth two (2) times a day. Quantity:  180 Tab Refills:  3  
     
   
   
   
  
 nitroglycerin 0.4 mg SL tablet Commonly known as:  NITROSTAT Your last dose was: Your next dose is:    
   
   
 Dose:  0.4 mg  
1 Tab by SubLINGual route every five (5) minutes as needed. Quantity:  25 Tab Refills:  11  
     
   
   
   
  
 rosuvastatin 20 mg tablet Commonly known as:  CRESTOR Your last dose was: Your next dose is:    
   
   
 Dose:  20 mg Take 1 Tab by mouth nightly. Quantity:  30 Tab Refills:  5  
     
   
   
   
  
 traMADol 50 mg tablet Commonly known as:  ULTRAM  
   
Your last dose was: Your next dose is:    
   
   
 Dose:  50 mg Take 1 Tab by mouth every six (6) hours as needed for Pain. Quantity:  90 Tab Refills:  2 Discharge Instructions Endoscopic Retrograde Cholangiopancreatogram (ERCP): What to Expect at [facility] Your Recovery After you have an endoscopic retrograde cholangiopancreatogram (ERCP). You will be able to go home after your doctor or a nurse checks to make sure you are not having any problems. If you stay in the hospital overnight, you may go home the next day. You may have a sore throat for a day or two after the procedure. This care sheet gives you a general idea about how long it will take for you to recover. But each person recovers at a different pace. Follow the steps below to get better as quickly as possible. How can you care for yourself at home? Activity 1. Rest as much as you need to after you go home. 2. You should be able to go back to your usual activities the day after the procedure. Diet · Follow your doctor's directions for eating after the procedure. · Drink plenty of fluids (unless your doctor tells you not to). Medicines · If you have a sore throat the next day, use an over-the-counter spray to numb your throat. Follow-up care is a key part of your treatment and safety. Be sure to make and go to all appointments, and call your doctor if you are having problems. Its also a good idea to know your test results and keep a list of the medicines you take. When should you call for help? Call 911 anytime you think you may need emergency care. For example, call if: 
· You vomit blood or what looks like coffee grounds. · You pass maroon or bloody stools. Call your doctor now or go to the emergency room if: 
· You have trouble swallowing. · You have belly pain. · Your stools are black and tarlike or have streaks of blood. · You are sick to your stomach and cannot drink fluids. · You have a fever. · You have pain that does not get better after you take your pain medicine. Watch closely for changes in your health, and be sure to contact your doctor if: 
· Your throat still hurts after a day or two. You do not get better as expected. After general anesthesia or intravenous sedation, for 24 hours or while taking prescription Narcotics: · Limit your activities · Do not drive and operate hazardous machinery · Do not make important personal or business decisions · Do  not drink alcoholic beverages · If you have not urinated within 8 hours after discharge, please contact your surgeon on call. *  Please give a list of your current medications to your Primary Care Provider.  
 
*  Please update this list whenever your medications are discontinued, doses are 
 changed, or new medications (including over-the-counter products) are added. *  Please carry medication information at all times in case of emergency situations. These are general instructions for a healthy lifestyle: No smoking/ No tobacco products/ Avoid exposure to second hand smoke Surgeon General's Warning:  Quitting smoking now greatly reduces serious risk to your health. Obesity, smoking, and sedentary lifestyle greatly increases your risk for illness A healthy diet, regular physical exercise & weight monitoring are important for maintaining a healthy lifestyle You may be retaining fluid if you have a history of heart failure or if you experience any of the following symptoms:  Weight gain of 3 pounds or more overnight or 5 pounds in a week, increased swelling in our hands or feet or shortness of breath while lying flat in bed. Please call your doctor as soon as you notice any of these symptoms; do not wait until your next office visit. Recognize signs and symptoms of STROKE: 
 
F-face looks uneven A-arms unable to move or move unevenly S-speech slurred or non-existent T-time-call 911 as soon as signs and symptoms begin-DO NOT go Back to bed or wait to see if you get better-TIME IS BRAIN. Discharge Orders None ACO Transitions of Care Introducing [employer] [address] Radha Cope offers a voluntary care coordination program to provide high quality service and care to [facility] fee-for-service beneficiaries. Clifford Abrams was designed to help you enhance your health and well-being through the following services: ? Transitions of Care  support for individuals who are transitioning from one care setting to another (example: Hospital to home). ?  Chronic and Complex Care Coordination  support for individuals and caregivers of those with serious or chronic illnesses or with more than one chronic (ongoing) condition and those who take a number of different medications. If you meet specific medical criteria, a [facility] [address] may call you directly to coordinate your care with your primary care physician and your other care providers. For questions about the [facility] programs, please, contact your physicians office. For general questions or additional information about Accountable Care Organizations: 
Please visit www.medicare.gov/acos. html or call 1-800-MEDICARE (5-125.523.9009) TTY users should call 6-423.854.5702. Introducing [facility] & HEALTH SERVICES! [place] introduces [employer] patient portal. Now you can access parts of your medical record, email your doctor's office, and request medication refills online. 1. In your internet browser, go to [email or web address] 2. Click on the First Time User? Click Here link in the Sign In box. You will see the New Member Sign Up page. 3. Enter your [employer] Access Code exactly as it appears below. You will not need to use this code after youve completed the sign-up process. If you do not sign up before the expiration date, you must request a new code. · [employer] Access Code: [email or web address] Expires: 7/20/2017  1:45 PM 
 
4. Enter the last four digits of your Social Security Number (xxxx) and Date of Birth (mm/dd/yyyy) as indicated and click Submit. You will be taken to the next sign-up page. 5. Create a [employer] ID. This will be your [employer] login ID and cannot be changed, so think of one that is secure and easy to remember. 6. Create a [employer] password. You can change your password at any time. 7. Enter your Password Reset Question and Answer. This can be used at a later time if you forget your password. 8. Enter your e-mail address. You will receive e-mail notification when new information is available in [address] 9. Click Sign Up. You can now view and download portions of your medical record. 10. Click the Download Summary menu link to download a portable copy of your medical information. If you have questions, please visit the Frequently Asked Questions section of the [employer] website. Remember, [employer] is NOT to be used for urgent needs. For medical emergencies, dial 911. Now available from your iPhone and Android! General Information Please provide this summary of care documentation to your next provider. Patient Signature:  ____________________________________________________________ Date:  ____________________________________________________________  
  
[facility] Provider Signature:  ____________________________________________________________ Date:  ____________________________________________________________ (4) no impairment

## 2022-03-18 PROBLEM — N18.30 CKD (CHRONIC KIDNEY DISEASE) STAGE 3, GFR 30-59 ML/MIN (HCC): Status: ACTIVE | Noted: 2017-01-20

## 2022-03-18 PROBLEM — A41.9 SEPSIS (HCC): Status: ACTIVE | Noted: 2017-04-30

## 2022-03-18 PROBLEM — N17.9 AKI (ACUTE KIDNEY INJURY) (HCC): Status: ACTIVE | Noted: 2018-03-22

## 2022-03-19 PROBLEM — Z95.2 S/P TAVR (TRANSCATHETER AORTIC VALVE REPLACEMENT): Status: ACTIVE | Noted: 2017-03-29

## 2022-03-19 PROBLEM — R60.9 EDEMA: Status: ACTIVE | Noted: 2018-04-13

## 2022-03-19 PROBLEM — N39.0 UTI (URINARY TRACT INFECTION): Status: ACTIVE | Noted: 2017-04-30

## 2022-03-19 PROBLEM — E11.40 TYPE 2 DIABETES MELLITUS WITH DIABETIC NEUROPATHY (HCC): Status: ACTIVE | Noted: 2018-03-13

## 2022-03-19 PROBLEM — D62 ACUTE BLOOD LOSS ANEMIA: Status: ACTIVE | Noted: 2017-07-10

## 2022-03-19 PROBLEM — E11.21 TYPE 2 DIABETES WITH NEPHROPATHY (HCC): Status: ACTIVE | Noted: 2018-03-29

## 2022-03-19 PROBLEM — E16.2 HYPOGLYCEMIA: Status: ACTIVE | Noted: 2018-03-22

## 2022-03-19 PROBLEM — I35.1 NONRHEUMATIC AORTIC VALVE INSUFFICIENCY: Status: ACTIVE | Noted: 2019-12-04

## 2022-03-19 PROBLEM — N30.00 ACUTE CYSTITIS: Status: ACTIVE | Noted: 2017-03-29

## 2022-03-20 PROBLEM — S72.001A CLOSED RIGHT HIP FRACTURE (HCC): Status: ACTIVE | Noted: 2017-07-09

## 2022-05-19 DIAGNOSIS — E11.40 TYPE 2 DIABETES MELLITUS WITH DIABETIC NEUROPATHY, UNSPECIFIED WHETHER LONG TERM INSULIN USE (HCC): ICD-10-CM

## 2022-05-19 DIAGNOSIS — E78.5 HYPERLIPIDEMIA, UNSPECIFIED HYPERLIPIDEMIA TYPE: ICD-10-CM

## 2022-05-19 DIAGNOSIS — I10 ESSENTIAL HYPERTENSION, BENIGN: Primary | ICD-10-CM

## 2022-05-19 DIAGNOSIS — E03.9 HYPOTHYROIDISM, UNSPECIFIED TYPE: ICD-10-CM

## 2022-05-19 DIAGNOSIS — N17.9 AKI (ACUTE KIDNEY INJURY) (HCC): ICD-10-CM

## 2022-09-01 NOTE — DISCHARGE SUMMARY
Hospitalist Discharge Summary     Admit Date:  10/19/2020  4:10 PM   DC note date: 10/22/2020  Name:  King Sona   Age:  80 y.o.  :  3/20/1929   MRN:  [id number]   PCP:  Emily Wolf MD  Treatment Team: Attending Provider: Amanda Ryan MD; Utilization Review: Marcelo Fernandez RN; Care Manager: Lyndsey Gilliland.; Primary Nurse: Rocael Ross    Problem List for this Hospitalization:  Hospital Problems as of 10/22/2020 Date Reviewed: 10/19/2020          Codes Class Noted - Resolved POA    ALBERT (acute kidney injury) [facility]) ICD-10-CM: N17.9  ICD-9-CM: 584.9  3/22/2018 - Present Unknown                Admission HPI from 10/19/2020:    \"A 79-year-old female with PMH of AS s/p TAVR, Hypothyroidism, HTN, CAD, HLD, Type 2 DM, frequent UTI on prophylaxis, CKD3 who presents to the hospital with complaints of persistent diarrhea worsening over the last week and significant weakness. Patient presents with her daughter who gives most of the history and states that since April patient has had suffering from frequent diarrhea with anything that she eats. Over the last week 3 weeks and especially the last week she has been having worsening diarrhea with anything that she eats. She does endorse some nausea with bilat lower abdominal pain, any significant emesis. There are some days where she does 6 times a day. She describes her diarrhea as clear and yellow-brown at times. Not remember the last time she had a colonoscopy and is probably been over 20 years. She does endorse overall weakness and ESTRADA. Denies any fevers, cough, congestion, SOB, chest pain, lightheadedness, dizziness, syncope. Patient does have history of frequent UTIs for which she is on trimethoprim for. She does not endorse any dysuria, urinary frequency, urinary hesitancy, hematuria, flank pain. \"    Hospital Course:  Pt was given IVF for her acute kidney injury that was related to her significant diarrhea.  Pt's diarrhea issues had been chronic. Stool studies were ordered and did not reveal any WBC's. Pt's stool did not qualify criteria for testing for cdiff. She had CT abd performed which showed diverticulosis only. Her TSh was also low at 0.035, and was thought to be a contributing cause of her diarrhea. Her dose was cut in half and she was recommended to follow up with her PCP to recheck her TSH in 6 weeks. Her UA was unremarkable except for 4+ bacteria. She had no urinary symptoms and is likely colonized by bacteria. She was not treated for asymptomatic bacteruria. Her kidney function continued to improve on daily basis, with Creatining 1.37 on day of discharge, down from 3.13 on admission. She was recommended to continue her oral hydration at home and to eliminate foods that would make her diarrhea worse. She was also given one dose of imodium to help with her diarrhea. She was advised to follow up with her previous GI physician in the outpatient setting. Disposition: Home or Self Care  Activity: Activity as tolerated  Diet: DIET REGULAR  Code Status: DNR    Follow Up Orders: Follow-up Appointments   Procedures    FOLLOW UP VISIT Appointment in: One Week     Standing Status:   Standing     Number of Occurrences:   1     Order Specific Question:   Appointment in     Answer: One Week       Follow-up Information     Follow up With Specialties Details Why Contact Info    Vickey Piper MD [facility]   [address]  Panda Crabtree MD Gastroenterology   [address]  [phone]            Discharge meds at bottom of this note. Plan was discussed with patient and daughter. All questions answered. Patient was stable at time of discharge. Given instructions to call a physician or return if any concerns. Discharge summary and encounter summary was sent to PCP electronically via \"Comm Mgt\" link in [facility], if possible.     Diagnostic Imaging/Tests:   Ct Abd Pelv Wo Cont    Result Date: 10/19/2020  CT abdomen and pelvis without contrast  History: Diarrhea. Technique: Helically acquired images were obtained from the upper abdomen to the ischial tuberosities reconstructed at 5mm intervals without oral contrast. Intravenous contrast was not administered. Coronal reformatted images were submitted. Radiation dose reduction techniques were used for this study:  Our CT scanners use one or all of the following: Automated exposure control, adjustment of the mA and/or kVp according to patient's size, iterative reconstruction. Comparison: CTA 02/07/2017 CT ABDOMEN: The lack of IV contrast results in an incomplete assessment of the solid abdominal viscera. There is exclusion of superior portions of the liver and spleen. The visualized portions are unremarkable on this noncontrast study. The pancreas and adrenal glands are also unremarkable in noncontrast exam. An upper pole left renal cyst is unchanged. The kidneys are otherwise unremarkable on this noncontrast study. No adenopathy or ascites is present. There are no inflammatory changes. Curvilinear calcifications at the bladder fossa could represent calcified gallbladder wall and/or stones within a collapsed gallbladder lumen. No adenopathy or ascites is present. CT PELVIS: There are several sigmoid diverticula. No adenopathy or ascites is present. There is streak artifact from right hip right hip plasty. There are no inflammatory changes. There are sclerotic changes involving the sacrum which may indicate changes of prior insufficiency tractor's, unchanged. The uterus is absent. IMPRESSION: 1. Sigmoid diverticulosis 2. Curvilinear calcifications at the gallbladder fossa, unchanged. Echocardiogram results:  No results found for this visit on 10/19/20.     Procedures done this admission:  * No surgery found *    All Micro Results     Procedure Component Value Units Date/Time    OVA & PARASITES, STOOL [[id number]] Collected:  10/21/20 0955    Order Status:  Completed Specimen:  Feces from Stool Updated:  10/21/20 1036    CAMPLYLOBACTER CULTURE [[id number]] Collected:  10/21/20 0955    Order Status:  Completed Specimen:  Stool Updated:  10/21/20 1036    C. DIFFICILE/EPI PCR [[id number]] Collected:  10/21/20 0955    Order Status:  Canceled Specimen:  Stool           [unfilled]    Labs: Results:       BMP, Mg, Phos Recent Labs     10/22/20  0553 10/21/20  1029 10/20/20  0612   * 145 146*   K 4.3 4.9 4.7   * 116* 120*   CO2 23 23 19*   AGAP 7 6* 7   BUN 47* 52* 79*   CREA 1.37* 1.64* 2.13*   CA 8.4 8.8 8.5   * 205* 138*      CBC Recent Labs     10/22/20  0553 10/21/20  1029 10/20/20  0612   WBC 7.4 6.9 5.8   RBC 3.21* 3.54* 3.37*   HGB 10.2* 11.3* 10.7*   HCT 31.3* 33.8* 31.8*    161 159   GRANS 57 66 56   LYMPH 35 27 35   EOS 1 1 1   MONOS 7 6 8   BASOS 0 0 1   IG 0 0 0   ANEU 4.2 4.6 3.2   ABL 2.6 1.8 2.0   KIRSTEN 0.1 0.1 0.1   ABM 0.5 0.4 0.4   ABB 0.0 0.0 0.0   AIG 0.0 0.0 0.0      LFT Recent Labs     10/19/20  1610   ALT 13   AP 85   TP 7.9   ALB 3.7   GLOB 4.2*   AGRAT 0.9*      Cardiac Testing Lab Results   Component Value Date/Time     03/27/2017 06:03 PM     03/22/2017 05:26 PM    CK 1,317 (H) 07/08/2017 02:50 PM      Coagulation Tests Lab Results   Component Value Date/Time    Prothrombin time 10.9 07/08/2017 02:50 PM    Prothrombin time 10.6 03/27/2017 06:03 PM    Prothrombin time 10.3 01/13/2017 11:50 AM    INR 1.0 07/08/2017 02:50 PM    INR 1.0 03/27/2017 06:03 PM    INR 0.9 01/13/2017 11:50 AM    aPTT 26.9 07/08/2017 02:50 PM    aPTT 27.5 03/27/2017 06:03 PM      A1c Lab Results   Component Value Date/Time    Hemoglobin A1c 5.9 (H) 03/06/2020 09:54 AM    Hemoglobin A1c 6.5 (H) 11/02/2018 10:49 AM    Hemoglobin A1c 7.1 (H) 05/03/2018 03:40 PM      Lipid Panel Lab Results   Component Value Date/Time    Cholesterol, total 298 (H) 03/06/2020 09:54 AM    HDL Cholesterol 42 03/06/2020 09:54 AM    LDL, calculated 202 (H) 03/06/2020 09:54 AM    VLDL, calculated 54 (H) 03/06/2020 09:54 AM    Triglyceride 270 (H) 03/06/2020 09:54 AM    CHOL/HDL Ratio 2.9 03/29/2017 03:30 AM      Thyroid Panel Lab Results   Component Value Date/Time    TSH 0.035 (L) 10/19/2020 09:13 PM    TSH 1.310 03/06/2020 09:54 AM    TSH 1.670 11/02/2018 10:49 AM    T4, Free 1.5 10/20/2020 06:12 AM    T4, Free 1.13 03/06/2020 09:54 AM        Most Recent UA Lab Results   Component Value Date/Time    Color YELLOW 10/19/2020 04:43 PM    Appearance CLEAR 10/19/2020 04:43 PM    Specific gravity 1.014 10/19/2020 04:43 PM    pH (UA) 5.5 10/19/2020 04:43 PM    Protein Negative 10/19/2020 04:43 PM    Glucose Negative 10/19/2020 04:43 PM    Ketone Negative 10/19/2020 04:43 PM    Bilirubin Negative 10/19/2020 04:43 PM    Blood Negative 10/19/2020 04:43 PM    Urobilinogen 0.2 10/19/2020 04:43 PM    Nitrites Negative 10/19/2020 04:43 PM    Leukocyte Esterase SMALL (A) 10/19/2020 04:43 PM    WBC 0-3 10/19/2020 04:43 PM    RBC 0 10/19/2020 04:43 PM    Epithelial cells 0-3 10/19/2020 04:43 PM    Bacteria 4+ (H) 10/19/2020 04:43 PM    Casts 0 10/19/2020 04:43 PM    Crystals, urine AMORPHOUS 03/22/2017 05:57 PM    Mucus Present 05/24/2017 12:16 PM    Other observations RESULTS VERIFIED MANUALLY 04/30/2017 07:15 AM        Allergies   Allergen Reactions    Aspirin Anaphylaxis     Hives, swelling    Ativan [Lorazepam] Other (comments)     Confused and aggressive    Robaxin [Methocarbamol] Anaphylaxis     Swelling of tongue, wheezing  Muscle relaxers in general    Aleve [Naproxen Sodium] Rash    Amlodipine Other (comments)     Edema      Bactrim [Sulfamethoprim] Other (comments)    Carafate [Sucralfate] Hives    Ciprofloxacin Diarrhea     severe    Clorazepate Dipotassium Hives    Flexeril [Cyclobenzaprine] Itching    Vytorin 10-10 [Ezetimibe-Simvastatin] Other (comments)     Muscle soreness    Zantac [Ranitidine Hcl] Itching     Immunization History   Administered Date(s) Administered    Influenza Vaccine (Quad) Mdck Pf (>4 Yrs Flucelvax QUAD [id number]) 09/11/2017    Influenza Vaccine (Quad) PF (>6 Mo Flulaval, Fluarix, and >3 Yrs Afluria, Fluzone [id number]) 11/02/2018    Pneumococcal Conjugate (PCV-13) 09/29/2015    Pneumococcal Polysaccharide (PPSV-23) 09/11/2017    TB Skin Test (PPD) Intradermal 04/30/2017, 07/08/2017, 03/23/2018       All Labs from Last 24 Hrs:  Recent Results (from the past 24 hour(s))   GLUCOSE, POC    Collection Time: 10/21/20  9:49 PM   Result Value Ref Range    Glucose (POC) 182 (H) 65 - 679 mg/dL   METABOLIC PANEL, BASIC    Collection Time: 10/22/20  5:53 AM   Result Value Ref Range    Sodium 148 (H) 136 - 145 mmol/L    Potassium 4.3 3.5 - 5.1 mmol/L    Chloride 118 (H) 98 - 107 mmol/L    CO2 23 21 - 32 mmol/L    Anion gap 7 7 - 16 mmol/L    Glucose 141 (H) 65 - 100 mg/dL    BUN 47 (H) 8 - 23 MG/DL    Creatinine 1.37 (H) 0.6 - 1.0 MG/DL    GFR est AA 46 (L) >60 ml/min/1.73m2    GFR est non-AA 38 (L) >60 ml/min/1.73m2    Calcium 8.4 8.3 - 10.4 MG/DL   CBC WITH AUTOMATED DIFF    Collection Time: 10/22/20  5:53 AM   Result Value Ref Range    WBC 7.4 4.3 - 11.1 K/uL    RBC 3.21 (L) 4.05 - 5.2 M/uL    HGB 10.2 (L) 11.7 - 15.4 g/dL    HCT 31.3 (L) 35.8 - 46.3 %    MCV 97.5 79.6 - 97.8 FL    MCH 31.8 26.1 - 32.9 PG    MCHC 32.6 31.4 - 35.0 g/dL    RDW 12.1 11.9 - 14.6 %    PLATELET 610 592 - 710 K/uL    MPV 11.5 9.4 - 12.3 FL    ABSOLUTE NRBC 0.00 0.0 - 0.2 K/uL    DF AUTOMATED      NEUTROPHILS 57 43 - 78 %    LYMPHOCYTES 35 13 - 44 %    MONOCYTES 7 4.0 - 12.0 %    EOSINOPHILS 1 0.5 - 7.8 %    BASOPHILS 0 0.0 - 2.0 %    IMMATURE GRANULOCYTES 0 0.0 - 5.0 %    ABS. NEUTROPHILS 4.2 1.7 - 8.2 K/UL    ABS. LYMPHOCYTES 2.6 0.5 - 4.6 K/UL    ABS. MONOCYTES 0.5 0.1 - 1.3 K/UL    ABS. EOSINOPHILS 0.1 0.0 - 0.8 K/UL    ABS. BASOPHILS 0.0 0.0 - 0.2 K/UL    ABS. IMM.  GRANS. 0.0 0.0 - 0.5 K/UL   GLUCOSE, POC Collection Time: 10/22/20  7:29 AM   Result Value Ref Range    Glucose (POC) 148 (H) 65 - 100 mg/dL   GLUCOSE, POC    Collection Time: 10/22/20 11:20 AM   Result Value Ref Range    Glucose (POC) 141 (H) 65 - 100 mg/dL   GLUCOSE, POC    Collection Time: 10/22/20  1:14 PM   Result Value Ref Range    Glucose (POC) 142 (H) 65 - 100 mg/dL       Discharge Exam:  Patient Vitals for the past 24 hrs:   Temp Pulse Resp BP SpO2   10/22/20 1116 98.2 °F (36.8 °C) 74 17 (!) 148/71 99 %   10/22/20 0722 98.2 °F (36.8 °C) 75 16 131/64 100 %   10/22/20 0359 98.2 °F (36.8 °C) 68 17 128/64 96 %   10/21/20 2352 98.1 °F (36.7 °C) 74 18 (!) 103/55 92 %   10/21/20 1930 98.1 °F (36.7 °C) 62 18 (!) 102/54 97 %   10/21/20 1527 97.6 °F (36.4 °C) 77 18 (!) 114/48 95 %     Oxygen Therapy  O2 Sat (%): 99 % (10/22/20 1116)  Pulse via Oximetry: 76 beats per minute (10/19/20 1918)  O2 Device: Room air (10/21/20 1500)    Estimated body mass index is 25.92 kg/m² as calculated from the following:    Height as of this encounter: 5' 4\" (1.626 m). Weight as of this encounter: 68.5 kg (151 lb). Intake/Output Summary (Last 24 hours) at 10/22/2020 1421  Last data filed at 10/21/2020 1527  Gross per 24 hour   Intake    Output 250 ml   Net -250 ml       *Note that automatically entered I/Os may not be accurate; dependent on patient compliance with collection and accurate  by assistants. General:    Well nourished. Alert. Eyes:   Normal sclerae. Extraocular movements intact. ENT:  Normocephalic, atraumatic. Moist mucous membranes  CV:   Regular rate and rhythm. No murmur, rub, or gallop. Lungs:  Clear to auscultation bilaterally. No wheezing, rhonchi, or rales. Abdomen: Soft, nontender, nondistended. Extremities: Warm and dry. No cyanosis or edema. Neurologic: CN II-XII grossly intact. No gross focal deficits   Skin:     No rashes or jaundice. Psych:  Normal mood and affect.     Current Med List in Hospital:   Current FAMILY HISTORY:  Father  Still living? No  Family history of cerebrovascular accident (CVA) in father, Age at diagnosis: Age Unknown  Family history of hypertension, Age at diagnosis: Age Unknown  Family history of renal failure, Age at diagnosis: Age Unknown    Mother  Still living? No  Family history of breast cancer in mother, Age at diagnosis: Age Unknown  Family history of hypertension, Age at diagnosis: Age Unknown    Sibling  Still living? Yes, Estimated age: Age Unknown  Family history of diabetes mellitus, Age at diagnosis: Age Unknown     Facility-Administered Medications   Medication Dose Route Frequency    influenza vaccine 2020-21 (6 mos+)(PF) (FLUARIX/FLULAVAL/FLUZONE QUAD) injection 0.5 mL  0.5 mL IntraMUSCular PRIOR TO DISCHARGE    insulin lispro (HUMALOG) injection   SubCUTAneous AC&HS    dextrose 40% (GLUTOSE) oral gel 1 Tube  15 g Oral PRN    glucagon (GLUCAGEN) injection 1 mg  1 mg IntraMUSCular PRN    dextrose (D50W) injection syrg 12.5-25 g  25-50 mL IntraVENous PRN    heparin (porcine) injection 5,000 Units  5,000 Units SubCUTAneous Q8H    levothyroxine (SYNTHROID) tablet 50 mcg  50 mcg Oral ACB    sodium chloride (NS) flush 5-40 mL  5-40 mL IntraVENous Q8H    sodium chloride (NS) flush 5-40 mL  5-40 mL IntraVENous PRN    potassium chloride 10 mEq in 100 ml IVPB  10 mEq IntraVENous PRN    magnesium sulfate 2 g/50 ml IVPB (premix or compounded)  2 g IntraVENous PRN    acetaminophen (TYLENOL) tablet 650 mg  650 mg Oral Q6H PRN    Or    acetaminophen (TYLENOL) suppository 650 mg  650 mg Rectal Q6H PRN    polyethylene glycol (MIRALAX) packet 17 g  17 g Oral DAILY PRN    bisacodyL (DULCOLAX) suppository 10 mg  10 mg Rectal DAILY PRN    ondansetron (ZOFRAN ODT) tablet 4 mg  4 mg Oral Q8H PRN    Or    ondansetron (ZOFRAN) injection 4 mg  4 mg IntraVENous Q6H PRN    lisinopriL (PRINIVIL, ZESTRIL) tablet 20 mg  20 mg Oral DAILY    carvediloL (COREG) tablet 6.25 mg  6.25 mg Oral BID WITH MEALS    clopidogreL (PLAVIX) tablet 75 mg  75 mg Oral DAILY       Discharge Info:   Current Discharge Medication List      CONTINUE these medications which have CHANGED    Details   levothyroxine (SYNTHROID) 50 mcg tablet Take 1 Tab by mouth Daily (before breakfast).   Qty: 30 Tab, Refills: 1         CONTINUE these medications which have NOT CHANGED    Details   trimethoprim (TRIMPEX) 100 mg tablet TAKE ONE TABLET BY MOUTH EVERY DAY FOR SUPPRESSION  Qty: 135 Tab, Refills: 3    Associated Diagnoses: Acute cystitis without hematuria benazepriL (LOTENSIN) 20 mg tablet Take 1 Tab by mouth daily. Qty: 90 Tab, Refills: 3    Associated Diagnoses: Essential hypertension, benign; Coronary artery disease due to lipid rich plaque; S/P TAVR (transcatheter aortic valve replacement)      carvediloL (COREG) 6.25 mg tablet Take 1 Tab by mouth two (2) times daily (with meals). Qty: 180 Tab, Refills: 3    Associated Diagnoses: Essential hypertension, benign; Coronary artery disease due to lipid rich plaque; Coronary artery disease involving native coronary artery of native heart without angina pectoris; S/P TAVR (transcatheter aortic valve replacement)      chlorthalidone (HYGROTEN) 25 mg tablet TAKE ONE TABLET BY MOUTH EVERY DAY  Qty: 90 Tab, Refills: 3    Associated Diagnoses: Essential hypertension, benign      hydrALAZINE (APRESOLINE) 50 mg tablet Take 1 Tab by mouth two (2) times a day. Qty: 90 Tab, Refills: 3    Associated Diagnoses: Essential hypertension, benign; S/P TAVR (transcatheter aortic valve replacement)      clopidogrel (PLAVIX) 75 mg tab TAKE ONE TABLET BY MOUTH EVERY DAY  Qty: 90 Tab, Refills: 5    Associated Diagnoses: Coronary artery disease due to lipid rich plaque; Essential hypertension, benign; S/P TAVR (transcatheter aortic valve replacement)      !! miscellaneous medical supply misc Compression hose, medium compression, 20-30mm HGm, fitted to size 2 pair. LE edema. Qty: 2 Each, Refills: 0      !! miscellaneous medical supply misc Glucose Test Meter - for glucose, once daily testing. ICD - 10 E11.9  Qty: 1 Each, Refills: 0    Associated Diagnoses: Uncontrolled diabetes mellitus type 2 without complications, unspecified long term insulin use status; Essential hypertension, benign      !! miscellaneous medical supply misc Glucose Test Strips - Use for once or twice daily glucose testing.   Qty: 50 Each, Refills: 11    Associated Diagnoses: Uncontrolled diabetes mellitus type 2 without complications, unspecified long term insulin use status; Essential hypertension, benign      !! miscellaneous medical supply misc Glucose Lancets for once or twice daily glucose testing, icd-10 E11.9  Qty: 50 Each, Refills: 11    Associated Diagnoses: Uncontrolled diabetes mellitus type 2 without complications, unspecified long term insulin use status; Essential hypertension, benign       !! - Potential duplicate medications found. Please discuss with provider. Time spent in patient discharge planning and coordination 35 minutes.     Signed:  Bebe Lemus MD

## 2023-01-08 ENCOUNTER — HOSPITAL ENCOUNTER (INPATIENT)
Age: 88
LOS: 9 days | Discharge: NURSING, REHAB OR LONG-TERM CARE | DRG: 480 | End: 2023-01-17
Attending: EMERGENCY MEDICINE | Admitting: INTERNAL MEDICINE
Payer: MEDICARE

## 2023-01-08 ENCOUNTER — APPOINTMENT (OUTPATIENT)
Dept: CT IMAGING | Age: 88
DRG: 480 | End: 2023-01-08
Payer: MEDICARE

## 2023-01-08 ENCOUNTER — APPOINTMENT (OUTPATIENT)
Dept: GENERAL RADIOLOGY | Age: 88
DRG: 480 | End: 2023-01-08
Payer: MEDICARE

## 2023-01-08 DIAGNOSIS — Z96.649 PERIPROSTHETIC FRACTURE OF SHAFT OF FEMUR: ICD-10-CM
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
## 2023-01-08 DIAGNOSIS — M97.8XXA PERIPROSTHETIC FRACTURE OF SHAFT OF FEMUR: ICD-10-CM

## 2023-01-08 PROBLEM — S72.90XA FEMORAL FRACTURE (HCC): Status: ACTIVE | Noted: 2023-01-08

## 2023-01-08 PROBLEM — F03.90 DEMENTIA (HCC): Status: ACTIVE | Noted: 2023-01-08

## 2023-01-08 PROBLEM — E03.9 HYPOTHYROIDISM: Status: ACTIVE | Noted: 2023-01-08
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
## 2023-01-08 LAB
ALBUMIN SERPL-MCNC: 2.8 G/DL (ref 3.2–4.6)
ALBUMIN/GLOB SERPL: 0.7 (ref 0.4–1.6)
ALP SERPL-CCNC: 95 U/L (ref 50–136)
ALT SERPL-CCNC: 19 U/L (ref 12–65)
ANION GAP SERPL CALC-SCNC: 7 MMOL/L (ref 2–11)
APPEARANCE UR: CLEAR
AST SERPL-CCNC: 23 U/L (ref 15–37)
BACTERIA URNS QL MICRO: ABNORMAL /HPF
BASOPHILS # BLD: 0 K/UL (ref 0–0.2)
BASOPHILS NFR BLD: 0 % (ref 0–2)
BILIRUB SERPL-MCNC: 0.3 MG/DL (ref 0.2–1.1)
BILIRUB UR QL: NEGATIVE
BUN SERPL-MCNC: 24 MG/DL (ref 8–23)
CALCIUM SERPL-MCNC: 8.5 MG/DL (ref 8.3–10.4)
CHLORIDE SERPL-SCNC: 109 MMOL/L (ref 101–110)
CK SERPL-CCNC: 135 U/L (ref 21–215)
CO2 SERPL-SCNC: 25 MMOL/L (ref 21–32)
COLOR UR: ABNORMAL
CREAT SERPL-MCNC: 1.2 MG/DL (ref 0.6–1)
DIFFERENTIAL METHOD BLD: ABNORMAL
EOSINOPHIL # BLD: 0 K/UL (ref 0–0.8)
EOSINOPHIL NFR BLD: 0 % (ref 0.5–7.8)
EPI CELLS #/AREA URNS HPF: ABNORMAL /HPF
ERYTHROCYTE [DISTWIDTH] IN BLOOD BY AUTOMATED COUNT: 13.6 % (ref 11.9–14.6)
GLOBULIN SER CALC-MCNC: 4 G/DL (ref 2.8–4.5)
GLUCOSE SERPL-MCNC: 258 MG/DL (ref 65–100)
GLUCOSE UR STRIP.AUTO-MCNC: NEGATIVE MG/DL
HCT VFR BLD AUTO: 32.6 % (ref 35.8–46.3)
HGB BLD-MCNC: 10.8 G/DL (ref 11.7–15.4)
HGB UR QL STRIP: NEGATIVE
IMM GRANULOCYTES # BLD AUTO: 0.1 K/UL (ref 0–0.5)
IMM GRANULOCYTES NFR BLD AUTO: 0 % (ref 0–5)
INR PPP: 0.9
KETONES UR QL STRIP.AUTO: NEGATIVE MG/DL
LEUKOCYTE ESTERASE UR QL STRIP.AUTO: NEGATIVE
LYMPHOCYTES # BLD: 1.5 K/UL (ref 0.5–4.6)
LYMPHOCYTES NFR BLD: 12 % (ref 13–44)
MAGNESIUM SERPL-MCNC: 1.8 MG/DL (ref 1.8–2.4)
MCH RBC QN AUTO: 29.3 PG (ref 26.1–32.9)
MCHC RBC AUTO-ENTMCNC: 33.1 G/DL (ref 31.4–35)
MCV RBC AUTO: 88.6 FL (ref 82–102)
MONOCYTES # BLD: 0.6 K/UL (ref 0.1–1.3)
MONOCYTES NFR BLD: 5 % (ref 4–12)
NEUTS SEG # BLD: 10.1 K/UL (ref 1.7–8.2)
NEUTS SEG NFR BLD: 83 % (ref 43–78)
NITRITE UR QL STRIP.AUTO: NEGATIVE
NRBC # BLD: 0 K/UL (ref 0–0.2)
PH UR STRIP: 6 (ref 5–9)
PLATELET # BLD AUTO: 208 K/UL (ref 150–450)
PMV BLD AUTO: 10.3 FL (ref 9.4–12.3)
POTASSIUM SERPL-SCNC: 4 MMOL/L (ref 3.5–5.1)
PROT SERPL-MCNC: 6.8 G/DL (ref 6.3–8.2)
PROT UR STRIP-MCNC: 30 MG/DL
PROTHROMBIN TIME: 13 SEC (ref 12.6–14.3)
RBC # BLD AUTO: 3.68 M/UL (ref 4.05–5.2)
SODIUM SERPL-SCNC: 141 MMOL/L (ref 133–143)
SP GR UR REFRACTOMETRY: 1.02 (ref 1–1.02)
TSH W FREE THYROID IF ABNORMAL: 0.86 UIU/ML (ref 0.36–3.74)
UROBILINOGEN UR QL STRIP.AUTO: 0.2 EU/DL (ref 0.2–1)
VIT B12 SERPL-MCNC: 235 PG/ML (ref 193–986)
WBC # BLD AUTO: 12.3 K/UL (ref 4.3–11.1)
WBC URNS QL MICRO: ABNORMAL /HPF

## 2023-01-08 PROCEDURE — 73502 X-RAY EXAM HIP UNI 2-3 VIEWS: CPT

## 2023-01-08 PROCEDURE — 73562 X-RAY EXAM OF KNEE 3: CPT

## 2023-01-08 PROCEDURE — 80053 COMPREHEN METABOLIC PANEL: CPT

## 2023-01-08 PROCEDURE — 71045 X-RAY EXAM CHEST 1 VIEW: CPT

## 2023-01-08 PROCEDURE — 6360000002 HC RX W HCPCS: Performed by: EMERGENCY MEDICINE

## 2023-01-08 PROCEDURE — 96374 THER/PROPH/DIAG INJ IV PUSH: CPT

## 2023-01-08 PROCEDURE — 83735 ASSAY OF MAGNESIUM: CPT

## 2023-01-08 PROCEDURE — 99285 EMERGENCY DEPT VISIT HI MDM: CPT

## 2023-01-08 PROCEDURE — 6360000002 HC RX W HCPCS: Performed by: INTERNAL MEDICINE

## 2023-01-08 PROCEDURE — 85025 COMPLETE CBC W/AUTO DIFF WBC: CPT

## 2023-01-08 PROCEDURE — 82607 VITAMIN B-12: CPT

## 2023-01-08 PROCEDURE — 84443 ASSAY THYROID STIM HORMONE: CPT

## 2023-01-08 PROCEDURE — 1100000000 HC RM PRIVATE

## 2023-01-08 PROCEDURE — 73552 X-RAY EXAM OF FEMUR 2/>: CPT

## 2023-01-08 PROCEDURE — 85610 PROTHROMBIN TIME: CPT

## 2023-01-08 PROCEDURE — 2580000003 HC RX 258: Performed by: INTERNAL MEDICINE

## 2023-01-08 PROCEDURE — 81001 URINALYSIS AUTO W/SCOPE: CPT

## 2023-01-08 PROCEDURE — 82550 ASSAY OF CK (CPK): CPT

## 2023-01-08 PROCEDURE — 6370000000 HC RX 637 (ALT 250 FOR IP): Performed by: FAMILY MEDICINE

## 2023-01-08 PROCEDURE — 72125 CT NECK SPINE W/O DYE: CPT

## 2023-01-08 PROCEDURE — 70450 CT HEAD/BRAIN W/O DYE: CPT

## 2023-01-08 RX ORDER — ACETAMINOPHEN 325 MG/1
650 TABLET ORAL EVERY 6 HOURS PRN
Status: DISCONTINUED | OUTPATIENT
Start: 2023-01-08 | End: 2023-01-17 | Stop reason: HOSPADM

## 2023-01-08 RX ORDER — ACETAMINOPHEN 650 MG/1
650 SUPPOSITORY RECTAL EVERY 6 HOURS PRN
Status: DISCONTINUED | OUTPATIENT
Start: 2023-01-08 | End: 2023-01-17 | Stop reason: HOSPADM

## 2023-01-08 RX ORDER — HYDRALAZINE HYDROCHLORIDE 50 MG/1
50 TABLET, FILM COATED ORAL DAILY
Status: DISCONTINUED | OUTPATIENT
Start: 2023-01-09 | End: 2023-01-12

## 2023-01-08 RX ORDER — MORPHINE SULFATE 2 MG/ML
2 INJECTION, SOLUTION INTRAMUSCULAR; INTRAVENOUS EVERY 4 HOURS PRN
Status: DISCONTINUED | OUTPATIENT
Start: 2023-01-08 | End: 2023-01-10

## 2023-01-08 RX ORDER — CLOPIDOGREL BISULFATE 75 MG/1
75 TABLET ORAL DAILY
Status: DISCONTINUED | OUTPATIENT
Start: 2023-01-08 | End: 2023-01-10

## 2023-01-08 RX ORDER — CHLORTHALIDONE 25 MG/1
25 TABLET ORAL DAILY
Status: DISCONTINUED | OUTPATIENT
Start: 2023-01-08 | End: 2023-01-14

## 2023-01-08 RX ORDER — TRIMETHOPRIM 100 MG/1
100 TABLET ORAL DAILY
Status: DISCONTINUED | OUTPATIENT
Start: 2023-01-09 | End: 2023-01-17 | Stop reason: HOSPADM

## 2023-01-08 RX ORDER — ONDANSETRON 2 MG/ML
4 INJECTION INTRAMUSCULAR; INTRAVENOUS EVERY 6 HOURS PRN
Status: DISCONTINUED | OUTPATIENT
Start: 2023-01-08 | End: 2023-01-11

## 2023-01-08 RX ORDER — ONDANSETRON 4 MG/1
4 TABLET, ORALLY DISINTEGRATING ORAL EVERY 8 HOURS PRN
Status: DISCONTINUED | OUTPATIENT
Start: 2023-01-08 | End: 2023-01-11

## 2023-01-08 RX ORDER — POLYETHYLENE GLYCOL 3350 17 G/17G
17 POWDER, FOR SOLUTION ORAL DAILY PRN
Status: DISCONTINUED | OUTPATIENT
Start: 2023-01-08 | End: 2023-01-17 | Stop reason: HOSPADM

## 2023-01-08 RX ORDER — LANOLIN ALCOHOL/MO/W.PET/CERES
3 CREAM (GRAM) TOPICAL NIGHTLY PRN
Status: DISCONTINUED | OUTPATIENT
Start: 2023-01-08 | End: 2023-01-17 | Stop reason: HOSPADM

## 2023-01-08 RX ORDER — SODIUM CHLORIDE 0.9 % (FLUSH) 0.9 %
5-40 SYRINGE (ML) INJECTION EVERY 12 HOURS SCHEDULED
Status: DISCONTINUED | OUTPATIENT
Start: 2023-01-08 | End: 2023-01-17 | Stop reason: HOSPADM

## 2023-01-08 RX ORDER — OXYCODONE HYDROCHLORIDE 5 MG/1
5 TABLET ORAL EVERY 4 HOURS PRN
Status: DISCONTINUED | OUTPATIENT
Start: 2023-01-08 | End: 2023-01-10

## 2023-01-08 RX ORDER — LISINOPRIL 20 MG/1
20 TABLET ORAL DAILY
Status: DISCONTINUED | OUTPATIENT
Start: 2023-01-09 | End: 2023-01-12

## 2023-01-08 RX ORDER — ENOXAPARIN SODIUM 100 MG/ML
30 INJECTION SUBCUTANEOUS DAILY
Status: DISCONTINUED | OUTPATIENT
Start: 2023-01-09 | End: 2023-01-09

## 2023-01-08 RX ORDER — CARVEDILOL 6.25 MG/1
6.25 TABLET ORAL DAILY
Status: DISCONTINUED | OUTPATIENT
Start: 2023-01-09 | End: 2023-01-17 | Stop reason: HOSPADM

## 2023-01-08 RX ORDER — SODIUM CHLORIDE 9 MG/ML
INJECTION, SOLUTION INTRAVENOUS PRN
Status: DISCONTINUED | OUTPATIENT
Start: 2023-01-08 | End: 2023-01-17 | Stop reason: HOSPADM

## 2023-01-08 RX ORDER — SODIUM CHLORIDE, SODIUM LACTATE, POTASSIUM CHLORIDE, CALCIUM CHLORIDE 600; 310; 30; 20 MG/100ML; MG/100ML; MG/100ML; MG/100ML
INJECTION, SOLUTION INTRAVENOUS CONTINUOUS
Status: DISCONTINUED | OUTPATIENT
Start: 2023-01-08 | End: 2023-01-13

## 2023-01-08 RX ORDER — SODIUM CHLORIDE 0.9 % (FLUSH) 0.9 %
5-40 SYRINGE (ML) INJECTION PRN
Status: DISCONTINUED | OUTPATIENT
Start: 2023-01-08 | End: 2023-01-17 | Stop reason: HOSPADM

## 2023-01-08 RX ORDER — LEVOTHYROXINE SODIUM 0.05 MG/1
50 TABLET ORAL DAILY
Status: DISCONTINUED | OUTPATIENT
Start: 2023-01-09 | End: 2023-01-17 | Stop reason: HOSPADM

## 2023-01-08 RX ORDER — MORPHINE SULFATE 4 MG/ML
2 INJECTION INTRAVENOUS ONCE
Status: COMPLETED | OUTPATIENT
Start: 2023-01-08 | End: 2023-01-08

## 2023-01-08 RX ADMIN — Medication 3 MG: at 20:15

## 2023-01-08 RX ADMIN — SODIUM CHLORIDE, PRESERVATIVE FREE 5 ML: 5 INJECTION INTRAVENOUS at 19:42

## 2023-01-08 RX ADMIN — SODIUM CHLORIDE, SODIUM LACTATE, POTASSIUM CHLORIDE, AND CALCIUM CHLORIDE: 600; 310; 30; 20 INJECTION, SOLUTION INTRAVENOUS at 18:54

## 2023-01-08 RX ADMIN — MORPHINE SULFATE 2 MG: 2 INJECTION, SOLUTION INTRAMUSCULAR; INTRAVENOUS at 18:53

## 2023-01-08 RX ADMIN — MORPHINE SULFATE 2 MG: 4 INJECTION INTRAVENOUS at 11:16

## 2023-01-08 ASSESSMENT — PAIN DESCRIPTION - ORIENTATION
ORIENTATION: RIGHT
ORIENTATION: RIGHT

## 2023-01-08 ASSESSMENT — PAIN DESCRIPTION - LOCATION
LOCATION: HIP
LOCATION: HIP
LOCATION: HIP;KNEE

## 2023-01-08 ASSESSMENT — PAIN SCALES - GENERAL
PAINLEVEL_OUTOF10: 7
PAINLEVEL_OUTOF10: 8
PAINLEVEL_OUTOF10: 7

## 2023-01-08 ASSESSMENT — ENCOUNTER SYMPTOMS
GASTROINTESTINAL NEGATIVE: 1
RESPIRATORY NEGATIVE: 1

## 2023-01-08 ASSESSMENT — PAIN DESCRIPTION - DESCRIPTORS: DESCRIPTORS: ACHING

## 2023-01-08 ASSESSMENT — PAIN - FUNCTIONAL ASSESSMENT: PAIN_FUNCTIONAL_ASSESSMENT: 0-10

## 2023-01-08 NOTE — ED TRIAGE NOTES
Patient arrives to ED via EMS from home. Patient had a fall and son found patient on the floor. Patient unsure why she fell. Patient complains of pain to right hip and right knee. Unknown if she hit head. Unknown of LOC. Patient is on Plavix.

## 2023-01-08 NOTE — ED PROVIDER NOTES
Emergency Department Provider Note                   PCP:                Michael Mckeon MD               Age: 80 y.o. Sex: female       ICD-10-CM    1. Fall, initial encounter  [address] XXXA       2. Periprosthetic fracture of shaft of femur  [id number] Peralta     Y58.760           DISPOSITION Admitted 01/08/2023 11:55:51 AM        Medical Decision Making  63-year-old  female presented emergency department after a fall. Patient with a periprosthetic left femur fracture. Discussed with orthopedics and with hospitalist.  Patient will be admitted to the hospitalist and Ortho trauma will see patient tomorrow with plans for operating. Discussed with patient and her family concerning all of the findings. They expressed understanding and are agreeable to the plan. Amount and/or Complexity of Data Reviewed  Labs: ordered. Decision-making details documented in ED Course. Radiology: ordered and independent interpretation performed. Decision-making details documented in ED Course. ECG/medicine tests: ordered and independent interpretation performed. Decision-making details documented in ED Course. Discussion of management or test interpretation with external provider(s): Discussed with orthopedics. Shared images via [employer]. Recommended admission to hospitalist and patient will be seen by trauma with plans for operating. Risk  Prescription drug management. Decision regarding hospitalization. Complexity of Problem: 1 stable, acute illness. (3)                   ED Course as of 01/08/23 1209   Sun Jan 08, 2023   1019 ===================================  ED EKG Interpretation  EKG was interpreted in the absence of a cardiologist.    Rate: 75  EKG Interpretation: Normal sinus rhythm. Normal NC interval.  Slightly prolonged QT interval.  ST Segments: Nonspecific ST segments - NO STEMI    Seamus Bowden MD 10:20 AM    [JL]   2885 Independently reviewed patient's x-rays.   Patient with right distal femur periprosthetic fracture. Discussed with orthopedics via [employer] and sent images via [employer] for them to evaluate. Recommended patient be admitted to the hospitalist and trauma will see patient in the morning for possible operation tomorrow depending on Plavix status. [JL]   5559 Patient's labs reassuring. Patient's creatinine improved from baseline. [JL]      ED Course User Index  [JL] Amy Sifuentes MD        Orders Placed This Encounter   Procedures    XR HIP RIGHT (2-3 VIEWS)    XR KNEE RIGHT (3 VIEWS)    CT HEAD WO CONTRAST    CT CERVICAL SPINE WO CONTRAST    XR FEMUR RIGHT (MIN 2 VIEWS)    XR CHEST PORTABLE    CBC with Auto Differential    Comprehensive Metabolic Panel    CK    Protime-INR    Magnesium    Urinalysis w rflx microscopic    ADULT DIET;  Regular    POCT Urine Dipstick    Vital signs per unit routine    Up as tolerated    Full code    Initiate Oxygen Therapy Protocol    EKG 12 Lead    Saline lock IV    ADMIT TO INPATIENT        Medications   sodium chloride flush 0.9 % injection 5-40 mL (has no administration in time range)   sodium chloride flush 0.9 % injection 5-40 mL (has no administration in time range)   0.9 % sodium chloride infusion (has no administration in time range)   enoxaparin Sodium (LOVENOX) injection 30 mg (has no administration in time range)   ondansetron (ZOFRAN-ODT) disintegrating tablet 4 mg (has no administration in time range)     Or   ondansetron (ZOFRAN) injection 4 mg (has no administration in time range)   polyethylene glycol (GLYCOLAX) packet 17 g (has no administration in time range)   acetaminophen (TYLENOL) tablet 650 mg (has no administration in time range)     Or   acetaminophen (TYLENOL) suppository 650 mg (has no administration in time range)   morphine injection 2 mg (2 mg IntraVENous Given 1/8/23 1116)       New Prescriptions    No medications on file        Messi García is a 80 y.o. female who presents to the Emergency Department with chief complaint of    Chief Complaint   Patient presents with    Fall      77-year-old  female with history of type 2 diabetes, hypertension, CKD, hyperlipidemia presented to the emergency department via EMS after sustaining a fall sometime in the middle of the night. Patient states that she tripped and fell. She is unsure if she hit her head but does not think that she lost consciousness. Patient was unable to get up secondary to right hip and knee pain. Patient with previous total hip and total knee replacements on the right lower extremity. Patient is unable to ambulate secondary to pain. Patient states that she felt fine prior to her fall. She denies any other injuries or symptoms. She denies any fevers, chills, chest pain, shortness of breath, abdominal pain or any other concerns. The history is provided by the patient, the EMS personnel and medical records. Review of Systems   Constitutional: Negative. HENT: Negative. Respiratory: Negative. Cardiovascular: Negative. Gastrointestinal: Negative. Genitourinary: Negative. Musculoskeletal:  Positive for arthralgias and gait problem. Skin: Negative. Psychiatric/Behavioral: Negative. All other systems reviewed and are negative.     Past Medical History:   Diagnosis Date    Aortic regurgitation     mild to moderate per ECHO 3/28/17    Aortic stenosis     severe per ECHO 3/28/17    Basal cell carcinoma     Bruit (arterial)     CAD (coronary artery disease)     small vessel disease, medical management per cardiologist not 4/17    Choledocholithiasis     Hyperlipidemia     Hypertension     Hypothyroidism     Murmur, cardiac     Poor historian     S/P TAVR (transcatheter aortic valve replacement)     3/17    Tricuspid valve regurgitation     moderate per ECHO 3/28/17    Type II or unspecified type diabetes mellitus without mention of complication, not stated as uncontrolled     not medicated, treated w/ diet    Urinary incontinence         Past Surgical History:   Procedure Laterality Date    APPENDECTOMY      CARPAL TUNNEL RELEASE      patient denies    CYSTOCELE REPAIR      ERCP  04/28/2017    laser    ERCP  02/20/2017    large CBD stones    HYSTERECTOMY      KNEE ARTHROSCOPY Right     LAP,CHOLECYSTECTOMY      OOPHORECTOMY      Partial    ORTHOPEDIC SURGERY      2 back-one fusion-low; all fingers-trigger    OTHER SURGICAL HISTORY Right     basal cell nose    OTHER SURGICAL HISTORY  03/28/2017    Tavr    RECTOCELE REPAIR          Family History   Problem Relation Age of Onset    Heart Attack Father         age 68    Heart Disease Father     Osteoarthritis Mother     Diabetes Mother         Social History     Socioeconomic History    Marital status:    Tobacco Use    Smoking status: Never    Smokeless tobacco: Never   Substance and Sexual Activity    Alcohol use: No    Drug use: No         Aspirin, Lorazepam, Methocarbamol, Amlodipine, Ciprofloxacin, Clorazepate dipotassium, Cyclobenzaprine, Ezetimibe-simvastatin, Ranitidine hcl, Sucralfate, Sulfamethoxazole-trimethoprim, and Naproxen sodium     Previous Medications    AZITHROMYCIN (ZITHROMAX) 500 MG TABLET    Take 500 mg by mouth daily    BENAZEPRIL (LOTENSIN) 20 MG TABLET    Take 20 mg by mouth daily    CARBAMIDE PEROXIDE (DEBROX) 6.5 % OTIC SOLUTION    Place 10 drops in ear(s) 2 times daily    CARVEDILOL (COREG) 6.25 MG TABLET    TAKE ONE TABLET BY MOUTH TWICE A DAY (WITH MEALS) . `    CHLORTHALIDONE (HYGROTON) 25 MG TABLET    Take 25 mg by mouth daily    CLOPIDOGREL (PLAVIX) 75 MG TABLET    Take 75 mg by mouth daily    HYDRALAZINE (APRESOLINE) 50 MG TABLET    Take 50 mg by mouth 2 times daily    LEVOTHYROXINE (SYNTHROID) 100 MCG TABLET    TAKE ONE TABLET BY MOUTH EVERY DAY BEFORE BREAKFAST    LEVOTHYROXINE (SYNTHROID) 50 MCG TABLET    Take 50 mcg by mouth every morning (before breakfast)    TRIMETHOPRIM (TRIMPEX) 100 MG TABLET    TAKE ONE TABLET BY MOUTH EVERY DAY FOR SUPPRESSION        Vitals signs and nursing note reviewed. Patient Vitals for the past 4 hrs:   Temp Pulse Resp BP SpO2   01/08/23 1007 97.2 °F (36.2 °C) 74 16 129/66 96 %          Physical Exam  Vitals and nursing note reviewed. Constitutional:       General: She is not in acute distress. Appearance: Normal appearance. She is not ill-appearing or toxic-appearing. HENT:      Head: Normocephalic and atraumatic. Mouth/Throat:      Mouth: Mucous membranes are moist.   Eyes:      Extraocular Movements: Extraocular movements intact. Pupils: Pupils are equal, round, and reactive to light. Cardiovascular:      Rate and Rhythm: Normal rate and regular rhythm. Pulses: Normal pulses. Heart sounds: Normal heart sounds. Pulmonary:      Effort: Pulmonary effort is normal.      Breath sounds: Normal breath sounds. Abdominal:      Palpations: Abdomen is soft. Tenderness: There is no abdominal tenderness. There is no guarding or rebound. Musculoskeletal:         General: Swelling, tenderness and signs of injury present. Cervical back: Normal range of motion. Comments: Tenderness and swelling to the right distal femur with restriction of range of motion at knee and hip secondary to pain. Skin:     General: Skin is warm and dry. Neurological:      General: No focal deficit present. Mental Status: She is alert and oriented to person, place, and time. Psychiatric:         Mood and Affect: Mood normal.         Behavior: Behavior normal.         Thought Content: Thought content normal.         Judgment: Judgment normal.          Results for orders placed or performed during the hospital encounter of 01/08/23   XR HIP RIGHT (2-3 VIEWS)    Narrative    Right hip series    HISTORY: fall    3 views were obtained including an AP view of the pelvis. COMPARISON: 08/24/2017    FINDINGS: A right trochanteric fixation nail is present.  There is no evidence of  acute fracture or dislocation. There is a remote appearing right inferior pubic  ramus fracture which has occurred in the interim. Heterotopic bone extends  superior from the greater trochanter. There is no evidence of acute fracture or  dislocation. The joint space is well-preserved. There is no bony destruction. Atherosclerotic changes are present. Impression    Postoperative changes without evidence of acute bony abnormality. XR KNEE RIGHT (3 VIEWS)    Narrative    Right knee series    HISTORY: Pain after fall    2 views were obtained. Comparison: 12/20/2018    Findings: There is a spiral type fracture of the distal femoral metadiaphysis. There is mild displacement. Am intramedullary karin is present within the femur as  well as a right total knee arthroplasty. There is anatomic alignment at the  knee. There is no joint effusion. Impression    Spiral type fracture of the distal femoral metadiaphysis with mild  displacement. CT HEAD WO CONTRAST    Narrative    CT head and cervical spine without contrast    HISTORY: Fall, patient found on floor. TECHNIQUE: 5 mm axial images were obtained from the skull base to the vertex  without intravenous contrast. Helically acquired images were obtained spine  reconstructed at 2.5 mm thickness. Sagittal and coronal reformatted images were  submitted. All CT scans at this facility are performed using dose optimization technique as  appropriate to a performed exam, to include on automated exposure control,  adjustment of the mA and/or KV according to patient's size (including  appropriate matching for site-specific examinations), or use of iterative  reconstruction technique. COMPARISON: None    CT head without contrast:    Findings: The ventricles and sulci are prominent but felt to be appropriate for  age. There are no extra-axial fluid collections. No evidence of acute  intracranial hemorrhage or mass effect is identified.  There is no evidence to  suggest an acute major territorial infarct. Patchy areas of decreased  attenuation are present within the supratentorial white matter. These are  nonspecific findings but would be most compatible with moderate chronic small  vessel ischemic change. Encephalomalacia changes are present within the left  occipital lobe as well as small remote left cerebellar infarctions. There is a  remote appearing lacunar infarction within the left thalamus. The bony calvarium is intact. The visualized mastoid air cells and paranasal  sinuses are well pneumatized and aerated. CT cervical spine without contrast:    Findings: Vertebral body heights are well-maintained. There is slight  retrolisthesis of C5. There is moderate disc space narrowing at C5-6. There is  no evidence of acute fracture. There is moderately advanced multilevel facet  arthropathy. The prevertebral soft tissues are unremarkable. Carotid  atherosclerotic changes are present at the bifurcations. Impression    1. No evidence of acute intracranial hemorrhage. 2. No evidence of acute cervical spine fracture. 3. Cervical spondylosis, felt to account for the slight retrolisthesis of C5.  4. 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 CT CERVICAL SPINE WO CONTRAST    Narrative    CT head and cervical spine without contrast    HISTORY: Fall, patient found on floor. TECHNIQUE: 5 mm axial images were obtained from the skull base to the vertex  without intravenous contrast. Helically acquired images were obtained spine  reconstructed at 2.5 mm thickness. Sagittal and coronal reformatted images were  submitted.     All CT scans at this facility are performed using dose optimization technique as  appropriate to a performed exam, to include on automated exposure control,  adjustment of the mA and/or KV according to patient's size (including  appropriate matching for site-specific examinations), or use of iterative  reconstruction technique. COMPARISON: None    CT head without contrast:    Findings: The ventricles and sulci are prominent but felt to be appropriate for  age. There are no extra-axial fluid collections. No evidence of acute  intracranial hemorrhage or mass effect is identified. There is no evidence to  suggest an acute major territorial infarct. Patchy areas of decreased  attenuation are present within the supratentorial white matter. These are  nonspecific findings but would be most compatible with moderate chronic small  vessel ischemic change. Encephalomalacia changes are present within the left  occipital lobe as well as small remote left cerebellar infarctions. There is a  remote appearing lacunar infarction within the left thalamus. The bony calvarium is intact. The visualized mastoid air cells and paranasal  sinuses are well pneumatized and aerated. CT cervical spine without contrast:    Findings: Vertebral body heights are well-maintained. There is slight  retrolisthesis of C5. There is moderate disc space narrowing at C5-6. There is  no evidence of acute fracture. There is moderately advanced multilevel facet  arthropathy. The prevertebral soft tissues are unremarkable. Carotid  atherosclerotic changes are present at the bifurcations. Impression    1. No evidence of acute intracranial hemorrhage. 2. No evidence of acute cervical spine fracture. 3. Cervical spondylosis, felt to account for the slight retrolisthesis of C5.  4. 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 XR FEMUR RIGHT (MIN 2 VIEWS)    Narrative    Right femur series: 01/08/2023    HISTORY: Pain after fall    4 views were obtained. Comparison: 07/27/2017    Findings: There is a spiral type fracture involving the distal femoral  metadiaphysis. There is mild displacement. A trochanteric fixation nail is  present as well as a right total knee arthroplasty.  The soft tissues are  unremarkable. There is no bony destruction or other abnormality. Vascular  calcifications are present. Impression    Spiral type fracture of the distal femoral metadiaphysis. XR CHEST PORTABLE    Narrative    Portable chest:     History: Patient found on floor, preoperative assessment    Comparison: 03/22/2018    Findings: A single view of the chest was obtained at 10:26 AM. There are  postoperative changes of TAVR. The cardiac silhouette is at the upper limits of normal. The lungs and pleural  spaces are clear. The pulmonary vascularity is within normal limits. Impression    Unremarkable portable chest radiograph.              CBC with Auto Differential   Result Value Ref Range    WBC 12.3 (H) 4.3 - 11.1 K/uL    RBC 3.68 (L) 4.05 - 5.2 M/uL    Hemoglobin 10.8 (L) 11.7 - 15.4 g/dL    Hematocrit 32.6 (L) 35.8 - 46.3 %    MCV 88.6 82 - 102 FL    MCH 29.3 26.1 - 32.9 PG    MCHC 33.1 31.4 - 35.0 g/dL    RDW 13.6 11.9 - 14.6 %    Platelets 984 009 - 960 K/uL    MPV 10.3 9.4 - 12.3 FL    nRBC 0.00 0.0 - 0.2 K/uL    Differential Type AUTOMATED      Seg Neutrophils 83 (H) 43 - 78 %    Lymphocytes 12 (L) 13 - 44 %    Monocytes 5 4.0 - 12.0 %    Eosinophils % 0 (L) 0.5 - 7.8 %    Basophils 0 0.0 - 2.0 %    Immature Granulocytes 0 0.0 - 5.0 %    Segs Absolute 10.1 (H) 1.7 - 8.2 K/UL    Absolute Lymph # 1.5 0.5 - 4.6 K/UL    Absolute Mono # 0.6 0.1 - 1.3 K/UL    Absolute Eos # 0.0 0.0 - 0.8 K/UL    Basophils Absolute 0.0 0.0 - 0.2 K/UL    Absolute Immature Granulocyte 0.1 0.0 - 0.5 K/UL   Comprehensive Metabolic Panel   Result Value Ref Range    Sodium 141 133 - 143 mmol/L    Potassium 4.0 3.5 - 5.1 mmol/L    Chloride 109 101 - 110 mmol/L    CO2 25 21 - 32 mmol/L    Anion Gap 7 2 - 11 mmol/L    Glucose 258 (H) 65 - 100 mg/dL    BUN 24 (H) 8 - 23 MG/DL    Creatinine 1.20 (H) 0.6 - 1.0 MG/DL    Est, Glom Filt Rate 42 (L) >60 ml/min/1.73m2    Calcium 8.5 8.3 - 10.4 MG/DL    Total Bilirubin 0.3 0.2 - 1.1 MG/DL    ALT 19 12 - 65 U/L    AST 23 15 - 37 U/L    Alk Phosphatase 95 50 - 136 U/L    Total Protein 6.8 6.3 - 8.2 g/dL    Albumin 2.8 (L) 3.2 - 4.6 g/dL    Globulin 4.0 2.8 - 4.5 g/dL    Albumin/Globulin Ratio 0.7 0.4 - 1.6     CK   Result Value Ref Range    Total  21 - 215 U/L   Protime-INR   Result Value Ref Range    Protime 13.0 12.6 - 14.3 sec    INR 0.9     Magnesium   Result Value Ref Range    Magnesium 1.8 1.8 - 2.4 mg/dL   Urinalysis w rflx microscopic   Result Value Ref Range    Color, UA YELLOW/STRAW      Appearance CLEAR      Specific Gravity, UA 1.018 1.001 - 1.023      pH, Urine 6.0 5.0 - 9.0      Protein, UA 30 (A) NEG mg/dL    Glucose, UA Negative mg/dL    Ketones, Urine Negative NEG mg/dL    Bilirubin Urine Negative NEG      Blood, Urine Negative NEG      Urobilinogen, Urine 0.2 0.2 - 1.0 EU/dL    Nitrite, Urine Negative NEG      Leukocyte Esterase, Urine Negative NEG          XR HIP RIGHT (2-3 VIEWS)   Final Result   Postoperative changes without evidence of acute bony abnormality. XR KNEE RIGHT (3 VIEWS)   Final Result   Spiral type fracture of the distal femoral metadiaphysis with mild   displacement. XR FEMUR RIGHT (MIN 2 VIEWS)   Final Result   Spiral type fracture of the distal femoral metadiaphysis. XR CHEST PORTABLE   Final Result   Unremarkable portable chest radiograph. CT HEAD WO CONTRAST   Final Result   1. No evidence of acute intracranial hemorrhage. 2. No evidence of acute cervical spine fracture. 3. Cervical spondylosis, felt to account for the slight retrolisthesis of C5.   4. 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 CT CERVICAL SPINE WO CONTRAST   Final Result   1. No evidence of acute intracranial hemorrhage. 2. No evidence of acute cervical spine fracture.    3. Cervical spondylosis, felt to account for the slight retrolisthesis of C5.   4. 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 Voice dictation software was used during the making of this note. This software is not perfect and grammatical and other typographical errors may be present. This note has not been completely proofread for errors.        Yasmeen Milton MD  01/08/23 3536

## 2023-01-08 NOTE — H&P
Admit date: 2023   Name:  Sacha Koenig   Age:  80 y.o.   :  3/20/1929   MRN:  [id number]   PCP:  Kamlesh Perez MD   Provider:  Mitchell Sampson MD      ASSESSMENT AND PLAN  70-year-old female with a past medical history of hypertension, dementia, aortic stenosis status post TAVR, CKD stage III, coronary artery disease, hyperlipidemia, the presented in the setting of a fall    1. Right femoral fracture likely in the setting of mechanical fall  -Pain management  -Orthopedic surgery consult  -N.p.o. after midnight for possible intervention    2. Encephalopathy likely in the setting of progression of her dementia  -CT of the brain without acute intracranial abnormalities  -No signs of UTI or pneumonia  -Obtain ammonia, TSH, B12  -Monitor mental status  -Avoid sedatives  -Delirium precautions    3. CKD stage III  -Avoid nephrotoxic agents  -Monitor renal function    4. Coronary artery disease  -Hold Plavix in the setting of possible surgical intervention  -Not on statin    5. Hypertension  -Continue carvedilol, ACE inhibitor, carvedilol, hydralazine  -Hold chlorthalidone for now    6. Hypothyroidism  Continue Synthroid    DVT prophylaxis with Lovenox    DNR/DNI    Patient and family aware of plan          CHIEF COMPLAINT:  Fall      HISTORY OF PRESENT ILLNESS:  70-year-old female with a past medical history of hypertension, dementia, aortic stenosis status post TAVR, CKD stage III, coronary artery disease, hyperlipidemia, the presented in the setting of a fall. Most of the history obtained by family at bedside since patient unable to provide any detailed history. According to family the patient has been presenting with increasing confusion for the past few months. She was found on the floor by her caretaker this morning, so the patient was brought into the emergency department.   Otherwise she has not been complaining of any chest pain, no shortness of breath, no abdominal pain, no nausea or vomiting. In the emergency department the patient was found to be hemodynamically stable. Radiologic findings of right distal femoral fracture. She will be admitted to medical floors for further management. Past Medical History:   Diagnosis Date    Aortic regurgitation     mild to moderate per ECHO 3/28/17    Aortic stenosis     severe per ECHO 3/28/17    Basal cell carcinoma     Bruit (arterial)     CAD (coronary artery disease)     small vessel disease, medical management per cardiologist not 4/17    Choledocholithiasis     Hyperlipidemia     Hypertension     Hypothyroidism     Murmur, cardiac     Poor historian     S/P TAVR (transcatheter aortic valve replacement)     3/17    Tricuspid valve regurgitation     moderate per ECHO 3/28/17    Type II or unspecified type diabetes mellitus without mention of complication, not stated as uncontrolled     not medicated, treated w/ diet    Urinary incontinence        Past Surgical History:   Procedure Laterality Date    APPENDECTOMY      CARPAL TUNNEL RELEASE      patient denies    CYSTOCELE REPAIR      ERCP  04/28/2017    laser    ERCP  02/20/2017    large CBD stones    HYSTERECTOMY      KNEE ARTHROSCOPY Right     LAP,CHOLECYSTECTOMY      OOPHORECTOMY      Partial    ORTHOPEDIC SURGERY      2 back-one fusion-low; all fingers-trigger    OTHER SURGICAL HISTORY Right     basal cell nose    OTHER SURGICAL HISTORY  03/28/2017    Tavr    RECTOCELE REPAIR            HOME MEDICATION:  Prior to Admission medications    Medication Sig Start Date End Date Taking?  Authorizing Provider   azithromycin (ZITHROMAX) 500 MG tablet Take 500 mg by mouth daily 1/10/22   Ar Automatic Reconciliation   benazepril (LOTENSIN) 20 MG tablet Take 20 mg by mouth daily 11/30/21   Ar Automatic Reconciliation   carbamide peroxide (DEBROX) 6.5 % otic solution Place 10 drops in ear(s) 2 times daily 11/30/21   Ar Automatic Reconciliation   carvedilol (COREG) 6.25 MG tablet TAKE ONE TABLET BY MOUTH TWICE A DAY (WITH MEALS) . ` 11/30/21   Ar Automatic Reconciliation   chlorthalidone (HYGROTON) 25 MG tablet Take 25 mg by mouth daily 8/19/21   Ar Automatic Reconciliation   clopidogrel (PLAVIX) 75 MG tablet Take 75 mg by mouth daily 8/19/21   Ar Automatic Reconciliation   hydrALAZINE (APRESOLINE) 50 MG tablet Take 50 mg by mouth 2 times daily 11/30/21   Ar Automatic Reconciliation   levothyroxine (SYNTHROID) 100 MCG tablet TAKE ONE TABLET BY MOUTH EVERY DAY BEFORE BREAKFAST 11/30/21   Ar Automatic Reconciliation   levothyroxine (SYNTHROID) 50 MCG tablet Take 50 mcg by mouth every morning (before breakfast) 11/6/20   Ar Automatic Reconciliation   trimethoprim (TRIMPEX) 100 MG tablet TAKE ONE TABLET BY MOUTH EVERY DAY FOR SUPPRESSION 8/9/21   Ar Automatic Reconciliation         REVIEW OF SYSTEMS:  14 ROS negative except from stated on HPI      Social History     Tobacco Use    Smoking status: Never    Smokeless tobacco: Never   Substance Use Topics    Alcohol use: No    Drug use: No         Family History   Problem Relation Age of Onset    Heart Attack Father         age 68    Heart Disease Father     Osteoarthritis Mother     Diabetes Mother          Allergies   Allergen Reactions    Aspirin Anaphylaxis     Hives, swelling    Lorazepam Other (See Comments)     Confused and aggressive    Methocarbamol Anaphylaxis     Swelling of tongue, wheezing  Muscle relaxers in general    Amlodipine Other (See Comments)     Edema    Ciprofloxacin Diarrhea     severe    Clorazepate Dipotassium Hives    Cyclobenzaprine Itching    Ezetimibe-Simvastatin Other (See Comments)     Muscle soreness    Ranitidine Hcl Itching    Sucralfate Hives    Sulfamethoxazole-Trimethoprim Other (See Comments)    Naproxen Sodium Rash         Vitals:    01/08/23 1007   BP: 129/66   Pulse: 74   Resp: 16   Temp: 97.2 °F (36.2 °C)   TempSrc: Axillary   SpO2: 96%   Weight: 154 lb (69.9 kg)   Height: 5' 4\" (1.626 m)         PHYSICAL EXAM:  General: Alert, confused, NAD  HEENT: NC/AT, EOM are intact  Neck: supple, no JVD  Cardiovascular: RRR, S1, S2, no murmurs  Respiratory: Lungs are clear, no wheezes or rales  Abdomen: Soft, NT, ND  Back: No CVA tenderness, no paraspinal tenderness  Extremities: LE without pedal edema, no erythema  Neuro: CN are intact, no focal deficits  Skin: no rash or ulcers      I have personally reviewed patients laboratory data showing  Lab Results   Component Value Date    WBC 12.3 (H) 01/08/2023    HGB 10.8 (L) 01/08/2023    HCT 32.6 (L) 01/08/2023    MCV 88.6 01/08/2023     01/08/2023     Lab Results   Component Value Date    CKTOTAL 135 01/08/2023     Lab Results   Component Value Date    ANIONGAP 7 01/08/2023    CALCIUM 8.5 01/08/2023     01/08/2023    K 4.0 01/08/2023    CO2 25 01/08/2023     01/08/2023    BUN 24 (H) 01/08/2023    CREATININE 1.20 (H) 01/08/2023         I have personally reviewed patients EKG showing  Sinus rhythm      I have personally reviewed patients imaging showing  XR HIP RIGHT (2-3 VIEWS)   Final Result   Postoperative changes without evidence of acute bony abnormality. XR KNEE RIGHT (3 VIEWS)   Final Result   Spiral type fracture of the distal femoral metadiaphysis with mild   displacement. XR FEMUR RIGHT (MIN 2 VIEWS)   Final Result   Spiral type fracture of the distal femoral metadiaphysis. XR CHEST PORTABLE   Final Result   Unremarkable portable chest radiograph. CT HEAD WO CONTRAST   Final Result   1. No evidence of acute intracranial hemorrhage. 2. No evidence of acute cervical spine fracture. 3. Cervical spondylosis, felt to account for the slight retrolisthesis of C5.   4. 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 CT CERVICAL SPINE WO CONTRAST   Final Result   1. No evidence of acute intracranial hemorrhage. 2. No evidence of acute cervical spine fracture.    3. Cervical spondylosis, felt to account for the slight retrolisthesis of C5.   4. 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                  Likely length of stay more than 2 midnights

## 2023-01-08 NOTE — H&P
Medical Student History & Physical   Admit Date:  2023 10:05 AM   Name:  Alyce Schneider   Age:  80 y.o. Sex:  female  :  3/20/1929   MRN:  [id number]   Room:  [place] [address]    Presenting Complaint: Fall    Reason(s) for Admission: Fall from ground level [facility]     History of Present Illness:   Alyce Schneider is a 80 y.o. female with medical history of TIIDM, HTN, Aortic Stenosis s/p TAVR (2017), CKD IV, CAD, and HLD who presented with an acute fall last night. The history is obtained from her daughter as the patient is altered mentally. Since 23 that daughter states that the patient has had increased confusion. The patient did not exhibit any unilateral weakness, facial droop, or slurring of words. Last night the patient was put to bed at normal time. The patient's caretaker came in the morning as usual around 0800 and found the patient on her back on her bedroom floor. Patient's bedside toilet was used and therefore the daughter believes the patient fell while using the bathroom. Patient was unable to get up and ambulate, and had significant R hip and knee pain. ER workup notable for BUN 24, Cr 1.2 (baseline 1.6), glucose of 258, WBC 12.3K, Hgb 10.8, Hct 32.6, UA non infectious. XR femur shows a R spiral fracture of distal metadiaphysis. The patient has intramedullary karin fixation of R femur with TKA. CT did not show intracranial bleed or acute stroke. CT cervical spine was negative for any fracture. Assessment & Plan:       Closed Non-Comminuted Spiral Fracture of Right Femur due to Ground Level Fall  -Patient has intramedullary karin fixation and total RKA. -Orthopedic Surgery will see her AM 23  -Narcotics and analgesics ordered    Altered Mental Status  -unclear etiology;  Family states patient was confused before her fall  -WBC 12.3K with ANC 10.1  -UA non infectious; CXR negative for acute process  -Will order B12, ammonia, and TSH  -Consider MRI for further workup    Leukocytosis  -See above    Type II Diabetes Mellitus with Hyperglycemia  -Glucose 258; last A1C 6.3 (Nov 2021)  -Not on insulin at home    Coronary Artery Disease  -Currently on plavix and statin    Aortic Stenosis s/p TAVR  -Currently on plavix and trimethoprim for endocarditis prophylaxis    Hypertension  -/71  -Continue home dose carvedilol, hydralazine, chlorthalidone, and lontensin    Hypothyroidism  -Continue levothyroxine    Diet: NPO  VTE ppx: SCDs  Code Status: Pending    Hospital Problems:  Principal Problem:    Fall from ground level  Resolved Problems:    * No resolved hospital problems.  *      Past History:     Past Medical History:   Diagnosis Date    Aortic regurgitation     mild to moderate per ECHO 3/28/17    Aortic stenosis     severe per ECHO 3/28/17    Basal cell carcinoma     Bruit (arterial)     CAD (coronary artery disease)     small vessel disease, medical management per cardiologist not 4/17    Choledocholithiasis     Hyperlipidemia     Hypertension     Hypothyroidism     Murmur, cardiac     Poor historian     S/P TAVR (transcatheter aortic valve replacement)     3/17    Tricuspid valve regurgitation     moderate per ECHO 3/28/17    Type II or unspecified type diabetes mellitus without mention of complication, not stated as uncontrolled     not medicated, treated w/ diet    Urinary incontinence        Past Surgical History:   Procedure Laterality Date    APPENDECTOMY      CARPAL TUNNEL RELEASE      patient denies    CYSTOCELE REPAIR      ERCP  04/28/2017    laser    ERCP  02/20/2017    large CBD stones    HYSTERECTOMY      KNEE ARTHROSCOPY Right     LAP,CHOLECYSTECTOMY      OOPHORECTOMY      Partial    ORTHOPEDIC SURGERY      2 back-one fusion-low; all fingers-trigger    OTHER SURGICAL HISTORY Right     basal cell nose    OTHER SURGICAL HISTORY  03/28/2017    Tavr    RECTOCELE REPAIR          Social History     Tobacco Use    Smoking status: Never    Smokeless tobacco: Never   Substance Use Topics    Alcohol use: No      Social History     Substance and Sexual Activity   Drug Use No       Family History   Problem Relation Age of Onset    Heart Attack Father         age 77    Heart Disease Father     Osteoarthritis Mother     Diabetes Mother         Immunization History   Administered Date(s) Administered    COVID-19, MODERNA BLUE border, Primary or Immunocompromised, (age 12y+), IM, 100 mcg/0.5mL 01/16/2021, 02/13/2021    Influenza, FLUARIX, FLULAVAL, FLUZONE (age 6 mo+) AND AFLURIA, (age 3 y+), PF, 0.5mL 11/02/2018, 10/22/2020    Influenza, FLUCELVAX, (age 6 mo+), MDCK, PF, 0.5mL 09/11/2017    PPD Test 04/30/2017, 07/08/2017, 03/23/2018    Pneumococcal Conjugate 13-valent ([id number]) 09/29/2015    Pneumococcal Polysaccharide ([id number]) 09/11/2017     Allergies   Allergen Reactions    Aspirin Anaphylaxis     Hives, swelling    Lorazepam Other (See Comments)     Confused and aggressive    Methocarbamol Anaphylaxis     Swelling of tongue, wheezing  Muscle relaxers in general    Amlodipine Other (See Comments)     Edema    Ciprofloxacin Diarrhea     severe    Clorazepate Dipotassium Hives    Cyclobenzaprine Itching    Ezetimibe-Simvastatin Other (See Comments)     Muscle soreness    Ranitidine Hcl Itching    Sucralfate Hives    Sulfamethoxazole-Trimethoprim Other (See Comments)    Naproxen Sodium Rash     Prior to Admit Medications:  Current Outpatient Medications   Medication Instructions    azithromycin (ZITHROMAX) 500 mg, Oral, DAILY    benazepril (LOTENSIN) 20 mg, Oral, DAILY    carbamide peroxide (DEBROX) 6.5 % otic solution 10 drops, Otic, 2 TIMES DAILY    carvedilol (COREG) 6.25 MG tablet TAKE ONE TABLET BY MOUTH TWICE A DAY (WITH MEALS) .`    chlorthalidone (HYGROTON) 25 mg, Oral, DAILY    clopidogrel (PLAVIX) 75 mg, Oral, DAILY    hydrALAZINE (APRESOLINE) 50 mg, Oral, 2 TIMES DAILY    levothyroxine (SYNTHROID) 100 MCG tablet TAKE ONE TABLET BY MOUTH EVERY DAY BEFORE BREAKFAST  levothyroxine (SYNTHROID) 50 mcg, Oral, DAILY BEFORE BREAKFAST    trimethoprim (TRIMPEX) 100 MG tablet TAKE ONE TABLET BY MOUTH EVERY DAY FOR SUPPRESSION       Objective:   Patient Vitals for the past 24 hrs:   Temp Pulse Resp BP SpO2   01/08/23 1007 97.2 °F (36.2 °C) 74 16 129/66 96 %       Oxygen Therapy  SpO2: 96 %    Estimated body mass index is 26.43 kg/m² as calculated from the following:    Height as of this encounter: 5' 4\" (1.626 m). Weight as of this encounter: 154 lb (69.9 kg). No intake or output data in the 24 hours ending 01/08/23 1329      Physical Exam:  Blood pressure 129/66, pulse 74, temperature 97.2 °F (36.2 °C), temperature source Axillary, resp. rate 16, height 5' 4\" (1.626 m), weight 154 lb (69.9 kg), SpO2 96 %. General:    Dehydrated appearance. A&Ox1  Head:  Normocephalic, atraumatic  Eyes:  Sclerae appear normal.  Pupils equally round. ENT:  Nares appear normal.  Moist oral mucosa  Neck:  No restricted ROM. Trachea midline   CV:   RRR. No m/r/g. No jugular venous distension. Lungs:   CTAB. No wheezing, rhonchi, or rales. Symmetric expansion. Abdomen:   Soft, nontender, nondistended. Extremities: TTP R lower extremity  Skin:     Bruising of R hypothenar eminence. Excoriation along R anterior tibial surface  Neuro:  Unable to follow commands. Patient is confused. Psych:  Normal mood and affect.       I have personally reviewed labs and tests showing:  Recent Labs:  Recent Results (from the past 24 hour(s))   CBC with Auto Differential    Collection Time: 01/08/23 10:54 AM   Result Value Ref Range    WBC 12.3 (H) 4.3 - 11.1 K/uL    RBC 3.68 (L) 4.05 - 5.2 M/uL    Hemoglobin 10.8 (L) 11.7 - 15.4 g/dL    Hematocrit 32.6 (L) 35.8 - 46.3 %    MCV 88.6 82 - 102 FL    MCH 29.3 26.1 - 32.9 PG    MCHC 33.1 31.4 - 35.0 g/dL    RDW 13.6 11.9 - 14.6 %    Platelets 615 636 - 969 K/uL    MPV 10.3 9.4 - 12.3 FL    nRBC 0.00 0.0 - 0.2 K/uL    Differential Type AUTOMATED      Seg Neutrophils 83 (H) 43 - 78 %    Lymphocytes 12 (L) 13 - 44 %    Monocytes 5 4.0 - 12.0 %    Eosinophils % 0 (L) 0.5 - 7.8 %    Basophils 0 0.0 - 2.0 %    Immature Granulocytes 0 0.0 - 5.0 %    Segs Absolute 10.1 (H) 1.7 - 8.2 K/UL    Absolute Lymph # 1.5 0.5 - 4.6 K/UL    Absolute Mono # 0.6 0.1 - 1.3 K/UL    Absolute Eos # 0.0 0.0 - 0.8 K/UL    Basophils Absolute 0.0 0.0 - 0.2 K/UL    Absolute Immature Granulocyte 0.1 0.0 - 0.5 K/UL   Comprehensive Metabolic Panel    Collection Time: 01/08/23 10:54 AM   Result Value Ref Range    Sodium 141 133 - 143 mmol/L    Potassium 4.0 3.5 - 5.1 mmol/L    Chloride 109 101 - 110 mmol/L    CO2 25 21 - 32 mmol/L    Anion Gap 7 2 - 11 mmol/L    Glucose 258 (H) 65 - 100 mg/dL    BUN 24 (H) 8 - 23 MG/DL    Creatinine 1.20 (H) 0.6 - 1.0 MG/DL    Est, Glom Filt Rate 42 (L) >60 ml/min/1.73m2    Calcium 8.5 8.3 - 10.4 MG/DL    Total Bilirubin 0.3 0.2 - 1.1 MG/DL    ALT 19 12 - 65 U/L    AST 23 15 - 37 U/L    Alk Phosphatase 95 50 - 136 U/L    Total Protein 6.8 6.3 - 8.2 g/dL    Albumin 2.8 (L) 3.2 - 4.6 g/dL    Globulin 4.0 2.8 - 4.5 g/dL    Albumin/Globulin Ratio 0.7 0.4 - 1.6     CK    Collection Time: 01/08/23 10:54 AM   Result Value Ref Range    Total  21 - 215 U/L   Protime-INR    Collection Time: 01/08/23 10:54 AM   Result Value Ref Range    Protime 13.0 12.6 - 14.3 sec    INR 0.9     Magnesium    Collection Time: 01/08/23 10:54 AM   Result Value Ref Range    Magnesium 1.8 1.8 - 2.4 mg/dL   Urinalysis w rflx microscopic    Collection Time: 01/08/23 11:40 AM   Result Value Ref Range    Color, UA YELLOW/STRAW      Appearance CLEAR      Specific Gravity, UA 1.018 1.001 - 1.023      pH, Urine 6.0 5.0 - 9.0      Protein, UA 30 (A) NEG mg/dL    Glucose, UA Negative mg/dL    Ketones, Urine Negative NEG mg/dL    Bilirubin Urine Negative NEG      Blood, Urine Negative NEG      Urobilinogen, Urine 0.2 0.2 - 1.0 EU/dL    Nitrite, Urine Negative NEG      Leukocyte Esterase, Urine Negative NEG WBC, UA 0-3 0 /hpf    Epithelial Cells UA 0-3 0 /hpf    BACTERIA, URINE TRACE 0 /hpf       I have personally reviewed imaging studies showing:  XR HIP RIGHT (2-3 VIEWS)    Result Date: 1/8/2023  Right hip series HISTORY: fall 3 views were obtained including an AP view of the pelvis. COMPARISON: 08/24/2017 FINDINGS: A right trochanteric fixation nail is present. There is no evidence of acute fracture or dislocation. There is a remote appearing right inferior pubic ramus fracture which has occurred in the interim. Heterotopic bone extends superior from the greater trochanter. There is no evidence of acute fracture or dislocation. The joint space is well-preserved. There is no bony destruction. Atherosclerotic changes are present. Postoperative changes without evidence of acute bony abnormality. XR FEMUR RIGHT (MIN 2 VIEWS)    Result Date: 1/8/2023  Right femur series: 01/08/2023 HISTORY: Pain after fall 4 views were obtained. Comparison: 07/27/2017 Findings: There is a spiral type fracture involving the distal femoral metadiaphysis. There is mild displacement. A trochanteric fixation nail is present as well as a right total knee arthroplasty. The soft tissues are unremarkable. There is no bony destruction or other abnormality. Vascular calcifications are present. Spiral type fracture of the distal femoral metadiaphysis. XR KNEE RIGHT (3 VIEWS)    Result Date: 1/8/2023  Right knee series HISTORY: Pain after fall 2 views were obtained. Comparison: 12/20/2018 Findings: There is a spiral type fracture of the distal femoral metadiaphysis. There is mild displacement. Am intramedullary karin is present within the femur as well as a right total knee arthroplasty. There is anatomic alignment at the knee. There is no joint effusion. Spiral type fracture of the distal femoral metadiaphysis with mild displacement.     CT HEAD WO CONTRAST    Result Date: 1/8/2023  CT head and cervical spine without contrast HISTORY: Fall, patient found on floor. TECHNIQUE: 5 mm axial images were obtained from the skull base to the vertex without intravenous contrast. Helically acquired images were obtained spine reconstructed at 2.5 mm thickness. Sagittal and coronal reformatted images were submitted. All CT scans at this facility are performed using dose optimization technique as appropriate to a performed exam, to include on automated exposure control, adjustment of the mA and/or KV according to patient's size (including appropriate matching for site-specific examinations), or use of iterative reconstruction technique. COMPARISON: None CT head without contrast: Findings: The ventricles and sulci are prominent but felt to be appropriate for age. There are no extra-axial fluid collections. No evidence of acute intracranial hemorrhage or mass effect is identified. There is no evidence to suggest an acute major territorial infarct. Patchy areas of decreased attenuation are present within the supratentorial white matter. These are nonspecific findings but would be most compatible with moderate chronic small vessel ischemic change. Encephalomalacia changes are present within the left occipital lobe as well as small remote left cerebellar infarctions. There is a remote appearing lacunar infarction within the left thalamus. The bony calvarium is intact. The visualized mastoid air cells and paranasal sinuses are well pneumatized and aerated. CT cervical spine without contrast: Findings: Vertebral body heights are well-maintained. There is slight retrolisthesis of C5. There is moderate disc space narrowing at C5-6. There is no evidence of acute fracture. There is moderately advanced multilevel facet arthropathy. The prevertebral soft tissues are unremarkable. Carotid atherosclerotic changes are present at the bifurcations. 1. No evidence of acute intracranial hemorrhage. 2. No evidence of acute cervical spine fracture.  3. Cervical spondylosis, felt to account for the slight retrolisthesis of C5. 4. 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 CT CERVICAL SPINE WO CONTRAST    Result Date: 1/8/2023  CT head and cervical spine without contrast HISTORY: Fall, patient found on floor. TECHNIQUE: 5 mm axial images were obtained from the skull base to the vertex without intravenous contrast. Helically acquired images were obtained spine reconstructed at 2.5 mm thickness. Sagittal and coronal reformatted images were submitted. All CT scans at this facility are performed using dose optimization technique as appropriate to a performed exam, to include on automated exposure control, adjustment of the mA and/or KV according to patient's size (including appropriate matching for site-specific examinations), or use of iterative reconstruction technique. COMPARISON: None CT head without contrast: Findings: The ventricles and sulci are prominent but felt to be appropriate for age. There are no extra-axial fluid collections. No evidence of acute intracranial hemorrhage or mass effect is identified. There is no evidence to suggest an acute major territorial infarct. Patchy areas of decreased attenuation are present within the supratentorial white matter. These are nonspecific findings but would be most compatible with moderate chronic small vessel ischemic change. Encephalomalacia changes are present within the left occipital lobe as well as small remote left cerebellar infarctions. There is a remote appearing lacunar infarction within the left thalamus. The bony calvarium is intact. The visualized mastoid air cells and paranasal sinuses are well pneumatized and aerated. CT cervical spine without contrast: Findings: Vertebral body heights are well-maintained. There is slight retrolisthesis of C5. There is moderate disc space narrowing at C5-6. There is no evidence of acute fracture.  There is moderately advanced multilevel facet arthropathy. The prevertebral soft tissues are unremarkable. Carotid atherosclerotic changes are present at the bifurcations. 1. No evidence of acute intracranial hemorrhage. 2. No evidence of acute cervical spine fracture. 3. Cervical spondylosis, felt to account for the slight retrolisthesis of C5. 4. 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 XR CHEST PORTABLE    Result Date: 1/8/2023  Portable chest: History: Patient found on floor, preoperative assessment Comparison: 03/22/2018 Findings: A single view of the chest was obtained at 10:26 AM. There are postoperative changes of TAVR. The cardiac silhouette is at the upper limits of normal. The lungs and pleural spaces are clear. The pulmonary vascularity is within normal limits. Unremarkable portable chest radiograph. Echocardiogram:  No results found for this or any previous visit. Orders Placed This Encounter   Medications    morphine injection 2 mg    sodium chloride flush 0.9 % injection 5-40 mL    sodium chloride flush 0.9 % injection 5-40 mL    0.9 % sodium chloride infusion    enoxaparin Sodium (LOVENOX) injection 30 mg     Order Specific Question:   Indication of Use     Answer:   Prophylaxis-DVT/PE    OR Linked Order Group     ondansetron (ZOFRAN-ODT) disintegrating tablet 4 mg     ondansetron (ZOFRAN) injection 4 mg    polyethylene glycol (GLYCOLAX) packet 17 g    OR Linked Order Group     acetaminophen (TYLENOL) tablet 650 mg     acetaminophen (TYLENOL) suppository 650 mg         Signed:  Jarrell Allen    Part of this note may have been written by using a voice dictation software. The note has been proof read but may still contain some grammatical/other typographical errors.

## 2023-01-09 ENCOUNTER — ANESTHESIA (OUTPATIENT)
Dept: SURGERY | Age: 88
End: 2023-01-09
Payer: MEDICARE

## 2023-01-09 ENCOUNTER — APPOINTMENT (OUTPATIENT)
Dept: GENERAL RADIOLOGY | Age: 88
DRG: 480 | End: 2023-01-09
Payer: MEDICARE

## 2023-01-09 ENCOUNTER — ANESTHESIA EVENT (OUTPATIENT)
Dept: SURGERY | Age: 88
End: 2023-01-09
Payer: MEDICARE

## 2023-01-09 LAB
ANION GAP SERPL CALC-SCNC: 5 MMOL/L (ref 2–11)
BASOPHILS # BLD: 0 K/UL (ref 0–0.2)
BASOPHILS NFR BLD: 0 % (ref 0–2)
BUN SERPL-MCNC: 31 MG/DL (ref 8–23)
CALCIUM SERPL-MCNC: 8.5 MG/DL (ref 8.3–10.4)
CHLORIDE SERPL-SCNC: 109 MMOL/L (ref 101–110)
CO2 SERPL-SCNC: 26 MMOL/L (ref 21–32)
CREAT SERPL-MCNC: 1.3 MG/DL (ref 0.6–1)
DIFFERENTIAL METHOD BLD: ABNORMAL
EOSINOPHIL # BLD: 0 K/UL (ref 0–0.8)
EOSINOPHIL NFR BLD: 0 % (ref 0.5–7.8)
ERYTHROCYTE [DISTWIDTH] IN BLOOD BY AUTOMATED COUNT: 14 % (ref 11.9–14.6)
GLUCOSE SERPL-MCNC: 204 MG/DL (ref 65–100)
HCT VFR BLD AUTO: 29.2 % (ref 35.8–46.3)
HGB BLD-MCNC: 9.3 G/DL (ref 11.7–15.4)
IMM GRANULOCYTES # BLD AUTO: 0 K/UL (ref 0–0.5)
IMM GRANULOCYTES NFR BLD AUTO: 0 % (ref 0–5)
LYMPHOCYTES # BLD: 2.3 K/UL (ref 0.5–4.6)
LYMPHOCYTES NFR BLD: 25 % (ref 13–44)
MCH RBC QN AUTO: 29.3 PG (ref 26.1–32.9)
MCHC RBC AUTO-ENTMCNC: 31.8 G/DL (ref 31.4–35)
MCV RBC AUTO: 92.1 FL (ref 82–102)
MONOCYTES # BLD: 0.9 K/UL (ref 0.1–1.3)
MONOCYTES NFR BLD: 9 % (ref 4–12)
NEUTS SEG # BLD: 6 K/UL (ref 1.7–8.2)
NEUTS SEG NFR BLD: 66 % (ref 43–78)
NRBC # BLD: 0 K/UL (ref 0–0.2)
PLATELET # BLD AUTO: 180 K/UL (ref 150–450)
PMV BLD AUTO: 10.7 FL (ref 9.4–12.3)
POTASSIUM SERPL-SCNC: 3.9 MMOL/L (ref 3.5–5.1)
RBC # BLD AUTO: 3.17 M/UL (ref 4.05–5.2)
SODIUM SERPL-SCNC: 140 MMOL/L (ref 133–143)
WBC # BLD AUTO: 9.2 K/UL (ref 4.3–11.1)

## 2023-01-09 PROCEDURE — 3700000000 HC ANESTHESIA ATTENDED CARE: Performed by: ORTHOPAEDIC SURGERY

## 2023-01-09 PROCEDURE — 7100000000 HC PACU RECOVERY - FIRST 15 MIN: Performed by: ORTHOPAEDIC SURGERY

## 2023-01-09 PROCEDURE — 80048 BASIC METABOLIC PNL TOTAL CA: CPT

## 2023-01-09 PROCEDURE — 6360000002 HC RX W HCPCS: Performed by: ORTHOPAEDIC SURGERY

## 2023-01-09 PROCEDURE — 27506 TREATMENT OF THIGH FRACTURE: CPT | Performed by: ORTHOPAEDIC SURGERY

## 2023-01-09 PROCEDURE — 6370000000 HC RX 637 (ALT 250 FOR IP): Performed by: INTERNAL MEDICINE

## 2023-01-09 PROCEDURE — 2500000003 HC RX 250 WO HCPCS

## 2023-01-09 PROCEDURE — 0QH836Z INSERTION OF INTRAMEDULLARY INTERNAL FIXATION DEVICE INTO RIGHT FEMORAL SHAFT, PERCUTANEOUS APPROACH: ICD-10-PCS | Performed by: ORTHOPAEDIC SURGERY

## 2023-01-09 PROCEDURE — 2580000003 HC RX 258: Performed by: INTERNAL MEDICINE

## 2023-01-09 PROCEDURE — 6360000002 HC RX W HCPCS: Performed by: INTERNAL MEDICINE

## 2023-01-09 PROCEDURE — 2720000010 HC SURG SUPPLY STERILE: Performed by: ORTHOPAEDIC SURGERY

## 2023-01-09 PROCEDURE — 2500000003 HC RX 250 WO HCPCS: Performed by: ORTHOPAEDIC SURGERY

## 2023-01-09 PROCEDURE — 36415 COLL VENOUS BLD VENIPUNCTURE: CPT

## 2023-01-09 PROCEDURE — 6360000002 HC RX W HCPCS

## 2023-01-09 PROCEDURE — 2709999900 HC NON-CHARGEABLE SUPPLY: Performed by: ORTHOPAEDIC SURGERY

## 2023-01-09 PROCEDURE — 73560 X-RAY EXAM OF KNEE 1 OR 2: CPT

## 2023-01-09 PROCEDURE — 85025 COMPLETE CBC W/AUTO DIFF WBC: CPT

## 2023-01-09 PROCEDURE — 1100000000 HC RM PRIVATE

## 2023-01-09 PROCEDURE — 3600000004 HC SURGERY LEVEL 4 BASE: Performed by: ORTHOPAEDIC SURGERY

## 2023-01-09 PROCEDURE — 3700000001 HC ADD 15 MINUTES (ANESTHESIA): Performed by: ORTHOPAEDIC SURGERY

## 2023-01-09 PROCEDURE — 3600000014 HC SURGERY LEVEL 4 ADDTL 15MIN: Performed by: ORTHOPAEDIC SURGERY

## 2023-01-09 PROCEDURE — C1713 ANCHOR/SCREW BN/BN,TIS/BN: HCPCS | Performed by: ORTHOPAEDIC SURGERY

## 2023-01-09 PROCEDURE — 7100000001 HC PACU RECOVERY - ADDTL 15 MIN: Performed by: ORTHOPAEDIC SURGERY

## 2023-01-09 DEVICE — SCREW BNE L62MM DIA5MM TIB LT GRN TI ST CANN LOK FULL THRD: Type: IMPLANTABLE DEVICE | Site: LEG | Status: FUNCTIONAL

## 2023-01-09 DEVICE — SCREW BNE L52MM DIA5MM NONSTERILE TIB LT GRN TI ST CANN LOK: Type: IMPLANTABLE DEVICE | Site: LEG | Status: FUNCTIONAL

## 2023-01-09 RX ORDER — HYDRALAZINE HYDROCHLORIDE 20 MG/ML
10 INJECTION INTRAMUSCULAR; INTRAVENOUS
Status: DISCONTINUED | OUTPATIENT
Start: 2023-01-09 | End: 2023-01-09 | Stop reason: HOSPADM

## 2023-01-09 RX ORDER — DIPHENHYDRAMINE HYDROCHLORIDE 50 MG/ML
12.5 INJECTION INTRAMUSCULAR; INTRAVENOUS
Status: DISCONTINUED | OUTPATIENT
Start: 2023-01-09 | End: 2023-01-09 | Stop reason: HOSPADM

## 2023-01-09 RX ORDER — OXYCODONE HYDROCHLORIDE 5 MG/1
5 TABLET ORAL PRN
Status: DISCONTINUED | OUTPATIENT
Start: 2023-01-09 | End: 2023-01-09 | Stop reason: HOSPADM

## 2023-01-09 RX ORDER — OXYCODONE HYDROCHLORIDE 5 MG/1
10 TABLET ORAL PRN
Status: DISCONTINUED | OUTPATIENT
Start: 2023-01-09 | End: 2023-01-09 | Stop reason: HOSPADM

## 2023-01-09 RX ORDER — HALOPERIDOL 5 MG/ML
1 INJECTION INTRAMUSCULAR
Status: DISCONTINUED | OUTPATIENT
Start: 2023-01-09 | End: 2023-01-09 | Stop reason: HOSPADM

## 2023-01-09 RX ORDER — HYDROMORPHONE HCL 110MG/55ML
0.5 PATIENT CONTROLLED ANALGESIA SYRINGE INTRAVENOUS EVERY 5 MIN PRN
Status: DISCONTINUED | OUTPATIENT
Start: 2023-01-09 | End: 2023-01-09 | Stop reason: HOSPADM

## 2023-01-09 RX ORDER — IPRATROPIUM BROMIDE AND ALBUTEROL SULFATE 2.5; .5 MG/3ML; MG/3ML
1 SOLUTION RESPIRATORY (INHALATION)
Status: DISCONTINUED | OUTPATIENT
Start: 2023-01-09 | End: 2023-01-09 | Stop reason: HOSPADM

## 2023-01-09 RX ORDER — SODIUM CHLORIDE 0.9 % (FLUSH) 0.9 %
5-40 SYRINGE (ML) INJECTION EVERY 12 HOURS SCHEDULED
Status: DISCONTINUED | OUTPATIENT
Start: 2023-01-09 | End: 2023-01-09

## 2023-01-09 RX ORDER — EPHEDRINE SULFATE/0.9% NACL/PF 50 MG/5 ML
SYRINGE (ML) INTRAVENOUS PRN
Status: DISCONTINUED | OUTPATIENT
Start: 2023-01-09 | End: 2023-01-09 | Stop reason: SDUPTHER

## 2023-01-09 RX ORDER — KETAMINE HYDROCHLORIDE 50 MG/ML
INJECTION, SOLUTION, CONCENTRATE INTRAMUSCULAR; INTRAVENOUS PRN
Status: DISCONTINUED | OUTPATIENT
Start: 2023-01-09 | End: 2023-01-09 | Stop reason: SDUPTHER

## 2023-01-09 RX ORDER — PROCHLORPERAZINE EDISYLATE 5 MG/ML
5 INJECTION INTRAMUSCULAR; INTRAVENOUS
Status: DISCONTINUED | OUTPATIENT
Start: 2023-01-09 | End: 2023-01-09 | Stop reason: HOSPADM

## 2023-01-09 RX ORDER — LABETALOL HYDROCHLORIDE 5 MG/ML
10 INJECTION, SOLUTION INTRAVENOUS
Status: DISCONTINUED | OUTPATIENT
Start: 2023-01-09 | End: 2023-01-09 | Stop reason: HOSPADM

## 2023-01-09 RX ORDER — ONDANSETRON 2 MG/ML
4 INJECTION INTRAMUSCULAR; INTRAVENOUS EVERY 6 HOURS PRN
Status: DISCONTINUED | OUTPATIENT
Start: 2023-01-09 | End: 2023-01-17 | Stop reason: HOSPADM

## 2023-01-09 RX ORDER — DIMETHICONE, CAMPHOR (SYNTHETIC), MENTHOL, AND PHENOL 1.1; .5; .625; .5 G/100G; G/100G; G/100G; G/100G
OINTMENT TOPICAL PRN
Status: DISCONTINUED | OUTPATIENT
Start: 2023-01-09 | End: 2023-01-17 | Stop reason: HOSPADM

## 2023-01-09 RX ORDER — DEXMEDETOMIDINE HYDROCHLORIDE 100 UG/ML
INJECTION, SOLUTION INTRAVENOUS PRN
Status: DISCONTINUED | OUTPATIENT
Start: 2023-01-09 | End: 2023-01-09 | Stop reason: SDUPTHER

## 2023-01-09 RX ORDER — SODIUM CHLORIDE 0.9 % (FLUSH) 0.9 %
5-40 SYRINGE (ML) INJECTION PRN
Status: DISCONTINUED | OUTPATIENT
Start: 2023-01-09 | End: 2023-01-17 | Stop reason: HOSPADM

## 2023-01-09 RX ORDER — PROPOFOL 10 MG/ML
INJECTION, EMULSION INTRAVENOUS PRN
Status: DISCONTINUED | OUTPATIENT
Start: 2023-01-09 | End: 2023-01-09 | Stop reason: SDUPTHER

## 2023-01-09 RX ORDER — ONDANSETRON 4 MG/1
4 TABLET, ORALLY DISINTEGRATING ORAL EVERY 8 HOURS PRN
Status: DISCONTINUED | OUTPATIENT
Start: 2023-01-09 | End: 2023-01-17 | Stop reason: HOSPADM

## 2023-01-09 RX ORDER — SODIUM CHLORIDE 9 MG/ML
INJECTION, SOLUTION INTRAVENOUS PRN
Status: DISCONTINUED | OUTPATIENT
Start: 2023-01-09 | End: 2023-01-17 | Stop reason: HOSPADM

## 2023-01-09 RX ORDER — LIDOCAINE HYDROCHLORIDE AND EPINEPHRINE 10; 10 MG/ML; UG/ML
INJECTION, SOLUTION INFILTRATION; PERINEURAL PRN
Status: DISCONTINUED | OUTPATIENT
Start: 2023-01-09 | End: 2023-01-09 | Stop reason: HOSPADM

## 2023-01-09 RX ADMIN — CARVEDILOL 6.25 MG: 6.25 TABLET, FILM COATED ORAL at 09:00

## 2023-01-09 RX ADMIN — KETAMINE HYDROCHLORIDE 10 MG: 50 INJECTION, SOLUTION INTRAMUSCULAR; INTRAVENOUS at 16:43

## 2023-01-09 RX ADMIN — Medication 2000 MG: at 16:48

## 2023-01-09 RX ADMIN — MORPHINE SULFATE 2 MG: 2 INJECTION, SOLUTION INTRAMUSCULAR; INTRAVENOUS at 05:04

## 2023-01-09 RX ADMIN — SODIUM CHLORIDE, SODIUM LACTATE, POTASSIUM CHLORIDE, AND CALCIUM CHLORIDE: 600; 310; 30; 20 INJECTION, SOLUTION INTRAVENOUS at 11:33

## 2023-01-09 RX ADMIN — SODIUM CHLORIDE, SODIUM LACTATE, POTASSIUM CHLORIDE, AND CALCIUM CHLORIDE: 600; 310; 30; 20 INJECTION, SOLUTION INTRAVENOUS at 08:59

## 2023-01-09 RX ADMIN — LISINOPRIL 20 MG: 20 TABLET ORAL at 09:00

## 2023-01-09 RX ADMIN — ONDANSETRON 4 MG: 2 INJECTION INTRAMUSCULAR; INTRAVENOUS at 16:54

## 2023-01-09 RX ADMIN — DEXMEDETOMIDINE 4 MCG: 100 INJECTION, SOLUTION, CONCENTRATE INTRAVENOUS at 16:51

## 2023-01-09 RX ADMIN — SODIUM CHLORIDE, PRESERVATIVE FREE 10 ML: 5 INJECTION INTRAVENOUS at 09:01

## 2023-01-09 RX ADMIN — TUBERCULIN PURIFIED PROTEIN DERIVATIVE 5 UNITS: 5 INJECTION, SOLUTION INTRADERMAL at 18:20

## 2023-01-09 RX ADMIN — KETAMINE HYDROCHLORIDE 10 MG: 50 INJECTION, SOLUTION INTRAMUSCULAR; INTRAVENOUS at 16:50

## 2023-01-09 RX ADMIN — DEXMEDETOMIDINE 4 MCG: 100 INJECTION, SOLUTION, CONCENTRATE INTRAVENOUS at 16:42

## 2023-01-09 RX ADMIN — LEVOTHYROXINE SODIUM 50 MCG: 0.05 TABLET ORAL at 09:00

## 2023-01-09 RX ADMIN — PROPOFOL 10 MG: 10 INJECTION, EMULSION INTRAVENOUS at 16:51

## 2023-01-09 RX ADMIN — Medication 10 MG: at 16:47

## 2023-01-09 RX ADMIN — HYDRALAZINE HYDROCHLORIDE 50 MG: 50 TABLET, FILM COATED ORAL at 09:00

## 2023-01-09 ASSESSMENT — PAIN - FUNCTIONAL ASSESSMENT
PAIN_FUNCTIONAL_ASSESSMENT: NONE - DENIES PAIN
PAIN_FUNCTIONAL_ASSESSMENT: PREVENTS OR INTERFERES SOME ACTIVE ACTIVITIES AND ADLS
PAIN_FUNCTIONAL_ASSESSMENT: WONG-BAKER FACES
PAIN_FUNCTIONAL_ASSESSMENT: 0-10

## 2023-01-09 ASSESSMENT — PAIN DESCRIPTION - LOCATION: LOCATION: HIP

## 2023-01-09 ASSESSMENT — PAIN DESCRIPTION - FREQUENCY: FREQUENCY: INTERMITTENT

## 2023-01-09 ASSESSMENT — PAIN DESCRIPTION - ONSET: ONSET: GRADUAL

## 2023-01-09 ASSESSMENT — PAIN DESCRIPTION - ORIENTATION: ORIENTATION: RIGHT

## 2023-01-09 ASSESSMENT — PAIN DESCRIPTION - DESCRIPTORS: DESCRIPTORS: ACHING;SORE

## 2023-01-09 ASSESSMENT — PAIN SCALES - GENERAL
PAINLEVEL_OUTOF10: 0
PAINLEVEL_OUTOF10: 0
PAINLEVEL_OUTOF10: 8

## 2023-01-09 ASSESSMENT — PAIN DESCRIPTION - PAIN TYPE: TYPE: ACUTE PAIN

## 2023-01-09 NOTE — ANESTHESIA PRE PROCEDURE
Department of Anesthesiology  Preprocedure Note       Name:  Robbie Ojeda   Age:  80 y.o.  :  3/20/1929                                          MRN:  [id number]         Date:  2023      Surgeon: Ariana Powell):  Usha Bajwa MD    Procedure: Procedure(s):  RIGHT FEMUR INSERTION DISTAL TFN SCREW    Medications prior to admission:   Prior to Admission medications    Medication Sig Start Date End Date Taking? Authorizing Provider   azithromycin (ZITHROMAX) 500 MG tablet Take 500 mg by mouth daily 1/10/22   Ar Automatic Reconciliation   benazepril (LOTENSIN) 20 MG tablet Take 20 mg by mouth daily 21   Ar Automatic Reconciliation   carbamide peroxide (DEBROX) 6.5 % otic solution Place 10 drops in ear(s) 2 times daily 21   Ar Automatic Reconciliation   carvedilol (COREG) 6.25 MG tablet TAKE ONE TABLET BY MOUTH TWICE A DAY (WITH MEALS) . ` 21   Ar Automatic Reconciliation   chlorthalidone (HYGROTON) 25 MG tablet Take 25 mg by mouth daily 21   Ar Automatic Reconciliation   clopidogrel (PLAVIX) 75 MG tablet Take 75 mg by mouth daily 21   Ar Automatic Reconciliation   hydrALAZINE (APRESOLINE) 50 MG tablet Take 50 mg by mouth 2 times daily 21   Ar Automatic Reconciliation   levothyroxine (SYNTHROID) 100 MCG tablet TAKE ONE TABLET BY MOUTH EVERY DAY BEFORE BREAKFAST 21   Ar Automatic Reconciliation   levothyroxine (SYNTHROID) 50 MCG tablet Take 50 mcg by mouth every morning (before breakfast) 20   Ar Automatic Reconciliation   trimethoprim (TRIMPEX) 100 MG tablet TAKE ONE TABLET BY MOUTH EVERY DAY FOR SUPPRESSION 21   Ar Automatic Reconciliation       Current medications:    Current Facility-Administered Medications   Medication Dose Route Frequency Provider Last Rate Last Admin    ceFAZolin (ANCEF) 2000 mg in sterile water 20 mL IV syringe  2,000 mg IntraVENous On Call to 1100 Frederick Lo MD        sodium chloride flush 0.9 % injection 5-40 mL  5-40 mL IntraVENous 2 times per day Andrew Marsh MD   10 mL at 01/09/23 0901    sodium chloride flush 0.9 % injection 5-40 mL  5-40 mL IntraVENous PRN Andrew Marsh MD        0.9 % sodium chloride infusion   IntraVENous PRN Andrew Marsh MD        ondansetron (ZOFRAN-ODT) disintegrating tablet 4 mg  4 mg Oral Q8H PRN Andrew Marsh MD        Or    ondansetron [facility]) injection 4 mg  4 mg IntraVENous Q6H PRN Andrew Marsh MD        polyethylene glycol [facility]) packet 17 g  17 g Oral Daily PRN Andrew Marsh MD        acetaminophen (TYLENOL) tablet 650 mg  650 mg Oral Q6H PRN Andrew Marsh MD        Or    acetaminophen (TYLENOL) suppository 650 mg  650 mg Rectal Q6H PRN Andrew Marsh MD        oxyCODONE (ROXICODONE) immediate release tablet 5 mg  5 mg Oral Q4H PRN Andrew Marsh MD        morphine injection 2 mg  2 mg IntraVENous Q4H PRN Andrew Marsh MD   2 mg at 01/09/23 0504    lisinopril (PRINIVIL;ZESTRIL) tablet 20 mg  20 mg Oral Daily Andrew Marsh MD   20 mg at 01/09/23 0900    carvedilol (COREG) tablet 6.25 mg  6.25 mg Oral Daily Andrew Marsh MD   6.25 mg at 01/09/23 0900    [Held by provider] chlorthalidone (HYGROTON) tablet 25 mg  25 mg Oral Daily Andrew Marsh MD        [Held by provider] clopidogrel (PLAVIX) tablet 75 mg  75 mg Oral Daily Andrew Marsh MD        hydrALAZINE (APRESOLINE) tablet 50 mg  50 mg Oral Daily Andrew Marsh MD   50 mg at 01/09/23 0900    levothyroxine (SYNTHROID) tablet 50 mcg  50 mcg Oral Daily Andrew Marsh MD   50 mcg at 01/09/23 0900    trimethoprim (TRIMPEX) tablet 100 mg  100 mg Oral Daily Andrew Marsh MD        lactated ringers infusion   IntraVENous Continuous Andrew Marsh MD 75 mL/hr at 01/09/23 1133 New Bag at 01/09/23 1133    melatonin tablet 3 mg  3 mg Oral Nightly PRN Bam Dunn DO   3 mg at 01/08/23 2015       Allergies:     Allergies   Allergen Reactions  Aspirin Anaphylaxis     Hives, swelling    Lorazepam Other (See Comments)     Confused and aggressive    Methocarbamol Anaphylaxis     Swelling of tongue, wheezing  Muscle relaxers in general    Amlodipine Other (See Comments)     Edema    Ciprofloxacin Diarrhea     severe    Clorazepate Dipotassium Hives    Cyclobenzaprine Itching    Ezetimibe-Simvastatin Other (See Comments)     Muscle soreness    Ranitidine Hcl Itching    Sucralfate Hives    Sulfamethoxazole-Trimethoprim Other (See Comments)    Naproxen Sodium Rash       Problem List:    Patient Active Problem List   Diagnosis Code    OZZIE (acute kidney injury) ([facility] [address] N17.9    Hyperlipidemia E78.5    Sepsis ([facility] [address] A41.9    CKD (chronic kidney disease) stage 3, GFR 30-59 ml/min ([facility]) N18.30    Essential hypertension, benign I10    Acute blood loss anemia D62    Type 2 diabetes with nephropathy ([facility]) E11.21    Type 2 diabetes mellitus with diabetic neuropathy ([facility]) E11.40    Coronary artery disease involving native coronary artery of native heart I25.10    Edema R60.9    Acute cystitis N30.00    Visual disturbances H53.9    Nonrheumatic aortic valve insufficiency I35.1    At risk for falling Z91.81    Type II diabetes mellitus, uncontrolled [id number]    S/P TAVR (transcatheter aortic valve replacement) Z95.2    UTI (urinary tract infection) N39.0    Hypoglycemia E16.2    Closed right hip fracture ([facility] [address] S72.001A    Fall from ground level W18.30XA    Femoral fracture ([facility] [address] S72.90XA    Dementia ([facility] [address] F03.90    Hypothyroidism E03.9       Past Medical History:        Diagnosis Date    Aortic regurgitation     mild to moderate per ECHO 3/28/17    Aortic stenosis     severe per ECHO 3/28/17    Basal cell carcinoma     Bruit (arterial)     CAD (coronary artery disease)     small vessel disease, medical management per cardiologist not 4/17    Choledocholithiasis     Hyperlipidemia     Hypertension     Hypothyroidism     Murmur, cardiac     Poor historian     S/P TAVR (transcatheter aortic valve replacement)     3/17    Tricuspid valve regurgitation     moderate per ECHO 3/28/17    Type II or unspecified type diabetes mellitus without mention of complication, not stated as uncontrolled     not medicated, treated w/ diet    Urinary incontinence        Past Surgical History:        Procedure Laterality Date    APPENDECTOMY      CARPAL TUNNEL RELEASE      patient denies    CYSTOCELE REPAIR      ERCP  04/28/2017    laser    ERCP  02/20/2017    large CBD stones    HYSTERECTOMY (CERVIX STATUS UNKNOWN)      KNEE ARTHROSCOPY Right     LAP,CHOLECYSTECTOMY      ORTHOPEDIC SURGERY      2 back-one fusion-low; all fingers-trigger    OTHER SURGICAL HISTORY Right     basal cell nose    OTHER SURGICAL HISTORY  03/28/2017    Tavr    OVARY REMOVAL      Partial    RECTOCELE REPAIR         Social History:    Social History     Tobacco Use    Smoking status: Never    Smokeless tobacco: Never   Substance Use Topics    Alcohol use:  No                                Counseling given: Not Answered      Vital Signs (Current):   Vitals:    01/08/23 1853 01/09/23 0437 01/09/23 0721 01/09/23 1125   BP:  136/62 132/66 (!) 118/58   Pulse:  70 75 68   Resp: 16 15 18 16   Temp:  97.9 °F (36.6 °C) 97.5 °F (36.4 °C) 98.7 °F (37.1 °C)   TempSrc:  Oral Oral Temporal   SpO2:  95% 94% 93%   Weight:       Height:                                                  BP Readings from Last 3 Encounters:   01/09/23 (!) 118/58   12/10/21 138/65   11/30/21 (!) 108/52       NPO Status: Time of last liquid consumption: 2030                        Time of last solid consumption: 2030                        Date of last liquid consumption: 01/08/23                        Date of last solid food consumption: 01/08/23    BMI:   Wt Readings from Last 3 Encounters:   01/08/23 154 lb (69.9 kg)   12/10/21 154 lb (69.9 kg)   11/30/21 154 lb (69.9 kg)     Body mass index is 26.43 kg/m².    CBC:   Lab Results   Component Value Date/Time    WBC 9.2 01/09/2023 06:11 AM    RBC 3.17 01/09/2023 06:11 AM    HGB 9.3 01/09/2023 06:11 AM    HCT 29.2 01/09/2023 06:11 AM    MCV 92.1 01/09/2023 06:11 AM    RDW 14.0 01/09/2023 06:11 AM     01/09/2023 06:11 AM       CMP:   Lab Results   Component Value Date/Time     01/09/2023 06:11 AM    K 3.9 01/09/2023 06:11 AM     01/09/2023 06:11 AM    CO2 26 01/09/2023 06:11 AM    BUN 31 01/09/2023 06:11 AM    CREATININE 1.30 01/09/2023 06:11 AM    GFRAA 29 11/17/2021 08:34 AM    AGRATIO 1.4 11/17/2021 08:34 AM    LABGLOM 38 01/09/2023 06:11 AM    GLUCOSE 204 01/09/2023 06:11 AM    PROT 6.8 01/08/2023 10:54 AM    CALCIUM 8.5 01/09/2023 06:11 AM    BILITOT 0.3 01/08/2023 10:54 AM    ALKPHOS 95 01/08/2023 10:54 AM    ALKPHOS 89 11/17/2021 08:34 AM    AST 23 01/08/2023 10:54 AM    ALT 19 01/08/2023 10:54 AM       POC Tests: No results for input(s): POCGLU, POCNA, POCK, POCCL, POCBUN, POCHEMO, POCHCT in the last 72 hours.     Coags:   Lab Results   Component Value Date/Time    PROTIME 13.0 01/08/2023 10:54 AM    INR 0.9 01/08/2023 10:54 AM       HCG (If Applicable): No results found for: PREGTESTUR, PREGSERUM, HCG, HCGQUANT     ABGs: No results found for: PHART, PO2ART, KXL6AHA, YQB9NXZ, BEART, O3XUPSKB     Type & Screen (If Applicable):  No results found for: LABABO, LABRH    Drug/Infectious Status (If Applicable):  No results found for: HIV, HEPCAB    COVID-19 Screening (If Applicable): No results found for: COVID19        Anesthesia Evaluation  Patient summary reviewed and Nursing notes reviewed  Airway: Mallampati: III  TM distance: >3 FB   Neck ROM: limited  Mouth opening: < 3 FB   Dental:    (+) edentulous      Pulmonary:Negative Pulmonary ROS and normal exam                               Cardiovascular:  Exercise tolerance: poor (<4 METS), Ambulates with walker  (+) hypertension:, valvular problems/murmurs (s/p tavr): AS, CAD:, hyperlipidemia            Echocardiogram reviewed               ROS comment: Last ECHO 2019. Saw cards 2021. No further ECHO done. Patient has been ambulating with walker. Unchanged functional capacity. No new accelerating angina, syncope, unchanged functional capacity. Neuro/Psych:   (+) dementia            GI/Hepatic/Renal:   (+) renal disease: CRI,           Endo/Other:    (+) Diabetes, hypothyroidism, blood dyscrasia: anemia:., .                 Abdominal:             Vascular: Other Findings:           Anesthesia Plan      TIVA     ASA 3 - emergent     (Discussed suspension of DNR. Risks/benefits of anesthesia discussed with patient and family. Also discussed with Dr. Nilsa Griffin. Will plan for light sedation and local anesthetic per surgeon. )  Induction: intravenous. Anesthetic plan and risks discussed with patient and child/children.                         Cristin Pate MD   1/9/2023

## 2023-01-09 NOTE — OP NOTE
Operative Report    Patient: Sahra Bergeron MRN: [id number]  SSN: [id number]    YOB: 1929  Age: 80 y.o. Sex: female       Date of Surgery: January 9, 2023     History:  Sahra Bergeron is a 80 y.o. female extremity. She was seen and found to have a femoral shaft fracture which was around a previously placed intramedullary nail. The nail had not been locked distally. I talked to the patient's family at length regarding different treatment options. I do not think nonoperative treatment would been completely unreasonable but I do think the most reasonable thing to be just to try going place some interlock bolts I think this will stabilize it a little bit more and hopefully get this to heal uneventfully. I talked to the patient and/or their representative and explained the exact nature the procedure. I also went through a detailed list of the material risks associated with  the procedure which included risk of bleeding, infection, injury to nearby structures, worsening the situation, as well as the risks associate with anesthesia and finally death. Also talked with him regarding the benefits and alternatives to the procedure. Preoperative Diagnosis: [facility]osed [facility] of right femur[facility] [S72.001A]     Postoperative Diagnosis:   Loes displaced right femoral shaft fracture      Surgeon(s) and Role:     * Carlos Enrique Perez MD - Primary    Anesthesia: General     Procedure: Treatment of right femoral shaft fracture with intramedullary nail fixation and interlock bolt screw fixation    Procedure in Detail: After the successful induction of monitored anesthesia care as well as local anesthetic I localized to areas over the right distal thigh and used lidocaine with epinephrine to anesthetize the skin and the periosteum on the lateral aspect of the femur.   I then made a small incision and using freehand technique placed 2 screws 1 in the dynamic one of the static portion of the oblong hole and one in the most distal hole of the femoral nail trying to get 3 screws as much fixation as I could distal to her long oblique fracture. Once the screws were in place and their position was confirmed both AP and lateral projection. I was satisfied that there was reasonable fixation and that they were definitely through the nail itself once this was confirmed I closed incision with staples patient was awakened taken to cover him in stable condition      Estimated Blood Loss: 30 cc    Tourniquet Time: * No tourniquets in log *      Implants:   Implant Name Type Inv. Item Serial No.  Lot No. LRB No. Used Action   SCREW BNE L52MM DIA5MM NONSTERILE TIB LT GRN TI ST [email or web address] - [id number]  SCREW BNE L52MM DIA5MM NONSTERILE TIB LT GRN TI ST [email or web address]  [employer]- [id number] Right 11 Implanted               Specimens: * No specimens in log *        Drains: None                Complications: None    Counts: Sponge and needle counts were correct times two.     Signed By:  Aquilino Lerma MD     January 9, 2023

## 2023-01-09 NOTE — PERIOP NOTE
TRANSFER - IN REPORT:    Verbal report received from TORRIE Kauffman on [employer] being received from [phone] for ordered procedure      Report consisted of patients Situation, Background, Assessment and Recommendations(SBAR). Information from the following report(s) SBAR, Kardex, ED Summary, MAR, Recent Results, Procedure Verification, and Quality Measures was reviewed with the receiving nurse. Opportunity for questions and clarification was provided. Assessment completed upon patients arrival to unit and care assumed.

## 2023-01-09 NOTE — INTERVAL H&P NOTE
Update History & Physical    The Patient's History and Physical of 1/8/2023 was reviewed with the patient and I examined the patient. There was no change. The surgical site was confirmed by the patient and me. Plan:  The risk, benefits, expected outcome, and alternative to the recommended procedure have been discussed with the patient. Patient understands and wants to proceed with open treatment of right periprosthetic femur fracture with interlock screw fixation.     Electronically signed by Ana Camilo MD on 1/9/2023 at 7:54 AM

## 2023-01-09 NOTE — PROGRESS NOTES
TRANSFER - OUT REPORT:    Verbal report given to Adán Parnell RN on [employer]  being transferred to 7th floor for routine progression of patient care       Report consisted of patient's Situation, Background, Assessment and   Recommendations(SBAR). Information from the following report(s) Nurse Handoff Report and ED SBAR was reviewed with the receiving nurse. [place] Assessment: No data recorded  Lines:   Peripheral IV 01/08/23 Left Antecubital (Active)   Site Assessment Clean, dry & intact 01/08/23 1708   Line Status Infusing 01/08/23 1708   Line Care Connections checked and tightened 01/08/23 1708   Phlebitis Assessment No symptoms 01/08/23 1708   Infiltration Assessment 0 01/08/23 1708   Alcohol Cap Used No 01/08/23 1708   Dressing Status Clean, dry & intact 01/08/23 1708   Dressing Type Transparent 01/08/23 1708        Opportunity for questions and clarification was provided.       Patient transported with:  [employer]

## 2023-01-09 NOTE — CONSULTS
Consult    Patient: Ed Boone MRN: [id number]  SSN: [id number]    YOB: 1929  Age: 80 y.o. Sex: female      Subjective:      Ed Boone is a 80 y.o. female who presented after a fall. She was seen in the emergency room and found to have a long oblique femoral shaft fracture. She has had a previously placed [employer] trochanteric femoral nail. There were no interlock bolts placed distally at the time. She did pretty well with this and has healed. She does have a very extensive cardiac history including aortic valve replacement which I would assume likely for aortic stenosis. He does take Plavix. She does have a history of significant dementia. She does get around pretty well with a walker the.     Past Medical History:   Diagnosis Date    Aortic regurgitation     mild to moderate per ECHO 3/28/17    Aortic stenosis     severe per ECHO 3/28/17    Basal cell carcinoma     Bruit (arterial)     CAD (coronary artery disease)     small vessel disease, medical management per cardiologist not 4/17    Choledocholithiasis     Hyperlipidemia     Hypertension     Hypothyroidism     Murmur, cardiac     Poor historian     S/P TAVR (transcatheter aortic valve replacement)     3/17    Tricuspid valve regurgitation     moderate per ECHO 3/28/17    Type II or unspecified type diabetes mellitus without mention of complication, not stated as uncontrolled     not medicated, treated w/ diet    Urinary incontinence      Past Surgical History:   Procedure Laterality Date    APPENDECTOMY      CARPAL TUNNEL RELEASE      patient denies    CYSTOCELE REPAIR      ERCP  04/28/2017    laser    ERCP  02/20/2017    large CBD stones    HYSTERECTOMY      KNEE ARTHROSCOPY Right     LAP,CHOLECYSTECTOMY      OOPHORECTOMY      Partial    ORTHOPEDIC SURGERY      2 back-one fusion-low; all fingers-trigger    OTHER SURGICAL HISTORY Right     basal cell nose    OTHER SURGICAL HISTORY  03/28/2017    Tavr    RECTOCELE REPAIR        FAMHX -No history of inflammatory arthritis   Social History     Tobacco Use    Smoking status: Never    Smokeless tobacco: Never   Substance Use Topics    Alcohol use: No      Current Facility-Administered Medications   Medication Dose Route Frequency Provider Last Rate Last Admin    ceFAZolin (ANCEF) 2000 mg in sterile water 20 mL IV syringe  2,000 mg IntraVENous On Call to 1100 Frederick Lo MD        sodium chloride flush 0.9 % injection 5-40 mL  5-40 mL IntraVENous 2 times per day Edith Kenyon MD   5 mL at 01/08/23 1942    sodium chloride flush 0.9 % injection 5-40 mL  5-40 mL IntraVENous PRN Edith Kenyon MD        0.9 % sodium chloride infusion   IntraVENous PRN Edith Kenyon MD        ondansetron (ZOFRAN-ODT) disintegrating tablet 4 mg  4 mg Oral Q8H PRN Edith Kenyon MD        Or    ondansetron [facility]) injection 4 mg  4 mg IntraVENous Q6H PRN Edith Kenyon MD        polyethylene glycol (GLYCOLAX) packet 17 g  17 g Oral Daily PRN Edith Kenyon MD        acetaminophen (TYLENOL) tablet 650 mg  650 mg Oral Q6H PRN Edith Kenyon MD        Or    acetaminophen (TYLENOL) suppository 650 mg  650 mg Rectal Q6H PRN Edith Kenyon MD        oxyCODONE (ROXICODONE) immediate release tablet 5 mg  5 mg Oral Q4H PRN Edith Kenyon MD        morphine injection 2 mg  2 mg IntraVENous Q4H PRN Edith Kenyon MD   2 mg at 01/09/23 0504    lisinopril (PRINIVIL;ZESTRIL) tablet 20 mg  20 mg Oral Daily Edith Kenyon MD        carvedilol (COREG) tablet 6.25 mg  6.25 mg Oral Daily Edith Kenyon MD        [Held by provider] chlorthalidone (HYGROTON) tablet 25 mg  25 mg Oral Daily Edith Kenyon MD        [Held by provider] clopidogrel (PLAVIX) tablet 75 mg  75 mg Oral Daily Edith Kenyon MD        hydrALAZINE (APRESOLINE) tablet 50 mg  50 mg Oral Daily Edith Kenyon MD        levothyroxine (SYNTHROID) tablet 50 mcg  50 mcg Oral Daily Erika Gerardo MD        trimethoprim (TRIMPEX) tablet 100 mg  100 mg Oral Daily Erika Gerardo MD        lactated ringers infusion   IntraVENous Continuous Erika Gerardo MD 75 mL/hr at 01/08/23 1854 New Bag at 01/08/23 1854    melatonin tablet 3 mg  3 mg Oral Nightly PRN Phuong Delgadillo, DO   3 mg at 01/08/23 2015        Allergies   Allergen Reactions    Aspirin Anaphylaxis     Hives, swelling    Lorazepam Other (See Comments)     Confused and aggressive    Methocarbamol Anaphylaxis     Swelling of tongue, wheezing  Muscle relaxers in general    Amlodipine Other (See Comments)     Edema    Ciprofloxacin Diarrhea     severe    Clorazepate Dipotassium Hives    Cyclobenzaprine Itching    Ezetimibe-Simvastatin Other (See Comments)     Muscle soreness    Ranitidine Hcl Itching    Sucralfate Hives    Sulfamethoxazole-Trimethoprim Other (See Comments)    Naproxen Sodium Rash       Review of Systems:  A comprehensive review of systems was negative. Objective:     Vitals:    01/08/23 1715 01/08/23 1853 01/09/23 0437 01/09/23 0721   BP: 101/74  136/62 132/66   Pulse: 72  70 75   Resp: 18 16 15 18   Temp:   97.9 °F (36.6 °C) 97.5 °F (36.4 °C)   TempSrc:   Oral Oral   SpO2: 96%  95% 94%   Weight:       Height:            Physical Exam:  Physical Exam:  General:  Alert, cooperative, no distress, appears stated age. Orientation she is arousable but not oriented   Eyes:  Conjunctivae/corneas clear. PERRL, EOMs intact. Fundi benign   Ears:  Normal TMs and external ear canals both ears. Nose: Nares normal. Septum midline. Mucosa normal. No drainage or sinus tenderness. Mouth/Throat: Lips, mucosa, and tongue normal. Teeth and gums normal.   Neck: Supple, symmetrical, trachea midline, no adenopathy, thyroid: no enlargment/tenderness/nodules, no carotid bruit and no JVD. Back:   Symmetric, no curvature. ROM normal. No CVA tenderness. Lungs:   Clear to auscultation bilaterally.    Heart:  Regular rate and rhythm, S1, S2 normal, no murmur, click, rub or gallop. Abdomen:   Soft, non-tender. Bowel sounds normal. No masses,  No organomegaly. No lymphadenopathy in all 4 extremities    Alignment- pt has mild obvious deformity of the right femur    Range of motion- pain with any range of motion right knee  Tenderness to palpation over the right knee  Vascular-distal pulses palpable in RIGHT LOWER      Sensory/motor-deep tendon reflexes normal RIGHT LOWER. Motor and sensory function intact. Stability- is difficult to assess stability because of the presence of the fracture      Skin- no rashes ulcerations or open wounds RIGHT LOWER    Gait-put any weight on her right lower extremity    Assessment:     Hospital Problems             Last Modified POA    * (Principal) Fall from ground level 1/8/2023 Yes    Femoral fracture ([facility] [address] 1/8/2023 Yes    Dementia ([facility] [address] 1/8/2023 Yes    Hypothyroidism 1/8/2023 Yes    Essential hypertension, benign 1/8/2023 Yes    Coronary artery disease involving native coronary artery of native heart 1/8/2023 Yes    Overview Signed 3/19/2022  3:31 PM by Jono, [email or web address]     ON PLAVIX AS ALLERGIC TO ASPIRIN  1. LHC (3/1/13):  LAD: 50-60%. LCX: 80-90% prox. 70% distal.  OM2:   70-75% prox. RCA: 10-20%  2. LHC (1/17): Small vessel disease involving distal left circumflex. Mild to moderate disease in other segments. Is displaced long oblique right periprosthetic femoral shaft fracture around a trochanteric femoral nail    Xrays and or studies:    Reviewed x-rays which shows a long oblique displaced fracture around a femoral nail  Plan:     Open with the patient's son at length. I definitely do not think it be unreasonable to treat this nonoperatively. I do think it is unfortunate she did not have interlock bolts placed at her for surgical intervention and then we could definitely treat this nonoperatively.   With her dementia and with her inability to really comply with any sort of limited weightbearing I do think it would be reasonable to at least think about her back to the operating room where we could place 2 distal interlock bolts. I explained this to her son and what this would involve and used illustrations to help him understand. I think the advantage of this would be that it would be a very straightforward procedure we could probably even do under local anesthetic just some sedation or under general anesthesia as she is not a candidate for spinal since she takes Plavix. The procedure would just involve us placing at least 1 or 2 interlock bolts through the nail distally. This to be some to be very minimally invasive and would take probably take about 10 minutes or so to do. After talking him about this option and then we could feel better about the fact that the fracture is not displaced. If we try to treat nonoperatively and did displace that she would require a very large surgical procedure with a much more formal open reduction internal fixation likely with plate fixation and cables and then that would obviously not be in her best interest I think the most reasonable thing would be to try to going proceed with a smaller procedure now and try to stabilize this allow it heal and hopefully eliminate the worry that we might have to do a bigger procedure later. He seems to feel comfortable consenting.   The plan is to proceed with open treatment of right femoral shaft fracture with interlock screw fixation today in the operating room    Signed By: Vitor Farris MD

## 2023-01-09 NOTE — PROGRESS NOTES
Hospitalist Progress Note   Admit Date:  2023 10:05 AM   Name:  Evelyn Connors   Age:  80 y.o. Sex:  female  :  3/20/1929   MRN:  [id number]   [place]  [facility] [place]    Presenting Complaint: Fall     Reason(s) for Admission: Fall, initial encounter [W19. XXXA]  Fall from ground level [W18.30XA]  Periprosthetic fracture of shaft of femur [G80. 8XXA, [address] Course:   history of hypertension, dementia, aortic stenosis status post TAVR, CKD stage III, coronary artery disease, hyperlipidemia, the presented in the setting of a fall. Most of the history obtained by family at bedside since patient unable to provide any detailed history. According to family the patient has been presenting with increasing confusion for the past few months. She was found on the floor by her caretaker this morning, so the patient was brought into the emergency department. Otherwise she has not been complaining of any chest pain, no shortness of breath, no abdominal pain, no nausea or vomiting. In the emergency department the patient was found to be hemodynamically stable. Radiologic findings of right distal femoral fracture. She will be admitted to medical floors for further management. Subjective & 24hr Events (23): Saw pt in preop  Family at bedside  Pt calm, pleasant. Baseline dementia  Only on palpation rt thigh lower aspect      Assessment & Plan:     1. Right femoral fracture likely in the setting of mechanical fall  -Pain management  -Orthopedic surgery consult  -N.p.o. after midnight for possible intervention  - pt for surgery today    2. Encephalopathy likely in the setting of progression of her dementia  -CT of the brain without acute intracranial abnormalities  -No signs of UTI or pneumonia  -Obtain ammonia, TSH, B12  -Monitor mental status  -Avoid sedatives  -Delirium precautions  2023- baseline dementia, pleasant  Normal TSH ,low normal B12    3.   CKD stage III  -Avoid nephrotoxic agents  -Monitor renal function    4. Coronary artery disease  -Hold Plavix in the setting of possible surgical intervention  -Not on statin    5. Hypertension  -Continue carvedilol, ACE inhibitor, carvedilol, hydralazine  -Hold chlorthalidone for now    6. Hypothyroidism  Continue Synthroid    DVT prophylaxis with Lovenox    DNR/DNI       Hospital Problems:  Principal Problem:    Fall from ground level  Active Problems:    Femoral fracture (HCC)    Dementia ([facility] [address]    Hypothyroidism    Essential hypertension, benign    Coronary artery disease involving native coronary artery of native heart  Resolved Problems:    * No resolved hospital problems. *      Objective:   Patient Vitals for the past 24 hrs:   Temp Pulse Resp BP SpO2   01/09/23 1125 98.7 °F (37.1 °C) 68 16 (!) 118/58 93 %   01/09/23 0721 97.5 °F (36.4 °C) 75 18 132/66 94 %   01/09/23 0437 97.9 °F (36.6 °C) 70 15 136/62 95 %   01/08/23 1853 -- -- 16 -- --   01/08/23 1715 -- 72 18 101/74 96 %   01/08/23 1700 -- 73 18 (!) 127/56 95 %       Oxygen Therapy  SpO2: 93 %  Pulse via Oximetry: 69 beats per minute  Pulse Oximeter Device Mode: Other (Comment)  Pulse Oximeter Device Location: Left, Finger  O2 Device: None (Room air)    Estimated body mass index is 26.43 kg/m² as calculated from the following:    Height as of this encounter: 5' 4\" (1.626 m). Weight as of this encounter: 154 lb (69.9 kg). No intake or output data in the 24 hours ending 01/09/23 1632      Physical Exam:     Blood pressure (!) 118/58, pulse 68, temperature 98.7 °F (37.1 °C), temperature source Temporal, resp. rate 16, height 5' 4\" (1.626 m), weight 154 lb (69.9 kg), SpO2 93 %. General:    Well nourished. Head:  Normocephalic, atraumatic  Eyes:  Sclerae appear normal.  Pupils equally round. ENT:  Nares appear normal.  Moist oral mucosa  Neck:  No restricted ROM. Trachea midline   CV:   RRR. No m/r/g. No jugular venous distension. Lungs:   CTAB. No wheezing, rhonchi, or rales. Symmetric expansion. Abdomen:   Soft, nontender, nondistended. Extremities: Swelling rt thigh lower aspect- tender to palpation  Normal movement left lower ext  Skin:     No rashes and normal coloration. Warm and dry. Neuro:  CN II-XII grossly intact. Sensation intact. awake,laert   Psych:  Calm ,pleasant    I have personally reviewed labs and tests showing:  Recent Labs:  Recent Results (from the past 48 hour(s))   TSH with Reflex    Collection Time: 01/08/23 10:20 AM   Result Value Ref Range    TSH w Free Thyroid if Abnormal 0.86 0.358 - 3.740 UIU/ML   Vitamin B12    Collection Time: 01/08/23 10:20 AM   Result Value Ref Range    Vitamin B-12 235 193 - 986 pg/mL   CBC with Auto Differential    Collection Time: 01/08/23 10:54 AM   Result Value Ref Range    WBC 12.3 (H) 4.3 - 11.1 K/uL    RBC 3.68 (L) 4.05 - 5.2 M/uL    Hemoglobin 10.8 (L) 11.7 - 15.4 g/dL    Hematocrit 32.6 (L) 35.8 - 46.3 %    MCV 88.6 82 - 102 FL    MCH 29.3 26.1 - 32.9 PG    MCHC 33.1 31.4 - 35.0 g/dL    RDW 13.6 11.9 - 14.6 %    Platelets 354 616 - 308 K/uL    MPV 10.3 9.4 - 12.3 FL    nRBC 0.00 0.0 - 0.2 K/uL    Differential Type AUTOMATED      Seg Neutrophils 83 (H) 43 - 78 %    Lymphocytes 12 (L) 13 - 44 %    Monocytes 5 4.0 - 12.0 %    Eosinophils % 0 (L) 0.5 - 7.8 %    Basophils 0 0.0 - 2.0 %    Immature Granulocytes 0 0.0 - 5.0 %    Segs Absolute 10.1 (H) 1.7 - 8.2 K/UL    Absolute Lymph # 1.5 0.5 - 4.6 K/UL    Absolute Mono # 0.6 0.1 - 1.3 K/UL    Absolute Eos # 0.0 0.0 - 0.8 K/UL    Basophils Absolute 0.0 0.0 - 0.2 K/UL    Absolute Immature Granulocyte 0.1 0.0 - 0.5 K/UL   Comprehensive Metabolic Panel    Collection Time: 01/08/23 10:54 AM   Result Value Ref Range    Sodium 141 133 - 143 mmol/L    Potassium 4.0 3.5 - 5.1 mmol/L    Chloride 109 101 - 110 mmol/L    CO2 25 21 - 32 mmol/L    Anion Gap 7 2 - 11 mmol/L    Glucose 258 (H) 65 - 100 mg/dL    BUN 24 (H) 8 - 23 MG/DL    Creatinine 1.20 (H) 0.6 - 1.0 MG/DL    Est, Glom Filt Rate 42 (L) >60 ml/min/1.73m2    Calcium 8.5 8.3 - 10.4 MG/DL    Total Bilirubin 0.3 0.2 - 1.1 MG/DL    ALT 19 12 - 65 U/L    AST 23 15 - 37 U/L    Alk Phosphatase 95 50 - 136 U/L    Total Protein 6.8 6.3 - 8.2 g/dL    Albumin 2.8 (L) 3.2 - 4.6 g/dL    Globulin 4.0 2.8 - 4.5 g/dL    Albumin/Globulin Ratio 0.7 0.4 - 1.6     CK    Collection Time: 01/08/23 10:54 AM   Result Value Ref Range    Total  21 - 215 U/L   Protime-INR    Collection Time: 01/08/23 10:54 AM   Result Value Ref Range    Protime 13.0 12.6 - 14.3 sec    INR 0.9     Magnesium    Collection Time: 01/08/23 10:54 AM   Result Value Ref Range    Magnesium 1.8 1.8 - 2.4 mg/dL   Urinalysis w rflx microscopic    Collection Time: 01/08/23 11:40 AM   Result Value Ref Range    Color, UA YELLOW/STRAW      Appearance CLEAR      Specific Gravity, UA 1.018 1.001 - 1.023      pH, Urine 6.0 5.0 - 9.0      Protein, UA 30 (A) NEG mg/dL    Glucose, UA Negative mg/dL    Ketones, Urine Negative NEG mg/dL    Bilirubin Urine Negative NEG      Blood, Urine Negative NEG      Urobilinogen, Urine 0.2 0.2 - 1.0 EU/dL    Nitrite, Urine Negative NEG      Leukocyte Esterase, Urine Negative NEG      WBC, UA 0-3 0 /hpf    Epithelial Cells UA 0-3 0 /hpf    BACTERIA, URINE TRACE 0 /hpf   CBC with Auto Differential    Collection Time: 01/09/23  6:11 AM   Result Value Ref Range    WBC 9.2 4.3 - 11.1 K/uL    RBC 3.17 (L) 4.05 - 5.2 M/uL    Hemoglobin 9.3 (L) 11.7 - 15.4 g/dL    Hematocrit 29.2 (L) 35.8 - 46.3 %    MCV 92.1 82 - 102 FL    MCH 29.3 26.1 - 32.9 PG    MCHC 31.8 31.4 - 35.0 g/dL    RDW 14.0 11.9 - 14.6 %    Platelets 261 823 - 163 K/uL    MPV 10.7 9.4 - 12.3 FL    nRBC 0.00 0.0 - 0.2 K/uL    Differential Type AUTOMATED      Seg Neutrophils 66 43 - 78 %    Lymphocytes 25 13 - 44 %    Monocytes 9 4.0 - 12.0 %    Eosinophils % 0 (L) 0.5 - 7.8 %    Basophils 0 0.0 - 2.0 %    Immature Granulocytes 0 0.0 - 5.0 %    Segs Absolute 6.0 1.7 - 8.2 K/UL    Absolute Lymph # 2.3 0.5 - 4.6 K/UL    Absolute Mono # 0.9 0.1 - 1.3 K/UL    Absolute Eos # 0.0 0.0 - 0.8 K/UL    Basophils Absolute 0.0 0.0 - 0.2 K/UL    Absolute Immature Granulocyte 0.0 0.0 - 0.5 K/UL   Basic Metabolic Panel    Collection Time: 01/09/23  6:11 AM   Result Value Ref Range    Sodium 140 133 - 143 mmol/L    Potassium 3.9 3.5 - 5.1 mmol/L    Chloride 109 101 - 110 mmol/L    CO2 26 21 - 32 mmol/L    Anion Gap 5 2 - 11 mmol/L    Glucose 204 (H) 65 - 100 mg/dL    BUN 31 (H) 8 - 23 MG/DL    Creatinine 1.30 (H) 0.6 - 1.0 MG/DL    Est, Glom Filt Rate 38 (L) >60 ml/min/1.73m2    Calcium 8.5 8.3 - 10.4 MG/DL       I have personally reviewed imaging studies showing: Other Studies:  XR HIP RIGHT (2-3 VIEWS)   Final Result   Postoperative changes without evidence of acute bony abnormality. XR KNEE RIGHT (3 VIEWS)   Final Result   Spiral type fracture of the distal femoral metadiaphysis with mild   displacement. XR FEMUR RIGHT (MIN 2 VIEWS)   Final Result   Spiral type fracture of the distal femoral metadiaphysis. XR CHEST PORTABLE   Final Result   Unremarkable portable chest radiograph. CT HEAD WO CONTRAST   Final Result   1. No evidence of acute intracranial hemorrhage. 2. No evidence of acute cervical spine fracture. 3. Cervical spondylosis, felt to account for the slight retrolisthesis of C5.   4. 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 CT CERVICAL SPINE WO CONTRAST   Final Result   1. No evidence of acute intracranial hemorrhage. 2. No evidence of acute cervical spine fracture. 3. Cervical spondylosis, felt to account for the slight retrolisthesis of C5.   4. 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                 Current Meds:  Current Facility-Administered Medications   Medication Dose Route Frequency    ceFAZolin (ANCEF) 2000 mg in sterile water 20 mL IV syringe  2,000 mg IntraVENous On Call to OR sodium chloride flush 0.9 % injection 5-40 mL  5-40 mL IntraVENous 2 times per day    sodium chloride flush 0.9 % injection 5-40 mL  5-40 mL IntraVENous PRN    0.9 % sodium chloride infusion   IntraVENous PRN    ondansetron (ZOFRAN-ODT) disintegrating tablet 4 mg  4 mg Oral Q8H PRN    Or    ondansetron (ZOFRAN) injection 4 mg  4 mg IntraVENous Q6H PRN    polyethylene glycol (GLYCOLAX) packet 17 g  17 g Oral Daily PRN    acetaminophen (TYLENOL) tablet 650 mg  650 mg Oral Q6H PRN    Or    acetaminophen (TYLENOL) suppository 650 mg  650 mg Rectal Q6H PRN    oxyCODONE (ROXICODONE) immediate release tablet 5 mg  5 mg Oral Q4H PRN    morphine injection 2 mg  2 mg IntraVENous Q4H PRN    lisinopril (PRINIVIL;ZESTRIL) tablet 20 mg  20 mg Oral Daily    carvedilol (COREG) tablet 6.25 mg  6.25 mg Oral Daily    [Held by provider] chlorthalidone (HYGROTON) tablet 25 mg  25 mg Oral Daily    [Held by provider] clopidogrel (PLAVIX) tablet 75 mg  75 mg Oral Daily    hydrALAZINE (APRESOLINE) tablet 50 mg  50 mg Oral Daily    levothyroxine (SYNTHROID) tablet 50 mcg  50 mcg Oral Daily    trimethoprim (TRIMPEX) tablet 100 mg  100 mg Oral Daily    lactated ringers infusion   IntraVENous Continuous    melatonin tablet 3 mg  3 mg Oral Nightly PRN       Signed:  Molly Mcallister MD

## 2023-01-09 NOTE — PROGRESS NOTES
TRANSFER - IN REPORT:    Verbal report received from TORRIE Gupta on [place]  being received from PACU for routine progression of patient care      Report consisted of patient's Situation, Background, Assessment and   Recommendations(SBAR). Information from the following report(s) Surgery Report and Recent Results was reviewed with the receiving nurse. Opportunity for questions and clarification was provided. Assessment completed upon patient's arrival to unit 735 and care assumed.

## 2023-01-10 ENCOUNTER — APPOINTMENT (OUTPATIENT)
Dept: CT IMAGING | Age: 88
DRG: 480 | End: 2023-01-10
Payer: MEDICARE

## 2023-01-10 LAB
ANION GAP SERPL CALC-SCNC: 5 MMOL/L (ref 2–11)
BASOPHILS # BLD: 0 K/UL (ref 0–0.2)
BASOPHILS NFR BLD: 0 % (ref 0–2)
BUN SERPL-MCNC: 28 MG/DL (ref 8–23)
CALCIUM SERPL-MCNC: 8 MG/DL (ref 8.3–10.4)
CHLORIDE SERPL-SCNC: 109 MMOL/L (ref 101–110)
CO2 SERPL-SCNC: 27 MMOL/L (ref 21–32)
CREAT SERPL-MCNC: 1.2 MG/DL (ref 0.6–1)
DIFFERENTIAL METHOD BLD: ABNORMAL
EOSINOPHIL # BLD: 0.1 K/UL (ref 0–0.8)
EOSINOPHIL NFR BLD: 1 % (ref 0.5–7.8)
ERYTHROCYTE [DISTWIDTH] IN BLOOD BY AUTOMATED COUNT: 13.9 % (ref 11.9–14.6)
GLUCOSE BLD STRIP.AUTO-MCNC: 254 MG/DL (ref 65–100)
GLUCOSE SERPL-MCNC: 168 MG/DL (ref 65–100)
HCT VFR BLD AUTO: 23.8 % (ref 35.8–46.3)
HGB BLD-MCNC: 7.6 G/DL (ref 11.7–15.4)
IMM GRANULOCYTES # BLD AUTO: 0 K/UL (ref 0–0.5)
IMM GRANULOCYTES NFR BLD AUTO: 1 % (ref 0–5)
LYMPHOCYTES # BLD: 1.9 K/UL (ref 0.5–4.6)
LYMPHOCYTES NFR BLD: 26 % (ref 13–44)
MCH RBC QN AUTO: 29.6 PG (ref 26.1–32.9)
MCHC RBC AUTO-ENTMCNC: 31.9 G/DL (ref 31.4–35)
MCV RBC AUTO: 92.6 FL (ref 82–102)
MM INDURATION, POC: 0 MM (ref 0–5)
MONOCYTES # BLD: 0.8 K/UL (ref 0.1–1.3)
MONOCYTES NFR BLD: 10 % (ref 4–12)
NEUTS SEG # BLD: 4.6 K/UL (ref 1.7–8.2)
NEUTS SEG NFR BLD: 62 % (ref 43–78)
NRBC # BLD: 0 K/UL (ref 0–0.2)
PLATELET # BLD AUTO: 155 K/UL (ref 150–450)
PMV BLD AUTO: 11.2 FL (ref 9.4–12.3)
POTASSIUM SERPL-SCNC: 3.9 MMOL/L (ref 3.5–5.1)
PPD, POC: NEGATIVE
RBC # BLD AUTO: 2.57 M/UL (ref 4.05–5.2)
SERVICE CMNT-IMP: ABNORMAL
SODIUM SERPL-SCNC: 141 MMOL/L (ref 133–143)
WBC # BLD AUTO: 7.5 K/UL (ref 4.3–11.1)

## 2023-01-10 PROCEDURE — 6370000000 HC RX 637 (ALT 250 FOR IP): Performed by: ORTHOPAEDIC SURGERY

## 2023-01-10 PROCEDURE — 51701 INSERT BLADDER CATHETER: CPT

## 2023-01-10 PROCEDURE — 36415 COLL VENOUS BLD VENIPUNCTURE: CPT

## 2023-01-10 PROCEDURE — 80048 BASIC METABOLIC PNL TOTAL CA: CPT

## 2023-01-10 PROCEDURE — 99222 1ST HOSP IP/OBS MODERATE 55: CPT | Performed by: PSYCHIATRY & NEUROLOGY

## 2023-01-10 PROCEDURE — 51798 US URINE CAPACITY MEASURE: CPT

## 2023-01-10 PROCEDURE — 85025 COMPLETE CBC W/AUTO DIFF WBC: CPT

## 2023-01-10 PROCEDURE — 6370000000 HC RX 637 (ALT 250 FOR IP): Performed by: FAMILY MEDICINE

## 2023-01-10 PROCEDURE — 6360000002 HC RX W HCPCS

## 2023-01-10 PROCEDURE — 2580000003 HC RX 258: Performed by: ORTHOPAEDIC SURGERY

## 2023-01-10 PROCEDURE — 97530 THERAPEUTIC ACTIVITIES: CPT

## 2023-01-10 PROCEDURE — 1100000000 HC RM PRIVATE

## 2023-01-10 PROCEDURE — 97112 NEUROMUSCULAR REEDUCATION: CPT

## 2023-01-10 PROCEDURE — 6360000002 HC RX W HCPCS: Performed by: ORTHOPAEDIC SURGERY

## 2023-01-10 PROCEDURE — 97162 PT EVAL MOD COMPLEX 30 MIN: CPT

## 2023-01-10 PROCEDURE — 6370000000 HC RX 637 (ALT 250 FOR IP): Performed by: PHYSICIAN ASSISTANT

## 2023-01-10 PROCEDURE — 82962 GLUCOSE BLOOD TEST: CPT

## 2023-01-10 RX ORDER — MORPHINE SULFATE 2 MG/ML
0.5 INJECTION, SOLUTION INTRAMUSCULAR; INTRAVENOUS EVERY 4 HOURS PRN
Status: DISCONTINUED | OUTPATIENT
Start: 2023-01-10 | End: 2023-01-17 | Stop reason: HOSPADM

## 2023-01-10 RX ORDER — NALOXONE HYDROCHLORIDE 0.4 MG/ML
INJECTION, SOLUTION INTRAMUSCULAR; INTRAVENOUS; SUBCUTANEOUS
Status: COMPLETED
Start: 2023-01-10 | End: 2023-01-10

## 2023-01-10 RX ORDER — NALOXONE HYDROCHLORIDE 0.4 MG/ML
0.4 INJECTION, SOLUTION INTRAMUSCULAR; INTRAVENOUS; SUBCUTANEOUS PRN
Status: DISCONTINUED | OUTPATIENT
Start: 2023-01-10 | End: 2023-01-17 | Stop reason: HOSPADM

## 2023-01-10 RX ORDER — TRAMADOL HYDROCHLORIDE 50 MG/1
50 TABLET ORAL EVERY 6 HOURS PRN
Status: DISCONTINUED | OUTPATIENT
Start: 2023-01-10 | End: 2023-01-15

## 2023-01-10 RX ORDER — VITAMIN B COMPLEX
2000 TABLET ORAL DAILY
Status: DISCONTINUED | OUTPATIENT
Start: 2023-01-10 | End: 2023-01-17 | Stop reason: HOSPADM

## 2023-01-10 RX ORDER — CLOPIDOGREL BISULFATE 75 MG/1
75 TABLET ORAL DAILY
Status: DISCONTINUED | OUTPATIENT
Start: 2023-01-10 | End: 2023-01-17 | Stop reason: HOSPADM

## 2023-01-10 RX ADMIN — SODIUM CHLORIDE, SODIUM LACTATE, POTASSIUM CHLORIDE, AND CALCIUM CHLORIDE: 600; 310; 30; 20 INJECTION, SOLUTION INTRAVENOUS at 06:14

## 2023-01-10 RX ADMIN — HYDRALAZINE HYDROCHLORIDE 50 MG: 50 TABLET, FILM COATED ORAL at 08:54

## 2023-01-10 RX ADMIN — CARVEDILOL 6.25 MG: 6.25 TABLET, FILM COATED ORAL at 08:55

## 2023-01-10 RX ADMIN — CLOPIDOGREL BISULFATE 75 MG: 75 TABLET ORAL at 15:23

## 2023-01-10 RX ADMIN — CEFAZOLIN SODIUM 2000 MG: 100 INJECTION, POWDER, LYOPHILIZED, FOR SOLUTION INTRAVENOUS at 00:01

## 2023-01-10 RX ADMIN — SODIUM CHLORIDE, PRESERVATIVE FREE 10 ML: 5 INJECTION INTRAVENOUS at 08:55

## 2023-01-10 RX ADMIN — Medication: at 00:46

## 2023-01-10 RX ADMIN — Medication 3 MG: at 20:57

## 2023-01-10 RX ADMIN — ACETAMINOPHEN 650 MG: 325 TABLET ORAL at 10:10

## 2023-01-10 RX ADMIN — OXYCODONE 5 MG: 5 TABLET ORAL at 11:50

## 2023-01-10 RX ADMIN — LISINOPRIL 20 MG: 20 TABLET ORAL at 08:54

## 2023-01-10 RX ADMIN — TRAMADOL HYDROCHLORIDE 50 MG: 50 TABLET ORAL at 20:57

## 2023-01-10 RX ADMIN — LEVOTHYROXINE SODIUM 50 MCG: 0.05 TABLET ORAL at 06:12

## 2023-01-10 RX ADMIN — NALXONE HYDROCHLORIDE: 0.4 INJECTION INTRAMUSCULAR; INTRAVENOUS; SUBCUTANEOUS at 13:41

## 2023-01-10 RX ADMIN — TRIMETHOPRIM 100 MG: 100 TABLET ORAL at 08:57

## 2023-01-10 RX ADMIN — CEFAZOLIN SODIUM 2000 MG: 100 INJECTION, POWDER, LYOPHILIZED, FOR SOLUTION INTRAVENOUS at 07:59

## 2023-01-10 RX ADMIN — CHOLECALCIFEROL TAB 25 MCG (1000 UNIT) 2000 UNITS: 25 TAB at 11:50

## 2023-01-10 ASSESSMENT — PAIN DESCRIPTION - ORIENTATION
ORIENTATION: RIGHT
ORIENTATION: RIGHT;LOWER
ORIENTATION: RIGHT

## 2023-01-10 ASSESSMENT — PAIN DESCRIPTION - FREQUENCY
FREQUENCY: INTERMITTENT

## 2023-01-10 ASSESSMENT — PAIN - FUNCTIONAL ASSESSMENT: PAIN_FUNCTIONAL_ASSESSMENT: PREVENTS OR INTERFERES SOME ACTIVE ACTIVITIES AND ADLS

## 2023-01-10 ASSESSMENT — PAIN SCALES - GENERAL
PAINLEVEL_OUTOF10: 0
PAINLEVEL_OUTOF10: 5
PAINLEVEL_OUTOF10: 6
PAINLEVEL_OUTOF10: 3
PAINLEVEL_OUTOF10: 0

## 2023-01-10 ASSESSMENT — PAIN DESCRIPTION - DESCRIPTORS
DESCRIPTORS: ACHING
DESCRIPTORS: ACHING;SORE
DESCRIPTORS: ACHING;SORE

## 2023-01-10 ASSESSMENT — PAIN DESCRIPTION - PAIN TYPE
TYPE: ACUTE PAIN;SURGICAL PAIN
TYPE: ACUTE PAIN
TYPE: ACUTE PAIN;SURGICAL PAIN

## 2023-01-10 ASSESSMENT — PAIN DESCRIPTION - LOCATION
LOCATION: BACK;HIP
LOCATION: HIP
LOCATION: HIP

## 2023-01-10 ASSESSMENT — PAIN DESCRIPTION - ONSET
ONSET: PROGRESSIVE
ONSET: PROGRESSIVE
ONSET: ON-GOING

## 2023-01-10 NOTE — PROGRESS NOTES
Hospitalist Progress Note   Admit Date:  2023 10:05 AM   Name:  Rebekah Izaguirre   Age:  80 y.o. Sex:  female  :  3/20/1929   MRN:  [id number]   Room:  [facility]    Presenting Complaint: Fall     Reason(s) for Admission: Fall, initial encounter [W19. XXXA]  Fall from ground level [W18.30XA]  Periprosthetic fracture of shaft of femur [Q73. 8XXA, [address] Course:   history of hypertension, dementia, aortic stenosis status post TAVR, CKD stage III, coronary artery disease, hyperlipidemia, the presented in the setting of a fall. Most of the history obtained by family at bedside since patient unable to provide any detailed history. According to family the patient has been presenting with increasing confusion for the past few months. She was found on the floor by her caretaker this morning, so the patient was brought into the emergency department. Otherwise she has not been complaining of any chest pain, no shortness of breath, no abdominal pain, no nausea or vomiting. In the emergency department the patient was found to be hemodynamically stable. Radiologic findings of right distal femoral fracture. She will be admitted to medical floors for further management. Subjective & 24hr Events (01/10/23): Staff called saying pt not talking, did receive narcan. I was at bedside, family in the room. Pt couldn't talk, was still able to follow commands, was mildy able to move upper extremities, when asked to squeeze my hand,mild squeeze. Called CODE S,Neurology evaluated - felt secondary to pin medications, did not recommend CT head. Added back pts plavix after speaking to . Changed pain medications    Reevaluated pt about approx 30 min later- pt awake, alert, able to verbalize appropriately, back to her baseline. Assessment & Plan:     Spiral type fracture of the distal femoral metadiaphysis.   2023  Preoperative Diagnosis: Closed fracture of neck of right femur, initial encounter ([facility] [S72.001A]    Postoperative Diagnosis:   Loes displaced right femoral shaft fracture   Surgeon(s) and Role:     * Cricket Malone MD - Primary   Anesthesia: General    Procedure: Treatment of right femoral shaft fracture with intramedullary nail fixation and interlock bolt screw fixation  1/10- changed pain medications to tramadol  Dced oxycodone and decreased morphine to 0.5 mg    Acute metabolic encephalopathy  9/87- prob sec pain medications,did receive narcan. Initially not much response. Code S was called-neurology felt secondary to pain medications. Pt back to baseline when reevaluated in approx 30 min. Dementia    Ckd 3  Cont fluids    CAD,s/p TAVR  1/10- added plavix    HTN  1/10- added parameters for bp medications    Hypothyroid  Cont synthroid. DVT prophylaxis - plavix  DNR        Hospital Problems:  Principal Problem:    Fall from ground level  Active Problems:    Femoral fracture (HCC)    Dementia ([facility] [address]    Hypothyroidism    Essential hypertension, benign    Coronary artery disease involving native coronary artery of native heart  Resolved Problems:    * No resolved hospital problems.  *      Objective:   Patient Vitals for the past 24 hrs:   Temp Pulse Resp BP SpO2   01/10/23 1633 -- 77 -- (!) 103/44 96 %   01/10/23 1558 -- 72 -- (!) 105/36 --   01/10/23 1543 97.5 °F (36.4 °C) 72 16 (!) 105/34 96 %   01/10/23 1347 -- 78 18 (!) 122/55 97 %   01/10/23 1147 -- 74 -- (!) 118/45 93 %   01/10/23 1145 98.6 °F (37 °C) 72 16 (!) 109/46 96 %   01/10/23 0811 97.6 °F (36.4 °C) 73 18 (!) 132/59 94 %   01/10/23 0409 98.1 °F (36.7 °C) 78 17 (!) 122/56 97 %   01/10/23 0040 97.9 °F (36.6 °C) 79 17 131/64 98 %   01/09/23 1908 97.6 °F (36.4 °C) 82 18 (!) 148/66 94 %   01/09/23 1809 97.7 °F (36.5 °C) 79 19 (!) 153/61 98 %   01/09/23 1751 -- 82 16 (!) 144/65 91 %   01/09/23 1746 -- 80 15 (!) 152/66 96 %   01/09/23 1741 98 °F (36.7 °C) 81 16 (!) 146/65 94 %   01/09/23 1734 -- 82 16 (!) 144/66 93 %   01/09/23 1731 -- 81 15 (!) 144/65 94 %   01/09/23 1726 -- 81 16 (!) 143/66 95 %   01/09/23 1721 -- 84 -- -- 97 %   01/09/23 1719 -- 84 15 (!) 156/71 96 %   01/09/23 1716 -- 84 14 (!) 159/72 97 %   01/09/23 1712 97.3 °F (36.3 °C) 77 14 (!) 160/70 97 %   01/09/23 1711 -- 78 16 (!) 160/70 98 %       Oxygen Therapy  SpO2: 96 %  Pulse via Oximetry: 83 beats per minute  Pulse Oximeter Device Mode: Continuous  Pulse Oximeter Device Location: Finger  O2 Device: None (Room air)  O2 Flow Rate (L/min): 2 L/min    Estimated body mass index is 26.43 kg/m² as calculated from the following:    Height as of this encounter: 5' 4\" (1.626 m). Weight as of this encounter: 154 lb (69.9 kg). Intake/Output Summary (Last 24 hours) at 1/10/2023 1657  Last data filed at 1/10/2023 0658  Gross per 24 hour   Intake --   Output 592 ml   Net -592 ml         Physical Exam:     Blood pressure (!) 103/44, pulse 77, temperature 97.5 °F (36.4 °C), temperature source Axillary, resp. rate 16, height 5' 4\" (1.626 m), weight 154 lb (69.9 kg), SpO2 96 %. General:    Initial examination, non verbal, repeat examination - awake, alert - back to baseline  Head:  Normocephalic, atraumatic  Eyes:  Sclerae appear normal.  Pupils equally round. ENT:  Nares appear normal.  Moist oral mucosa  Neck:  No restricted ROM. Trachea midline   CV:   RRR. No m/r/g. No jugular venous distension. Lungs:   CTAB. No wheezing, rhonchi, or rales. Symmetric expansion. Abdomen:   Soft, nontender, nondistended. Extremities: Initially mild movement of upper ext- latera on re examination - normal movement of upper extremities. Decreased rt lower ext secondary to pain  Normal movement of left lower ext  Skin:     No rashes and normal coloration. Warm and dry. Neuro:  CN II-XII grossly intact. Sensation intact.    Psych:  calm    I have personally reviewed labs and tests showing:  Recent Labs:  Recent Results (from the past 48 hour(s))   CBC with Auto Differential    Collection Time: 01/09/23  6:11 AM   Result Value Ref Range    WBC 9.2 4.3 - 11.1 K/uL    RBC 3.17 (L) 4.05 - 5.2 M/uL    Hemoglobin 9.3 (L) 11.7 - 15.4 g/dL    Hematocrit 29.2 (L) 35.8 - 46.3 %    MCV 92.1 82 - 102 FL    MCH 29.3 26.1 - 32.9 PG    MCHC 31.8 31.4 - 35.0 g/dL    RDW 14.0 11.9 - 14.6 %    Platelets 419 525 - 092 K/uL    MPV 10.7 9.4 - 12.3 FL    nRBC 0.00 0.0 - 0.2 K/uL    Differential Type AUTOMATED      Seg Neutrophils 66 43 - 78 %    Lymphocytes 25 13 - 44 %    Monocytes 9 4.0 - 12.0 %    Eosinophils % 0 (L) 0.5 - 7.8 %    Basophils 0 0.0 - 2.0 %    Immature Granulocytes 0 0.0 - 5.0 %    Segs Absolute 6.0 1.7 - 8.2 K/UL    Absolute Lymph # 2.3 0.5 - 4.6 K/UL    Absolute Mono # 0.9 0.1 - 1.3 K/UL    Absolute Eos # 0.0 0.0 - 0.8 K/UL    Basophils Absolute 0.0 0.0 - 0.2 K/UL    Absolute Immature Granulocyte 0.0 0.0 - 0.5 K/UL   Basic Metabolic Panel    Collection Time: 01/09/23  6:11 AM   Result Value Ref Range    Sodium 140 133 - 143 mmol/L    Potassium 3.9 3.5 - 5.1 mmol/L    Chloride 109 101 - 110 mmol/L    CO2 26 21 - 32 mmol/L    Anion Gap 5 2 - 11 mmol/L    Glucose 204 (H) 65 - 100 mg/dL    BUN 31 (H) 8 - 23 MG/DL    Creatinine 1.30 (H) 0.6 - 1.0 MG/DL    Est, Glom Filt Rate 38 (L) >60 ml/min/1.73m2    Calcium 8.5 8.3 - 10.4 MG/DL   CBC with Auto Differential    Collection Time: 01/10/23  5:15 AM   Result Value Ref Range    WBC 7.5 4.3 - 11.1 K/uL    RBC 2.57 (L) 4.05 - 5.2 M/uL    Hemoglobin 7.6 (L) 11.7 - 15.4 g/dL    Hematocrit 23.8 (L) 35.8 - 46.3 %    MCV 92.6 82 - 102 FL    MCH 29.6 26.1 - 32.9 PG    MCHC 31.9 31.4 - 35.0 g/dL    RDW 13.9 11.9 - 14.6 %    Platelets 017 310 - 829 K/uL    MPV 11.2 9.4 - 12.3 FL    nRBC 0.00 0.0 - 0.2 K/uL    Differential Type AUTOMATED      Seg Neutrophils 62 43 - 78 %    Lymphocytes 26 13 - 44 %    Monocytes 10 4.0 - 12.0 %    Eosinophils % 1 0.5 - 7.8 %    Basophils 0 0.0 - 2.0 %    Immature Granulocytes 1 0.0 - 5.0 %    Segs Absolute 4.6 1.7 - 8.2 K/UL    Absolute Lymph # 1.9 0.5 - 4.6 K/UL    Absolute Mono # 0.8 0.1 - 1.3 K/UL    Absolute Eos # 0.1 0.0 - 0.8 K/UL    Basophils Absolute 0.0 0.0 - 0.2 K/UL    Absolute Immature Granulocyte 0.0 0.0 - 0.5 K/UL   Basic Metabolic Panel w/ Reflex to MG    Collection Time: 01/10/23  5:15 AM   Result Value Ref Range    Sodium 141 133 - 143 mmol/L    Potassium 3.9 3.5 - 5.1 mmol/L    Chloride 109 101 - 110 mmol/L    CO2 27 21 - 32 mmol/L    Anion Gap 5 2 - 11 mmol/L    Glucose 168 (H) 65 - 100 mg/dL    BUN 28 (H) 8 - 23 MG/DL    Creatinine 1.20 (H) 0.6 - 1.0 MG/DL    Est, Glom Filt Rate 42 (L) >60 ml/min/1.73m2    Calcium 8.0 (L) 8.3 - 10.4 MG/DL   POCT Glucose    Collection Time: 01/10/23  1:46 PM   Result Value Ref Range    POC Glucose 254 (H) 65 - 100 mg/dL    Performed by: Glen        I have personally reviewed imaging studies showing: Other Studies:  XR KNEE RIGHT (1-2 VIEWS)   Final Result   Surgical fixation of distal femur periprosthetic fracture. NC XR TECHNOLOGIST SERVICE   Final Result      XR HIP RIGHT (2-3 VIEWS)   Final Result   Postoperative changes without evidence of acute bony abnormality. XR KNEE RIGHT (3 VIEWS)   Final Result   Spiral type fracture of the distal femoral metadiaphysis with mild   displacement. XR FEMUR RIGHT (MIN 2 VIEWS)   Final Result   Spiral type fracture of the distal femoral metadiaphysis. XR CHEST PORTABLE   Final Result   Unremarkable portable chest radiograph. CT HEAD WO CONTRAST   Final Result   1. No evidence of acute intracranial hemorrhage. 2. No evidence of acute cervical spine fracture. 3. Cervical spondylosis, felt to account for the slight retrolisthesis of C5.   4. 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 CT CERVICAL SPINE WO CONTRAST   Final Result   1. No evidence of acute intracranial hemorrhage.    2. No evidence of acute cervical spine fracture. 3. Cervical spondylosis, felt to account for the slight retrolisthesis of C5.   4. 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                 Current Meds:  Current Facility-Administered Medications   Medication Dose Route Frequency    Vitamin D (CHOLECALCIFEROL) tablet 2,000 Units  2,000 Units Oral Daily    naloxone (NARCAN) injection 0.4 mg  0.4 mg IntraVENous PRN    clopidogrel (PLAVIX) tablet 75 mg  75 mg Oral Daily    morphine injection 0.5 mg  0.5 mg IntraVENous Q4H PRN    traMADol (ULTRAM) tablet 50 mg  50 mg Oral Q6H PRN    sodium chloride flush 0.9 % injection 5-40 mL  5-40 mL IntraVENous PRN    0.9 % sodium chloride infusion   IntraVENous PRN    ondansetron (ZOFRAN-ODT) disintegrating tablet 4 mg  4 mg Oral Q8H PRN    Or    ondansetron (ZOFRAN) injection 4 mg  4 mg IntraVENous Q6H PRN    tuberculin injection 5 Units  5 Units IntraDERmal Once    medicated lip ointment (BLISTEX)   Topical PRN    sodium chloride flush 0.9 % injection 5-40 mL  5-40 mL IntraVENous 2 times per day    sodium chloride flush 0.9 % injection 5-40 mL  5-40 mL IntraVENous PRN    0.9 % sodium chloride infusion   IntraVENous PRN    ondansetron (ZOFRAN-ODT) disintegrating tablet 4 mg  4 mg Oral Q8H PRN    Or    ondansetron (ZOFRAN) injection 4 mg  4 mg IntraVENous Q6H PRN    polyethylene glycol (GLYCOLAX) packet 17 g  17 g Oral Daily PRN    acetaminophen (TYLENOL) tablet 650 mg  650 mg Oral Q6H PRN    Or    acetaminophen (TYLENOL) suppository 650 mg  650 mg Rectal Q6H PRN    lisinopril (PRINIVIL;ZESTRIL) tablet 20 mg  20 mg Oral Daily    carvedilol (COREG) tablet 6.25 mg  6.25 mg Oral Daily    [Held by provider] chlorthalidone (HYGROTON) tablet 25 mg  25 mg Oral Daily    hydrALAZINE (APRESOLINE) tablet 50 mg  50 mg Oral Daily    levothyroxine (SYNTHROID) tablet 50 mcg  50 mcg Oral Daily    trimethoprim (TRIMPEX) tablet 100 mg  100 mg Oral Daily    lactated ringers infusion   IntraVENous Continuous    melatonin tablet 3 mg  3 mg Oral Nightly PRN       Signed:  Daniella Robles MD

## 2023-01-10 NOTE — FLOWSHEET NOTE
01/10/23 1347   Vital Signs   Heart Rate 78   Heart Rate Source Monitor   Resp 18   BP (!) 122/55   MAP (Calculated) 77     RN called to room by PT. Pt staring off into space, not able to speak or follow commands. This AM pt alert & oriented to self & place & able to follow commands appropriately. Pt was given oxycodone 5 mg at 1150. Pt given narcan at 1341. Hospitalist at bedside. Code S called 9913 after pt being minimally responsive to narcan. Code team to bedside. Per neurology no CT.

## 2023-01-10 NOTE — ANESTHESIA POSTPROCEDURE EVALUATION
Department of Anesthesiology  Postprocedure Note    Patient: Rene Alvarado  MRN: [id number]  YOB: 1929  Date of evaluation: 1/9/2023      Procedure Summary     Date: 01/09/23 Room / Location: [facility] MAIN OR  / [facility] MAIN OR    Anesthesia Start: 9894 Anesthesia Stop: 1286    Procedure: RIGHT FEMUR INSERTION DISTAL TFN SCREW (Right: Leg Lower) Diagnosis:       Closed fracture of neck of right femur, initial encounter ([facility])      (Closed [facility] of right femur[facility] [S72.001A])    Providers: Lisette Moreira MD Responsible Provider: Deena Fernandez MD    Anesthesia Type: TIVA ASA Status: 3 - Emergent          Anesthesia Type: No value filed.     Melany Phase I: Melany Score: 8    Melany Phase II:        Anesthesia Post Evaluation    Patient location during evaluation: bedside  Patient participation: complete - patient participated  Level of consciousness: awake and alert  Pain score: 1  Airway patency: patent  Nausea & Vomiting: no vomiting  Complications: no  Cardiovascular status: hemodynamically stable  Respiratory status: acceptable  Hydration status: euvolemic

## 2023-01-10 NOTE — PROGRESS NOTES
[facility]        January 10, 2023         Post Op day: 1 Day Post-Op Procedure(s) (LRB):  RIGHT FEMUR INSERTION DISTAL TFN SCREW (Right)      Admit Date: 2023  Admit Diagnosis: Fall, initial encounter [W19. XXXA]  Fall from ground level [W18.30XA]  Periprosthetic fracture of shaft of femur [B24. 8XXA, Z96.649]       Principle Problem: Fall from ground level. Subjective: Doing well, No complaints, No SOB, No Chest Pain, No Nausea or Vomiting     Objective:   Vital Signs are Stable, No Acute Distress, Alert,  Dressing is Dry,  Neurovascular exam is normal.     Assessment / Plan :  Patient Active Problem List   Diagnosis    OZZIE (acute kidney injury) ([facility] [address]    Hyperlipidemia    Sepsis ([facility] [address]    CKD (chronic kidney disease) stage 3, GFR 30-59 ml/min ([facility])    Essential hypertension, benign    Acute blood loss anemia    Type 2 diabetes with nephropathy (HCC)    Type 2 diabetes mellitus with diabetic neuropathy (HCC)    Coronary artery disease involving native coronary artery of native heart    Edema    Acute cystitis    Visual disturbances    Nonrheumatic aortic valve insufficiency    At risk for falling    Type II diabetes mellitus, uncontrolled    S/P TAVR (transcatheter aortic valve replacement)    UTI (urinary tract infection)    Hypoglycemia    Closed right hip fracture ([facility] [address]    Fall from ground level    Femoral fracture (HCC)    Dementia ([facility] [address]    Hypothyroidism    Patient Vitals for the past 8 hrs:   BP Temp Temp src Pulse Resp SpO2   01/10/23 0811 (!) 132/59 97.6 °F (36.4 °C) Oral 73 18 94 %   01/10/23 0409 (!) 122/56 98.1 °F (36.7 °C) Axillary 78 17 97 %    Temp (24hrs), Av.9 °F (36.6 °C), Min:97.3 °F (36.3 °C), Max:98.7 °F (37.1 °C)    Body mass index is 26.43 kg/m².     Lab Results   Component Value Date/Time    HGB 7.6 01/10/2023 05:15 AM          S/P Procedure(s) (LRB):  RIGHT FEMUR INSERTION DISTAL TFN SCREW (Right)      Vit D 2000iu daily x 12 weeks   Medical Mgmt per hospitalist  Anticoagulation plan: resume plavix   Continue PT  Fall Precautions  DC disp: rehab placement  F/U: 2 weeks postop for wound check and staple removal        Signed By: LUC Childs PA  1/10/2023,  10:15 AM

## 2023-01-10 NOTE — CONSULTS
[facility] [address]  [address] [place] [id number]            Luciana Nagel is a 89 y.o. female who presents on referral from acute Code S Dr. Brown  Acute change in speech noted.  It is noted that additionally however the patient had received analgesic at 11:30 AM.  No focal motor change.      Past Medical History:   Diagnosis Date    Aortic regurgitation     mild to moderate per ECHO 3/28/17    Aortic stenosis     severe per ECHO 3/28/17    Basal cell carcinoma     Bruit (arterial)     CAD (coronary artery disease)     small vessel disease, medical management per cardiologist not 4/17    Choledocholithiasis     Hyperlipidemia     Hypertension     Hypothyroidism     Murmur, cardiac     Poor historian     S/P TAVR (transcatheter aortic valve replacement)     3/17    Tricuspid valve regurgitation     moderate per ECHO 3/28/17    Type II or unspecified type diabetes mellitus without mention of complication, not stated as uncontrolled     not medicated, treated w/ diet    Urinary incontinence        Past Surgical History:   Procedure Laterality Date    APPENDECTOMY      CARPAL TUNNEL RELEASE      patient denies    CYSTOCELE REPAIR      ERCP  04/28/2017    laser    ERCP  02/20/2017    large CBD stones    FEMUR FRACTURE SURGERY Right 1/9/2023    RIGHT FEMUR INSERTION DISTAL TFN SCREW performed by Luis Armando Mckenzie MD at [facility] MAIN OR    HYSTERECTOMY (CERVIX STATUS UNKNOWN)      KNEE ARTHROSCOPY Right     LAP,CHOLECYSTECTOMY      ORTHOPEDIC SURGERY      2 back-one fusion-low; all fingers-trigger    OTHER SURGICAL HISTORY Right     basal cell nose    OTHER SURGICAL HISTORY  03/28/2017    Tavr    OVARY REMOVAL      Partial    RECTOCELE REPAIR          Family History   Problem Relation Age of Onset    Heart Attack Father         age 77    Heart Disease Father     Osteoarthritis Mother     Diabetes Mother         Social History     Socioeconomic History    Marital status:       Spouse name: None    Number of children: None    Years of education: None    Highest education level: None   Tobacco Use    Smoking status: Never    Smokeless tobacco: Never   Substance and Sexual Activity    Alcohol use: No    Drug use: No         Current Facility-Administered Medications   Medication Dose Route Frequency Provider Last Rate Last Admin    Vitamin D (CHOLECALCIFEROL) tablet 2,000 Units  2,000 Units Oral Daily LUC Magana   2,000 Units at 01/10/23 1150    naloxone (NARCAN) injection 0.4 mg  0.4 mg IntraVENous PRN Amy Medellin MD        clopidogrel (PLAVIX) tablet 75 mg  75 mg Oral Daily Amy Medellin MD   75 mg at 01/10/23 1523    morphine injection 0.5 mg  0.5 mg IntraVENous Q4H PRN Amy Medellin MD        traMADol (ULTRAM) tablet 50 mg  50 mg Oral Q6H PRN Amy Medellin MD        sodium chloride flush 0.9 % injection 5-40 mL  5-40 mL IntraVENous PRN Angela Marshall MD        0.9 % sodium chloride infusion   IntraVENous PRN Angela Marshall MD        ondansetron (ZOFRAN-ODT) disintegrating tablet 4 mg  4 mg Oral Q8H PRN Angela Marshall MD        Or    ondansetron [facility]) injection 4 mg  4 mg IntraVENous Q6H PRN Angela Marshall MD        tuberculin injection 5 Units  5 Units IntraDERmal Once Angela Marshall MD   5 Units at 01/09/23 1820    medicated lip ointment (BLISTEX)   Topical PRN Amy Medellin MD   Given at 01/10/23 0046    sodium chloride flush 0.9 % injection 5-40 mL  5-40 mL IntraVENous 2 times per day Angela Marshall MD   10 mL at 01/10/23 0855    sodium chloride flush 0.9 % injection 5-40 mL  5-40 mL IntraVENous PRN Angela Marshall MD        0.9 % sodium chloride infusion   IntraVENous PRN Angela Marshall MD        ondansetron (ZOFRAN-ODT) disintegrating tablet 4 mg  4 mg Oral Q8H PRN Angela Marshall MD        Or    ondansetron [facility]) injection 4 mg  4 mg IntraVENous Q6H PRN Angela Marshall MD   4 mg at 01/09/23 1654    polyethylene glycol (GLYCOLAX) packet 17 g  17 g Oral Daily PRN Louisstewart Farfan MD        acetaminophen (TYLENOL) tablet 650 mg  650 mg Oral Q6H PRN Louisnickie Farfan MD   650 mg at 01/10/23 1010    Or    acetaminophen (TYLENOL) suppository 650 mg  650 mg Rectal Q6H PRN Louisnickie Farfan MD        lisinopril (PRINIVIL;ZESTRIL) tablet 20 mg  20 mg Oral Daily Louisstewart Farfan MD   20 mg at 01/10/23 0854    carvedilol (COREG) tablet 6.25 mg  6.25 mg Oral Daily Louisnickie Farfan MD   6.25 mg at 01/10/23 0855    [Held by provider] chlorthalidone (HYGROTON) tablet 25 mg  25 mg Oral Daily Ricki Harrison MD        hydrALAZINE (APRESOLINE) tablet 50 mg  50 mg Oral Daily Louisnickie Farfan MD   50 mg at 01/10/23 1912    levothyroxine (SYNTHROID) tablet 50 mcg  50 mcg Oral Daily Louisnickie Farfan MD   50 mcg at 01/10/23 0612    trimethoprim (TRIMPEX) tablet 100 mg  100 mg Oral Daily Louisnickie Farfan MD   100 mg at 01/10/23 4226    lactated ringers infusion   IntraVENous Continuous Louisstewart Farfan MD 75 mL/hr at 01/10/23 8114 New Bag at 01/10/23 5120    melatonin tablet 3 mg  3 mg Oral Nightly PRN Louisraul Farfan MD   3 mg at 01/08/23 2015        Allergies   Allergen Reactions    Aspirin Anaphylaxis     Hives, swelling    Lorazepam Other (See Comments)     Confused and aggressive    Methocarbamol Anaphylaxis     Swelling of tongue, wheezing  Muscle relaxers in general    Amlodipine Other (See Comments)     Edema    Ciprofloxacin Diarrhea     severe    Clorazepate Dipotassium Hives    Cyclobenzaprine Itching    Ezetimibe-Simvastatin Other (See Comments)     Muscle soreness    Ranitidine Hcl Itching    Sucralfate Hives    Sulfamethoxazole-Trimethoprim Other (See Comments)    Naproxen Sodium Rash       Review of Systems  Not feasible secondary to cognitive dysfunction  BP (!) 105/34   Pulse 72   Temp 97.5 °F (36.4 °C) (Axillary)   Resp 16   Ht 5' 4\" (1.626 m)   Wt 154 lb (69.9 kg)   SpO2 96%   BMI 26.43 kg/m²     Neurologic Exam  The patient is awake readily arousable and does with encouragement follow directions. Oriented to the fact she is in the hospital.  No obvious facial asymmetry and no unilateral weakness in the upper extremities examination of the lower extremities from a relative standpoint between the 2 sides is difficult in the face of the patient's acute hip surgery. No localizing symptomatology from a sensory standpoint level of cooperation precludes cerebellar function testing    Most recent MRI   No results found for this or any previous visit. Most recent MRA   No results found for this or any previous visit. Most recent CTA  Results for orders placed during the hospital encounter of 01/08/23    CT HEAD WO CONTRAST    Narrative  CT head and cervical spine without contrast    HISTORY: Fall, patient found on floor. TECHNIQUE: 5 mm axial images were obtained from the skull base to the vertex  without intravenous contrast. Helically acquired images were obtained spine  reconstructed at 2.5 mm thickness. Sagittal and coronal reformatted images were  submitted. All CT scans at this facility are performed using dose optimization technique as  appropriate to a performed exam, to include on automated exposure control,  adjustment of the mA and/or KV according to patient's size (including  appropriate matching for site-specific examinations), or use of iterative  reconstruction technique. COMPARISON: None    CT head without contrast:    Findings: The ventricles and sulci are prominent but felt to be appropriate for  age. There are no extra-axial fluid collections. No evidence of acute  intracranial hemorrhage or mass effect is identified. There is no evidence to  suggest an acute major territorial infarct. Patchy areas of decreased  attenuation are present within the supratentorial white matter. These are  nonspecific findings but would be most compatible with moderate chronic small  vessel ischemic change.  Encephalomalacia changes are present within the left  occipital lobe as well as small remote left cerebellar infarctions. There is a  remote appearing lacunar infarction within the left thalamus. The bony calvarium is intact. The visualized mastoid air cells and paranasal  sinuses are well pneumatized and aerated. CT cervical spine without contrast:    Findings: Vertebral body heights are well-maintained. There is slight  retrolisthesis of C5. There is moderate disc space narrowing at C5-6. There is  no evidence of acute fracture. There is moderately advanced multilevel facet  arthropathy. The prevertebral soft tissues are unremarkable. Carotid  atherosclerotic changes are present at the bifurcations. Impression  1. No evidence of acute intracranial hemorrhage. 2. No evidence of acute cervical spine fracture. 3. Cervical spondylosis, felt to account for the slight retrolisthesis of C5.  4. 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 Most recent Echo  No results found for this or any previous visit. Most recent lipid panels  Lab Results   Component Value Date/Time    CHOL 299 11/17/2021 08:34 AM    HDL 33 11/17/2021 08:34 AM    VLDL 55 11/17/2021 08:34 AM       Most recent Hgb A1C  No results found for: HBA1C, [id number]      Assessment/Plan:  Patient appears to be pretty much at baseline and would encourage conservative observation at this time I have reviewed the patient's initial CT.   I have we reviewed the history and no head trauma occurred with fall based on the fact that the patient is now post operative fixation of the hip which was done yesterday options for acute treatment of cerebrovascular disease particularly given her age are further pursuit with reference to Code S is an appropriate as there would be risks with regards to the status of the patient's hip with moving her and there is no specific gain          Narendra Rojas MD

## 2023-01-10 NOTE — PROGRESS NOTES
Occupational Therapy Note:    Attempted to see patient this AM for occupational therapy evaluation session. Upon arrival, pt lethargic; not following commands. RN notified and at bedside to assess. Will follow and re-attempt as schedule permits/patient available.  Thank you,    Jason Conrad, OT    Rehab Caseload Tracker

## 2023-01-10 NOTE — FLOWSHEET NOTE
01/10/23 1543   Vital Signs   Temp 97.5 °F (36.4 °C)   Temp Source Axillary   Heart Rate 72   Heart Rate Source Monitor   Resp 16   BP (!) 105/34   MAP (Calculated) 58   MAP (mmHg) (!) 55   BP Location Right upper arm   Patient Position Lying left side     Hospitalist notified. BP parameters added to scheduled daily medications. Continue maintenance IVFs at current rate, no bolus at this time. Hospitalist also notified of pt not voiding today. Order received to continue bladder checks qshift/as needed & insert rivera catheter if bladder scanner > 300 mls.

## 2023-01-10 NOTE — PROGRESS NOTES
Physical Therapy Note:    Attempted to see patient this PM for physical therapy treatment  session. Patient was nonresponsive, staring off into space. BP 96/46. RN informed immediately. Will follow and re-attempt as schedule permits/patient available.  Thank you,    Erin Moffett, PT

## 2023-01-10 NOTE — PROGRESS NOTES
Responded to Code S called overhead at room 735 this afternoon. Patient's family was at bedside. Patient's  arrived during the Code S. I conveyed care and concern to patient's son and gratitude for the 's presence. Son expressed his awareness of patient's poor life expectancy after hip surgery. Son is expecting a visit from his , also. Spiritual Care remains available for support.        Bg Bolden 68  Board Certified

## 2023-01-10 NOTE — WOUND CARE
Attempt wound consult, recent code s, not appropriate to turn for assessment of  areas until this work up is completed. Nurse describes erythema no open areas. Silicone border, turning and heel boots started last night with improvement. Continue current treatments. Will monitor.

## 2023-01-10 NOTE — DISCHARGE INSTRUCTIONS
[facility]      IF YOU HAVE ANY PROBLEMS ONCE YOU ARE AT HOME CALL THE FOLLOWING NUMBERS:   Main office number: [phone] ask for Magdiel Almeida (medical assistant with Dr. Nicholas Silva)  Office Address: [facility] [address], [address] [facility]      Patient Discharge Instructions    Nallely Posey / [id number] : 3/20/1929    Admitted 2023           To be given to [address] on Admission         Weight bearing status: As tolerated with walker and assistance    Activity  Continue Physical Therapy and Occupational Therapy   Fall precautions     Wound Care  Dry dressing changes using sterile technique every other day or more frequently if needed   Staples are to be left in and removed in our office 2 weeks postop    Diet  Resume regular or diabetic diet      Medications    Patient medications are listed on the medication reconciliation sheet. Follow up   Follow up in our office in 2 weeks postop   All patients are to be transported via stretcher unless they are able to independently get out of a chair and stand without assistance. Information obtained by :  I understand that if any problems occur once I am at home I am to contact my physician. I understand and acknowledge receipt of the instructions indicated above.                                                                                                                                            Physician's or R.N.'s Signature                                                                  Date/Time                                                                                                                                              Patient or Representative Signature                                                          Date/Time

## 2023-01-10 NOTE — THERAPY EVALUATION
ACUTE PHYSICAL THERAPY GOALS:   (Developed with and agreed upon by patient and/or caregiver.)    (1.) Luciana Nagel  will move from supine to sit and sit to supine  with MINIMAL ASSIST within 7 treatment day(s).    (2.) Luciana Nagel will transfer from bed to chair and chair to bed with MINIMAL ASSIST using the least restrictive device within 7 treatment day(s).    (3.) Luciana Nagel will ambulate with MINIMAL ASSIST for 10 feet with the least restrictive device within 7 treatment day(s).   (4.) Luciana Nagel will perform standing static and dynamic balance activities x 10 minutes with MINIMAL ASSIST to improve safety within 7 treatment day(s).  (5.) Luciana Nagel will perform bilateral lower extremity exercises x 15 min for HEP with SUPERVISION to improve strength, endurance, and functional mobility within 7 treatment day(s).       PHYSICAL THERAPY Initial Assessment, Daily Note, and AM  (Link to Caseload Tracking: PT Visit Days : 1  Acknowledge Orders  Time In/Out  PT Charge Capture  Rehab Caseload Tracker    Luciana Nagel is a 89 y.o. female   PRIMARY DIAGNOSIS: Fall from ground level  Fall, initial encounter [W19.XXXA]  Fall from ground level [W18.30XA]  Periprosthetic fracture of shaft of femur [M97.8XXA, Z96.649]  Procedure(s) (LRB):  RIGHT FEMUR INSERTION DISTAL TFN SCREW (Right)  1 Day Post-Op  Reason for Referral: Pain in Right Hip (M25.551)  Difficulty in walking, Not elsewhere classified (R26.2)  Inpatient: Payor: MEDICARE / Plan: MEDICARE PART A AND B / Product Type: *No Product type* /     ASSESSMENT:     REHAB RECOMMENDATIONS:   Recommendation to date pending progress:  Setting:  Short-term Rehab    Equipment:    To Be Determined     ASSESSMENT:  Ms. Nagel  is a 89 year old female who presents after a fall with R femoral shaft fracture around pre-existing IM nail fixation. POD#1 after distal femur TFN screw. This date pt performs mobility including bed mobility,  transfers, and standing balance attempts with max-totalA. Sitting balance activities with CGA-modA. Pt was confused and resistant to mobility, requiring max encouragement and education on importance of mobility and purpose of therapy. Unable to safely transfer to chair. Pt presents as functioning below her baseline, with deficits in mobility including transfers, gait, balance, and activity tolerance. Pt will benefit from skilled therapy services to address stated deficits to promote return to highest level of function, independence, and safety. Will continue to follow. [address] [facility] [address] [address] AM-PAC 6 Clicks Basic Mobility Inpatient Short Form  AM-PAC Mobility Inpatient   How much difficulty turning over in bed?: A Lot  How much difficulty sitting down on / standing up from a chair with arms?: A Lot  How much difficulty moving from lying on back to sitting on side of bed?: A Lot  How much help from another person moving to and from a bed to a chair?: Total  How much help from another person needed to walk in hospital room?: Total  How much help from another person for climbing 3-5 steps with a railing?: Total  AM-PAC Inpatient Mobility Raw Score : 9  AM-PAC Inpatient T-Scale Score : 30.55  Mobility Inpatient CMS 0-100% Score: 81.38  Mobility Inpatient CMS G-Code Modifier : CM    SUBJECTIVE:   Ms. Rob López states, \"Just let me lay back down\"     Social/Functional Lives With: Alone  Type of Home: House  Home Layout: One level  Home Access: Stairs to enter with rails  Entrance Stairs - Number of Steps: 3-4  Home Equipment: Jen Adenike, 4 wheeled  Has the patient had two or more falls in the past year or any fall with injury in the past year?: Yes  Receives Help From: Family, Personal care attendant  ADL Assistance: Needs assistance  Homemaking Assistance: Needs assistance  Ambulation Assistance: Independent  Transfer Assistance: Independent  Per dtr, pt has caregivers 7 days a week from 8-230/4 depending on the day.  Either dtr or SHAYNE checks on pt before bed each night and neighbor also watches out for her.    OBJECTIVE:     PAIN: Chitra Idalia / O2: PRECAUTION / Catherne Iron / DRAINS:   Pre Treatment: None         Post Treatment: 10/10 RLE pain, RN informed Vitals    VSS    Oxygen   2L O2, nasal cannula   IV    RESTRICTIONS/PRECAUTIONS:  Restrictions/Precautions: Weight Bearing, Fall Risk  Right Lower Extremity Weight Bearing: Weight Bearing As Tolerated              GROSS EVALUATION: Intact Impaired (Comments):   AROM []  RLE limitations as expected post-op   PROM []    Strength []  RLE grossly 3-/5, LLE grossly 3+/5   Balance []  Fair sitting balance, poor standing balance   Posture [] Forward Head  Rounded Shoulders  Thoracic Kyphosis   Sensation [x]     Coordination [x]      Tone [x]     Edema [x]    Activity Tolerance []  Limited by confusion and pain    []      COGNITION/  PERCEPTION: Intact Impaired (Comments):   Orientation []  Oriented to person and place   Vision []     Hearing []     Cognition  []  Intermittent confusion and difficulty following commands     MOBILITY: I Mod I S SBA CGA Min Mod Max Total  NT x2 Comments:   Bed Mobility    Rolling [] [] [] [] [] [] [] [] [] [x] []    Supine to Sit [] [] [] [] [] [] [] [x] [] [] []    Scooting [] [] [] [] [] [] [] [x] [] [] []    Sit to Supine [] [] [] [] [] [] [] [x] [] [] []    Transfers    Sit to Stand [] [] [] [] [] [] [] [x] [] [] [] X4 from EOB, first attempt to RW, then other attempts with PT anterior to pt, pt unable to fully rise or bear weight through RLE even with bed elevated, max encouragement required throughout for attempts   Bed to Chair [] [] [] [] [] [] [] [] [] [x] [] Unsafe to attempt   Stand to Sit [] [] [] [] [] [] [] [x] [] [] []     [] [] [] [] [] [] [] [] [] [] []    I=Independent, Mod I=Modified Independent, S=Supervision, SBA=Standby Assistance, CGA=Contact Guard Assistance,   Min=Minimal Assistance, Mod=Moderate Assistance, Max=Maximal Assistance, Total=Total Assistance, NT=Not Tested    GAIT: I Mod I S SBA CGA Min Mod Max Total  NT x2 Comments:   Level of Assistance [] [] [] [] [] [] [] [] [] [x] []    Distance   feet    DME N/A    Gait Quality N/A    Weightbearing Status Restrictions/Precautions  Restrictions/Precautions: Weight Bearing, Fall Risk  Lower Extremity Weight Bearing Restrictions  Right Lower Extremity Weight Bearing: Weight Bearing As Tolerated    Stairs      I=Independent, Mod I=Modified Independent, S=Supervision, SBA=Standby Assistance, CGA=Contact Guard Assistance,   Min=Minimal Assistance, Mod=Moderate Assistance, Max=Maximal Assistance, Total=Total Assistance, NT=Not Tested    PLAN:   FREQUENCY AND DURATION: BID for duration of hospital stay or until stated goals are met, whichever comes first.    THERAPY PROGNOSIS: Good    PROBLEM LIST:   (Skilled intervention is medically necessary to address:)  Decreased ADL/Functional Activities  Decreased Activity Tolerance  Decreased AROM/PROM  Decreased Balance  Decreased Cognition  Decreased Gait Ability  Decreased Safety Awareness  Decreased Strength  Decreased Transfer Abilities  Increased Pain INTERVENTIONS PLANNED:   (Benefits and precautions of physical therapy have been discussed with the patient.)  Therapeutic Activity  Therapeutic Exercise/HEP  Neuromuscular Re-education  Gait Training  Education       TREATMENT:   EVALUATION: MODERATE COMPLEXITY: (Untimed Charge)    TREATMENT:   Therapeutic Activity (30 Minutes): Therapeutic activity included Supine to Sit, Sit to Supine, Scooting, Lateral Scooting, Transfer Training, Sitting balance , Standing balance, and education to improve functional Activity tolerance, Balance, Coordination, Mobility, Strength, and ROM.   Neuromuscular Re-education (10 Minutes): Neuromuscular Re-education included Balance Training, Coordination training, Functional mobility with facilitation, Postural training, Sitting balance training, and Standing balance training to improve Balance, Coordination, Functional Mobility, Postural Control, and Proprioception.     TREATMENT GRID:  N/A    AFTER TREATMENT PRECAUTIONS: Alarm Activated, Bed, Bed/Chair Locked, Call light within reach, Needs within reach, RN notified, and Visitors at bedside    INTERDISCIPLINARY COLLABORATION:  RN/ PCT and OT/ AARON    EDUCATION: Education Given To: Patient  Education Provided: Role of Therapy;Plan of Care;Transfer Training  Education Method: Verbal;Demonstration  Barriers to Learning: Cognition  Education Outcome: Continued education needed    TIME IN/OUT:  Time In: 0903  Time Out: [address]  Minutes: 23 Leda Colunga PT

## 2023-01-11 LAB
ANION GAP SERPL CALC-SCNC: 6 MMOL/L (ref 2–11)
BASOPHILS # BLD: 0 K/UL (ref 0–0.2)
BASOPHILS NFR BLD: 0 % (ref 0–2)
BUN SERPL-MCNC: 24 MG/DL (ref 8–23)
CALCIUM SERPL-MCNC: 7.9 MG/DL (ref 8.3–10.4)
CHLORIDE SERPL-SCNC: 106 MMOL/L (ref 101–110)
CO2 SERPL-SCNC: 27 MMOL/L (ref 21–32)
CREAT SERPL-MCNC: 1 MG/DL (ref 0.6–1)
DIFFERENTIAL METHOD BLD: ABNORMAL
EOSINOPHIL # BLD: 0.1 K/UL (ref 0–0.8)
EOSINOPHIL NFR BLD: 1 % (ref 0.5–7.8)
ERYTHROCYTE [DISTWIDTH] IN BLOOD BY AUTOMATED COUNT: 13.4 % (ref 11.9–14.6)
GLUCOSE SERPL-MCNC: 170 MG/DL (ref 65–100)
HCT VFR BLD AUTO: 22.7 % (ref 35.8–46.3)
HGB BLD-MCNC: 7.5 G/DL (ref 11.7–15.4)
IMM GRANULOCYTES # BLD AUTO: 0 K/UL (ref 0–0.5)
IMM GRANULOCYTES NFR BLD AUTO: 0 % (ref 0–5)
LYMPHOCYTES # BLD: 2.4 K/UL (ref 0.5–4.6)
LYMPHOCYTES NFR BLD: 27 % (ref 13–44)
MCH RBC QN AUTO: 29.6 PG (ref 26.1–32.9)
MCHC RBC AUTO-ENTMCNC: 33 G/DL (ref 31.4–35)
MCV RBC AUTO: 89.7 FL (ref 82–102)
MM INDURATION, POC: 0 MM (ref 0–5)
MONOCYTES # BLD: 0.7 K/UL (ref 0.1–1.3)
MONOCYTES NFR BLD: 8 % (ref 4–12)
NEUTS SEG # BLD: 5.7 K/UL (ref 1.7–8.2)
NEUTS SEG NFR BLD: 64 % (ref 43–78)
NRBC # BLD: 0 K/UL (ref 0–0.2)
PLATELET # BLD AUTO: 137 K/UL (ref 150–450)
PLATELET COMMENT: SLIGHT
PMV BLD AUTO: 11 FL (ref 9.4–12.3)
POTASSIUM SERPL-SCNC: 3.9 MMOL/L (ref 3.5–5.1)
PPD, POC: NEGATIVE
RBC # BLD AUTO: 2.53 M/UL (ref 4.05–5.2)
RBC MORPH BLD: ABNORMAL
SARS-COV-2: NORMAL
SODIUM SERPL-SCNC: 139 MMOL/L (ref 133–143)
WBC # BLD AUTO: 8.9 K/UL (ref 4.3–11.1)
WBC MORPH BLD: ABNORMAL

## 2023-01-11 PROCEDURE — 6370000000 HC RX 637 (ALT 250 FOR IP): Performed by: PHYSICIAN ASSISTANT

## 2023-01-11 PROCEDURE — 6370000000 HC RX 637 (ALT 250 FOR IP): Performed by: FAMILY MEDICINE

## 2023-01-11 PROCEDURE — U0003 INFECTIOUS AGENT DETECTION BY NUCLEIC ACID (DNA OR RNA); SEVERE ACUTE RESPIRATORY SYNDROME CORONAVIRUS 2 (SARS-COV-2) (CORONAVIRUS DISEASE [COVID-19]), AMPLIFIED PROBE TECHNIQUE, MAKING USE OF HIGH THROUGHPUT TECHNOLOGIES AS DESCRIBED BY CMS-2020-01-R: HCPCS

## 2023-01-11 PROCEDURE — 2580000003 HC RX 258: Performed by: ORTHOPAEDIC SURGERY

## 2023-01-11 PROCEDURE — 36415 COLL VENOUS BLD VENIPUNCTURE: CPT

## 2023-01-11 PROCEDURE — 97535 SELF CARE MNGMENT TRAINING: CPT

## 2023-01-11 PROCEDURE — 6370000000 HC RX 637 (ALT 250 FOR IP): Performed by: ORTHOPAEDIC SURGERY

## 2023-01-11 PROCEDURE — 85025 COMPLETE CBC W/AUTO DIFF WBC: CPT

## 2023-01-11 PROCEDURE — 80048 BASIC METABOLIC PNL TOTAL CA: CPT

## 2023-01-11 PROCEDURE — 97165 OT EVAL LOW COMPLEX 30 MIN: CPT

## 2023-01-11 PROCEDURE — 97530 THERAPEUTIC ACTIVITIES: CPT

## 2023-01-11 PROCEDURE — 1100000000 HC RM PRIVATE

## 2023-01-11 RX ORDER — CARBOXYMETHYLCELLULOSE SODIUM 10 MG/ML
1 GEL OPHTHALMIC PRN
Status: DISCONTINUED | OUTPATIENT
Start: 2023-01-11 | End: 2023-01-17 | Stop reason: HOSPADM

## 2023-01-11 RX ADMIN — CLOPIDOGREL BISULFATE 75 MG: 75 TABLET ORAL at 08:05

## 2023-01-11 RX ADMIN — SODIUM CHLORIDE, SODIUM LACTATE, POTASSIUM CHLORIDE, AND CALCIUM CHLORIDE: 600; 310; 30; 20 INJECTION, SOLUTION INTRAVENOUS at 06:56

## 2023-01-11 RX ADMIN — CHOLECALCIFEROL TAB 25 MCG (1000 UNIT) 2000 UNITS: 25 TAB at 08:05

## 2023-01-11 RX ADMIN — TRIMETHOPRIM 100 MG: 100 TABLET ORAL at 08:05

## 2023-01-11 RX ADMIN — LEVOTHYROXINE SODIUM 50 MCG: 0.05 TABLET ORAL at 06:34

## 2023-01-11 RX ADMIN — CARBOXYMETHYLCELLULOSE SODIUM 1 DROP: 10 GEL OPHTHALMIC at 22:57

## 2023-01-11 RX ADMIN — HYDRALAZINE HYDROCHLORIDE 50 MG: 50 TABLET, FILM COATED ORAL at 08:05

## 2023-01-11 RX ADMIN — SODIUM CHLORIDE, PRESERVATIVE FREE 10 ML: 5 INJECTION INTRAVENOUS at 08:06

## 2023-01-11 RX ADMIN — TRAMADOL HYDROCHLORIDE 50 MG: 50 TABLET ORAL at 12:03

## 2023-01-11 RX ADMIN — LISINOPRIL 20 MG: 20 TABLET ORAL at 08:06

## 2023-01-11 RX ADMIN — ACETAMINOPHEN 650 MG: 325 TABLET ORAL at 08:05

## 2023-01-11 RX ADMIN — CARVEDILOL 6.25 MG: 6.25 TABLET, FILM COATED ORAL at 08:05

## 2023-01-11 ASSESSMENT — PAIN DESCRIPTION - FREQUENCY
FREQUENCY: INTERMITTENT
FREQUENCY: INTERMITTENT

## 2023-01-11 ASSESSMENT — PAIN DESCRIPTION - PAIN TYPE
TYPE: ACUTE PAIN;SURGICAL PAIN
TYPE: SURGICAL PAIN;ACUTE PAIN

## 2023-01-11 ASSESSMENT — PAIN SCALES - GENERAL
PAINLEVEL_OUTOF10: 6
PAINLEVEL_OUTOF10: 0
PAINLEVEL_OUTOF10: 3

## 2023-01-11 ASSESSMENT — PAIN DESCRIPTION - ORIENTATION
ORIENTATION: RIGHT;POSTERIOR
ORIENTATION: POSTERIOR;RIGHT

## 2023-01-11 ASSESSMENT — PAIN DESCRIPTION - ONSET
ONSET: PROGRESSIVE
ONSET: ON-GOING

## 2023-01-11 ASSESSMENT — PAIN DESCRIPTION - DESCRIPTORS
DESCRIPTORS: ACHING
DESCRIPTORS: ACHING

## 2023-01-11 ASSESSMENT — PAIN DESCRIPTION - LOCATION
LOCATION: HIP;BACK
LOCATION: HIP;BACK

## 2023-01-11 NOTE — PROGRESS NOTES
Hospitalist Progress Note   Admit Date:  2023 10:05 AM   Name:  Christiano Alvarado   Age:  80 y.o. Sex:  female  :  3/20/1929   MRN:  [id number]   Room:  [facility]/    Presenting Complaint: Fall     Reason(s) for Admission: Fall, initial encounter [W19. XXXA]  Fall from ground level [W18.30XA]  Periprosthetic fracture of shaft of femur [O46. 8XXA, [address] Course:   history of hypertension, dementia, aortic stenosis status post TAVR, CKD stage III, coronary artery disease, hyperlipidemia, the presented in the setting of a fall. Most of the history obtained by family at bedside since patient unable to provide any detailed history. According to family the patient has been presenting with increasing confusion for the past few months. She was found on the floor by her caretaker this morning, so the patient was brought into the emergency department. Otherwise she has not been complaining of any chest pain, no shortness of breath, no abdominal pain, no nausea or vomiting. In the emergency department the patient was found to be hemodynamically stable. Radiologic findings of right distal femoral fracture. She will be admitted to medical floors for further management. Subjective & 24hr Events (23):   1/10  Staff called saying pt not talking, did receive narcan. I was at bedside, family in the room. Pt couldn't talk, was still able to follow commands, was mildy able to move upper extremities, when asked to squeeze my hand,mild squeeze. Called CODE S,Neurology evaluated - felt secondary to pin medications, did not recommend CT head. Added back pts plavix after speaking to . Changed pain medications    Reevaluated pt about approx 30 min later- pt awake, alert, able to verbalize appropriately, back to her baseline.       Awake,alert  C/o pain rt op site  On oxygen  On ivf at 75 cc/hr - will change to 30 cc/hr        Assessment & Plan:     Spiral type fracture of the distal femoral metadiaphysis. 1/9/2023  Preoperative Diagnosis: Closed fracture of neck of right femur, initial encounter ([facility] [address] [S72.001A]    Postoperative Diagnosis:   Loes displaced right femoral shaft fracture   Surgeon(s) and Role:     * Cricket Malone MD - Primary   Anesthesia: General    Procedure: Treatment of right femoral shaft fracture with intramedullary nail fixation and interlock bolt screw fixation  1/10- changed pain medications to tramadol  Dced oxycodone and decreased morphine to 0.5 mg    Acute metabolic encephalopathy  0/69- prob sec pain medications,did receive narcan. Initially not much response. Code S was called-neurology felt secondary to pain medications. Pt back to baseline when reevaluated in approx 30 min. 1/11- awake,alert. Dementia    Ckd 3  Cont fluids  1/11- decrease fluids to 30 cc/hr    CAD,s/p TAVR  1/10- added plavix    HTN  1/10- added parameters for bp medications    Hypothyroid  Cont synthroid. DVT prophylaxis - plavix  DNR        Hospital Problems:  Principal Problem:    Fall from ground level  Active Problems:    Femoral fracture (HCC)    Dementia ([facility]    Hypothyroidism    Essential hypertension, benign    Coronary artery disease involving native coronary artery of native heart  Resolved Problems:    * No resolved hospital problems.  *      Objective:   Patient Vitals for the past 24 hrs:   Temp Pulse Resp BP SpO2   01/11/23 1554 97.2 °F (36.2 °C) 63 -- (!) 103/49 99 %   01/11/23 1100 97.3 °F (36.3 °C) 68 18 (!) 114/52 95 %   01/11/23 0743 97.9 °F (36.6 °C) 94 18 (!) 154/79 92 %   01/11/23 0522 98.4 °F (36.9 °C) 100 18 (!) 159/68 92 %   01/11/23 0111 98.4 °F (36.9 °C) 93 16 (!) 158/61 91 %   01/10/23 2057 -- -- 18 -- --   01/10/23 2010 98.8 °F (37.1 °C) 85 18 (!) 152/62 97 %       Oxygen Therapy  SpO2: 99 %  Pulse via Oximetry: 83 beats per minute  Pulse Oximeter Device Mode: Continuous  Pulse Oximeter Device Location: Finger  O2 Device: Nasal cannula  O2 Flow Rate (L/min): 2 L/min    Estimated body mass index is 26.43 kg/m² as calculated from the following:    Height as of this encounter: 5' 4\" (1.626 m). Weight as of this encounter: 154 lb (69.9 kg). No intake or output data in the 24 hours ending 01/11/23 1642        Physical Exam:     Blood pressure (!) 103/49, pulse 63, temperature 97.2 °F (36.2 °C), temperature source Axillary, resp. rate 18, height 5' 4\" (1.626 m), weight 154 lb (69.9 kg), SpO2 99 %. General:    awake, alert on oxygen 2 lit/min  Head:  Normocephalic, atraumatic  Eyes:  Sclerae appear normal.  Pupils equally round. ENT:  Nares appear normal.  Moist oral mucosa  Neck:  No restricted ROM. Trachea midline   CV:   RRR. No m/r/g. No jugular venous distension. Lungs:   CTAB. No wheezing, rhonchi, or rales. Symmetric expansion. Abdomen:   Soft, nontender, nondistended. Extremities: Iimited mobility rt lower ext secondary to pain. Can wiggle rt toes. Normal movements other ext  Skin:     No rashes and normal coloration. Warm and dry. Neuro:  CN II-XII grossly intact. Sensation intact.    Psych:  calm    I have personally reviewed labs and tests showing:  Recent Labs:  Recent Results (from the past 48 hour(s))   CBC with Auto Differential    Collection Time: 01/10/23  5:15 AM   Result Value Ref Range    WBC 7.5 4.3 - 11.1 K/uL    RBC 2.57 (L) 4.05 - 5.2 M/uL    Hemoglobin 7.6 (L) 11.7 - 15.4 g/dL    Hematocrit 23.8 (L) 35.8 - 46.3 %    MCV 92.6 82 - 102 FL    MCH 29.6 26.1 - 32.9 PG    MCHC 31.9 31.4 - 35.0 g/dL    RDW 13.9 11.9 - 14.6 %    Platelets 545 872 - 186 K/uL    MPV 11.2 9.4 - 12.3 FL    nRBC 0.00 0.0 - 0.2 K/uL    Differential Type AUTOMATED      Seg Neutrophils 62 43 - 78 %    Lymphocytes 26 13 - 44 %    Monocytes 10 4.0 - 12.0 %    Eosinophils % 1 0.5 - 7.8 %    Basophils 0 0.0 - 2.0 %    Immature Granulocytes 1 0.0 - 5.0 %    Segs Absolute 4.6 1.7 - 8.2 K/UL    Absolute Lymph # 1.9 0.5 - 4.6 K/UL    Absolute Mono # 0.8 0.1 - 1.3 K/UL    Absolute Eos # 0.1 0.0 - 0.8 K/UL    Basophils Absolute 0.0 0.0 - 0.2 K/UL    Absolute Immature Granulocyte 0.0 0.0 - 0.5 K/UL   Basic Metabolic Panel w/ Reflex to MG    Collection Time: 01/10/23  5:15 AM   Result Value Ref Range    Sodium 141 133 - 143 mmol/L    Potassium 3.9 3.5 - 5.1 mmol/L    Chloride 109 101 - 110 mmol/L    CO2 27 21 - 32 mmol/L    Anion Gap 5 2 - 11 mmol/L    Glucose 168 (H) 65 - 100 mg/dL    BUN 28 (H) 8 - 23 MG/DL    Creatinine 1.20 (H) 0.6 - 1.0 MG/DL    Est, Glom Filt Rate 42 (L) >60 ml/min/1.73m2    Calcium 8.0 (L) 8.3 - 10.4 MG/DL   POCT Glucose    Collection Time: 01/10/23  1:46 PM   Result Value Ref Range    POC Glucose 254 (H) 65 - 100 mg/dL    Performed by: Glen    PLEASE READ & DOCUMENT PPD TEST IN 24 HRS    Collection Time: 01/10/23  6:20 PM   Result Value Ref Range    PPD, (POC) Negative Negative    mm Induration 0 0 - 5 mm   CBC with Auto Differential    Collection Time: 01/11/23  5:00 AM   Result Value Ref Range    WBC 8.9 4.3 - 11.1 K/uL    RBC 2.53 (L) 4.05 - 5.2 M/uL    Hemoglobin 7.5 (L) 11.7 - 15.4 g/dL    Hematocrit 22.7 (L) 35.8 - 46.3 %    MCV 89.7 82 - 102 FL    MCH 29.6 26.1 - 32.9 PG    MCHC 33.0 31.4 - 35.0 g/dL    RDW 13.4 11.9 - 14.6 %    Platelets 777 (L) 266 - 450 K/uL    MPV 11.0 9.4 - 12.3 FL    nRBC 0.00 0.0 - 0.2 K/uL    Differential Type AUTOMATED      Seg Neutrophils 64 43 - 78 %    Lymphocytes 27 13 - 44 %    Monocytes 8 4.0 - 12.0 %    Eosinophils % 1 0.5 - 7.8 %    Basophils 0 0.0 - 2.0 %    Immature Granulocytes 0 0.0 - 5.0 %    Segs Absolute 5.7 1.7 - 8.2 K/UL    Absolute Lymph # 2.4 0.5 - 4.6 K/UL    Absolute Mono # 0.7 0.1 - 1.3 K/UL    Absolute Eos # 0.1 0.0 - 0.8 K/UL    Basophils Absolute 0.0 0.0 - 0.2 K/UL    Absolute Immature Granulocyte 0.0 0.0 - 0.5 K/UL    RBC Comment NORMOCYTIC/NORMOCHROMIC      WBC Comment Result Confirmed By Smear      Platelet Comment SLIGHT     Basic Metabolic Panel w/ Reflex to MG    Collection Time: 01/11/23  5:00 AM   Result Value Ref Range    Sodium 139 133 - 143 mmol/L    Potassium 3.9 3.5 - 5.1 mmol/L    Chloride 106 101 - 110 mmol/L    CO2 27 21 - 32 mmol/L    Anion Gap 6 2 - 11 mmol/L    Glucose 170 (H) 65 - 100 mg/dL    BUN 24 (H) 8 - 23 MG/DL    Creatinine 1.00 0.6 - 1.0 MG/DL    Est, Glom Filt Rate 53 (L) >60 ml/min/1.73m2    Calcium 7.9 (L) 8.3 - 10.4 MG/DL   COVID-19    Collection Time: 01/11/23  1:53 PM    Specimen: Nasopharyngeal Swab   Result Value Ref Range    SARS-CoV-2 Please find results under separate order         I have personally reviewed imaging studies showing: Other Studies:  XR KNEE RIGHT (1-2 VIEWS)   Final Result   Surgical fixation of distal femur periprosthetic fracture. NC XR TECHNOLOGIST SERVICE   Final Result      XR HIP RIGHT (2-3 VIEWS)   Final Result   Postoperative changes without evidence of acute bony abnormality. XR KNEE RIGHT (3 VIEWS)   Final Result   Spiral type fracture of the distal femoral metadiaphysis with mild   displacement. XR FEMUR RIGHT (MIN 2 VIEWS)   Final Result   Spiral type fracture of the distal femoral metadiaphysis. XR CHEST PORTABLE   Final Result   Unremarkable portable chest radiograph. CT HEAD WO CONTRAST   Final Result   1. No evidence of acute intracranial hemorrhage. 2. No evidence of acute cervical spine fracture. 3. Cervical spondylosis, felt to account for the slight retrolisthesis of C5.   4. 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 CT CERVICAL SPINE WO CONTRAST   Final Result   1. No evidence of acute intracranial hemorrhage. 2. No evidence of acute cervical spine fracture. 3. Cervical spondylosis, felt to account for the slight retrolisthesis of C5.   4. 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                 Current Meds:  Current Facility-Administered Medications   Medication Dose Route Frequency    Vitamin D (CHOLECALCIFEROL) tablet 2,000 Units  2,000 Units Oral Daily    naloxone (NARCAN) injection 0.4 mg  0.4 mg IntraVENous PRN    clopidogrel (PLAVIX) tablet 75 mg  75 mg Oral Daily    morphine injection 0.5 mg  0.5 mg IntraVENous Q4H PRN    traMADol (ULTRAM) tablet 50 mg  50 mg Oral Q6H PRN    sodium chloride flush 0.9 % injection 5-40 mL  5-40 mL IntraVENous PRN    0.9 % sodium chloride infusion   IntraVENous PRN    ondansetron (ZOFRAN-ODT) disintegrating tablet 4 mg  4 mg Oral Q8H PRN    Or    ondansetron (ZOFRAN) injection 4 mg  4 mg IntraVENous Q6H PRN    medicated lip ointment (BLISTEX)   Topical PRN    sodium chloride flush 0.9 % injection 5-40 mL  5-40 mL IntraVENous 2 times per day    sodium chloride flush 0.9 % injection 5-40 mL  5-40 mL IntraVENous PRN    0.9 % sodium chloride infusion   IntraVENous PRN    polyethylene glycol (GLYCOLAX) packet 17 g  17 g Oral Daily PRN    acetaminophen (TYLENOL) tablet 650 mg  650 mg Oral Q6H PRN    Or    acetaminophen (TYLENOL) suppository 650 mg  650 mg Rectal Q6H PRN    lisinopril (PRINIVIL;ZESTRIL) tablet 20 mg  20 mg Oral Daily    carvedilol (COREG) tablet 6.25 mg  6.25 mg Oral Daily    [Held by provider] chlorthalidone (HYGROTON) tablet 25 mg  25 mg Oral Daily    hydrALAZINE (APRESOLINE) tablet 50 mg  50 mg Oral Daily    levothyroxine (SYNTHROID) tablet 50 mcg  50 mcg Oral Daily    trimethoprim (TRIMPEX) tablet 100 mg  100 mg Oral Daily    lactated ringers infusion   IntraVENous Continuous    melatonin tablet 3 mg  3 mg Oral Nightly PRN       Signed:  Toyin Hand MD

## 2023-01-11 NOTE — PROGRESS NOTES
ACUTE PHYSICAL THERAPY GOALS:   (Developed with and agreed upon by patient and/or caregiver.)  (1.) Mitali Padron  will move from supine to sit and sit to supine  with MINIMAL ASSIST within 7 treatment day(s). (2.) Luciana Marie will transfer from bed to chair and chair to bed with MINIMAL ASSIST using the least restrictive device within 7 treatment day(s). (3.) Mitali Padron will ambulate with MINIMAL ASSIST for 10 feet with the least restrictive device within 7 treatment day(s). (4.) Luciana Marie will perform standing static and dynamic balance activities x 10 minutes with MINIMAL ASSIST to improve safety within 7 treatment day(s). (5.) Luciana Marie will perform bilateral lower extremity exercises x 15 min for HEP with SUPERVISION to improve strength, endurance, and functional mobility within 7 treatment day(s). PHYSICAL THERAPY: Daily Note PM   (Link to Caseload Tracking: PT Visit Days : 2  Time In/Out PT Charge Capture  Rehab Caseload Tracker  Orders    Mitali Padron is a 80 y.o. female   PRIMARY DIAGNOSIS: Fall from ground level  Fall, initial encounter [W19. XXXA]  Fall from ground level [W18.30XA]  Periprosthetic fracture of shaft of femur [J87. 8XXA, Z96.649]  Procedure(s) (LRB):  RIGHT FEMUR INSERTION DISTAL TFN SCREW (Right)  2 Days Post-Op  Inpatient: Payor: MEDICARE / Plan: MEDICARE PART A AND B / Product Type: *No Product type* /     ASSESSMENT:     REHAB RECOMMENDATIONS:   Recommendation to date pending progress:  Setting:  Short-term Rehab    Equipment:    To Be Determined     ASSESSMENT:  Ms. Rob López is seated in the recliner upon contact and has family members at bedside. The DIL is a PT and is very helpful and encouraging to the patient. The patient required max Ax2 for scooting and sit to stand from the chair. She remains unable to use the RW due to posterior lean. SPT required cueing for participation and remaining calm.   Once seated EOB she demonstrated good sitting balance. In bed she required mod Ax2 for rolling side to side. Slow progress toward goals.         SUBJECTIVE:   Ms. Freddie Stout states, \"ooohh\"     Social/Functional Lives With: Alone  Type of Home: House  Home Layout: One level  Home Access: Stairs to enter with rails  Entrance Stairs - Number of Steps: 3-4  Home Equipment: [employer], 4 wheeled  Has the patient had two or more falls in the past year or any fall with injury in the past year?: Yes  Receives Help From: Family, Personal care attendant  ADL Assistance: Needs assistance  Homemaking Assistance: Needs assistance  Ambulation Assistance: Independent  Transfer Assistance: Independent  OBJECTIVE:     PAIN: Miriam Maxim / O2: Wiliam Asher / Stan Liner / [place]add [place]:   Pre Treatment:    0      Post Treatment:  not rated Vitals        Oxygen    None    RESTRICTIONS/PRECAUTIONS:  Restrictions/Precautions  Restrictions/Precautions: Weight Bearing, Fall Risk  Lower Extremity Weight Bearing Restrictions  Right Lower Extremity Weight Bearing: Weight Bearing As Tolerated  Restrictions/Precautions: Weight Bearing, Fall Risk     MOBILITY: I Mod I S SBA CGA Min Mod Max Total  NT x2 Comments:   Bed Mobility    Rolling [] [] [] [] [] [] [x] [x] [] [] [x]    Supine to Sit [] [] [] [] [] [] [] [] [] [x] []    Scooting [] [] [] [] [] [] [] [x] [] [] [x]    Sit to Supine [] [] [] [] [] [] [x] [x] [] [] [x]    Transfers    Sit to Stand [] [] [] [] [] [] [] [x] [] [] [x]    Bed to Chair [] [] [] [] [] [] [] [] [] [x] []    Stand to Sit [] [] [] [] [] [] [] [x] [] [] [x]     [] [] [] [] [] [] [] [] [] [] []    I=Independent, Mod I=Modified Independent, S=Supervision, SBA=Standby Assistance, CGA=Contact Guard Assistance,   Min=Minimal Assistance, Mod=Moderate Assistance, Max=Maximal Assistance, Total=Total Assistance, NT=Not Tested    BALANCE: Good Fair+ Fair Fair- Poor NT Comments   Sitting Static [] [x] [] [] [] []    Sitting Dynamic [] [x] [] [] [] []              Standing Static [] [] [] [] [x] []    Standing Dynamic [] [] [] [] [x] []      GAIT: I Mod I S SBA CGA Min Mod Max Total  NT x2 Comments:   Level of Assistance [] [] [] [] [] [] [] [] [] [x] []    Distance   feet    DME N/A    Gait Quality N/A    Weightbearing Status      Stairs      I=Independent, Mod I=Modified Independent, S=Supervision, SBA=Standby Assistance, CGA=Contact Guard Assistance,   Min=Minimal Assistance, Mod=Moderate Assistance, Max=Maximal Assistance, Total=Total Assistance, NT=Not Tested    PLAN:   FREQUENCY AND DURATION: BID for duration of hospital stay or until stated goals are met, whichever comes first.    TREATMENT:   TREATMENT:   Therapeutic Activity (23 Minutes): Therapeutic activity included Rolling, Sit to Supine, Scooting, Lateral Scooting, Transfer Training, Sitting balance , and Standing balance to improve functional Activity tolerance, Balance, Coordination, Mobility, and Strength.     TREATMENT GRID:  N/A    AFTER TREATMENT PRECAUTIONS: Alarm Activated, Bed, Bed/Chair Locked, Call light within reach, RN notified, and Visitors at bedside    INTERDISCIPLINARY COLLABORATION:  RN/ PCT, PT/ PTA, and Rehab Attendant    EDUCATION:      TIME IN/OUT:  Time In: 1330  [facility]: [facility]  Minutes: [address], hospitals

## 2023-01-11 NOTE — PROGRESS NOTES
[facility]        2023         Post Op day: 2 Days Post-Op Procedure(s) (LRB):  RIGHT FEMUR INSERTION DISTAL TFN SCREW (Right)      Admit Date: 2023  Admit Diagnosis: Fall, initial encounter [W19. XXXA]  Fall from ground level [W18.30XA]  Periprosthetic fracture of shaft of femur [L53. 8XXA, Z96.649]       Principle Problem: Fall from ground level. Subjective: Doing well, No complaints, No SOB, No Chest Pain, No Nausea or Vomiting     Objective:   Vital Signs are Stable, No Acute Distress, Alert,  Dressing is Dry,  Neurovascular exam is normal.     Assessment / Plan :  Patient Active Problem List   Diagnosis    OZZIE (acute kidney injury) ([facility] [address]    Hyperlipidemia    Sepsis ([facility] [address]    CKD (chronic kidney disease) stage 3, GFR 30-59 ml/min ([facility])    Essential hypertension, benign    Acute blood loss anemia    Type 2 diabetes with nephropathy (HCC)    Type 2 diabetes mellitus with diabetic neuropathy (HCC)    Coronary artery disease involving native coronary artery of native heart    Edema    Acute cystitis    Visual disturbances    Nonrheumatic aortic valve insufficiency    At risk for falling    Type II diabetes mellitus, uncontrolled    S/P TAVR (transcatheter aortic valve replacement)    UTI (urinary tract infection)    Hypoglycemia    Closed right hip fracture ([facility] [address]    Fall from ground level    Femoral fracture (HCC)    Dementia ([facility] [address]    Hypothyroidism    Patient Vitals for the past 8 hrs:   BP Temp Temp src Pulse Resp SpO2   23 0743 (!) 154/79 97.9 °F (36.6 °C) Axillary 94 18 92 %   23 0522 (!) 159/68 98.4 °F (36.9 °C) Axillary 100 18 92 %    Temp (24hrs), Av.3 °F (36.8 °C), Min:97.5 °F (36.4 °C), Max:98.8 °F (37.1 °C)    Body mass index is 26.43 kg/m².     Lab Results   Component Value Date/Time    HGB 7.5 2023 05:00 AM          S/P Procedure(s) (LRB):  RIGHT FEMUR INSERTION DISTAL TFN SCREW (Right)      Vit D 2000iu daily x 12 weeks   Medical Mgmt per hospitalist  Anticoagulation plan: resume plavix   Continue PT  Fall Precautions  DC disp: rehab placement  F/U: 2 weeks postop for wound check and staple removal        Signed By: LUC Jean Baptiste PA  1/11/2023,  9:37 AM

## 2023-01-11 NOTE — PROGRESS NOTES
ACUTE PHYSICAL THERAPY GOALS:   (Developed with and agreed upon by patient and/or caregiver.)  (1.) Mitali Padron  will move from supine to sit and sit to supine  with MINIMAL ASSIST within 7 treatment day(s). (2.) Luciana Benedict will transfer from bed to chair and chair to bed with MINIMAL ASSIST using the least restrictive device within 7 treatment day(s). (3.) Mitali Padron will ambulate with MINIMAL ASSIST for 10 feet with the least restrictive device within 7 treatment day(s). (4.) Luciana Benedict will perform standing static and dynamic balance activities x 10 minutes with MINIMAL ASSIST to improve safety within 7 treatment day(s). (5.) Luciana Benedict will perform bilateral lower extremity exercises x 15 min for HEP with SUPERVISION to improve strength, endurance, and functional mobility within 7 treatment day(s). PHYSICAL THERAPY: Daily Note AM   (Link to Caseload Tracking: PT Visit Days : 1  Time In/Out PT Charge Capture  Rehab Caseload Tracker  Orders    Mitali Padron is a 80 y.o. female   PRIMARY DIAGNOSIS: Fall from ground level  Fall, initial encounter [W19. XXXA]  Fall from ground level [W18.30XA]  Periprosthetic fracture of shaft of femur [U60. 8XXA, Z96.649]  Procedure(s) (LRB):  RIGHT FEMUR INSERTION DISTAL TFN SCREW (Right)  2 Days Post-Op  Inpatient: Payor: MEDICARE / Plan: MEDICARE PART A AND B / Product Type: *No Product type* /     ASSESSMENT:     REHAB RECOMMENDATIONS:   Recommendation to date pending progress:  Setting:  Short-term Rehab    Equipment:    To Be Determined     ASSESSMENT:  Ms. Manuel Marcos is making slow progress toward goals. She required max/total Ax2 for all mobility. Today she is alert and answering questions although she remains somewhat confused. She performed bed mobility and demonstrated good static sitting balance EOB. The patient did become resistant to STS transfers with strong posterior lean and feet sliding forward.   After several STS attempts, performed SPT to the recliner with max Ax2 and cueing for participation. She was unable to take steps, but did stand more upright during the transfer than during STS. SUBJECTIVE:   Ms. Mynor Ortiz states, \"Don't you know my hip is broken? \"     Social/Functional Lives With: Alone  Type of Home: House  Home Layout: One level  Home Access: Stairs to enter with rails  Entrance Stairs - Number of Steps: 3-4  Home Equipment: [employer], 4 wheeled  Has the patient had two or more falls in the past year or any fall with injury in the past year?: Yes  Receives Help From: Family, Personal care attendant  ADL Assistance: Needs assistance  Homemaking Assistance: Needs assistance  Ambulation Assistance: Independent  Transfer Assistance: Independent  OBJECTIVE:     PAIN: Raymond Schmid / O2: PRECAUTION / Bam Freeborn / Juanmas Alejandro:   Pre Treatment:    0      Post Treatment:  not rated Vitals        Oxygen    None    RESTRICTIONS/PRECAUTIONS:  Restrictions/Precautions  Restrictions/Precautions: Weight Bearing, Fall Risk  Lower Extremity Weight Bearing Restrictions  Right Lower Extremity Weight Bearing: Weight Bearing As Tolerated  Restrictions/Precautions: Weight Bearing, Fall Risk     MOBILITY: I Mod I S SBA CGA Min Mod Max Total  NT x2 Comments:   Bed Mobility    Rolling [] [] [] [] [] [] [] [] [] [x] []    Supine to Sit [] [] [] [] [] [] [] [x] [] [] [x]    Scooting [] [] [] [] [] [] [] [x] [] [] [x]    Sit to Supine [] [] [] [] [] [] [] [] [] [x] []    Transfers    Sit to Stand [] [] [] [] [] [] [] [x] [x] [] [x]    Bed to Chair [] [] [] [] [] [] [] [x] [] [] []    Stand to Sit [] [] [] [] [] [] [] [x] [] [] [x]     [] [] [] [] [] [] [] [] [] [] []    I=Independent, Mod I=Modified Independent, S=Supervision, SBA=Standby Assistance, CGA=Contact Guard Assistance,   Min=Minimal Assistance, Mod=Moderate Assistance, Max=Maximal Assistance, Total=Total Assistance, NT=Not Tested    BALANCE: Good Fair+ Fair Fair- Poor NT Comments Sitting Static [] [x] [] [] [] []    Sitting Dynamic [] [x] [] [] [] []              Standing Static [] [] [] [] [x] []    Standing Dynamic [] [] [] [] [x] []      GAIT: I Mod I S SBA CGA Min Mod Max Total  NT x2 Comments:   Level of Assistance [] [] [] [] [] [] [] [] [] [x] []    Distance   feet    DME N/A    Gait Quality N/A    Weightbearing Status      Stairs      I=Independent, Mod I=Modified Independent, S=Supervision, SBA=Standby Assistance, CGA=Contact Guard Assistance,   Min=Minimal Assistance, Mod=Moderate Assistance, Max=Maximal Assistance, Total=Total Assistance, NT=Not Tested    PLAN:   FREQUENCY AND DURATION: BID for duration of hospital stay or until stated goals are met, whichever comes first.    TREATMENT:   TREATMENT:   Therapeutic Activity (23 Minutes): Therapeutic activity included Supine to Sit, Scooting, Transfer Training, Sitting balance , and Standing balance to improve functional Activity tolerance, Balance, Coordination, Mobility, and Strength.     TREATMENT GRID:  N/A    AFTER TREATMENT PRECAUTIONS: Alarm Activated, Bed/Chair Locked, Call light within reach, Chair, RN notified, and Visitors at bedside    INTERDISCIPLINARY COLLABORATION:  RN/ PCT, PT/ PTA, and Rehab Attendant    EDUCATION:      TIME IN/OUT:  Time In: 0944  Time Out: [address]  Minutes: [address] [facility]

## 2023-01-11 NOTE — PROGRESS NOTES
ACUTE OCCUPATIONAL THERAPY GOALS:   (Developed with and agreed upon by patient and/or caregiver.)  Patient will complete functional activity while seated EOB, unsupported, with set-up and adaptive equipment as needed. 2. Patient will complete functional transfers with min A and adaptive equipment as needed. 3. Patient will complete lower body bathing and dressing with mod A and adaptive equipment as needed. 4. Patient will complete toileting with min A.   5. Patient will tolerate at least 15  minutes of BUE therapeutic exercises to increase strength in BUE to aid in functional transfers. 6. Patient will tolerate at least 30 minutes of OT treatment with no rest breaks to increase activity tolerance for ADLs. Timeframe: 7 visits              OCCUPATIONAL THERAPY Initial Assessment, Daily Note, and AM       OT Visit Days: 1   Acknowledge Orders  Time  OT Charge Capture  Rehab Caseload Tracker      Celina Clifford is a 80 y.o. female   PRIMARY DIAGNOSIS: Fall from ground level  Fall, initial encounter [W19. XXXA]  Fall from ground level [W18.30XA]  Periprosthetic fracture of shaft of femur [T20. 8XXA, Z96.649]  Procedure(s) (LRB):  RIGHT FEMUR INSERTION DISTAL TFN SCREW (Right)  3 Days Post-Op  Reason for Referral: Generalized Muscle Weakness (M62.81)  Difficulty in walking, Not elsewhere classified (R26.2)  History of falling (Z91.81)  Inpatient: Payor: MEDICARE / Plan: MEDICARE PART A AND B / Product Type: *No Product type* /     ASSESSMENT:     REHAB RECOMMENDATIONS:   Recommendation to date pending progress:  Setting:  Short-term Rehab    Equipment:    To Be Determined     ASSESSMENT:  Ms. Ochoa Reddy presents with deficits in overall strength, activity tolerance, ADL performance, and functional mobility. Presents for R distal femur fracture, s/p femur nail insertion/fixation. WBAT in RLE. Limited by cognition/hx of dementia. Difficult at times to direct for any mobility or functional performance.  BUE are generally decreased but WFL. Already up to chair upon arrival, however PTA noting max A x 2 for bed mobility this a.m. Attempted to complete oral hygiene and denture care however pt unable to be directed despite max verbal and tactile cues. Proceeded to complete wash face and hands with min [place] and max cues. At this time, Madeleine Zamora is functioning below baseline for ADLs and functional mobility. Patient would benefit from skilled OT services to address OT goals and plan of care      [address] [facility] [address] [address] AM-PAC 6 Clicks Daily Activity Inpatient Short Form:    AM-PAC Daily Activity Inpatient   How much help for putting on and taking off regular lower body clothing?: Total  How much help for Bathing?: Total  How much help for Toileting?: Total  How much help for putting on and taking off regular upper body clothing?: A Lot  How much help for taking care of personal grooming?: A Little  How much help for eating meals?: A Little  AM-PAC Inpatient Daily Activity Raw Score: 11  AM-PAC Inpatient ADL T-Scale Score : 29.04  ADL Inpatient CMS 0-100% Score: 70.42  ADL Inpatient CMS G-Code Modifier : CL           SUBJECTIVE:     Ms. Layo Angulo states, \"I don't really want to do that right now. \"     Social/Functional Lives With: Alone  Type of Home: House  Home Layout: One level  Home Access: Stairs to enter with rails  Entrance Stairs - Number of Steps: 3-4  Home Equipment: [employer], 4 wheeled  Has the patient had two or more falls in the past year or any fall with injury in the past year?: Yes  Receives Help From: Family, Personal care attendant  ADL Assistance: Needs assistance  Homemaking Assistance: Needs assistance  Ambulation Assistance: Independent  Transfer Assistance: Independent  Lives alone however has sitter everyday; supportive family. Uses RW for mobility at baseline. Sitter provides assistance with bathing, dressing, and toileting tasks.    OBJECTIVE:     Adelaide Velasco / Adryan Lama / Damian Grimaldo: Marquez Catheter and IV    RESTRICTIONS/PRECAUTIONS:  Restrictions/Precautions: Weight Bearing, Fall Risk  Right Lower Extremity Weight Bearing: Weight Bearing As Tolerated    PAIN: VITALS / O2:   Pre Treatment:      4/10    Post Treatment: same       Vitals          Oxygen     Stable on 2L 02       GROSS EVALUATION: INTACT IMPAIRED   (See Comments)   UE AROM [] []   UE PROM [] []   Strength []    Generally decreased; BUE WFL   Posture / Balance []    Fair + unsupported EOB sitting balance  poor static standing balance  poor dynamic standing balance   Sensation [x]     Coordination [x]       Tone [x]       Edema [x]    Activity Tolerance []    Generally decreased on RA   Hand Dominance R [] L []      COGNITION/  PERCEPTION: INTACT IMPAIRED   (See Comments)   Orientation []  Hx of dementia   Vision [x]     Hearing []  [place]   Cognition  []  Hx of dementia   Perception []  decreased     MOBILITY: I Mod I S SBA CGA Min Mod Max Total  NT x2 Comments:   Bed Mobility    Rolling [] [] [] [] [] [] [] [] [] [] [] Already up to chair   Supine to Sit [] [] [] [] [] [] [] [] [] [] []    Scooting [] [] [] [] [] [] [] [] [] [] []    Sit to Supine [] [] [] [] [] [] [] [] [] [] []    Transfers    Sit to Stand [] [] [] [] [] [] [] [x] [] [] [x]    Bed to Chair [] [] [] [] [] [] [] [x] [] [] [x]    Stand to Sit [] [] [] [] [] [] [] [x] [] [] [x]    Tub/Shower [] [] [] [] [] [] [] [] [] [] []     Toilet [] [] [] [] [] [] [] [] [] [] []      [] [] [] [] [] [] [] [] [] [] []    I=Independent, Mod I=Modified Independent, S=Supervision/Setup, SBA=Standby Assistance, CGA=Contact Guard Assistance, Min=Minimal Assistance, Mod=Moderate Assistance, Max=Maximal Assistance, Total=Total Assistance, NT=Not Tested    ACTIVITIES OF DAILY LIVING: I Mod I S SBA CGA Min Mod Max Total NT Comments   BASIC ADLs:              Upper Body Bathing  [] [] [] [] [] [] [] [] [] []    Lower Body Bathing [] [] [] [] [] [] [] [] [] []    Toileting [] [] [] [] [] [] [] [] [] []    Upper Body Dressing [] [] [] [] [] [] [] [] [] []    Lower Body Dressing [] [] [] [] [] [] [] [] [] []    Feeding [] [] [] [] [] [] [] [] [] []    Grooming [] [] [] [] [] [x] [] [] [] [] [place] with cues required   Personal Device Care [] [] [] [] [] [] [] [] [] []    Functional Mobility [] [] [] [] [] [] [] [x] [] [] X 2    I=Independent, Mod I=Modified Independent, S=Supervision/Setup, SBA=Standby Assistance, CGA=Contact Guard Assistance, Min=Minimal Assistance, Mod=Moderate Assistance, Max=Maximal Assistance, Total=Total Assistance, NT=Not Tested    PLAN:   FREQUENCY/DURATION   OT Plan of Care: 4 times/week for duration of hospital stay or until stated goals are met, whichever comes first.    PROBLEM LIST:   (Skilled intervention is medically necessary to address:)  Decreased ADL/Functional Activities  Decreased Activity Tolerance  Decreased AROM/PROM  Decreased Balance  Decreased Cognition  Decreased Coordination  Decreased Gait Ability  Decreased Safety Awareness  Decreased Strength  Decreased Transfer Abilities  Increased Pain   INTERVENTIONS PLANNED:  (Benefits and precautions of occupational therapy have been discussed with the patient.)  Self Care Training  Therapeutic Activity  Therapeutic Exercise/HEP  Neuromuscular Re-education  Manual Therapy  Education         TREATMENT:     EVALUATION: LOW COMPLEXITY: (Untimed Charge)    TREATMENT:   Self Care (10 minutes): Patient participated in grooming ADLs in supported sitting with maximal visual, verbal, and tactile cueing to increase independence, decrease assistance required, increase activity tolerance, and increase safety awareness. Patient also participated in functional mobility, functional transfer, and energy conservation training to increase independence, decrease assistance required, increase activity tolerance, and increase safety awareness.      TREATMENT GRID:  N/A    AFTER TREATMENT PRECAUTIONS: Call light within reach, Chair, Needs within reach, RN notified, and Visitors at bedside    INTERDISCIPLINARY COLLABORATION:  RN/ PCT and OT/ AARON    EDUCATION:        TOTAL TREATMENT DURATION AND TIME:  Time In: 1100  Time Out: [address] [facility]  Minutes: [address] [facility], OT

## 2023-01-12 LAB
ANION GAP SERPL CALC-SCNC: 6 MMOL/L (ref 2–11)
BASOPHILS # BLD: 0 K/UL (ref 0–0.2)
BASOPHILS NFR BLD: 0 % (ref 0–2)
BUN SERPL-MCNC: 27 MG/DL (ref 8–23)
CALCIUM SERPL-MCNC: 8.2 MG/DL (ref 8.3–10.4)
CHLORIDE SERPL-SCNC: 105 MMOL/L (ref 101–110)
CO2 SERPL-SCNC: 26 MMOL/L (ref 21–32)
CREAT SERPL-MCNC: 1.2 MG/DL (ref 0.6–1)
DIFFERENTIAL METHOD BLD: ABNORMAL
EOSINOPHIL # BLD: 0.2 K/UL (ref 0–0.8)
EOSINOPHIL NFR BLD: 2 % (ref 0.5–7.8)
ERYTHROCYTE [DISTWIDTH] IN BLOOD BY AUTOMATED COUNT: 13.7 % (ref 11.9–14.6)
GLUCOSE SERPL-MCNC: 145 MG/DL (ref 65–100)
HCT VFR BLD AUTO: 21.1 % (ref 35.8–46.3)
HGB BLD-MCNC: 6.9 G/DL (ref 11.7–15.4)
HISTORY CHECK: NORMAL
IMM GRANULOCYTES # BLD AUTO: 0 K/UL (ref 0–0.5)
IMM GRANULOCYTES NFR BLD AUTO: 0 % (ref 0–5)
LYMPHOCYTES # BLD: 2.5 K/UL (ref 0.5–4.6)
LYMPHOCYTES NFR BLD: 33 % (ref 13–44)
MCH RBC QN AUTO: 29.7 PG (ref 26.1–32.9)
MCHC RBC AUTO-ENTMCNC: 32.7 G/DL (ref 31.4–35)
MCV RBC AUTO: 90.9 FL (ref 82–102)
MM INDURATION, POC: 0 MM (ref 0–5)
MONOCYTES # BLD: 0.7 K/UL (ref 0.1–1.3)
MONOCYTES NFR BLD: 9 % (ref 4–12)
NEUTS SEG # BLD: 4.3 K/UL (ref 1.7–8.2)
NEUTS SEG NFR BLD: 56 % (ref 43–78)
NRBC # BLD: 0 K/UL (ref 0–0.2)
PLATELET # BLD AUTO: 159 K/UL (ref 150–450)
PMV BLD AUTO: 11.4 FL (ref 9.4–12.3)
POTASSIUM SERPL-SCNC: 4.3 MMOL/L (ref 3.5–5.1)
PPD, POC: NEGATIVE
RBC # BLD AUTO: 2.32 M/UL (ref 4.05–5.2)
SARS-COV-2 RNA RESP QL NAA+PROBE: NOT DETECTED
SODIUM SERPL-SCNC: 137 MMOL/L (ref 133–143)
SOURCE: NORMAL
WBC # BLD AUTO: 7.6 K/UL (ref 4.3–11.1)

## 2023-01-12 PROCEDURE — 36415 COLL VENOUS BLD VENIPUNCTURE: CPT

## 2023-01-12 PROCEDURE — 86923 COMPATIBILITY TEST ELECTRIC: CPT

## 2023-01-12 PROCEDURE — 1100000000 HC RM PRIVATE

## 2023-01-12 PROCEDURE — 86850 RBC ANTIBODY SCREEN: CPT

## 2023-01-12 PROCEDURE — 36430 TRANSFUSION BLD/BLD COMPNT: CPT

## 2023-01-12 PROCEDURE — 2580000003 HC RX 258: Performed by: ORTHOPAEDIC SURGERY

## 2023-01-12 PROCEDURE — 6370000000 HC RX 637 (ALT 250 FOR IP): Performed by: FAMILY MEDICINE

## 2023-01-12 PROCEDURE — 97535 SELF CARE MNGMENT TRAINING: CPT

## 2023-01-12 PROCEDURE — 94760 N-INVAS EAR/PLS OXIMETRY 1: CPT

## 2023-01-12 PROCEDURE — 85025 COMPLETE CBC W/AUTO DIFF WBC: CPT

## 2023-01-12 PROCEDURE — 6370000000 HC RX 637 (ALT 250 FOR IP): Performed by: ORTHOPAEDIC SURGERY

## 2023-01-12 PROCEDURE — 6370000000 HC RX 637 (ALT 250 FOR IP): Performed by: PHYSICIAN ASSISTANT

## 2023-01-12 PROCEDURE — 76937 US GUIDE VASCULAR ACCESS: CPT

## 2023-01-12 PROCEDURE — 2700000000 HC OXYGEN THERAPY PER DAY

## 2023-01-12 PROCEDURE — P9016 RBC LEUKOCYTES REDUCED: HCPCS

## 2023-01-12 PROCEDURE — 80048 BASIC METABOLIC PNL TOTAL CA: CPT

## 2023-01-12 PROCEDURE — 2580000003 HC RX 258: Performed by: FAMILY MEDICINE

## 2023-01-12 PROCEDURE — 30233N1 TRANSFUSION OF NONAUTOLOGOUS RED BLOOD CELLS INTO PERIPHERAL VEIN, PERCUTANEOUS APPROACH: ICD-10-PCS | Performed by: FAMILY MEDICINE

## 2023-01-12 RX ORDER — SODIUM CHLORIDE 9 MG/ML
INJECTION, SOLUTION INTRAVENOUS PRN
Status: DISCONTINUED | OUTPATIENT
Start: 2023-01-12 | End: 2023-01-17 | Stop reason: HOSPADM

## 2023-01-12 RX ORDER — HYDRALAZINE HYDROCHLORIDE 25 MG/1
25 TABLET, FILM COATED ORAL DAILY
Status: DISCONTINUED | OUTPATIENT
Start: 2023-01-13 | End: 2023-01-17 | Stop reason: HOSPADM

## 2023-01-12 RX ORDER — LISINOPRIL 5 MG/1
5 TABLET ORAL DAILY
Status: DISCONTINUED | OUTPATIENT
Start: 2023-01-13 | End: 2023-01-17 | Stop reason: HOSPADM

## 2023-01-12 RX ADMIN — TRIMETHOPRIM 100 MG: 100 TABLET ORAL at 09:38

## 2023-01-12 RX ADMIN — ACETAMINOPHEN 650 MG: 325 TABLET ORAL at 16:39

## 2023-01-12 RX ADMIN — CLOPIDOGREL BISULFATE 75 MG: 75 TABLET ORAL at 09:38

## 2023-01-12 RX ADMIN — LEVOTHYROXINE SODIUM 50 MCG: 0.05 TABLET ORAL at 06:06

## 2023-01-12 RX ADMIN — CHOLECALCIFEROL TAB 25 MCG (1000 UNIT) 2000 UNITS: 25 TAB at 09:37

## 2023-01-12 RX ADMIN — TRAMADOL HYDROCHLORIDE 50 MG: 50 TABLET ORAL at 11:08

## 2023-01-12 RX ADMIN — SODIUM CHLORIDE, SODIUM LACTATE, POTASSIUM CHLORIDE, AND CALCIUM CHLORIDE: 600; 310; 30; 20 INJECTION, SOLUTION INTRAVENOUS at 11:11

## 2023-01-12 RX ADMIN — CARVEDILOL 6.25 MG: 6.25 TABLET, FILM COATED ORAL at 09:39

## 2023-01-12 RX ADMIN — TRAMADOL HYDROCHLORIDE 50 MG: 50 TABLET ORAL at 20:50

## 2023-01-12 RX ADMIN — SODIUM CHLORIDE, PRESERVATIVE FREE 5 ML: 5 INJECTION INTRAVENOUS at 20:46

## 2023-01-12 RX ADMIN — SODIUM CHLORIDE, PRESERVATIVE FREE 10 ML: 5 INJECTION INTRAVENOUS at 09:39

## 2023-01-12 ASSESSMENT — PAIN DESCRIPTION - FREQUENCY
FREQUENCY: INTERMITTENT

## 2023-01-12 ASSESSMENT — PAIN DESCRIPTION - DESCRIPTORS
DESCRIPTORS: ACHING;DULL
DESCRIPTORS: ACHING
DESCRIPTORS: ACHING

## 2023-01-12 ASSESSMENT — PAIN DESCRIPTION - LOCATION
LOCATION: HIP;KNEE
LOCATION: KNEE
LOCATION: KNEE

## 2023-01-12 ASSESSMENT — PAIN - FUNCTIONAL ASSESSMENT: PAIN_FUNCTIONAL_ASSESSMENT: PREVENTS OR INTERFERES SOME ACTIVE ACTIVITIES AND ADLS

## 2023-01-12 ASSESSMENT — PAIN DESCRIPTION - ORIENTATION
ORIENTATION: RIGHT

## 2023-01-12 ASSESSMENT — PAIN DESCRIPTION - ONSET
ONSET: SUDDEN
ONSET: ON-GOING
ONSET: SUDDEN

## 2023-01-12 ASSESSMENT — PAIN DESCRIPTION - PAIN TYPE
TYPE: SURGICAL PAIN

## 2023-01-12 ASSESSMENT — PAIN SCALES - GENERAL
PAINLEVEL_OUTOF10: 0
PAINLEVEL_OUTOF10: 6
PAINLEVEL_OUTOF10: 3
PAINLEVEL_OUTOF10: 5

## 2023-01-12 NOTE — PROGRESS NOTES
Hospitalist Progress Note   Admit Date:  2023 10:05 AM   Name:  Conchita Sharma   Age:  80 y.o. Sex:  female  :  3/20/1929   MRN:  [id number]   Room:  [facility]/    Presenting Complaint: Fall     Reason(s) for Admission: Fall, initial encounter [W19. XXXA]  Fall from ground level [W18.30XA]  Periprosthetic fracture of shaft of femur [J24. 8XXA, [address] Course:   history of hypertension, dementia, aortic stenosis status post TAVR, CKD stage III, coronary artery disease, hyperlipidemia, the presented in the setting of a fall. Most of the history obtained by family at bedside since patient unable to provide any detailed history. According to family the patient has been presenting with increasing confusion for the past few months. She was found on the floor by her caretaker this morning, so the patient was brought into the emergency department. Otherwise she has not been complaining of any chest pain, no shortness of breath, no abdominal pain, no nausea or vomiting. In the emergency department the patient was found to be hemodynamically stable. Radiologic findings of right distal femoral fracture. She will be admitted to medical floors for further management. Course during the stay   History of fall right femur fracture, status postsurgery. Episode where patient was unable to talk, Code S was called, felt probably from the pain medication as per neurology. Mentation improved. Mild anemia, hemoglobin of 6.9, getting a unit of PRBC on . Only gave Coreg on , decrease hydralazine from 50 to 25 mg, decrease lisinopril from 20 to 5 mg. Subjective & 24hr Events (23):   1/10  Staff called saying pt not talking, did receive narcan. I was at bedside, family in the room. Pt couldn't talk, was still able to follow commands, was mildy able to move upper extremities, when asked to squeeze my hand,mild squeeze.   Called CODE S,Neurology evaluated - felt secondary to pin medications, did not recommend CT head. Added back pts plavix after speaking to . Changed pain medications    Reevaluated pt about approx 30 min later- pt awake, alert, able to verbalize appropriately, back to her baseline. 1/11  Awake,alert  C/o pain rt op site  On oxygen  On ivf at 75 cc/hr - will change to 30 cc/hr    1/12  Family at bedside  Hb 6.9  Ckd 3- creatinine 1.2  Patient awake alert, on 2 L/min, mild complaint right thigh region, OpSite. 1 unit of PRBC ordered        Assessment & Plan:     Spiral type fracture of the distal femoral metadiaphysis. 1/9/2023  Preoperative Diagnosis: Closed fracture of neck of right femur, initial encounter ([facility] [address] [S72.001A]    Postoperative Diagnosis:   Loes displaced right femoral shaft fracture   Surgeon(s) and Role:     * Magy Burton MD - Primary   Anesthesia: General    Procedure: Treatment of right femoral shaft fracture with intramedullary nail fixation and interlock bolt screw fixation  1/10- changed pain medications to tramadol  Dced oxycodone and decreased morphine to 0.5 mg    Acute metabolic encephalopathy  6/73- prob sec pain medications,did receive narcan. Initially not much response. Code S was called-neurology felt secondary to pain medications. Pt back to baseline when reevaluated in approx 30 min. 1/11- awake,alert. Dementia    Ckd 3  Cont fluids  1/11- decrease fluids to 30 cc/hr    CAD,s/p TAVR  1/10- added plavix    HTN  1/10- added parameters for bp medications  1/12- only gave coreg,decrease hydralazine from 50 to 25 mg, decrease lisinopril from 20 to 5 mg. Hypothyroid  Cont synthroid.     DVT prophylaxis - plavix  DNR        Hospital Problems:  Principal Problem:    Fall from ground level  Active Problems:    Femoral fracture (HCC)    Dementia ([facility] [address]    Hypothyroidism    Essential hypertension, benign    Coronary artery disease involving native coronary artery of native heart  Resolved Problems:    * No resolved hospital problems. *      Objective:   Patient Vitals for the past 24 hrs:   Temp Pulse Resp BP SpO2   01/12/23 0735 98.4 °F (36.9 °C) 69 -- (!) 156/53 99 %   01/12/23 0317 98.6 °F (37 °C) 70 19 (!) 140/55 94 %   01/11/23 2327 98.6 °F (37 °C) 77 18 (!) 147/57 94 %   01/11/23 2045 99 °F (37.2 °C) 75 18 (!) 112/91 91 %   01/11/23 1554 97.2 °F (36.2 °C) 63 -- (!) 103/49 99 %       Oxygen Therapy  SpO2: 99 %  Pulse via Oximetry: 83 beats per minute  Pulse Oximeter Device Mode: Continuous  Pulse Oximeter Device Location: Finger  O2 Device: Nasal cannula  O2 Flow Rate (L/min): 2 L/min    Estimated body mass index is 26.43 kg/m² as calculated from the following:    Height as of this encounter: 5' 4\" (1.626 m). Weight as of this encounter: 154 lb (69.9 kg). No intake or output data in the 24 hours ending 01/12/23 1111        Physical Exam:     Blood pressure (!) 156/53, pulse 69, temperature 98.4 °F (36.9 °C), temperature source Axillary, resp. rate 19, height 5' 4\" (1.626 m), weight 154 lb (69.9 kg), SpO2 99 %. General:    awake, alert on oxygen 2 lit/min  Head:  Normocephalic, atraumatic  Eyes:  Sclerae appear normal.  Pupils equally round. ENT:  Nares appear normal.  Moist oral mucosa  Neck:  No restricted ROM. Trachea midline   CV:   RRR. No m/r/g. No jugular venous distension. Lungs:   CTAB. No wheezing, rhonchi, or rales. Symmetric expansion. Abdomen:   Soft, nontender, nondistended. Extremities: Iimited mobility rt lower ext secondary to pain. Can wiggle rt toes. Normal movements other ext  Skin:     No rashes and normal coloration. Warm and dry. Neuro:  CN II-XII grossly intact. Sensation intact.    Psych:  calm    I have personally reviewed labs and tests showing:  Recent Labs:  Recent Results (from the past 48 hour(s))   POCT Glucose    Collection Time: 01/10/23  1:46 PM   Result Value Ref Range    POC Glucose 254 (H) 65 - 100 mg/dL    Performed by: Patti SEABSTIAN [facility] TEST IN 24 HRS    Collection Time: 01/10/23  6:20 PM   Result Value Ref Range    PPD, (POC) Negative Negative    mm Induration 0 0 - 5 mm   CBC with Auto Differential    Collection Time: 01/11/23  5:00 AM   Result Value Ref Range    WBC 8.9 4.3 - 11.1 K/uL    RBC 2.53 (L) 4.05 - 5.2 M/uL    Hemoglobin 7.5 (L) 11.7 - 15.4 g/dL    Hematocrit 22.7 (L) 35.8 - 46.3 %    MCV 89.7 82 - 102 FL    MCH 29.6 26.1 - 32.9 PG    MCHC 33.0 31.4 - 35.0 g/dL    RDW 13.4 11.9 - 14.6 %    Platelets 740 (L) 470 - 450 K/uL    MPV 11.0 9.4 - 12.3 FL    nRBC 0.00 0.0 - 0.2 K/uL    Differential Type AUTOMATED      Seg Neutrophils 64 43 - 78 %    Lymphocytes 27 13 - 44 %    Monocytes 8 4.0 - 12.0 %    Eosinophils % 1 0.5 - 7.8 %    Basophils 0 0.0 - 2.0 %    Immature Granulocytes 0 0.0 - 5.0 %    Segs Absolute 5.7 1.7 - 8.2 K/UL    Absolute Lymph # 2.4 0.5 - 4.6 K/UL    Absolute Mono # 0.7 0.1 - 1.3 K/UL    Absolute Eos # 0.1 0.0 - 0.8 K/UL    Basophils Absolute 0.0 0.0 - 0.2 K/UL    Absolute Immature Granulocyte 0.0 0.0 - 0.5 K/UL    RBC Comment NORMOCYTIC/NORMOCHROMIC      WBC Comment Result Confirmed By Smear      Platelet Comment SLIGHT     Basic Metabolic Panel w/ Reflex to MG    Collection Time: 01/11/23  5:00 AM   Result Value Ref Range    Sodium 139 133 - 143 mmol/L    Potassium 3.9 3.5 - 5.1 mmol/L    Chloride 106 101 - 110 mmol/L    CO2 27 21 - 32 mmol/L    Anion Gap 6 2 - 11 mmol/L    Glucose 170 (H) 65 - 100 mg/dL    BUN 24 (H) 8 - 23 MG/DL    Creatinine 1.00 0.6 - 1.0 MG/DL    Est, Glom Filt Rate 53 (L) >60 ml/min/1.73m2    Calcium 7.9 (L) 8.3 - 10.4 MG/DL   COVID-19    Collection Time: 01/11/23  1:53 PM    Specimen: Nasopharyngeal Swab   Result Value Ref Range    SARS-CoV-2 Please find results under separate order     CBC with Auto Differential    Collection Time: 01/12/23  4:38 AM   Result Value Ref Range    WBC 7.6 4.3 - 11.1 K/uL    RBC 2.32 (L) 4.05 - 5.2 M/uL    Hemoglobin 6.9 (LL) 11.7 - 15.4 g/dL    Hematocrit 21.1 (L) 35.8 - 46.3 %    MCV 90.9 82 - 102 FL    MCH 29.7 26.1 - 32.9 PG    MCHC 32.7 31.4 - 35.0 g/dL    RDW 13.7 11.9 - 14.6 %    Platelets 620 924 - 096 K/uL    MPV 11.4 9.4 - 12.3 FL    nRBC 0.00 0.0 - 0.2 K/uL    Differential Type AUTOMATED      Seg Neutrophils 56 43 - 78 %    Lymphocytes 33 13 - 44 %    Monocytes 9 4.0 - 12.0 %    Eosinophils % 2 0.5 - 7.8 %    Basophils 0 0.0 - 2.0 %    Immature Granulocytes 0 0.0 - 5.0 %    Segs Absolute 4.3 1.7 - 8.2 K/UL    Absolute Lymph # 2.5 0.5 - 4.6 K/UL    Absolute Mono # 0.7 0.1 - 1.3 K/UL    Absolute Eos # 0.2 0.0 - 0.8 K/UL    Basophils Absolute 0.0 0.0 - 0.2 K/UL    Absolute Immature Granulocyte 0.0 0.0 - 0.5 K/UL   Basic Metabolic Panel w/ Reflex to MG    Collection Time: 01/12/23  4:38 AM   Result Value Ref Range    Sodium 137 133 - 143 mmol/L    Potassium 4.3 3.5 - 5.1 mmol/L    Chloride 105 101 - 110 mmol/L    CO2 26 21 - 32 mmol/L    Anion Gap 6 2 - 11 mmol/L    Glucose 145 (H) 65 - 100 mg/dL    BUN 27 (H) 8 - 23 MG/DL    Creatinine 1.20 (H) 0.6 - 1.0 MG/DL    Est, Glom Filt Rate 42 (L) >60 ml/min/1.73m2    Calcium 8.2 (L) 8.3 - 10.4 MG/DL   PREPARE RBC (CROSSMATCH), 1 Units    Collection Time: 01/12/23  6:30 AM   Result Value Ref Range    History Check Historical check performed    TYPE AND SCREEN    Collection Time: 01/12/23  6:34 AM   Result Value Ref Range    Crossmatch expiration date 01/15/2023,5753     ABO/Rh O POSITIVE     Antibody Screen NEG     Unit Number [id number]     Product Code Blood Bank RC LR     Unit Divison 00     Dispense Status Blood Bank ALLOCATED     Crossmatch Result Compatible        I have personally reviewed imaging studies showing: Other Studies:  XR KNEE RIGHT (1-2 VIEWS)   Final Result   Surgical fixation of distal femur periprosthetic fracture. NC XR TECHNOLOGIST SERVICE   Final Result      XR HIP RIGHT (2-3 VIEWS)   Final Result   Postoperative changes without evidence of acute bony abnormality.          XR KNEE RIGHT (3 VIEWS) Final Result   Spiral type fracture of the distal femoral metadiaphysis with mild   displacement. XR FEMUR RIGHT (MIN 2 VIEWS)   Final Result   Spiral type fracture of the distal femoral metadiaphysis. XR CHEST PORTABLE   Final Result   Unremarkable portable chest radiograph. CT HEAD WO CONTRAST   Final Result   1. No evidence of acute intracranial hemorrhage. 2. No evidence of acute cervical spine fracture. 3. Cervical spondylosis, felt to account for the slight retrolisthesis of C5.   4. 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 CT CERVICAL SPINE WO CONTRAST   Final Result   1. No evidence of acute intracranial hemorrhage. 2. No evidence of acute cervical spine fracture. 3. Cervical spondylosis, felt to account for the slight retrolisthesis of C5.   4. 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                 Current Meds:  Current Facility-Administered Medications   Medication Dose Route Frequency    0.9 % sodium chloride infusion   IntraVENous PRN    carboxymethylcellulose (THERATEARS) 1 % ophthalmic gel 1 drop  1 drop Both Eyes PRN    Vitamin D (CHOLECALCIFEROL) tablet 2,000 Units  2,000 Units Oral Daily    naloxone (NARCAN) injection 0.4 mg  0.4 mg IntraVENous PRN    clopidogrel (PLAVIX) tablet 75 mg  75 mg Oral Daily    morphine injection 0.5 mg  0.5 mg IntraVENous Q4H PRN    traMADol (ULTRAM) tablet 50 mg  50 mg Oral Q6H PRN    sodium chloride flush 0.9 % injection 5-40 mL  5-40 mL IntraVENous PRN    0.9 % sodium chloride infusion   IntraVENous PRN    ondansetron (ZOFRAN-ODT) disintegrating tablet 4 mg  4 mg Oral Q8H PRN    Or    ondansetron (ZOFRAN) injection 4 mg  4 mg IntraVENous Q6H PRN    medicated lip ointment (BLISTEX)   Topical PRN    sodium chloride flush 0.9 % injection 5-40 mL  5-40 mL IntraVENous 2 times per day    sodium chloride flush 0.9 % injection 5-40 mL  5-40 mL IntraVENous PRN    0.9 % sodium chloride infusion   IntraVENous PRN    polyethylene glycol (GLYCOLAX) packet 17 g  17 g Oral Daily PRN    acetaminophen (TYLENOL) tablet 650 mg  650 mg Oral Q6H PRN    Or    acetaminophen (TYLENOL) suppository 650 mg  650 mg Rectal Q6H PRN    lisinopril (PRINIVIL;ZESTRIL) tablet 20 mg  20 mg Oral Daily    carvedilol (COREG) tablet 6.25 mg  6.25 mg Oral Daily    [Held by provider] chlorthalidone (HYGROTON) tablet 25 mg  25 mg Oral Daily    hydrALAZINE (APRESOLINE) tablet 50 mg  50 mg Oral Daily    levothyroxine (SYNTHROID) tablet 50 mcg  50 mcg Oral Daily    trimethoprim (TRIMPEX) tablet 100 mg  100 mg Oral Daily    lactated ringers infusion   IntraVENous Continuous    melatonin tablet 3 mg  3 mg Oral Nightly PRN       Signed:  Aster Catalan MD

## 2023-01-12 NOTE — PROGRESS NOTES
US Guided PIV access-   Skin was cleaned and disinfected prior to IV puncture. Ultrasound was used to find the vein which was compressible and does not have any ultrasound features of an artery or nerve bundle. Under real-time ultrasound guidance peripheral access was obtained in the right forearm using 22 G 1.75\" Peripheral IV catheter . Blood return was present and IV flushed without difficulty with no clinical signs of infiltration. IV dressing applied and there were no immediate complications noted and patient tolerated the procedure well.

## 2023-01-12 NOTE — PROGRESS NOTES
dACUTE OCCUPATIONAL THERAPY GOALS:   (Developed with and agreed upon by patient and/or caregiver.)  Patient will complete functional activity while seated EOB, unsupported, with set-up and adaptive equipment as needed. 2. Patient will complete functional transfers with min A and adaptive equipment as needed. 3. Patient will complete lower body bathing and dressing with mod A and adaptive equipment as needed. 4. Patient will complete toileting with min A.   5. Patient will tolerate at least 15  minutes of BUE therapeutic exercises to increase strength in BUE to aid in functional transfers. 6. Patient will tolerate at least 30 minutes of OT treatment with no rest breaks to increase activity tolerance for ADLs. OCCUPATIONAL THERAPY Daily Note     OT Visit Days: 2   Time  OT Charge Capture  Rehab Caseload Tracker  OT Orders    Luciana Moore is a 80 y.o. female   PRIMARY DIAGNOSIS: Fall from ground level  Fall, initial encounter [W19. XXXA]  Fall from ground level [W18.30XA]  Periprosthetic fracture of shaft of femur [S51. 8XXA, Z96.649]  Procedure(s) (LRB):  RIGHT FEMUR INSERTION DISTAL TFN SCREW (Right)  3 Days Post-Op  Inpatient: Payor: MEDICARE / Plan: MEDICARE PART A AND B / Product Type: *No Product type* /     ASSESSMENT:     REHAB RECOMMENDATIONS: CURRENT LEVEL OF FUNCTION:  (Most Recently Demonstrated)   Recommendation to date pending progress:  Setting:  Short-term Rehab    Equipment:    To Be Determined Bathing:  Not Tested  Dressing:  Not Tested  Feeding/Grooming:  Minimal Assist  Toileting:  Not Tested  Functional Mobility:  Not Tested     ASSESSMENT:  Ms. Andria Blunt continues to present with deficits in overall strength, activity tolerance, ADL performance, and functional mobility. Presents lightly resting however easily awakened; following commands well. Limited today d/t low Hgb (plans for unit of blood).  Noted untouched food and asked patient if she was interested in eating; agreeable and easily directed. Patient placed in chair positioned and provided set-up for self-feeding tasks. Occasional min [place] required for functional hand to mouth pattern; does well with finger foods. Proceeded to complete self-grooming tasks with min A. Mod verbal, visual, and tactile cues required for facilitation. Good progress towards therapy goals and plan of care. Will continue OT efforts as indicated. SUBJECTIVE:     Ms. Lyla Parikh states, \"It might need some salt. \"     Social/Functional Lives With: Alone  Type of Home: House  Home Layout: One level  Home Access: Stairs to enter with rails  Entrance Stairs - Number of Steps: 3-4  Home Equipment: WISHI, 4 wheeled  Has the patient had two or more falls in the past year or any fall with injury in the past year?: Yes  Receives Help From: Family, Friend(s), Personal care attendant  ADL Assistance: Needs assistance  Homemaking Assistance: Needs assistance  Ambulation Assistance: Independent  Transfer Assistance: Independent    OBJECTIVE:     Kerri Olsen / Katelin Darby / Brent Ov[place]: IV    RESTRICTIONS/PRECAUTIONS:  Restrictions/Precautions  Restrictions/Precautions: Weight Bearing, Fall Risk  Lower Extremity Weight Bearing Restrictions  Right Lower Extremity Weight Bearing: Weight Bearing As Tolerated        PAIN: VITALS / O2:   Pre Treatment:   Numeric: 0/10             Post Treatment: 0 /10 Vitals          Oxygen                MOBILITY: I Mod I S SBA CGA Min Mod Max Total  NT x2 Comments:   Bed Mobility    Rolling [] [] [] [] [] [] [] [] [] [] []    Supine to Sit [] [] [] [] [] [] [] [] [] [] []    Scooting [] [] [] [] [] [] [] [] [] [] []    Sit to Supine [] [] [] [] [] [] [] [] [] [] []    Transfers    Sit to Stand [] [] [] [] [] [] [] [] [] [] []    Bed to Chair [] [] [] [] [] [] [] [] [x] [] [] Bed to chair position   Stand to Sit [] [] [] [] [] [] [] [] [] [] []    I=Independent, Mod I=Modified Independent, S=Supervision/Setup, SBA=Standby Assistance, CGA=Contact Guard Assistance, Min=Minimal Assistance, Mod=Moderate Assistance, Max=Maximal Assistance, Total=Total Assistance, NT=Not Tested    ACTIVITIES OF DAILY LIVING: I Mod I S SBA CGA Min Mod Max Total NT Comments   BASIC ADLs:              Bathing/ Showering [] [] [] [] [] [] [] [] [] []    Toileting [] [] [] [] [] [] [] [] [] []    Upper Body Dressing [] [] [] [] [] [] [] [] [] []    Lower Body Dressing [] [] [] [] [] [] [] [] [] []    Feeding [] [] [] [] [] [x] [] [] [] [] [place]   Grooming [] [] [] [] [] [x] [] [] [] []    Personal Device Care [] [] [] [] [] [] [] [] [] []    Functional Mobility [] [] [] [] [] [] [] [] [] [] Bed to chair position   I=Independent, Mod I=Modified Independent, S=Supervision/Setup, SBA=Standby Assistance, CGA=Contact Guard Assistance, Min=Minimal Assistance, Mod=Moderate Assistance, Max=Maximal Assistance, Total=Total Assistance, NT=Not Tested    BALANCE: Good Fair+ Fair Fair- Poor NT Comments   Sitting Static [] [] [x] [] [] [] Supported sitting   Sitting Dynamic [] [] [] [] [] [x]              Standing Static [] [] [] [] [] [x]    Standing Dynamic [] [] [] [] [] [x]        PLAN:     FREQUENCY/DURATION   OT Plan of Care: 4 times/week for duration of hospital stay or until stated goals are met, whichever comes first.      TREATMENT:     TREATMENT:   Self Care (25 minutes): Patient participated in self feeding and grooming ADLs in supported sitting with minimal visual and verbal cueing to increase independence, decrease assistance required, and increase activity tolerance. Patient also participated in functional mobility, functional transfer, and energy conservation training to increase independence, decrease assistance required, increase activity tolerance, and increase safety awareness.      TREATMENT GRID:  N/A    AFTER TREATMENT PRECAUTIONS: Bed, Call light within reach, Needs within reach, and Visitors at bedside    INTERDISCIPLINARY COLLABORATION:  RN/ PCT and OT/ AARON    EDUCATION: TOTAL TREATMENT DURATION AND TIME:  Time In: 0830  Time Out: 2773  Minutes: [address], OTR/L, CLT

## 2023-01-12 NOTE — CARE COORDINATION
CM reviewed chart for anticipated discharge needs as patient admitted with fall and non-operative fractures. CM in to see patient and family at bedside. Patient's son and daughter-in-law are at bedside. Therapy recommendations for STR at discharge reviewed and son is agreeable. He would like CM to speak with his sister regarding final discharge plan. CM shared the necessity for patient to participate in therapy services which she has been refusing recently. Documentation will be necessary for facilities to accept. CM shared minimal bed availability in SNFs currently and notified family that CM will gather facilities that will have bed availability in the near future for their review. 48 hour PPD read 1/11/2023 and covid testing completed on 1/11/2023. CM will continue to follow for ongoing discharge planning. 01/11/23 1340   Service Assessment   Patient Orientation Alert and Oriented;Person   Cognition Dementia / Early Alzheimer's   History Provided By Child/Family;Medical Record   Primary Caregiver Private caregiver   Accompanied By/Relationship Patient's son and daughter in law   Support Systems Children;Family Members;Friends/Neighbors;Home [address] is: Legal Next of N[address]   PCP Verified by CM Yes   Prior Functional Level Assistance with the following:;Bathing;Cooking;Housework; Shopping   Current Functional Level Mobility; Shopping;Housework;Cooking; Toileting;Dressing; Bathing;Assistance with the following:   Can patient return to prior living arrangement No   Ability to make needs known: Poor   Family able to assist with home care needs: Yes   Would you like for me to discuss the discharge plan with any other family members/significant others, and if so, who?  Yes  (Patient's daughter)   Financial Resources Medicare   Community Resources None   CM/SW Referral Other (see comment)  (Assistance with discharge planning.)   Social/Functional History   Lives With Alone   Type of Home House   Receives Help From Family;Friend(s);Personal care attendant   Discharge Planning   Current Services Prior To Admission [facility] Home[facility]   Patient expects to be discharged to: Skilled nursing facility   History of falls? 1   Services At/After Discharge   Services At/After Discharge Skilled Nursing Facility (SNF)   Mode of Transport at Discharge BLS   Confirm Follow Up Transport Family   Condition of Participation: Discharge Planning   The Patient and/or Patient Representative was provided with a Choice of Provider? Patient Representative   Name of the Patient Representative who was provided with the Choice of Provider and agrees with the Discharge Plan?  CM will speak with patient's daughter.

## 2023-01-12 NOTE — PROGRESS NOTES
Physical Therapy Note:    Attempted to see patient this AM for physical therapy treatment  session. Patient has a HBG of 6.7 is is scheduled to receive blood. Will follow and re-attempt as schedule permits/patient available. Thank you,    Maia Medina, PTA     Rehab Caseload Tracker      PM: Attempted again in the PM and patient is still awaiting blood. RN requesting she does not get to the chair at this time. Will re-attempt at a later date/time.

## 2023-01-13 PROBLEM — D62 ACUTE BLOOD LOSS ANEMIA: Status: ACTIVE | Noted: 2017-07-10

## 2023-01-13 PROBLEM — D64.9 POSTOPERATIVE ANEMIA: Status: ACTIVE | Noted: 2023-01-13

## 2023-01-13 PROBLEM — N18.30 CKD (CHRONIC KIDNEY DISEASE) STAGE 3, GFR 30-59 ML/MIN (HCC): Status: ACTIVE | Noted: 2017-01-20

## 2023-01-13 PROBLEM — I35.1 NONRHEUMATIC AORTIC VALVE INSUFFICIENCY: Status: ACTIVE | Noted: 2019-12-04

## 2023-01-13 PROBLEM — Z95.2 S/P TAVR (TRANSCATHETER AORTIC VALVE REPLACEMENT): Status: ACTIVE | Noted: 2017-03-29

## 2023-01-13 LAB
ABO + RH BLD: NORMAL
ANION GAP SERPL CALC-SCNC: 7 MMOL/L (ref 2–11)
BLD PROD TYP BPU: NORMAL
BLOOD BANK DISPENSE STATUS: NORMAL
BLOOD GROUP ANTIBODIES SERPL: NORMAL
BPU ID: NORMAL
BUN SERPL-MCNC: 24 MG/DL (ref 8–23)
CALCIUM SERPL-MCNC: 8.6 MG/DL (ref 8.3–10.4)
CHLORIDE SERPL-SCNC: 104 MMOL/L (ref 101–110)
CO2 SERPL-SCNC: 25 MMOL/L (ref 21–32)
CREAT SERPL-MCNC: 1.2 MG/DL (ref 0.6–1)
CROSSMATCH RESULT: NORMAL
ERYTHROCYTE [DISTWIDTH] IN BLOOD BY AUTOMATED COUNT: 13.6 % (ref 11.9–14.6)
GLUCOSE SERPL-MCNC: 188 MG/DL (ref 65–100)
HCT VFR BLD AUTO: 31.2 % (ref 35.8–46.3)
HGB BLD-MCNC: 10.8 G/DL (ref 11.7–15.4)
MCH RBC QN AUTO: 30.3 PG (ref 26.1–32.9)
MCHC RBC AUTO-ENTMCNC: 34.6 G/DL (ref 31.4–35)
MCV RBC AUTO: 87.6 FL (ref 82–102)
NRBC # BLD: 0 K/UL (ref 0–0.2)
PLATELET # BLD AUTO: 212 K/UL (ref 150–450)
PMV BLD AUTO: 10.8 FL (ref 9.4–12.3)
POTASSIUM SERPL-SCNC: 4 MMOL/L (ref 3.5–5.1)
RBC # BLD AUTO: 3.56 M/UL (ref 4.05–5.2)
SODIUM SERPL-SCNC: 136 MMOL/L (ref 133–143)
SPECIMEN EXP DATE BLD: NORMAL
UNIT DIVISION: 0
WBC # BLD AUTO: 8 K/UL (ref 4.3–11.1)

## 2023-01-13 PROCEDURE — 1100000000 HC RM PRIVATE

## 2023-01-13 PROCEDURE — 6370000000 HC RX 637 (ALT 250 FOR IP): Performed by: ORTHOPAEDIC SURGERY

## 2023-01-13 PROCEDURE — 85027 COMPLETE CBC AUTOMATED: CPT

## 2023-01-13 PROCEDURE — 6360000002 HC RX W HCPCS: Performed by: FAMILY MEDICINE

## 2023-01-13 PROCEDURE — 36415 COLL VENOUS BLD VENIPUNCTURE: CPT

## 2023-01-13 PROCEDURE — 6370000000 HC RX 637 (ALT 250 FOR IP): Performed by: PHYSICIAN ASSISTANT

## 2023-01-13 PROCEDURE — 97530 THERAPEUTIC ACTIVITIES: CPT

## 2023-01-13 PROCEDURE — 6370000000 HC RX 637 (ALT 250 FOR IP): Performed by: FAMILY MEDICINE

## 2023-01-13 PROCEDURE — 80048 BASIC METABOLIC PNL TOTAL CA: CPT

## 2023-01-13 PROCEDURE — 2580000003 HC RX 258: Performed by: ORTHOPAEDIC SURGERY

## 2023-01-13 RX ORDER — HYDRALAZINE HYDROCHLORIDE 20 MG/ML
10 INJECTION INTRAMUSCULAR; INTRAVENOUS ONCE
Status: COMPLETED | OUTPATIENT
Start: 2023-01-13 | End: 2023-01-13

## 2023-01-13 RX ORDER — HYDROXYZINE PAMOATE 25 MG/1
25 CAPSULE ORAL 3 TIMES DAILY PRN
Status: DISCONTINUED | OUTPATIENT
Start: 2023-01-13 | End: 2023-01-17 | Stop reason: HOSPADM

## 2023-01-13 RX ADMIN — CARVEDILOL 6.25 MG: 6.25 TABLET, FILM COATED ORAL at 09:00

## 2023-01-13 RX ADMIN — HYDRALAZINE HYDROCHLORIDE 10 MG: 20 INJECTION INTRAMUSCULAR; INTRAVENOUS at 05:07

## 2023-01-13 RX ADMIN — CLOPIDOGREL BISULFATE 75 MG: 75 TABLET ORAL at 08:59

## 2023-01-13 RX ADMIN — HYDRALAZINE HYDROCHLORIDE 25 MG: 25 TABLET, FILM COATED ORAL at 08:59

## 2023-01-13 RX ADMIN — SODIUM CHLORIDE, PRESERVATIVE FREE 5 ML: 5 INJECTION INTRAVENOUS at 20:05

## 2023-01-13 RX ADMIN — HYDROXYZINE PAMOATE 25 MG: 25 CAPSULE ORAL at 14:32

## 2023-01-13 RX ADMIN — SODIUM CHLORIDE, PRESERVATIVE FREE 5 ML: 5 INJECTION INTRAVENOUS at 09:00

## 2023-01-13 RX ADMIN — TRIMETHOPRIM 100 MG: 100 TABLET ORAL at 09:00

## 2023-01-13 RX ADMIN — CHOLECALCIFEROL TAB 25 MCG (1000 UNIT) 2000 UNITS: 25 TAB at 09:00

## 2023-01-13 RX ADMIN — HYDRALAZINE HYDROCHLORIDE 10 MG: 20 INJECTION INTRAMUSCULAR; INTRAVENOUS at 06:30

## 2023-01-13 RX ADMIN — LISINOPRIL 5 MG: 5 TABLET ORAL at 08:59

## 2023-01-13 ASSESSMENT — PAIN SCALES - GENERAL
PAINLEVEL_OUTOF10: 0
PAINLEVEL_OUTOF10: 0

## 2023-01-13 NOTE — CARE COORDINATION
CM continue to reach out to [facility] facility liaisons to find bed availability. Patient's discharge plan reviewed in IDT rounds and AM lab work results still pending. Call placed to patient's daughter, Jazzmine Cani, and VM left.

## 2023-01-13 NOTE — PROGRESS NOTES
ACUTE PHYSICAL THERAPY GOALS:   (Developed with and agreed upon by patient and/or caregiver.)  (1.) Mitali Padron  will move from supine to sit and sit to supine  with MINIMAL ASSIST within 7 treatment day(s). (2.) Lucinaa Cuenca will transfer from bed to chair and chair to bed with MINIMAL ASSIST using the least restrictive device within 7 treatment day(s). (3.) Mitali Padron will ambulate with MINIMAL ASSIST for 10 feet with the least restrictive device within 7 treatment day(s). (4.) Luciana Cuenca will perform standing static and dynamic balance activities x 10 minutes with MINIMAL ASSIST to improve safety within 7 treatment day(s). (5.) Luciana Baez Cuenca will perform bilateral lower extremity exercises x 15 min for HEP with SUPERVISION to improve strength, endurance, and functional mobility within 7 treatment day(s). PHYSICAL THERAPY: Daily Note PM   (Link to Caseload Tracking: PT Visit Days : 4  Time In/Out PT Charge Capture  Rehab Caseload Tracker  Orders    Mitali Padron is a 80 y.o. female   PRIMARY DIAGNOSIS: Fall from ground level  Fall, initial encounter [W19. XXXA]  Fall from ground level [W18.30XA]  Periprosthetic fracture of shaft of femur [C07. 8XXA, Z96.649]  Procedure(s) (LRB):  RIGHT FEMUR INSERTION DISTAL TFN SCREW (Right)  4 Days Post-Op  Inpatient: Payor: MEDICARE / Plan: MEDICARE PART A AND B / Product Type: *No Product type* /     ASSESSMENT:     REHAB RECOMMENDATIONS:   Recommendation to date pending progress:  Setting:  Short-term Rehab    Equipment:    To Be Determined     ASSESSMENT:  Ms. David Henderson is making slow progress toward goals. She participates better with family in the room and encouraging her. The patient was able to perform STS much better this afternoon with min/mod Ax2 and the RW. She did attempt to take 1-2 steps, but was unable to and became fearful due to pain. SPT to the bed with max Ax2 for safety.   She returned to supine with mod Ax2.      SUBJECTIVE:   Ms. Nagel states, \"I can't move!\"     Social/Functional Lives With: Alone  Type of Home: House  Home Layout: One level  Home Access: Stairs to enter with rails  Entrance Stairs - Number of Steps: 3-4  Home Equipment: Walker, 4 wheeled  Has the patient had two or more falls in the past year or any fall with injury in the past year?: Yes  Receives Help From: Family, Friend(s), Personal care attendant  ADL Assistance: Needs assistance  Homemaking Assistance: Needs assistance  Ambulation Assistance: Independent  Transfer Assistance: Independent  OBJECTIVE:     PAIN: VITALS / O2: PRECAUTION / LINES / DRAINS:   Pre Treatment:    No number given, c/o knee pain      Post Treatment:  no number given due to confusion Vitals        Oxygen    None    RESTRICTIONS/PRECAUTIONS:  Restrictions/Precautions  Restrictions/Precautions: Weight Bearing, Fall Risk  Lower Extremity Weight Bearing Restrictions  Right Lower Extremity Weight Bearing: Weight Bearing As Tolerated  Restrictions/Precautions: Weight Bearing, Fall Risk     MOBILITY: I Mod I S SBA CGA Min Mod Max Total  NT x2 Comments:   Bed Mobility    Rolling [] [] [] [] [] [] [] [] [] [x] []    Supine to Sit [] [] [] [] [] [] [] [] [] [x] []    Scooting [] [] [] [] [] [] [] [x] [] [] [x]    Sit to Supine [] [] [] [] [] [] [] [x] [] [] [x]    Transfers    Sit to Stand [] [] [] [] [] [] [x] [] [] [] [x]    Bed to Chair [] [] [] [] [] [] [] [x] [] [] [x]    Stand to Sit [] [] [] [] [] [] [] [x] [] [] [x]     [] [] [] [] [] [] [] [] [] [] []    I=Independent, Mod I=Modified Independent, S=Supervision, SBA=Standby Assistance, CGA=Contact Guard Assistance,   Min=Minimal Assistance, Mod=Moderate Assistance, Max=Maximal Assistance, Total=Total Assistance, NT=Not Tested    BALANCE: Good Fair+ Fair Fair- Poor NT Comments   Sitting Static [] [x] [] [] [] []    Sitting Dynamic [] [x] [] [] [] []              Standing Static [] [] [] [x] [x] []    Standing Dynamic []  [] [] [] [x] []      GAIT: I Mod I S SBA CGA Min Mod Max Total  NT x2 Comments:   Level of Assistance [] [] [] [] [] [] [] [] [] [x] []    Distance   feet    DME N/A    Gait Quality N/A    Weightbearing Status      Stairs      I=Independent, Mod I=Modified Independent, S=Supervision, SBA=Standby Assistance, CGA=Contact Guard Assistance,   Min=Minimal Assistance, Mod=Moderate Assistance, Max=Maximal Assistance, Total=Total Assistance, NT=Not Tested    PLAN:   FREQUENCY AND DURATION: BID for duration of hospital stay or until stated goals are met, whichever comes first.    TREATMENT:   TREATMENT:   Therapeutic Activity (24 Minutes): Therapeutic activity included Sit to Supine, Scooting, Lateral Scooting, Transfer Training, Sitting balance , and Standing balance to improve functional Activity tolerance, Balance, Coordination, Mobility, and Strength.     TREATMENT GRID:  N/A    AFTER TREATMENT PRECAUTIONS: Alarm Activated, Bed, Bed/Chair Locked, Call light within reach, RN notified, and Visitors at bedside    INTERDISCIPLINARY COLLABORATION:  RN/ PCT, PT/ PTA, and Rehab Attendant    EDUCATION:      TIME IN/OUT:  Time In: 3052  Time Out: [address]  Minutes: [address] [place], PTA
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
## 2023-01-13 NOTE — PLAN OF CARE
Problem: Discharge Planning  Goal: Discharge to home or other facility with appropriate resources  Outcome: Progressing  Flowsheets (Taken 1/12/2023 2042)  Discharge to home or other facility with appropriate resources: Identify barriers to discharge with patient and caregiver     Problem: Pain  Goal: Verbalizes/displays adequate comfort level or baseline comfort level  Outcome: Progressing     Problem: Chronic Conditions and Co-morbidities  Goal: Patient's chronic conditions and co-morbidity symptoms are monitored and maintained or improved  Outcome: Progressing     Problem: Safety - Adult  Goal: Free from fall injury  Outcome: Progressing  Flowsheets (Taken 1/12/2023 2042)  Free From Fall Injury: Instruct family/caregiver on patient safety     Problem: Skin/Tissue Integrity  Goal: Absence of new skin breakdown  Description: 1. Monitor for areas of redness and/or skin breakdown  2. Assess vascular access sites hourly  3. Every 4-6 hours minimum:  Change oxygen saturation probe site  4. Every 4-6 hours:  If on nasal continuous positive airway pressure, respiratory therapy assess nares and determine need for appliance change or resting period.   Outcome: Progressing     Problem: Neurosensory - Adult  Goal: Achieves stable or improved neurological status  Outcome: Progressing     Problem: Respiratory - Adult  Goal: Achieves optimal ventilation and oxygenation  Outcome: Progressing     Problem: Cardiovascular - Adult  Goal: Maintains optimal cardiac output and hemodynamic stability  Outcome: Progressing     Problem: Skin/Tissue Integrity - Adult  Goal: Skin integrity remains intact  Outcome: Progressing  Flowsheets (Taken 1/12/2023 2042)  Skin Integrity Remains Intact: Monitor for areas of redness and/or skin breakdown  Goal: Incisions, wounds, or drain sites healing without S/S of infection  Outcome: Progressing  Goal: Oral mucous membranes remain intact  Outcome: Progressing     Problem: Musculoskeletal - Adult  Goal: Return mobility to safest level of function  Outcome: Progressing  Goal: Maintain proper alignment of affected body part  Outcome: Progressing  Goal: Return ADL status to a safe level of function  Outcome: Progressing     Problem: Gastrointestinal - Adult  Goal: Minimal or absence of nausea and vomiting  Outcome: Progressing  Goal: Maintains or returns to baseline bowel function  Outcome: Progressing  Goal: Maintains adequate nutritional intake  Outcome: Progressing     Problem: Genitourinary - Adult  Goal: Absence of urinary retention  Outcome: Progressing  Flowsheets (Taken 1/12/2023 2042)  Absence of urinary retention:   Assess patients ability to void and empty bladder   Monitor intake/output and perform bladder scan as needed     Problem: Infection - Adult  Goal: Absence of infection at discharge  Outcome: Progressing  Goal: Absence of infection during hospitalization  Outcome: Progressing     Problem: Metabolic/Fluid and Electrolytes - Adult  Goal: Electrolytes maintained within normal limits  Outcome: Progressing  Goal: Hemodynamic stability and optimal renal function maintained  Outcome: Progressing     Problem: Hematologic - Adult  Goal: Maintains hematologic stability  Outcome: Progressing

## 2023-01-13 NOTE — CARE COORDINATION
CM spoke with patient, daughter and son at bedside. Family is agreeable to [facility] referral and is agreeable to bed offers from [facility] or [facility]. Therapy in to work with patient and facility liaisons will review in order to offer bed if appropriate. Patient's daughter is agreeable to [address] [place] [address] liaison to follow patient and work with family to ensure safe transition from each level of care with hopeful discharge home with [facility] and private sitter/family. Referral sent. Kathy Berry with [facility] provided flowsheet to complete referral and she has visited family at bedside.

## 2023-01-13 NOTE — FLOWSHEET NOTE
01/13/23 0419   Vital Signs   Temp 97.6 °F (36.4 °C)   Temp Source Axillary   Heart Rate 89   Resp 18   BP (!) 198/78   MAP (Calculated) 118   MAP (mmHg) 108   BP Location Left upper arm   BP Method Automatic   Patient Position Supine   Level of Consciousness 0   MEWS Score 1     Pt hypertensive. No PRN BP meds. Hospitalist notified. Orders received for 10mg IV hydralazine once.

## 2023-01-14 PROCEDURE — 2580000003 HC RX 258: Performed by: ORTHOPAEDIC SURGERY

## 2023-01-14 PROCEDURE — 1100000000 HC RM PRIVATE

## 2023-01-14 PROCEDURE — 6370000000 HC RX 637 (ALT 250 FOR IP): Performed by: PHYSICIAN ASSISTANT

## 2023-01-14 PROCEDURE — 97530 THERAPEUTIC ACTIVITIES: CPT

## 2023-01-14 PROCEDURE — 97535 SELF CARE MNGMENT TRAINING: CPT

## 2023-01-14 PROCEDURE — 6370000000 HC RX 637 (ALT 250 FOR IP): Performed by: FAMILY MEDICINE

## 2023-01-14 PROCEDURE — 6370000000 HC RX 637 (ALT 250 FOR IP): Performed by: ORTHOPAEDIC SURGERY

## 2023-01-14 RX ORDER — HYDRALAZINE HYDROCHLORIDE 20 MG/ML
10 INJECTION INTRAMUSCULAR; INTRAVENOUS EVERY 6 HOURS PRN
Status: DISCONTINUED | OUTPATIENT
Start: 2023-01-14 | End: 2023-01-17 | Stop reason: HOSPADM

## 2023-01-14 RX ADMIN — CLOPIDOGREL BISULFATE 75 MG: 75 TABLET ORAL at 10:22

## 2023-01-14 RX ADMIN — SODIUM CHLORIDE, PRESERVATIVE FREE 5 ML: 5 INJECTION INTRAVENOUS at 19:56

## 2023-01-14 RX ADMIN — CHOLECALCIFEROL TAB 25 MCG (1000 UNIT) 2000 UNITS: 25 TAB at 10:22

## 2023-01-14 RX ADMIN — LISINOPRIL 5 MG: 5 TABLET ORAL at 10:22

## 2023-01-14 RX ADMIN — TRAMADOL HYDROCHLORIDE 50 MG: 50 TABLET ORAL at 13:34

## 2023-01-14 RX ADMIN — TRIMETHOPRIM 100 MG: 100 TABLET ORAL at 10:22

## 2023-01-14 RX ADMIN — HYDRALAZINE HYDROCHLORIDE 25 MG: 25 TABLET, FILM COATED ORAL at 10:22

## 2023-01-14 RX ADMIN — LEVOTHYROXINE SODIUM 50 MCG: 0.05 TABLET ORAL at 04:19

## 2023-01-14 RX ADMIN — SODIUM CHLORIDE, PRESERVATIVE FREE 5 ML: 5 INJECTION INTRAVENOUS at 10:23

## 2023-01-14 RX ADMIN — ACETAMINOPHEN 650 MG: 325 TABLET ORAL at 11:25

## 2023-01-14 RX ADMIN — CARVEDILOL 6.25 MG: 6.25 TABLET, FILM COATED ORAL at 10:23

## 2023-01-14 ASSESSMENT — PAIN SCALES - WONG BAKER
WONGBAKER_NUMERICALRESPONSE: 0
WONGBAKER_NUMERICALRESPONSE: 0

## 2023-01-14 ASSESSMENT — PAIN DESCRIPTION - LOCATION: LOCATION: HIP

## 2023-01-14 ASSESSMENT — PAIN SCALES - GENERAL
PAINLEVEL_OUTOF10: 0
PAINLEVEL_OUTOF10: 0
PAINLEVEL_OUTOF10: 5
PAINLEVEL_OUTOF10: 6

## 2023-01-14 ASSESSMENT — PAIN DESCRIPTION - PAIN TYPE: TYPE: SURGICAL PAIN

## 2023-01-14 ASSESSMENT — PAIN DESCRIPTION - DESCRIPTORS: DESCRIPTORS: DISCOMFORT

## 2023-01-14 ASSESSMENT — PAIN DESCRIPTION - ORIENTATION: ORIENTATION: RIGHT

## 2023-01-14 ASSESSMENT — PAIN - FUNCTIONAL ASSESSMENT: PAIN_FUNCTIONAL_ASSESSMENT: PREVENTS OR INTERFERES SOME ACTIVE ACTIVITIES AND ADLS

## 2023-01-14 NOTE — PROGRESS NOTES
ACUTE PHYSICAL THERAPY GOALS:   (Developed with and agreed upon by patient and/or caregiver.)  (1.) Josefina Mir  will move from supine to sit and sit to supine  with MINIMAL ASSIST within 7 treatment day(s). (2.) Luciana Gray will transfer from bed to chair and chair to bed with MINIMAL ASSIST using the least restrictive device within 7 treatment day(s). (3.) Josefina Mir will ambulate with MINIMAL ASSIST for 10 feet with the least restrictive device within 7 treatment day(s). (4.) Luciana Gray will perform standing static and dynamic balance activities x 10 minutes with MINIMAL ASSIST to improve safety within 7 treatment day(s). (5.) Luciana Gray will perform bilateral lower extremity exercises x 15 min for HEP with SUPERVISION to improve strength, endurance, and functional mobility within 7 treatment day(s). PHYSICAL THERAPY: Daily Note AM   (Link to Caseload Tracking: PT Visit Days : 5  Time In/Out PT Charge Capture  Rehab Caseload Tracker  Orders    Josefina Mir is a 80 y.o. female   PRIMARY DIAGNOSIS: Fall from ground level  Fall, initial encounter [W19. XXXA]  Fall from ground level [W18.30XA]  Periprosthetic fracture of shaft of femur [F31. 8XXA, Z96.649]  Procedure(s) (LRB):  RIGHT FEMUR INSERTION DISTAL TFN SCREW (Right)  5 Days Post-Op  Inpatient: Payor: MEDICARE / Plan: MEDICARE PART A AND B / Product Type: *No Product type* /     ASSESSMENT:     REHAB RECOMMENDATIONS:   Recommendation to date pending progress:  Setting:  Short-term Rehab    Equipment:    To Be Determined     ASSESSMENT:  Ms. Ivana Martinez is making good progress toward goals today. She seems confused. Pt required mod a x 2 to get to EOB and then to amb 3' to chair WBAT R. Once in chair she worked on sitting balance and LE ex. SPT to the bed with max Ax2 for safety. She returned to supine with mod Ax2. SUBJECTIVE:   Ms. Ivana Martinez states, Kaleb Acevedo you going to help me? \"     Social/Functional Lives With: Alone  Type of Home: House  Home Layout: One level  Home Access: Stairs to enter with rails  Entrance Stairs - Number of Steps: 3-4  Home Equipment: Workana, 4 wheeled  Has the patient had two or more falls in the past year or any fall with injury in the past year?: Yes  Receives Help From: Family, Friend(s), Personal care attendant  ADL Assistance: Needs assistance  Homemaking Assistance: Needs assistance  Ambulation Assistance: Independent  Transfer Assistance: Independent  OBJECTIVE:     PAIN: Cary Monet / O2: PRECAUTION / Maria Isabel Toribio / Vivian Li:   Pre Treatment:    No number given,      Post Treatment:  no number given  Vitals        Oxygen    None    RESTRICTIONS/PRECAUTIONS:  Restrictions/Precautions  Restrictions/Precautions: Weight Bearing, Fall Risk  Lower Extremity Weight Bearing Restrictions  Right Lower Extremity Weight Bearing: Weight Bearing As Tolerated  Restrictions/Precautions: Weight Bearing, Fall Risk     MOBILITY: I Mod I S SBA CGA Min Mod Max Total  NT x2 Comments:   Bed Mobility    Rolling [] [] [] [] [] [] [] [] [] [x] []    Supine to Sit [] [] [] [] [] [] [] [x] [] [] [x]    Scooting [] [] [] [] [] [] [] [x] [] [] [x]    Sit to Supine [] [] [] [] [] [] [] [] [] [x] []    Transfers    Sit to Stand [] [] [] [] [] [] [x] [] [] [] [x]    Bed to Chair [] [] [] [] [] [] [] [x] [] [] [x]    Stand to Sit [] [] [] [] [] [] [] [x] [] [] [x]     [] [] [] [] [] [] [] [] [] [] []    I=Independent, Mod I=Modified Independent, S=Supervision, SBA=Standby Assistance, CGA=Contact Guard Assistance,   Min=Minimal Assistance, Mod=Moderate Assistance, Max=Maximal Assistance, Total=Total Assistance, NT=Not Tested    BALANCE: Good Fair+ Fair Fair- Poor NT Comments   Sitting Static [] [x] [] [] [] []    Sitting Dynamic [] [x] [] [] [] []              Standing Static [] [] [] [x] [x] []    Standing Dynamic [] [] [] [] [x] []      GAIT: I Mod I S SBA CGA Min Mod Max Total  NT x2 Comments:   Level of Assistance [] [] [] [] [] [] [x] [] [] [] [x]    Distance 3  feet    DME Rolling Walker    Gait Quality Decreased meli , Decreased step clearance, and Decreased step length    Weightbearing Status      Stairs      I=Independent, Mod I=Modified Independent, S=Supervision, SBA=Standby Assistance, CGA=Contact Guard Assistance,   Min=Minimal Assistance, Mod=Moderate Assistance, Max=Maximal Assistance, Total=Total Assistance, NT=Not Tested    PLAN:   FREQUENCY AND DURATION: BID for duration of hospital stay or until stated goals are met, whichever comes first.    TREATMENT:   TREATMENT:   Co-Treatment PT/OT necessary due to patient's decreased overall endurance/tolerance levels, as well as need for high level skilled assistance to complete functional transfers/mobility and functional tasks  Therapeutic Activity (30 Minutes): Therapeutic activity included Supine to Sit, Scooting, Lateral Scooting, Transfer Training, Ambulation on level ground, Sitting balance , and Standing balance to improve functional Activity tolerance, Balance, Coordination, Mobility, and Strength.     TREATMENT GRID:  N/A    AFTER TREATMENT PRECAUTIONS: Alarm Activated, Bed/Chair Locked, Call light within reach, Chair, RN notified, and Visitors at bedside    INTERDISCIPLINARY COLLABORATION:  RN/ PCT, PT/ PTA, and OT/ AARON    EDUCATION:      TIME IN/OUT:  Time In: 0750  Time Out: 0820  Minutes: [address], PTA

## 2023-01-14 NOTE — CARE COORDINATION
Bed offer received from [facility] for Monday. Other [facility] facility choices will not have bed availability until mid-week next week. [facility] liaison confirms that she has spoken with their medical director and [facility] does not feel patient is appropriate for [facility] level of care. Message sent to patient's daughter. CM will continue to follow and plan for discharge to [facility] on Monday.

## 2023-01-14 NOTE — PLAN OF CARE
Problem: Discharge Planning  Goal: Discharge to home or other facility with appropriate resources  Outcome: Progressing  Flowsheets (Taken 1/13/2023 2003)  Discharge to home or other facility with appropriate resources: Identify barriers to discharge with patient and caregiver     Problem: Pain  Goal: Verbalizes/displays adequate comfort level or baseline comfort level  Outcome: Progressing     Problem: Chronic Conditions and Co-morbidities  Goal: Patient's chronic conditions and co-morbidity symptoms are monitored and maintained or improved  Outcome: Progressing     Problem: Safety - Adult  Goal: Free from fall injury  Outcome: Progressing  Flowsheets (Taken 1/13/2023 2003)  Free From Fall Injury: Instruct family/caregiver on patient safety     Problem: Skin/Tissue Integrity  Goal: Absence of new skin breakdown  Description: 1. Monitor for areas of redness and/or skin breakdown  2. Assess vascular access sites hourly  3. Every 4-6 hours minimum:  Change oxygen saturation probe site  4. Every 4-6 hours:  If on nasal continuous positive airway pressure, respiratory therapy assess nares and determine need for appliance change or resting period.   Outcome: Progressing     Problem: Neurosensory - Adult  Goal: Achieves stable or improved neurological status  Outcome: Progressing     Problem: Respiratory - Adult  Goal: Achieves optimal ventilation and oxygenation  Outcome: Progressing     Problem: Cardiovascular - Adult  Goal: Maintains optimal cardiac output and hemodynamic stability  Outcome: Progressing     Problem: Skin/Tissue Integrity - Adult  Goal: Skin integrity remains intact  Outcome: Progressing  Flowsheets (Taken 1/13/2023 2003)  Skin Integrity Remains Intact: Monitor for areas of redness and/or skin breakdown  Goal: Incisions, wounds, or drain sites healing without S/S of infection  Outcome: Progressing  Goal: Oral mucous membranes remain intact  Outcome: Progressing     Problem: Musculoskeletal - Adult  Goal: Return mobility to safest level of function  Outcome: Progressing  Flowsheets (Taken 1/13/2023 2003)  Return Mobility to Safest Level of Function: Assess patient stability and activity tolerance for standing, transferring and ambulating with or without assistive devices  Goal: Maintain proper alignment of affected body part  Outcome: Progressing  Goal: Return ADL status to a safe level of function  Outcome: Progressing     Problem: Gastrointestinal - Adult  Goal: Minimal or absence of nausea and vomiting  Outcome: Progressing  Goal: Maintains or returns to baseline bowel function  Outcome: Progressing  Goal: Maintains adequate nutritional intake  Outcome: Progressing     Problem: Genitourinary - Adult  Goal: Absence of urinary retention  Outcome: Progressing     Problem: Infection - Adult  Goal: Absence of infection at discharge  Outcome: Progressing  Goal: Absence of infection during hospitalization  Outcome: Progressing     Problem: Metabolic/Fluid and Electrolytes - Adult  Goal: Electrolytes maintained within normal limits  Outcome: Progressing  Goal: Hemodynamic stability and optimal renal function maintained  Outcome: Progressing     Problem: Hematologic - Adult  Goal: Maintains hematologic stability  Outcome: Progressing

## 2023-01-14 NOTE — PROGRESS NOTES
ACUTE PHYSICAL THERAPY GOALS:   (Developed with and agreed upon by patient and/or caregiver.)  (1.) Luciana Nagel  will move from supine to sit and sit to supine  with MINIMAL ASSIST within 7 treatment day(s).    (2.) Luciana Nagel will transfer from bed to chair and chair to bed with MINIMAL ASSIST using the least restrictive device within 7 treatment day(s).    (3.) Luciana Nagel will ambulate with MINIMAL ASSIST for 10 feet with the least restrictive device within 7 treatment day(s).   (4.) Luciana Nagel will perform standing static and dynamic balance activities x 10 minutes with MINIMAL ASSIST to improve safety within 7 treatment day(s).  (5.) Luciana Nagel will perform bilateral lower extremity exercises x 15 min for HEP with SUPERVISION to improve strength, endurance, and functional mobility within 7 treatment day(s).        PHYSICAL THERAPY: Daily Note PM   (Link to Caseload Tracking: PT Visit Days : 5  Time In/Out PT Charge Capture  Rehab Caseload Tracker  Orders    Luciana Nagel is a 89 y.o. female   PRIMARY DIAGNOSIS: Fall from ground level  Fall, initial encounter [W19.XXXA]  Fall from ground level [W18.30XA]  Periprosthetic fracture of shaft of femur [M97.8XXA, Z96.649]  Procedure(s) (LRB):  RIGHT FEMUR INSERTION DISTAL TFN SCREW (Right)  5 Days Post-Op  Inpatient: Payor: MEDICARE / Plan: MEDICARE PART A AND B / Product Type: *No Product type* /     ASSESSMENT:     REHAB RECOMMENDATIONS:   Recommendation to date pending progress:  Setting:  Short-term Rehab    Equipment:    To Be Determined     ASSESSMENT:  Ms. Nagel made no progress this afternoon toward goals. She was tired from sitting up. She seems confused.  Pt son present and tried to encourage pt to do more but she was just not able to. She required mod a x 2 for sit to stand. Attempted to get pt to amb but she was only able to move her feet a little forward. Sit to stand x 4  with mod to max a x 2 each time and  each time tried to take steps. Pt in flexed posture and unable to move feet forward. Pt returned to chair at her and sons request. RN in room stated she would make sure pt got back to bed.      SUBJECTIVE:   Ms. Nagel states, \"I just can't do it. I'm to tired\".      Social/Functional Lives With: Alone  Type of Home: House  Home Layout: One level  Home Access: Stairs to enter with rails  Entrance Stairs - Number of Steps: 3-4  Home Equipment: Walker, 4 wheeled  Has the patient had two or more falls in the past year or any fall with injury in the past year?: Yes  Receives Help From: Family, Friend(s), Personal care attendant  ADL Assistance: Needs assistance  Homemaking Assistance: Needs assistance  Ambulation Assistance: Independent  Transfer Assistance: Independent  OBJECTIVE:     PAIN: VITALS / O2: PRECAUTION / LINES / DRAINS:   Pre Treatment:    No number given,      Post Treatment:  no number given  Vitals        Oxygen    None    RESTRICTIONS/PRECAUTIONS:  Restrictions/Precautions  Restrictions/Precautions: Weight Bearing, Fall Risk  Lower Extremity Weight Bearing Restrictions  Right Lower Extremity Weight Bearing: Weight Bearing As Tolerated  Restrictions/Precautions: Weight Bearing, Fall Risk     MOBILITY: I Mod I S SBA CGA Min Mod Max Total  NT x2 Comments:   Bed Mobility    Rolling [] [] [] [] [] [] [] [] [] [x] []    Supine to Sit [] [] [] [] [] [] [] [] [] [x] []    Scooting [] [] [] [] [] [] [] [] [] [x] []    Sit to Supine [] [] [] [] [] [] [] [] [] [x] []    Transfers    Sit to Stand [] [] [] [] [] [] [x] [] [] [] [x]    Bed to Chair [] [] [] [] [] [] [] [] [] [x] []    Stand to Sit [] [] [] [] [] [] [x] [] [] [] [x]     [] [] [] [] [] [] [] [] [] [] []    I=Independent, Mod I=Modified Independent, S=Supervision, SBA=Standby Assistance, CGA=Contact Guard Assistance,   Min=Minimal Assistance, Mod=Moderate Assistance, Max=Maximal Assistance, Total=Total Assistance, NT=Not Tested    BALANCE: Good Fair+  Fair Fair- Poor NT Comments   Sitting Static [] [x] [] [] [] []    Sitting Dynamic [] [x] [] [] [] []              Standing Static [] [] [] [x] [x] []    Standing Dynamic [] [] [] [] [x] []      GAIT: I Mod I S SBA CGA Min Mod Max Total  NT x2 Comments:   Level of Assistance [] [] [] [] [] [] [] [x] [] [] [x]    Distance 2  feet    DME Rolling Walker    Gait Quality Decreased meli , Decreased step clearance, and Decreased step length    Weightbearing Status      Stairs      I=Independent, Mod I=Modified Independent, S=Supervision, SBA=Standby Assistance, CGA=Contact Guard Assistance,   Min=Minimal Assistance, Mod=Moderate Assistance, Max=Maximal Assistance, Total=Total Assistance, NT=Not Tested    PLAN:   FREQUENCY AND DURATION: BID for duration of hospital stay or until stated goals are met, whichever comes first.    TREATMENT:   TREATMENT:   Therapeutic Activity (23 Minutes): Therapeutic activity included Scooting, Ambulation on level ground, Sitting balance , Standing balance, and LAQ's and heel/toe raises to improve functional Activity tolerance, Balance, Mobility, and Strength.     TREATMENT GRID:  N/A    AFTER TREATMENT PRECAUTIONS: Alarm Activated, Bed/Chair Locked, Call light within reach, Chair, RN notified, and Visitors at bedside    INTERDISCIPLINARY COLLABORATION:  RN/ PCT and PT/ PTA    EDUCATION:      TIME IN/OUT:  Time In: 1317  Time Out: 1340  Minutes: 23    EARLINE BALDWIN PTA

## 2023-01-14 NOTE — CONSULTS
Comprehensive Nutrition Assessment    Type and Reason for Visit: Initial, Consult  Poor Intake/Appetite 5 or More Days (Hospitalists)    Nutrition Recommendations/Plan:   Meals and Snacks:  Diet: Continue current order  Nutrition Supplement Therapy:  Medical food supplement therapy:  Continue Ensure Enlive three times per day (this provides 350 kcal and 20 grams protein per bottle)     Malnutrition Assessment:  Malnutrition Status: Insufficient data  Pt generally thin appearing but likely attribute to advanced age vs malnutrition    Nutrition Assessment:  Nutrition History: Son provides nutrition hx. Son states pt intake varies, but typically consumes 2 meals/day + ensure. Reports UBW ~150lb, slight decline in weight progressively throughout years. Do You Have Any Cultural, Pentecostal, or Ethnic Food Preferences?: No   Nutrition Background:    Pt with PMH significant for hypertension, dementia, aortic stenosis status post TAVR, CKD stage III, coronary artery disease  who presented in the setting of a fall 1/8. She underwent right femur insertion distal TFN screw on 1/9/2023. On 1/10, patient was unable to talk so Code S was called. CT head not recommended, given narcan with improvement. Nutrition Interval:  Pt seen in recliner finishing lunch, son present. Lunch tray observed with ~80% consumed. Son states she typically eats % of what is served, denies any issues concerning pt chewing/swallowing. Son also reports pt consumes 1-2 ensure daily. Pt requesting prune juice, RD provided. Discussed pt with Guilherme Kern RN. Current Nutrition Therapies:  ADULT DIET;  Regular  ADULT ORAL NUTRITION SUPPLEMENT; Breakfast, Lunch, Dinner; Standard High Calorie/High Protein Oral Supplement    Current Intake:   Average Meal Intake: 51-75% Average Supplements Intake: 51-75%      Anthropometric Measures:  Height: 5' 4\" (162.6 cm)  Current Body Wt: 154 lb 1.6 oz (69.9 kg) (1/8), Weight source: Stated  BMI: 26.4, Overweight (BMI 25.0-29. 9)     Ideal Body Weight (Kg) (Calculated): 55 kg (120 lbs), 128.4 %  Usual Body Wt: 154 lb (69.9 kg) (MD office appt 12/21 ; limited emr hx), Percent weight change: 0.1       Edema:    BMI Category Overweight (BMI 25.0-29. 9)    Estimated Daily Nutrient Needs:  Energy (kcal/day): 7582-8446 (20-25kcal/kg) (Kcal/kg Weight used: 69.9 kg Current  Protein (g/day): 70-87 (20% of estimated kcal needs) Weight Used: (Current) 69.9 kg  Fluid (ml/day):   (1 ml/kcal)    Nutrition Diagnosis:   Inadequate oral intake related to early satiety as evidenced by  (PO intake and barriers to intake as above)    Nutrition Interventions:   Food and/or Nutrient Delivery: Continue Current Diet, Continue Oral Nutrition Supplement     Coordination of Nutrition Care: Continue to monitor while inpatient  Plan of Care discussed with: Lucero Ahmadi RN    Goals:       Active Goal: Meet at least 75% of estimated needs, by next RD assessment       Nutrition Monitoring and Evaluation:      Food/Nutrient Intake Outcomes: Food and Nutrient Intake, Supplement Intake  Physical Signs/Symptoms Outcomes: Meal Time Behavior, Weight    Discharge Planning:    Continue Oral Nutrition Supplement    Sean Moura RD, LD  Contact: [phone]

## 2023-01-14 NOTE — PROGRESS NOTES
System Downtime Recovery  The EMR experienced a system downtime. This downtime occurred on January 14 at 0100 AM for a duration of 2 hour(s). During this downtime paper charting was completed by me.

## 2023-01-14 NOTE — PROGRESS NOTES
It is with  great SPEEDY we pray for your family today: \"Trust in the LORD with all your heart     and lean not on your own  understanding; In all your ways submit to HIM,     and HE will make your paths straight. \"          May God's favor and peace be with you this day.             Eris Osman

## 2023-01-14 NOTE — PROGRESS NOTES
Hospitalist Progress Note   Admit Date:  2023 10:05 AM   Name:  Alyce Schneider   Age:  80 y.o. Sex:  female  :  3/20/1929   MRN:  [id number]   Room:  [facility]    Presenting Complaint: Fall     Reason(s) for Admission: Fall, initial encounter [W19. XXXA]  Fall from ground level [W18.30XA]  Periprosthetic fracture of shaft of femur [X38. Milbert Ora Course:   Alyce Schneider is a 80 y.o. female with medical history of hypertension, dementia, aortic stenosis status post TAVR, CKD stage III, coronary artery disease  who presented in the setting of a fall after being found on the floor by her caregiver. Admitted with right femur fracture after her fall. She underwent right femur insertion distal TFN screw on 2023. On 1/10, patient was unable to talk so Code S was called. Neurology saw in presentation was due to pain medications. No CT head was recommended. Pt was given narcan with improvement. Home Plavix was resumed after ok from Ortho. PT/OT evaluated and recommended short-term rehab. Patient was also treated for postoperative anemia. Hemoglobin 10.8 on admission (baseline ~10-11) but trended down to lowest 6.9 post op and received 1U PRBC on 23. Post-transfusion Hgb improved to >10. Subjective & 24hr Events (23):     Pt only oriented to person this morning sitting up in recliner, calm follows basic commands. Daughter at bedside stated that patient's dementia has been getting worse since May 2022. She did well with a sitter at home but since that sitter changed in May, her dementia has not been the same. Daughter was interested in short-term rehab but for long-term she may need to be in a nursing home versus home with home hospice.     No fever, chills, SOB, chest pain    Assessment & Plan:     Fall from ground level  Femoral fracture  S/p R femur insertion distal TFN screw 23  OK to resume Plavix per Ortho--for DVT ppx as well  PT/OT--STR  Ortho on board, appreciate recs  F/u with Ortho 2 weeks postop for wound check and staple removal    Post-op anemia  Hgb on admission 10.8 (baseline ~10-11), trended down to 7.6 post op day 1. Was 6.9 on 1/12/23. S/p 1U PRBC on 1/12/23 and improved to 10.8. Transfuse if Hgb <7 or if symptomatic    CKD stage 3  Cr 1.2 on admission (baseline ~1.3-1.7)  Avoid nephrotoxic meds  F/u BMP    Dementia   Noted, only oriented to person and follows basic commands  Code S on 1/10 with neuro evaluated--felt 2/2 pain medications, did not recommend CT head. Recommended to resume home Plavix  Limit sedating meds  Delirium precautions    Hypothyroidism  Cont synthroid    Essential hypertension  Cont Coreg, Hydralazine, ACE-i    CAD  Aortic Stenosis  S/p TAVR  Cont Plavix, ACE-I, BB    Chronic UTI  Cont home trimethoprim    Management of Delirium      Non-pharmacological intervention  Reorient the patient throughout the day  Window open and lights on during the day. Lights off, television off, noises down during the night. If able, decrease nursing checks at night  Therapies as often as possible  Avoid restraints to the best of your ability   Avoid sensory deprivation by using glasses and hearing aids, if applicable        Pharmacological intervention  Replete electrolyte abnormalities and correct metabolic abnormalities  Limit benzodiazepines, antihistamines, narcotics, anticholinergics. Preference towards Precedex for sedation over fentanyl and benzodiazepines/GABAa agonists.    For dangerous behavior/aggression to self or others can consider Zyprexa or Seroquel if benefits outweigh risk  For persistent insomnia can use melatonin four hours prior to bedtime or Seroquel 25 mg at bedtime         Anticipated discharge needs:      STR pending    I spent 39 minutes of time caring for this patient on the unit nearby or at bedside, and more than 50 percent was spent on coordination of care activities, and/or patient/family counseling regarding status and plan of care. Diet:  ADULT DIET; Regular  ADULT ORAL NUTRITION SUPPLEMENT; Breakfast, Lunch, Dinner; Standard High Calorie/High Protein Oral Supplement  DVT PPx: Plavix>  Code status: DNR    Hospital Problems:  Principal Problem:    Fall from ground level  Active Problems:    Femoral fracture (HCC)    Dementia (HCC)    Hypothyroidism    Postoperative anemia    CKD (chronic kidney disease) stage 3, GFR 30-59 ml/min (HCC)    Essential hypertension, benign    Acute blood loss anemia    Coronary artery disease involving native coronary artery of native heart    Nonrheumatic aortic valve insufficiency    S/P TAVR (transcatheter aortic valve replacement)  Resolved Problems:    * No resolved hospital problems. *      Objective:   Patient Vitals for the past 24 hrs:   Temp Pulse Resp BP SpO2   01/14/23 1038 97.7 °F (36.5 °C) 79 18 115/60 96 %   01/14/23 0923 -- 80 -- 138/79 --   01/14/23 0737 99.1 °F (37.3 °C) 83 19 (!) 183/67 95 %   01/14/23 0408 99.3 °F (37.4 °C) 83 17 (!) 166/75 93 %   01/13/23 2247 98.6 °F (37 °C) 89 18 (!) 150/94 92 %   01/13/23 2000 98.4 °F (36.9 °C) 83 18 (!) 155/74 95 %   01/13/23 1602 97.2 °F (36.2 °C) 71 19 (!) 164/74 96 %   01/13/23 1329 -- 73 17 (!) 159/60 --   01/13/23 1149 98.1 °F (36.7 °C) 65 20 (!) 142/68 96 %       Oxygen Therapy  SpO2: 96 %  Pulse via Oximetry: 83 beats per minute  Pulse Oximeter Device Mode: Intermittent  Pulse Oximeter Device Location: Finger  O2 Device: None (Room air)  O2 Flow Rate (L/min): 2 L/min    Estimated body mass index is 26.43 kg/m² as calculated from the following:    Height as of this encounter: 5' 4\" (1.626 m). Weight as of this encounter: 154 lb (69.9 kg). Intake/Output Summary (Last 24 hours) at 1/14/2023 1116  Last data filed at 1/13/2023 2247  Gross per 24 hour   Intake --   Output 600 ml   Net -600 ml         Physical Exam:     Blood pressure 115/60, pulse 79, temperature 97.7 °F (36.5 °C), temperature source Axillary, resp.  rate 18, height 5' 4\" (1.626 m), weight 154 lb (69.9 kg), SpO2 96 %. General:    Frail appearing  Head:  Normocephalic, atraumatic  Eyes:  Sclerae appear normal.  Pupils equally round. ENT:  Nares appear normal.  Moist oral mucosa  CV:   RRR. No m/r/g. No jugular venous distension. Lungs:   CTAB. No wheezing, rhonchi, or rales. Symmetric expansion. Abdomen:   Soft, nontender, nondistended. Extremities: No cyanosis or clubbing. No edema. L hip dressing c/d/I. Skin:     No rashes and normal coloration. Warm and dry. Neuro:  CN II-XII grossly intact. Sensation intact. Psych:  Pleasantly demented    I have personally reviewed labs and tests showing:  Recent Labs:  Recent Results (from the past 48 hour(s))   PLEASE READ & DOCUMENT PPD TEST IN 72 HRS    Collection Time: 01/12/23  6:20 PM   Result Value Ref Range    PPD, (POC) Negative Negative    mm Induration 0 0 - 5 mm   CBC    Collection Time: 01/13/23  8:06 AM   Result Value Ref Range    WBC 8.0 4.3 - 11.1 K/uL    RBC 3.56 (L) 4.05 - 5.2 M/uL    Hemoglobin 10.8 (L) 11.7 - 15.4 g/dL    Hematocrit 31.2 (L) 35.8 - 46.3 %    MCV 87.6 82 - 102 FL    MCH 30.3 26.1 - 32.9 PG    MCHC 34.6 31.4 - 35.0 g/dL    RDW 13.6 11.9 - 14.6 %    Platelets 437 505 - 079 K/uL    MPV 10.8 9.4 - 12.3 FL    nRBC 0.00 0.0 - 0.2 K/uL   Basic Metabolic Panel    Collection Time: 01/13/23  8:06 AM   Result Value Ref Range    Sodium 136 133 - 143 mmol/L    Potassium 4.0 3.5 - 5.1 mmol/L    Chloride 104 101 - 110 mmol/L    CO2 25 21 - 32 mmol/L    Anion Gap 7 2 - 11 mmol/L    Glucose 188 (H) 65 - 100 mg/dL    BUN 24 (H) 8 - 23 MG/DL    Creatinine 1.20 (H) 0.6 - 1.0 MG/DL    Est, Glom Filt Rate 42 (L) >60 ml/min/1.73m2    Calcium 8.6 8.3 - 10.4 MG/DL       I have personally reviewed imaging studies showing: Other Studies:  XR KNEE RIGHT (1-2 VIEWS)   Final Result   Surgical fixation of distal femur periprosthetic fracture.       NC XR TECHNOLOGIST SERVICE   Final Result      XR HIP RIGHT (2-3 VIEWS)   Final Result   Postoperative changes without evidence of acute bony abnormality. XR KNEE RIGHT (3 VIEWS)   Final Result   Spiral type fracture of the distal femoral metadiaphysis with mild   displacement. XR FEMUR RIGHT (MIN 2 VIEWS)   Final Result   Spiral type fracture of the distal femoral metadiaphysis. XR CHEST PORTABLE   Final Result   Unremarkable portable chest radiograph. CT HEAD WO CONTRAST   Final Result   1. No evidence of acute intracranial hemorrhage. 2. No evidence of acute cervical spine fracture. 3. Cervical spondylosis, felt to account for the slight retrolisthesis of C5.   4. 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 CT CERVICAL SPINE WO CONTRAST   Final Result   1. No evidence of acute intracranial hemorrhage. 2. No evidence of acute cervical spine fracture. 3. Cervical spondylosis, felt to account for the slight retrolisthesis of C5.   4. 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                 Current Meds:  Current Facility-Administered Medications   Medication Dose Route Frequency    hydrALAZINE (APRESOLINE) injection 10 mg  10 mg IntraVENous Q6H PRN    hydrOXYzine pamoate (VISTARIL) capsule 25 mg  25 mg Oral TID PRN    0.9 % sodium chloride infusion   IntraVENous PRN    lisinopril (PRINIVIL;ZESTRIL) tablet 5 mg  5 mg Oral Daily    hydrALAZINE (APRESOLINE) tablet 25 mg  25 mg Oral Daily    carboxymethylcellulose (THERATEARS) 1 % ophthalmic gel 1 drop  1 drop Both Eyes PRN    Vitamin D (CHOLECALCIFEROL) tablet 2,000 Units  2,000 Units Oral Daily    naloxone (NARCAN) injection 0.4 mg  0.4 mg IntraVENous PRN    clopidogrel (PLAVIX) tablet 75 mg  75 mg Oral Daily    morphine injection 0.5 mg  0.5 mg IntraVENous Q4H PRN    traMADol (ULTRAM) tablet 50 mg  50 mg Oral Q6H PRN    sodium chloride flush 0.9 % injection 5-40 mL  5-40 mL IntraVENous PRN 0.9 % sodium chloride infusion   IntraVENous PRN    ondansetron (ZOFRAN-ODT) disintegrating tablet 4 mg  4 mg Oral Q8H PRN    Or    ondansetron (ZOFRAN) injection 4 mg  4 mg IntraVENous Q6H PRN    medicated lip ointment (BLISTEX)   Topical PRN    sodium chloride flush 0.9 % injection 5-40 mL  5-40 mL IntraVENous 2 times per day    sodium chloride flush 0.9 % injection 5-40 mL  5-40 mL IntraVENous PRN    0.9 % sodium chloride infusion   IntraVENous PRN    polyethylene glycol (GLYCOLAX) packet 17 g  17 g Oral Daily PRN    acetaminophen (TYLENOL) tablet 650 mg  650 mg Oral Q6H PRN    Or    acetaminophen (TYLENOL) suppository 650 mg  650 mg Rectal Q6H PRN    carvedilol (COREG) tablet 6.25 mg  6.25 mg Oral Daily    [Held by provider] chlorthalidone (HYGROTON) tablet 25 mg  25 mg Oral Daily    levothyroxine (SYNTHROID) tablet 50 mcg  50 mcg Oral Daily    trimethoprim (TRIMPEX) tablet 100 mg  100 mg Oral Daily    melatonin tablet 3 mg  3 mg Oral Nightly PRN       Signed: Manuel Mcnair DO    Part of this note may have been written by using a voice dictation software. The note has been proof read but may still contain some grammatical/other typographical errors.

## 2023-01-14 NOTE — PROGRESS NOTES
dACUTE OCCUPATIONAL THERAPY GOALS:   (Developed with and agreed upon by patient and/or caregiver.)  Patient will complete functional activity while seated EOB, unsupported, with set-up and adaptive equipment as needed. 2. Patient will complete functional transfers with min A and adaptive equipment as needed. 3. Patient will complete lower body bathing and dressing with mod A and adaptive equipment as needed. 4. Patient will complete toileting with min A.   5. Patient will tolerate at least 15  minutes of BUE therapeutic exercises to increase strength in BUE to aid in functional transfers. 6. Patient will tolerate at least 30 minutes of OT treatment with no rest breaks to increase activity tolerance for ADLs. OCCUPATIONAL THERAPY Daily Note     OT Visit Days: 3   Time  OT Charge Capture  Rehab Caseload Tracker  OT Orders    Luciana Zhang is a 80 y.o. female   PRIMARY DIAGNOSIS: Fall from ground level  Fall, initial encounter [W19. XXXA]  Fall from ground level [W18.30XA]  Periprosthetic fracture of shaft of femur [G30. 8XXA, Z96.649]  Procedure(s) (LRB):  RIGHT FEMUR INSERTION DISTAL TFN SCREW (Right)  5 Days Post-Op  Inpatient: Payor: MEDICARE / Plan: MEDICARE PART A AND B / Product Type: *No Product type* /     ASSESSMENT:     REHAB RECOMMENDATIONS: CURRENT LEVEL OF FUNCTION:  (Most Recently Demonstrated)   Recommendation to date pending progress:  Setting:  Short-term Rehab    Equipment:    To Be Determined Bathing:  Not Tested  Dressing:  Not Tested  Feeding/Grooming:  Minimal Assist  Toileting:  Not Tested  Functional Mobility:  Maximal Assist-bed mob      ASSESSMENT:  Ms. Layo Mendoza continues to present with deficits in overall strength, activity tolerance, ADL performance, and functional mobility. Pt was supine in bed upon arrival. Pt completed bed mobility with max A X 2. Pt completed functional transfer with min A X 2 using rolling walker. Pt completed grooming and self feeding with min A. Pt is progressing towards goals. Continue POC. SUBJECTIVE:     Ms. Jessi Robertson states, \"Hello. \"     Social/Functional Lives With: Alone  Type of Home: House  Home Layout: One level  Home Access: Stairs to enter with rails  Entrance Stairs - Number of Steps: 3-4  Home Equipment: Russell Edyta, 4 wheeled  Has the patient had two or more falls in the past year or any fall with injury in the past year?: Yes  Receives Help From: Family, Friend(s), Personal care attendant  ADL Assistance: Needs assistance  Homemaking Assistance: Needs assistance  Ambulation Assistance: Independent  Transfer Assistance: Independent    OBJECTIVE:     Ardith Pott / Vipul Ebbing / Radene Flight: IV    RESTRICTIONS/PRECAUTIONS:  Restrictions/Precautions  Restrictions/Precautions: Weight Bearing, Fall Risk  Lower Extremity Weight Bearing Restrictions  Right Lower Extremity Weight Bearing: Weight Bearing As Tolerated        PAIN: VITALS / O2:   Pre Treatment:   Numeric: 0/10             Post Treatment: 0 /10 Vitals          Oxygen                MOBILITY: I Mod I S SBA CGA Min Mod Max Total  NT x2 Comments:   Bed Mobility    Rolling [] [] [] [] [] [] [] [] [] [] []    Supine to Sit [] [] [] [] [] [] [] [x] [] [] [x]    Scooting [] [] [] [] [] [] [] [x] [] [] [x]    Sit to Supine [] [] [] [] [] [] [] [] [] [] []    Transfers    Sit to Stand [] [] [] [] [] [] [] [x] [] [] [x]    Bed to Chair [] [] [] [] [] [] [] [] [x] [] [] Bed to chair position   Stand to Sit [] [] [] [] [] [] [] [] [] [] []    I=Independent, Mod I=Modified Independent, S=Supervision/Setup, SBA=Standby Assistance, CGA=Contact Guard Assistance, Min=Minimal Assistance, Mod=Moderate Assistance, Max=Maximal Assistance, Total=Total Assistance, NT=Not Tested    ACTIVITIES OF DAILY LIVING: I Mod I S SBA CGA Min Mod Max Total NT Comments   BASIC ADLs:              Bathing/ Showering [] [] [] [] [] [] [] [] [] []    Toileting [] [] [] [] [] [] [] [] [] []    Upper Body Dressing [] [] [] [] [] [] [] [] [] [] Lower Body Dressing [] [] [] [] [] [] [] [] [] []    Feeding [] [] [] [] [] [x] [] [] [] []    Grooming [] [] [] [] [] [x] [] [] [] []    Personal Device Care [] [] [] [] [] [] [] [] [] []    Functional Mobility [] [] [] [] [] [] [] [] [] [] Bed to chair position   I=Independent, Mod I=Modified Independent, S=Supervision/Setup, SBA=Standby Assistance, CGA=Contact Guard Assistance, Min=Minimal Assistance, Mod=Moderate Assistance, Max=Maximal Assistance, Total=Total Assistance, NT=Not Tested    BALANCE: Good Fair+ Fair Fair- Poor NT Comments   Sitting Static [] [] [x] [] [] [] Supported sitting   Sitting Dynamic [] [] [] [] [] [x]              Standing Static [] [] [x] [] [] []    Standing Dynamic [] [] [] [] [] [x]        PLAN:     FREQUENCY/DURATION   OT Plan of Care: 4 times/week for duration of hospital stay or until stated goals are met, whichever comes first.      TREATMENT:     TREATMENT:   Self Care (30 minutes): Patient participated in self feeding and grooming ADLs in supported sitting and standing with minimal visual and verbal cueing to increase independence and decrease assistance required. Patient also participated in functional mobility and functional transfer training to increase independence, decrease assistance required, and increase activity tolerance.      TREATMENT GRID:  N/A    AFTER TREATMENT PRECAUTIONS: Call light within reach, Chair, Needs within reach, RN notified, and Visitors at bedside    INTERDISCIPLINARY COLLABORATION:  RN/ PCT, PT/ PTA, and OT/ AARON    EDUCATION:        TOTAL TREATMENT DURATION AND TIME:  Time In: 8990  Time Out: 0820  Minutes: PAUL Hooper

## 2023-01-15 LAB
ANION GAP SERPL CALC-SCNC: 6 MMOL/L (ref 2–11)
BUN SERPL-MCNC: 36 MG/DL (ref 8–23)
CALCIUM SERPL-MCNC: 8.4 MG/DL (ref 8.3–10.4)
CHLORIDE SERPL-SCNC: 106 MMOL/L (ref 101–110)
CO2 SERPL-SCNC: 27 MMOL/L (ref 21–32)
CREAT SERPL-MCNC: 1.4 MG/DL (ref 0.6–1)
ERYTHROCYTE [DISTWIDTH] IN BLOOD BY AUTOMATED COUNT: 13.9 % (ref 11.9–14.6)
GLUCOSE SERPL-MCNC: 155 MG/DL (ref 65–100)
HCT VFR BLD AUTO: 28.7 % (ref 35.8–46.3)
HGB BLD-MCNC: 9.4 G/DL (ref 11.7–15.4)
MCH RBC QN AUTO: 29.7 PG (ref 26.1–32.9)
MCHC RBC AUTO-ENTMCNC: 32.8 G/DL (ref 31.4–35)
MCV RBC AUTO: 90.5 FL (ref 82–102)
NRBC # BLD: 0 K/UL (ref 0–0.2)
PLATELET # BLD AUTO: 239 K/UL (ref 150–450)
PMV BLD AUTO: 10.8 FL (ref 9.4–12.3)
POTASSIUM SERPL-SCNC: 4.3 MMOL/L (ref 3.5–5.1)
RBC # BLD AUTO: 3.17 M/UL (ref 4.05–5.2)
SODIUM SERPL-SCNC: 139 MMOL/L (ref 133–143)
WBC # BLD AUTO: 7.1 K/UL (ref 4.3–11.1)

## 2023-01-15 PROCEDURE — 80048 BASIC METABOLIC PNL TOTAL CA: CPT

## 2023-01-15 PROCEDURE — 6370000000 HC RX 637 (ALT 250 FOR IP): Performed by: FAMILY MEDICINE

## 2023-01-15 PROCEDURE — 97530 THERAPEUTIC ACTIVITIES: CPT

## 2023-01-15 PROCEDURE — 6370000000 HC RX 637 (ALT 250 FOR IP): Performed by: ORTHOPAEDIC SURGERY

## 2023-01-15 PROCEDURE — 6370000000 HC RX 637 (ALT 250 FOR IP): Performed by: PHYSICIAN ASSISTANT

## 2023-01-15 PROCEDURE — 85027 COMPLETE CBC AUTOMATED: CPT

## 2023-01-15 PROCEDURE — 2580000003 HC RX 258: Performed by: ORTHOPAEDIC SURGERY

## 2023-01-15 PROCEDURE — 97112 NEUROMUSCULAR REEDUCATION: CPT

## 2023-01-15 PROCEDURE — 36415 COLL VENOUS BLD VENIPUNCTURE: CPT

## 2023-01-15 PROCEDURE — 1100000000 HC RM PRIVATE

## 2023-01-15 RX ORDER — LIDOCAINE 4 G/G
2 PATCH TOPICAL DAILY
Status: DISCONTINUED | OUTPATIENT
Start: 2023-01-15 | End: 2023-01-17 | Stop reason: HOSPADM

## 2023-01-15 RX ORDER — SODIUM CHLORIDE, SODIUM LACTATE, POTASSIUM CHLORIDE, CALCIUM CHLORIDE 600; 310; 30; 20 MG/100ML; MG/100ML; MG/100ML; MG/100ML
INJECTION, SOLUTION INTRAVENOUS CONTINUOUS
Status: DISCONTINUED | OUTPATIENT
Start: 2023-01-15 | End: 2023-01-15

## 2023-01-15 RX ORDER — TRAMADOL HYDROCHLORIDE 50 MG/1
25 TABLET ORAL EVERY 6 HOURS PRN
Status: DISCONTINUED | OUTPATIENT
Start: 2023-01-15 | End: 2023-01-17 | Stop reason: HOSPADM

## 2023-01-15 RX ADMIN — SODIUM CHLORIDE, PRESERVATIVE FREE 5 ML: 5 INJECTION INTRAVENOUS at 19:53

## 2023-01-15 RX ADMIN — CHOLECALCIFEROL TAB 25 MCG (1000 UNIT) 2000 UNITS: 25 TAB at 09:52

## 2023-01-15 RX ADMIN — LEVOTHYROXINE SODIUM 50 MCG: 0.05 TABLET ORAL at 05:04

## 2023-01-15 RX ADMIN — TRIMETHOPRIM 100 MG: 100 TABLET ORAL at 09:53

## 2023-01-15 RX ADMIN — ACETAMINOPHEN 650 MG: 325 TABLET ORAL at 19:57

## 2023-01-15 RX ADMIN — HYDRALAZINE HYDROCHLORIDE 25 MG: 25 TABLET, FILM COATED ORAL at 09:53

## 2023-01-15 RX ADMIN — CARVEDILOL 6.25 MG: 6.25 TABLET, FILM COATED ORAL at 09:53

## 2023-01-15 RX ADMIN — ACETAMINOPHEN 650 MG: 325 TABLET ORAL at 13:52

## 2023-01-15 RX ADMIN — SODIUM CHLORIDE, PRESERVATIVE FREE 5 ML: 5 INJECTION INTRAVENOUS at 09:53

## 2023-01-15 RX ADMIN — TRAMADOL HYDROCHLORIDE 50 MG: 50 TABLET ORAL at 09:53

## 2023-01-15 RX ADMIN — LISINOPRIL 5 MG: 5 TABLET ORAL at 09:53

## 2023-01-15 RX ADMIN — CLOPIDOGREL BISULFATE 75 MG: 75 TABLET ORAL at 09:53

## 2023-01-15 ASSESSMENT — PAIN DESCRIPTION - ORIENTATION
ORIENTATION: RIGHT
ORIENTATION: RIGHT

## 2023-01-15 ASSESSMENT — PAIN SCALES - GENERAL
PAINLEVEL_OUTOF10: 0
PAINLEVEL_OUTOF10: 0
PAINLEVEL_OUTOF10: 3
PAINLEVEL_OUTOF10: 3
PAINLEVEL_OUTOF10: 6

## 2023-01-15 ASSESSMENT — PAIN DESCRIPTION - DESCRIPTORS
DESCRIPTORS: ACHING;DISCOMFORT
DESCRIPTORS: SORE

## 2023-01-15 ASSESSMENT — PAIN SCALES - WONG BAKER: WONGBAKER_NUMERICALRESPONSE: 0

## 2023-01-15 ASSESSMENT — PAIN DESCRIPTION - PAIN TYPE: TYPE: SURGICAL PAIN

## 2023-01-15 ASSESSMENT — PAIN DESCRIPTION - FREQUENCY: FREQUENCY: INTERMITTENT

## 2023-01-15 ASSESSMENT — PAIN - FUNCTIONAL ASSESSMENT
PAIN_FUNCTIONAL_ASSESSMENT: PREVENTS OR INTERFERES SOME ACTIVE ACTIVITIES AND ADLS
PAIN_FUNCTIONAL_ASSESSMENT: PREVENTS OR INTERFERES SOME ACTIVE ACTIVITIES AND ADLS

## 2023-01-15 ASSESSMENT — PAIN DESCRIPTION - ONSET: ONSET: ON-GOING

## 2023-01-15 ASSESSMENT — PAIN DESCRIPTION - LOCATION
LOCATION: LEG
LOCATION: HIP

## 2023-01-15 NOTE — PROGRESS NOTES
ACUTE PHYSICAL THERAPY GOALS:   (Developed with and agreed upon by patient and/or caregiver.)  (1.) Mitali Padron  will move from supine to sit and sit to supine  with MINIMAL ASSIST within 7 treatment day(s). (2.) Luciana Núñez Ort will transfer from bed to chair and chair to bed with MINIMAL ASSIST using the least restrictive device within 7 treatment day(s). (3.) Mitali Padron will ambulate with MINIMAL ASSIST for 10 feet with the least restrictive device within 7 treatment day(s). (4.) Luciana Núñez Ort will perform standing static and dynamic balance activities x 10 minutes with MINIMAL ASSIST to improve safety within 7 treatment day(s). (5.) Luciana Núñez Ort will perform bilateral lower extremity exercises x 15 min for HEP with SUPERVISION to improve strength, endurance, and functional mobility within 7 treatment day(s). PHYSICAL THERAPY: Daily Note PM   (Link to Caseload Tracking: PT Visit Days : 6  Time In/Out PT Charge Capture  Rehab Caseload Tracker  Orders    Mitali Padron is a 80 y.o. female   PRIMARY DIAGNOSIS: Femoral fracture ([facility] [address]  Fall, initial encounter [W19. XXXA]  Fall from ground level [W18.30XA]  Periprosthetic fracture of shaft of femur [L58. 8XXA, Z96.649]  Procedure(s) (LRB):  RIGHT FEMUR INSERTION DISTAL TFN SCREW (Right)  6 Days Post-Op  Inpatient: Payor: MEDICARE / Plan: MEDICARE PART A AND B / Product Type: *No Product type* /     ASSESSMENT:     REHAB RECOMMENDATIONS:   Recommendation to date pending progress:  Setting:  Short-term Rehab    Equipment:    To Be Determined     ASSESSMENT:  Ms. Lyla Parikh made no progress again today toward goals. She continues to be more fearful. She seems confused. Pt son in law and daughter present. She required max a x 2 for sit to stand. Attempted to get pt to amb but she was unable. SPT to chair max a x 2. SUBJECTIVE:   Ms. Lyla Parikh states \"I don't think I can\".       Social/Functional Lives With: Alone  Type of Home: House  Home Layout: One level  Home Access: Stairs to enter with rails  Entrance Stairs - Number of Steps: 3-4  Home Equipment: Stevie Sample, 4 wheeled  Has the patient had two or more falls in the past year or any fall with injury in the past year?: Yes  Receives Help From: Family, Friend(s), Personal care attendant  ADL Assistance: Needs assistance  Homemaking Assistance: Needs assistance  Ambulation Assistance: Independent  Transfer Assistance: Independent  OBJECTIVE:     PAIN: Veleria Caroli / O2: PRECAUTION / Vince Michaels / Rory Hun:   Pre Treatment:    No number given,      Post Treatment:  no number given  Vitals        Oxygen    None    RESTRICTIONS/PRECAUTIONS:  Restrictions/Precautions  Restrictions/Precautions: Weight Bearing, Fall Risk  Lower Extremity Weight Bearing Restrictions  Right Lower Extremity Weight Bearing: Weight Bearing As Tolerated  Restrictions/Precautions: Weight Bearing, Fall Risk     MOBILITY: I Mod I S SBA CGA Min Mod Max Total  NT x2 Comments:   Bed Mobility    Rolling [] [] [] [] [] [] [] [] [] [x] []    Supine to Sit [] [] [] [] [] [] [] [] [] [x] []    Scooting [] [] [] [] [] [] [] [x] [] [] [x]    Sit to Supine [] [] [] [] [] [] [] [] [x] [] [x]    Transfers    Sit to Stand [] [] [] [] [] [] [] [x] [] [] [x]    Bed to Chair [] [] [] [] [] [] [] [] [x] [] [x]    Stand to Sit [] [] [] [] [] [] [] [x] [] [] [x]     [] [] [] [] [] [] [] [] [] [] []    I=Independent, Mod I=Modified Independent, S=Supervision, SBA=Standby Assistance, CGA=Contact Guard Assistance,   Min=Minimal Assistance, Mod=Moderate Assistance, Max=Maximal Assistance, Total=Total Assistance, NT=Not Tested    BALANCE: Good Fair+ Fair Fair- Poor NT Comments   Sitting Static [] [x] [] [] [] []    Sitting Dynamic [] [x] [x] [] [] []              Standing Static [] [] [] [] [x] []    Standing Dynamic [] [] [] [] [x] []      GAIT: I Mod I S SBA CGA Min Mod Max Total  NT x2 Comments:   Level of Assistance [] [] [] [] [] [] [] [] [] [] [] Unable   Distance   feet    DME Rolling Walker for SPT    Gait Quality N/A    Weightbearing Status      Stairs      I=Independent, Mod I=Modified Independent, S=Supervision, SBA=Standby Assistance, CGA=Contact Guard Assistance,   Min=Minimal Assistance, Mod=Moderate Assistance, Max=Maximal Assistance, Total=Total Assistance, NT=Not Tested    PLAN:   FREQUENCY AND DURATION: BID for duration of hospital stay or until stated goals are met, whichever comes first.    TREATMENT:   TREATMENT:   Co-Treatment PT/OT necessary due to patient's decreased overall endurance/tolerance levels, as well as need for high level skilled assistance to complete functional transfers/mobility and functional tasks  Therapeutic Activity (24 Minutes): Therapeutic activity included Sit to Supine, Scooting, Transfer Training, Sitting balance , and Standing balance to improve functional Activity tolerance, Balance, Mobility, and Strength.     TREATMENT GRID:  N/A    AFTER TREATMENT PRECAUTIONS: Alarm Activated, Bed, Bed/Chair Locked, Call light within reach, RN notified, and Visitors at bedside    INTERDISCIPLINARY COLLABORATION:  RN/ PCT, PT/ PTA, and OT/ AARON    EDUCATION:      TIME IN/OUT:  Time In: 1347  Time Out: 1358  Minutes: Davey Tovar PTA

## 2023-01-15 NOTE — PROGRESS NOTES
Hospitalist Progress Note   Admit Date:  2023 10:05 AM   Name:  Madeleine Zamora   Age:  80 y.o. Sex:  female  :  3/20/1929   MRN:  [id number]   Room:  [facility]    Presenting Complaint: Fall     Reason(s) for Admission: Fall, initial encounter [W19. XXXA]  Fall from ground level [W18.30XA]  Periprosthetic fracture of shaft of femur [I98. Otoniel Ash Course:   Madeleine Zamora is a 80 y.o. female with medical history of hypertension, dementia, aortic stenosis status post TAVR, CKD stage III, coronary artery disease  who presented in the setting of a fall after being found on the floor by her caregiver. Admitted with right femur fracture after her fall. She underwent right femur insertion distal TFN screw on 2023. On 1/10, patient was unable to talk so Code S was called. Neurology saw in presentation was due to pain medications. No CT head was recommended. Pt was given narcan with improvement. Home Plavix was resumed after ok from Ortho. PT/OT evaluated and recommended short-term rehab. Patient was also treated for postoperative anemia. Hemoglobin 10.8 on admission (baseline ~10-11) but trended down to lowest 6.9 post op and received 1U PRBC on 23. Post-transfusion Hgb improved to >10. Subjective & 24hr Events (01/15/23):     Pt only sleeping in bed this AM but easily arousable and answered yes or no appropriately. Daughter at bedside stated she has only wanted to sleep this AM. Breakfast untouched this morning. No fever, chills, SOB, chest pain    Assessment & Plan:     Fall from ground level  Femoral fracture  S/p R femur insertion distal TFN screw 23  OK to resume Plavix per Ortho--for DVT ppx as well  PT/OT--STR  Ortho on board, appreciate recs  F/u with Ortho 2 weeks postop for wound check and staple removal    Post-op anemia  Hgb on admission 10.8 (baseline ~10-11), trended down to 7.6 post op day 1. Was 6.9 on 23.  S/p 1U PRBC on 23 and improved to 10.8. Transfuse if Hgb <7 or if symptomatic    CKD stage 3  Cr 1.2 on admission (baseline ~1.3-1.7)  Avoid nephrotoxic meds  F/u BMP    Dementia   Noted, only oriented to person and follows basic commands  Code S on 1/10 with neuro evaluated--felt 2/2 pain medications, did not recommend CT head. Recommended to resume home Plavix  Limit sedating meds  Delirium precautions    Hypothyroidism  Cont synthroid    Essential hypertension  Cont Coreg, Hydralazine, ACE-i    CAD  Aortic Stenosis  S/p TAVR  Cont Plavix, ACE-I, BB    Chronic UTI  Cont home trimethoprim    Management of Delirium      Non-pharmacological intervention  Reorient the patient throughout the day  Window open and lights on during the day. Lights off, television off, noises down during the night. If able, decrease nursing checks at night  Therapies as often as possible  Avoid restraints to the best of your ability   Avoid sensory deprivation by using glasses and hearing aids, if applicable        Pharmacological intervention  Replete electrolyte abnormalities and correct metabolic abnormalities  Limit benzodiazepines, antihistamines, narcotics, anticholinergics. Preference towards Precedex for sedation over fentanyl and benzodiazepines/GABAa agonists. For dangerous behavior/aggression to self or others can consider Zyprexa or Seroquel if benefits outweigh risk  For persistent insomnia can use melatonin four hours prior to bedtime or Seroquel 25 mg at bedtime         Anticipated discharge needs:      STR pending    I spent 39 minutes of time caring for this patient on the unit nearby or at bedside, and more than 50 percent was spent on coordination of care activities, and/or patient/family counseling regarding status and plan of care. Diet:  ADULT DIET;  Regular  ADULT ORAL NUTRITION SUPPLEMENT; Breakfast, Lunch, Dinner; Standard High Calorie/High Protein Oral Supplement  DVT PPx: Plavix>  Code status: DNR    Hospital Problems:  Principal Problem:    Femoral fracture (HCC)  Active Problems:    Fall from ground level    Dementia (HCC)    Hypothyroidism    Postoperative anemia    CKD (chronic kidney disease) stage 3, GFR 30-59 ml/min (HCC)    Essential hypertension, benign    Acute blood loss anemia    Coronary artery disease involving native coronary artery of native heart    Nonrheumatic aortic valve insufficiency    S/P TAVR (transcatheter aortic valve replacement)  Resolved Problems:    * No resolved hospital problems. *      Objective:   Patient Vitals for the past 24 hrs:   Temp Pulse Resp BP SpO2   01/15/23 1054 97.7 °F (36.5 °C) 64 18 (!) 116/54 93 %   01/15/23 0953 -- -- 16 -- --   01/15/23 0803 98.2 °F (36.8 °C) 66 18 (!) 145/64 95 %   01/15/23 0346 98.2 °F (36.8 °C) 70 18 (!) 142/59 93 %   01/14/23 2325 97.7 °F (36.5 °C) 75 20 134/63 94 %   01/14/23 1951 98.5 °F (36.9 °C) 70 20 (!) 113/44 --   01/14/23 1540 97.2 °F (36.2 °C) 69 19 134/60 93 %   01/14/23 1334 -- -- 16 -- --       Oxygen Therapy  SpO2: 93 %  Pulse via Oximetry: 83 beats per minute  Pulse Oximeter Device Mode: Intermittent  Pulse Oximeter Device Location: Finger  O2 Device: None (Room air)  O2 Flow Rate (L/min): 2 L/min    Estimated body mass index is 26.43 kg/m² as calculated from the following:    Height as of this encounter: 5' 4\" (1.626 m). Weight as of this encounter: 154 lb (69.9 kg). No intake or output data in the 24 hours ending 01/15/23 1105        Physical Exam:     Blood pressure (!) 116/54, pulse 64, temperature 97.7 °F (36.5 °C), temperature source Oral, resp. rate 18, height 5' 4\" (1.626 m), weight 154 lb (69.9 kg), SpO2 93 %. General:    Frail appearing  Head:  Normocephalic, atraumatic  Eyes:  Sclerae appear normal.  Pupils equally round. ENT:  Nares appear normal.  Moist oral mucosa  CV:   RRR. No m/r/g. No jugular venous distension. Lungs:   CTAB. No wheezing, rhonchi, or rales. Symmetric expansion.   Abdomen:   Soft, nontender, nondistended. Extremities: No cyanosis or clubbing. No edema. L hip dressing c/d/I. Skin:     No rashes and normal coloration. Warm and dry. Neuro:  CN II-XII grossly intact. Sensation intact. Psych:  Pleasantly demented    I have personally reviewed labs and tests showing:  Recent Labs:  Recent Results (from the past 48 hour(s))   CBC    Collection Time: 01/15/23  8:43 AM   Result Value Ref Range    WBC 7.1 4.3 - 11.1 K/uL    RBC 3.17 (L) 4.05 - 5.2 M/uL    Hemoglobin 9.4 (L) 11.7 - 15.4 g/dL    Hematocrit 28.7 (L) 35.8 - 46.3 %    MCV 90.5 82 - 102 FL    MCH 29.7 26.1 - 32.9 PG    MCHC 32.8 31.4 - 35.0 g/dL    RDW 13.9 11.9 - 14.6 %    Platelets 926 454 - 390 K/uL    MPV 10.8 9.4 - 12.3 FL    nRBC 0.00 0.0 - 0.2 K/uL   Basic Metabolic Panel    Collection Time: 01/15/23  8:43 AM   Result Value Ref Range    Sodium 139 133 - 143 mmol/L    Potassium 4.3 3.5 - 5.1 mmol/L    Chloride 106 101 - 110 mmol/L    CO2 27 21 - 32 mmol/L    Anion Gap 6 2 - 11 mmol/L    Glucose 155 (H) 65 - 100 mg/dL    BUN 36 (H) 8 - 23 MG/DL    Creatinine 1.40 (H) 0.6 - 1.0 MG/DL    Est, Glom Filt Rate 35 (L) >60 ml/min/1.73m2    Calcium 8.4 8.3 - 10.4 MG/DL       I have personally reviewed imaging studies showing: Other Studies:  XR KNEE RIGHT (1-2 VIEWS)   Final Result   Surgical fixation of distal femur periprosthetic fracture. NC XR TECHNOLOGIST SERVICE   Final Result      XR HIP RIGHT (2-3 VIEWS)   Final Result   Postoperative changes without evidence of acute bony abnormality. XR KNEE RIGHT (3 VIEWS)   Final Result   Spiral type fracture of the distal femoral metadiaphysis with mild   displacement. XR FEMUR RIGHT (MIN 2 VIEWS)   Final Result   Spiral type fracture of the distal femoral metadiaphysis. XR CHEST PORTABLE   Final Result   Unremarkable portable chest radiograph. CT HEAD WO CONTRAST   Final Result   1. No evidence of acute intracranial hemorrhage.    2. No evidence of acute cervical spine fracture. 3. Cervical spondylosis, felt to account for the slight retrolisthesis of C5.   4. 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 CT CERVICAL SPINE WO CONTRAST   Final Result   1. No evidence of acute intracranial hemorrhage. 2. No evidence of acute cervical spine fracture. 3. Cervical spondylosis, felt to account for the slight retrolisthesis of C5.   4. 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                 Current Meds:  Current Facility-Administered Medications   Medication Dose Route Frequency    lactated ringers infusion   IntraVENous Continuous    hydrALAZINE (APRESOLINE) injection 10 mg  10 mg IntraVENous Q6H PRN    hydrOXYzine pamoate (VISTARIL) capsule 25 mg  25 mg Oral TID PRN    0.9 % sodium chloride infusion   IntraVENous PRN    lisinopril (PRINIVIL;ZESTRIL) tablet 5 mg  5 mg Oral Daily    hydrALAZINE (APRESOLINE) tablet 25 mg  25 mg Oral Daily    carboxymethylcellulose (THERATEARS) 1 % ophthalmic gel 1 drop  1 drop Both Eyes PRN    Vitamin D (CHOLECALCIFEROL) tablet 2,000 Units  2,000 Units Oral Daily    naloxone (NARCAN) injection 0.4 mg  0.4 mg IntraVENous PRN    clopidogrel (PLAVIX) tablet 75 mg  75 mg Oral Daily    morphine injection 0.5 mg  0.5 mg IntraVENous Q4H PRN    traMADol (ULTRAM) tablet 50 mg  50 mg Oral Q6H PRN    sodium chloride flush 0.9 % injection 5-40 mL  5-40 mL IntraVENous PRN    0.9 % sodium chloride infusion   IntraVENous PRN    ondansetron (ZOFRAN-ODT) disintegrating tablet 4 mg  4 mg Oral Q8H PRN    Or    ondansetron (ZOFRAN) injection 4 mg  4 mg IntraVENous Q6H PRN    medicated lip ointment (BLISTEX)   Topical PRN    sodium chloride flush 0.9 % injection 5-40 mL  5-40 mL IntraVENous 2 times per day    sodium chloride flush 0.9 % injection 5-40 mL  5-40 mL IntraVENous PRN    0.9 % sodium chloride infusion   IntraVENous PRN    polyethylene glycol (GLYCOLAX) packet 17 g 17 g Oral Daily PRN    acetaminophen (TYLENOL) tablet 650 mg  650 mg Oral Q6H PRN    Or    acetaminophen (TYLENOL) suppository 650 mg  650 mg Rectal Q6H PRN    carvedilol (COREG) tablet 6.25 mg  6.25 mg Oral Daily    levothyroxine (SYNTHROID) tablet 50 mcg  50 mcg Oral Daily    trimethoprim (TRIMPEX) tablet 100 mg  100 mg Oral Daily    melatonin tablet 3 mg  3 mg Oral Nightly PRN       Signed: Mariana White DO    Part of this note may have been written by using a voice dictation software. The note has been proof read but may still contain some grammatical/other typographical errors.

## 2023-01-15 NOTE — PLAN OF CARE
Problem: Discharge Planning  Goal: Discharge to home or other facility with appropriate resources  Outcome: Progressing  Flowsheets (Taken 1/14/2023 1951)  Discharge to home or other facility with appropriate resources: Identify barriers to discharge with patient and caregiver     Problem: Pain  Goal: Verbalizes/displays adequate comfort level or baseline comfort level  Outcome: Progressing     Problem: Chronic Conditions and Co-morbidities  Goal: Patient's chronic conditions and co-morbidity symptoms are monitored and maintained or improved  Outcome: Progressing     Problem: Safety - Adult  Goal: Free from fall injury  Outcome: Progressing  Flowsheets (Taken 1/14/2023 1951)  Free From Fall Injury: Instruct family/caregiver on patient safety     Problem: Skin/Tissue Integrity  Goal: Absence of new skin breakdown  Description: 1. Monitor for areas of redness and/or skin breakdown  2. Assess vascular access sites hourly  3. Every 4-6 hours minimum:  Change oxygen saturation probe site  4. Every 4-6 hours:  If on nasal continuous positive airway pressure, respiratory therapy assess nares and determine need for appliance change or resting period.   Outcome: Progressing     Problem: Neurosensory - Adult  Goal: Achieves stable or improved neurological status  Outcome: Progressing     Problem: Respiratory - Adult  Goal: Achieves optimal ventilation and oxygenation  Outcome: Progressing     Problem: Cardiovascular - Adult  Goal: Maintains optimal cardiac output and hemodynamic stability  Outcome: Progressing     Problem: Skin/Tissue Integrity - Adult  Goal: Skin integrity remains intact  Outcome: Progressing  Flowsheets  Taken 1/14/2023 1951 by Trixie Harris RN  Skin Integrity Remains Intact: Monitor for areas of redness and/or skin breakdown  Taken 1/14/2023 1141 by Magy Morris RN  Skin Integrity Remains Intact: Monitor for areas of redness and/or skin breakdown  Goal: Incisions, wounds, or drain sites healing without S/S of infection  Outcome: Progressing  Flowsheets (Taken 1/14/2023 1141 by Ministerio Gonzalez RN)  Incisions, Wounds, or Drain Sites Healing Without Sign and Symptoms of Infection: ADMISSION and DAILY: Assess and document risk factors for pressure ulcer development  Goal: Oral mucous membranes remain intact  Outcome: Progressing  Flowsheets (Taken 1/14/2023 1141 by Ministerio Gonzalez RN)  Oral Mucous Membranes Remain Intact: Assess oral mucosa and hygiene practices     Problem: Musculoskeletal - Adult  Goal: Return mobility to safest level of function  Outcome: Progressing  Flowsheets (Taken 1/14/2023 1951)  Return Mobility to Safest Level of Function: Assist with transfers and ambulation using safe patient handling equipment as needed  Goal: Maintain proper alignment of affected body part  Outcome: Progressing  Goal: Return ADL status to a safe level of function  Outcome: Progressing     Problem: Gastrointestinal - Adult  Goal: Minimal or absence of nausea and vomiting  Outcome: Progressing  Goal: Maintains or returns to baseline bowel function  Outcome: Progressing  Goal: Maintains adequate nutritional intake  Outcome: Progressing     Problem: Genitourinary - Adult  Goal: Absence of urinary retention  Outcome: Progressing     Problem: Infection - Adult  Goal: Absence of infection at discharge  Outcome: Progressing  Goal: Absence of infection during hospitalization  Outcome: Progressing     Problem: Metabolic/Fluid and Electrolytes - Adult  Goal: Electrolytes maintained within normal limits  Outcome: Progressing  Goal: Hemodynamic stability and optimal renal function maintained  Outcome: Progressing     Problem: Hematologic - Adult  Goal: Maintains hematologic stability  Outcome: Progressing

## 2023-01-15 NOTE — PROGRESS NOTES
dACUTE OCCUPATIONAL THERAPY GOALS:   (Developed with and agreed upon by patient and/or caregiver.)  Patient will complete functional activity while seated EOB, unsupported, with set-up and adaptive equipment as needed. 2. Patient will complete functional transfers with min A and adaptive equipment as needed. 3. Patient will complete lower body bathing and dressing with mod A and adaptive equipment as needed. 4. Patient will complete toileting with min A.   5. Patient will tolerate at least 15  minutes of BUE therapeutic exercises to increase strength in BUE to aid in functional transfers. 6. Patient will tolerate at least 30 minutes of OT treatment with no rest breaks to increase activity tolerance for ADLs. OCCUPATIONAL THERAPY Daily Note     OT Visit Days: 4   Time  OT Charge Capture  Rehab Caseload Tracker  OT Orders    Luciana Cuenca is a 80 y.o. female   PRIMARY DIAGNOSIS: Femoral fracture ([address]  Fall, initial encounter [W19. XXXA]  Fall from ground level [W18.30XA]  Periprosthetic fracture of shaft of femur [G60. 8XXA, Z96.649]  Procedure(s) (LRB):  RIGHT FEMUR INSERTION DISTAL TFN SCREW (Right)  6 Days Post-Op  Inpatient: Payor: MEDICARE / Plan: MEDICARE PART A AND B / Product Type: *No Product type* /     ASSESSMENT:     REHAB RECOMMENDATIONS: CURRENT LEVEL OF FUNCTION:  (Most Recently Demonstrated)   Recommendation to date pending progress:  Setting:  Short-term Rehab    Equipment:    To Be Determined Bathing:  Not Tested  Dressing:  Not Tested  Feeding/Grooming:  Not Tested  Toileting:  Not Tested  Functional Mobility:  Moderate Assist/Max A x2     ASSESSMENT:  Ms. David Henderson is doing fair today. Per family, patient is not feeling well today. Pt required mod/max A x2 for bed mobility. Pt demonstrates fair (-) sitting balance at EOB. Pt was able to stand with mod A x2 but required max A x2 to transfer over to chair. Later assisted patient back to bed with same amount of assistance.  Minimal progress made this date. Will continue to benefit from skilled OT during stay.        SUBJECTIVE:     Ms. Jessi Manning states, \"I can move that leg\"     Social/Functional Lives With: Alone  Type of Home: House  Home Layout: One level  Home Access: Stairs to enter with rails  Entrance Stairs - Number of Steps: 3-4  Home Equipment: Belinda Favia, 4 wheeled  Has the patient had two or more falls in the past year or any fall with injury in the past year?: Yes  Receives Help From: Family, Friend(s), Personal care attendant  ADL Assistance: Needs assistance  Homemaking Assistance: Needs assistance  Ambulation Assistance: Independent  Transfer Assistance: Independent    OBJECTIVE:     Nanda Miller / Thalia Ferrari / Christina Romosten: Risa Barbosa    RESTRICTIONS/PRECAUTIONS:  Restrictions/Precautions  Restrictions/Precautions: Weight Bearing, Fall Risk  Lower Extremity Weight Bearing Restrictions  Right Lower Extremity Weight Bearing: Weight Bearing As Tolerated        PAIN: VITALS / O2:   Pre Treatment:   Numeric: 0/10             Post Treatment: 0 /10 Vitals          Oxygen                MOBILITY: I Mod I S SBA CGA Min Mod Max Total  NT x2 Comments:   Bed Mobility    Rolling [] [] [] [] [] [] [] [] [] [x] []    Supine to Sit [] [] [] [] [] [] [x] [x] [] [] [x]    Scooting [] [] [] [] [] [] [x] [] [] [] [x]    Sit to Supine [] [] [] [] [] [] [x] [] [] [] [x]    Transfers    Sit to Stand [] [] [] [] [] [] [x] [] [] [] [x]    Bed to Chair [] [] [] [] [] [] [] [x] [] [] [x]    Stand to Sit [] [] [] [] [] [] [] [x] [] [] [x]    I=Independent, Mod I=Modified Independent, S=Supervision/Setup, SBA=Standby Assistance, CGA=Contact Guard Assistance, Min=Minimal Assistance, Mod=Moderate Assistance, Max=Maximal Assistance, Total=Total Assistance, NT=Not Tested    ACTIVITIES OF DAILY LIVING: I Mod I S SBA CGA Min Mod Max Total NT Comments   BASIC ADLs:              Bathing/ Showering [] [] [] [] [] [] [] [] [] []    Toileting [] [] [] [] [] [] [] [] [] []    Upper Body Dressing [] [] [] [] [] [] [] [] [] []    Lower Body Dressing [] [] [] [] [] [] [] [] [] []    Feeding [] [] [] [] [] [] [] [] [] []    Grooming [] [] [] [] [] [] [] [] [] []    Personal Device Care [] [] [] [] [] [] [] [] [] []    Functional Mobility [] [] [] [] [] [] [] [] [] []    I=Independent, Mod I=Modified Independent, S=Supervision/Setup, SBA=Standby Assistance, CGA=Contact Guard Assistance, Min=Minimal Assistance, Mod=Moderate Assistance, Max=Maximal Assistance, Total=Total Assistance, NT=Not Tested    BALANCE: Good Fair+ Fair Fair- Poor NT Comments   Sitting Static [] [] [] [x] [] []    Sitting Dynamic [] [] [] [x] [] []              Standing Static [] [] [] [x] [] []    Standing Dynamic [] [] [] [] [x] []        PLAN:     FREQUENCY/DURATION   OT Plan of Care: 4 times/week for duration of hospital stay or until stated goals are met, whichever comes first.      TREATMENT:     TREATMENT:   Neuromuscular Re-education (25 Minutes): Neuromuscular Re-education included Balance Training, Coordination training, Functional mobility with facilitation, Sitting balance training, and Standing balance training to improve Balance, Coordination, and Postural Control.     TREATMENT GRID:  N/A    AFTER TREATMENT PRECAUTIONS: Bed, Bed/Chair Locked, Call light within reach, Needs within reach, and Visitors at bedside    INTERDISCIPLINARY COLLABORATION:  RN/ PCT, PT/ PTA, and OT/ AARON    EDUCATION:        TOTAL TREATMENT DURATION AND TIME:  Time In: 1026  Time Out: Ben 38  Minutes: 24    BONNIE Chau

## 2023-01-15 NOTE — PROGRESS NOTES
ACUTE PHYSICAL THERAPY GOALS:   (Developed with and agreed upon by patient and/or caregiver.)  (1.) Mitali Padron  will move from supine to sit and sit to supine  with MINIMAL ASSIST within 7 treatment day(s). (2.) Luciana Eckert will transfer from bed to chair and chair to bed with MINIMAL ASSIST using the least restrictive device within 7 treatment day(s). (3.) Mitali Padron will ambulate with MINIMAL ASSIST for 10 feet with the least restrictive device within 7 treatment day(s). (4.) Luciana Ibrahim Friend will perform standing static and dynamic balance activities x 10 minutes with MINIMAL ASSIST to improve safety within 7 treatment day(s). (5.) Luciana Ibrahim Friend will perform bilateral lower extremity exercises x 15 min for HEP with SUPERVISION to improve strength, endurance, and functional mobility within 7 treatment day(s). PHYSICAL THERAPY: Daily Note AM   (Link to Caseload Tracking: PT Visit Days : 6  Time In/Out PT Charge Capture  Rehab Caseload Tracker  Orders    Mitali Padron is a 80 y.o. female   PRIMARY DIAGNOSIS: Femoral fracture ([facility] [address]  Fall, initial encounter [W19. XXXA]  Fall from ground level [W18.30XA]  Periprosthetic fracture of shaft of femur [U79. 8XXA, Z96.649]  Procedure(s) (LRB):  RIGHT FEMUR INSERTION DISTAL TFN SCREW (Right)  6 Days Post-Op  Inpatient: Payor: MEDICARE / Plan: MEDICARE PART A AND B / Product Type: *No Product type* /     ASSESSMENT:     REHAB RECOMMENDATIONS:   Recommendation to date pending progress:  Setting:  Short-term Rehab    Equipment:    To Be Determined     ASSESSMENT:  Ms. Benjamin Paredes made no progress again today toward goals. She is more fearful. She seems confused. Pt son in law present. She required max a x 2 for sit to stand. Attempted to get pt to amb but she was only able to move her feet a little forward. Pt in flexed posture and having trouble moving feet forward. Pt sat in chair. SUBJECTIVE:   Ms. Benjamin Paredes states \"I can't. I can't\".       Social/Functional Lives With: Alone  Type of Home: House  Home Layout: One level  Home Access: Stairs to enter with rails  Entrance Stairs - Number of Steps: 3-4  Home Equipment: Paul Owen, 4 wheeled  Has the patient had two or more falls in the past year or any fall with injury in the past year?: Yes  Receives Help From: Family, Friend(s), Personal care attendant  ADL Assistance: Needs assistance  Homemaking Assistance: Needs assistance  Ambulation Assistance: Independent  Transfer Assistance: Independent  OBJECTIVE:     PAIN: Pablo Spenser / O2: Shanae Saenz / Chandrika Sorensen / Camden Jamisonlow:   Pre Treatment:    No number given,      Post Treatment:  no number given  Vitals        Oxygen    None    RESTRICTIONS/PRECAUTIONS:  Restrictions/Precautions  Restrictions/Precautions: Weight Bearing, Fall Risk  Lower Extremity Weight Bearing Restrictions  Right Lower Extremity Weight Bearing: Weight Bearing As Tolerated  Restrictions/Precautions: Weight Bearing, Fall Risk     MOBILITY: I Mod I S SBA CGA Min Mod Max Total  NT x2 Comments:   Bed Mobility    Rolling [] [] [] [] [] [] [] [] [] [x] []    Supine to Sit [] [] [] [] [] [] [x] [] [] [] [x]    Scooting [] [] [] [] [] [] [] [x] [] [] [x]    Sit to Supine [] [] [] [] [] [] [] [] [] [x] []    Transfers    Sit to Stand [] [] [] [] [] [] [] [x] [] [] [x]    Bed to Chair [] [] [] [] [] [] [] [] [] [x] []    Stand to Sit [] [] [] [] [] [] [x] [] [] [] [x]     [] [] [] [] [] [] [] [] [] [] []    I=Independent, Mod I=Modified Independent, S=Supervision, SBA=Standby Assistance, CGA=Contact Guard Assistance,   Min=Minimal Assistance, Mod=Moderate Assistance, Max=Maximal Assistance, Total=Total Assistance, NT=Not Tested    BALANCE: Good Fair+ Fair Fair- Poor NT Comments   Sitting Static [] [x] [] [] [] []    Sitting Dynamic [] [x] [] [] [] []              Standing Static [] [] [] [x] [x] []    Standing Dynamic [] [] [] [] [x] []      GAIT: I Mod I S SBA CGA Min Mod Max Total  NT x2 Comments: Level of Assistance [] [] [] [] [] [] [] [x] [] [] [x]    Distance 2  feet    DME Rolling Walker    Gait Quality Decreased meli , Decreased step clearance, and Decreased step length    Weightbearing Status      Stairs      I=Independent, Mod I=Modified Independent, S=Supervision, SBA=Standby Assistance, CGA=Contact Guard Assistance,   Min=Minimal Assistance, Mod=Moderate Assistance, Max=Maximal Assistance, Total=Total Assistance, NT=Not Tested    PLAN:   FREQUENCY AND DURATION: BID for duration of hospital stay or until stated goals are met, whichever comes first.    TREATMENT:   TREATMENT:   Co-Treatment PT/OT necessary due to patient's decreased overall endurance/tolerance levels, as well as need for high level skilled assistance to complete functional transfers/mobility and functional tasks  Therapeutic Activity (24 Minutes): Therapeutic activity included Supine to Sit, Scooting, Transfer Training, Sitting balance , and Standing balance to improve functional Activity tolerance, Balance, Mobility, and Strength.     TREATMENT GRID:  N/A    AFTER TREATMENT PRECAUTIONS: Alarm Activated, Bed/Chair Locked, Call light within reach, Chair, RN notified, and Visitors at bedside    INTERDISCIPLINARY COLLABORATION:  RN/ PCT and PT/ PTA    EDUCATION:      TIME IN/OUT:  Time In: 1026  Time Out: Ben 38  Minutes: 24    EARLINE BALDWIN PTA

## 2023-01-16 LAB
ANION GAP SERPL CALC-SCNC: 8 MMOL/L (ref 2–11)
BUN SERPL-MCNC: 47 MG/DL (ref 8–23)
CALCIUM SERPL-MCNC: 9.2 MG/DL (ref 8.3–10.4)
CHLORIDE SERPL-SCNC: 103 MMOL/L (ref 101–110)
CO2 SERPL-SCNC: 27 MMOL/L (ref 21–32)
CREAT SERPL-MCNC: 1.8 MG/DL (ref 0.6–1)
ERYTHROCYTE [DISTWIDTH] IN BLOOD BY AUTOMATED COUNT: 14 % (ref 11.9–14.6)
GLUCOSE SERPL-MCNC: 165 MG/DL (ref 65–100)
HCT VFR BLD AUTO: 31.6 % (ref 35.8–46.3)
HGB BLD-MCNC: 10.1 G/DL (ref 11.7–15.4)
MCH RBC QN AUTO: 29.6 PG (ref 26.1–32.9)
MCHC RBC AUTO-ENTMCNC: 32 G/DL (ref 31.4–35)
MCV RBC AUTO: 92.7 FL (ref 82–102)
NRBC # BLD: 0 K/UL (ref 0–0.2)
PLATELET # BLD AUTO: 262 K/UL (ref 150–450)
PMV BLD AUTO: 11.2 FL (ref 9.4–12.3)
POTASSIUM SERPL-SCNC: 4.5 MMOL/L (ref 3.5–5.1)
RBC # BLD AUTO: 3.41 M/UL (ref 4.05–5.2)
SODIUM SERPL-SCNC: 138 MMOL/L (ref 133–143)
WBC # BLD AUTO: 7.3 K/UL (ref 4.3–11.1)

## 2023-01-16 PROCEDURE — 97530 THERAPEUTIC ACTIVITIES: CPT

## 2023-01-16 PROCEDURE — 97164 PT RE-EVAL EST PLAN CARE: CPT

## 2023-01-16 PROCEDURE — 2580000003 HC RX 258: Performed by: INTERNAL MEDICINE

## 2023-01-16 PROCEDURE — 6370000000 HC RX 637 (ALT 250 FOR IP): Performed by: FAMILY MEDICINE

## 2023-01-16 PROCEDURE — 6370000000 HC RX 637 (ALT 250 FOR IP): Performed by: ORTHOPAEDIC SURGERY

## 2023-01-16 PROCEDURE — 80048 BASIC METABOLIC PNL TOTAL CA: CPT

## 2023-01-16 PROCEDURE — 1100000000 HC RM PRIVATE

## 2023-01-16 PROCEDURE — 85027 COMPLETE CBC AUTOMATED: CPT

## 2023-01-16 PROCEDURE — 6370000000 HC RX 637 (ALT 250 FOR IP): Performed by: PHYSICIAN ASSISTANT

## 2023-01-16 PROCEDURE — 36415 COLL VENOUS BLD VENIPUNCTURE: CPT

## 2023-01-16 PROCEDURE — 2580000003 HC RX 258: Performed by: ORTHOPAEDIC SURGERY

## 2023-01-16 RX ORDER — SODIUM CHLORIDE 9 MG/ML
INJECTION, SOLUTION INTRAVENOUS CONTINUOUS
Status: ACTIVE | OUTPATIENT
Start: 2023-01-16 | End: 2023-01-17

## 2023-01-16 RX ADMIN — CLOPIDOGREL BISULFATE 75 MG: 75 TABLET ORAL at 09:11

## 2023-01-16 RX ADMIN — SODIUM CHLORIDE: 9 INJECTION, SOLUTION INTRAVENOUS at 11:10

## 2023-01-16 RX ADMIN — CARVEDILOL 6.25 MG: 6.25 TABLET, FILM COATED ORAL at 09:11

## 2023-01-16 RX ADMIN — SODIUM CHLORIDE: 9 INJECTION, SOLUTION INTRAVENOUS at 19:49

## 2023-01-16 RX ADMIN — SODIUM CHLORIDE, PRESERVATIVE FREE 10 ML: 5 INJECTION INTRAVENOUS at 09:11

## 2023-01-16 RX ADMIN — LISINOPRIL 5 MG: 5 TABLET ORAL at 09:11

## 2023-01-16 RX ADMIN — TRIMETHOPRIM 100 MG: 100 TABLET ORAL at 09:12

## 2023-01-16 RX ADMIN — TRAMADOL HYDROCHLORIDE 25 MG: 50 TABLET ORAL at 19:47

## 2023-01-16 RX ADMIN — HYDRALAZINE HYDROCHLORIDE 25 MG: 25 TABLET, FILM COATED ORAL at 09:11

## 2023-01-16 RX ADMIN — CHOLECALCIFEROL TAB 25 MCG (1000 UNIT) 2000 UNITS: 25 TAB at 09:11

## 2023-01-16 RX ADMIN — TRAMADOL HYDROCHLORIDE 25 MG: 50 TABLET ORAL at 12:53

## 2023-01-16 RX ADMIN — Medication 3 MG: at 19:48

## 2023-01-16 RX ADMIN — LEVOTHYROXINE SODIUM 50 MCG: 0.05 TABLET ORAL at 05:25

## 2023-01-16 RX ADMIN — SODIUM CHLORIDE, PRESERVATIVE FREE 10 ML: 5 INJECTION INTRAVENOUS at 19:48

## 2023-01-16 ASSESSMENT — PAIN DESCRIPTION - LOCATION
LOCATION: LEG;BACK
LOCATION: LEG

## 2023-01-16 ASSESSMENT — PAIN SCALES - GENERAL
PAINLEVEL_OUTOF10: 0
PAINLEVEL_OUTOF10: 0
PAINLEVEL_OUTOF10: 6
PAINLEVEL_OUTOF10: 5

## 2023-01-16 ASSESSMENT — PAIN DESCRIPTION - ONSET: ONSET: SUDDEN

## 2023-01-16 ASSESSMENT — PAIN DESCRIPTION - ORIENTATION
ORIENTATION: RIGHT;POSTERIOR
ORIENTATION: RIGHT

## 2023-01-16 ASSESSMENT — PAIN DESCRIPTION - DESCRIPTORS
DESCRIPTORS: ACHING;SORE
DESCRIPTORS: SORE

## 2023-01-16 ASSESSMENT — PAIN DESCRIPTION - FREQUENCY: FREQUENCY: INTERMITTENT

## 2023-01-16 ASSESSMENT — PAIN SCALES - WONG BAKER: WONGBAKER_NUMERICALRESPONSE: 0

## 2023-01-16 ASSESSMENT — PAIN DESCRIPTION - PAIN TYPE: TYPE: SURGICAL PAIN

## 2023-01-16 NOTE — PROGRESS NOTES
Hospitalist Progress Note   Admit Date:  2023 10:05 AM   Name:  Garcia Donato   Age:  80 y.o. Sex:  female  :  3/20/1929   MRN:  [id number]   Room:  [facility]    Presenting Complaint: Fall     Reason(s) for Admission: Fall, initial encounter [W19. XXXA]  Fall from ground level [W18.30XA]  Periprosthetic fracture of shaft of femur [R96. Jose Alberto Mower Course:   Garcia Donato is a 80 y.o. female with medical history of hypertension, dementia, aortic stenosis status post TAVR, CKD stage III, coronary artery disease  who presented in the setting of a fall after being found on the floor by her caregiver. Admitted with right femur fracture after her fall. She underwent right femur insertion distal TFN screw on 2023. On 1/10, patient was unable to talk so Code S was called. Neurology saw in presentation was due to pain medications. No CT head was recommended. Pt was given narcan with improvement. Home Plavix was resumed after ok from Ortho. PT/OT evaluated and recommended short-term rehab. Patient was also treated for postoperative anemia. Hemoglobin 10.8 on admission (baseline ~10-11) but trended down to lowest 6.9 post op and received 1U PRBC on 23. Post-transfusion Hgb improved to >10. Subjective & 24hr Events (23): Patient stable, with no acute events overnight. Discussed with daughter overall course of dementia. Renal function now above baseline    Assessment & Plan:     Fall from ground level  Femoral fracture  S/p R femur insertion distal TFN screw 23  OK to resume Plavix per Ortho--for DVT ppx as well  PT/OT--STR  Ortho on board, appreciate recs  F/u with Ortho 2 weeks postop for wound check and staple removal    Post-op anemia  Hgb on admission 10.8 (baseline ~10-11), trended down to 7.6 post op day 1. Was 6.9 on 23. S/p 1U PRBC on 23 and improved to 10.8.   Transfuse if Hgb <7 or if symptomatic    Acute on CKD stage 3  Cr 1.2 on admission (baseline ~1.3-1.7)  Cr now 1.8 will treat with iv fluids  Avoid nephrotoxic meds  F/u BMP    Dementia   Noted, only oriented to person and follows basic commands  Code S on 1/10 with neuro evaluated--felt 2/2 pain medications, did not recommend CT head. Recommended to resume home Plavix  Limit sedating meds  Delirium precautions    Hypothyroidism  Cont synthroid    Essential hypertension  Cont Coreg, Hydralazine, ACE-i    CAD  Aortic Stenosis  S/p TAVR  Cont Plavix, ACE-I, BB    Chronic UTI  Cont home trimethoprim    Management of Delirium      Non-pharmacological intervention  Reorient the patient throughout the day  Window open and lights on during the day. Lights off, television off, noises down during the night. If able, decrease nursing checks at night  Therapies as often as possible  Avoid restraints to the best of your ability   Avoid sensory deprivation by using glasses and hearing aids, if applicable        Pharmacological intervention  Replete electrolyte abnormalities and correct metabolic abnormalities  Limit benzodiazepines, antihistamines, narcotics, anticholinergics. Preference towards Precedex for sedation over fentanyl and benzodiazepines/GABAa agonists. For dangerous behavior/aggression to self or others can consider Zyprexa or Seroquel if benefits outweigh risk  For persistent insomnia can use melatonin four hours prior to bedtime or Seroquel 25 mg at bedtime         Anticipated discharge needs:      STR pending    I spent 39 minutes of time caring for this patient on the unit nearby or at bedside, and more than 50 percent was spent on coordination of care activities, and/or patient/family counseling regarding status and plan of care. Diet:  ADULT DIET;  Regular  ADULT ORAL NUTRITION SUPPLEMENT; Breakfast, Lunch, Dinner; Standard High Calorie/High Protein Oral Supplement  DVT PPx: Plavix>  Code status: DNR    Hospital Problems:  Principal Problem:    Femoral fracture (HCC)  Active Problems:    Fall from ground level    Dementia ([facility] [address]    Hypothyroidism    Postoperative anemia    CKD (chronic kidney disease) stage 3, GFR 30-59 ml/min ([facility])    Essential hypertension, benign    Acute blood loss anemia    Coronary artery disease involving native coronary artery of native heart    Nonrheumatic aortic valve insufficiency    S/P TAVR (transcatheter aortic valve replacement)  Resolved Problems:    * No resolved hospital problems. *      Objective:   Patient Vitals for the past 24 hrs:   Temp Pulse Resp BP SpO2   01/16/23 1543 97.5 °F (36.4 °C) 65 18 (!) 125/59 97 %   01/16/23 1139 98.6 °F (37 °C) 61 18 130/68 96 %   01/16/23 0719 97.5 °F (36.4 °C) 70 18 (!) 147/71 98 %   01/16/23 0334 97.7 °F (36.5 °C) 69 17 (!) 158/75 96 %   01/15/23 2256 98.2 °F (36.8 °C) 62 17 (!) 114/49 95 %   01/15/23 1919 98.6 °F (37 °C) 67 17 (!) 111/47 94 %       Oxygen Therapy  SpO2: 97 %  Pulse via Oximetry: 83 beats per minute  Pulse Oximeter Device Mode: Intermittent  Pulse Oximeter Device Location: Finger  O2 Device: None (Room air)  O2 Flow Rate (L/min): 2 L/min    Estimated body mass index is 26.43 kg/m² as calculated from the following:    Height as of this encounter: 5' 4\" (1.626 m). Weight as of this encounter: 154 lb (69.9 kg). Intake/Output Summary (Last 24 hours) at 1/16/2023 1635  Last data filed at 1/16/2023 0944  Gross per 24 hour   Intake --   Output 1050 ml   Net -1050 ml           Physical Exam:     Blood pressure (!) 125/59, pulse 65, temperature 97.5 °F (36.4 °C), temperature source Oral, resp. rate 18, height 5' 4\" (1.626 m), weight 154 lb (69.9 kg), SpO2 97 %. General:    Frail appearing  Head:  Normocephalic, atraumatic  Eyes:  Sclerae appear normal.  Pupils equally round. ENT:  Nares appear normal.  Moist oral mucosa  CV:   RRR. No m/r/g. No jugular venous distension. Lungs:   CTAB. No wheezing, rhonchi, or rales. Symmetric expansion.   Abdomen:   Soft, nontender, nondistended. Extremities: No cyanosis or clubbing. No edema. L hip dressing c/d/I. Skin:     No rashes and normal coloration. Warm and dry. Neuro:  CN II-XII grossly intact. Sensation intact.    Psych:  Pleasantly demented    I have personally reviewed labs and tests showing:  Recent Labs:  Recent Results (from the past 48 hour(s))   CBC    Collection Time: 01/15/23  8:43 AM   Result Value Ref Range    WBC 7.1 4.3 - 11.1 K/uL    RBC 3.17 (L) 4.05 - 5.2 M/uL    Hemoglobin 9.4 (L) 11.7 - 15.4 g/dL    Hematocrit 28.7 (L) 35.8 - 46.3 %    MCV 90.5 82 - 102 FL    MCH 29.7 26.1 - 32.9 PG    MCHC 32.8 31.4 - 35.0 g/dL    RDW 13.9 11.9 - 14.6 %    Platelets 614 901 - 819 K/uL    MPV 10.8 9.4 - 12.3 FL    nRBC 0.00 0.0 - 0.2 K/uL   Basic Metabolic Panel    Collection Time: 01/15/23  8:43 AM   Result Value Ref Range    Sodium 139 133 - 143 mmol/L    Potassium 4.3 3.5 - 5.1 mmol/L    Chloride 106 101 - 110 mmol/L    CO2 27 21 - 32 mmol/L    Anion Gap 6 2 - 11 mmol/L    Glucose 155 (H) 65 - 100 mg/dL    BUN 36 (H) 8 - 23 MG/DL    Creatinine 1.40 (H) 0.6 - 1.0 MG/DL    Est, Glom Filt Rate 35 (L) >60 ml/min/1.73m2    Calcium 8.4 8.3 - 10.4 MG/DL   CBC    Collection Time: 01/16/23  5:32 AM   Result Value Ref Range    WBC 7.3 4.3 - 11.1 K/uL    RBC 3.41 (L) 4.05 - 5.2 M/uL    Hemoglobin 10.1 (L) 11.7 - 15.4 g/dL    Hematocrit 31.6 (L) 35.8 - 46.3 %    MCV 92.7 82 - 102 FL    MCH 29.6 26.1 - 32.9 PG    MCHC 32.0 31.4 - 35.0 g/dL    RDW 14.0 11.9 - 14.6 %    Platelets 756 941 - 379 K/uL    MPV 11.2 9.4 - 12.3 FL    nRBC 0.00 0.0 - 0.2 K/uL   Basic Metabolic Panel    Collection Time: 01/16/23  5:32 AM   Result Value Ref Range    Sodium 138 133 - 143 mmol/L    Potassium 4.5 3.5 - 5.1 mmol/L    Chloride 103 101 - 110 mmol/L    CO2 27 21 - 32 mmol/L    Anion Gap 8 2 - 11 mmol/L    Glucose 165 (H) 65 - 100 mg/dL    BUN 47 (H) 8 - 23 MG/DL    Creatinine 1.80 (H) 0.6 - 1.0 MG/DL    Est, Glom Filt Rate 26 (L) >60 ml/min/1.73m2 Calcium 9.2 8.3 - 10.4 MG/DL       I have personally reviewed imaging studies showing: Other Studies:  XR KNEE RIGHT (1-2 VIEWS)   Final Result   Surgical fixation of distal femur periprosthetic fracture. NC XR TECHNOLOGIST SERVICE   Final Result      XR HIP RIGHT (2-3 VIEWS)   Final Result   Postoperative changes without evidence of acute bony abnormality. XR KNEE RIGHT (3 VIEWS)   Final Result   Spiral type fracture of the distal femoral metadiaphysis with mild   displacement. XR FEMUR RIGHT (MIN 2 VIEWS)   Final Result   Spiral type fracture of the distal femoral metadiaphysis. XR CHEST PORTABLE   Final Result   Unremarkable portable chest radiograph. CT HEAD WO CONTRAST   Final Result   1. No evidence of acute intracranial hemorrhage. 2. No evidence of acute cervical spine fracture. 3. Cervical spondylosis, felt to account for the slight retrolisthesis of C5.   4. 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 CT CERVICAL SPINE WO CONTRAST   Final Result   1. No evidence of acute intracranial hemorrhage. 2. No evidence of acute cervical spine fracture. 3. Cervical spondylosis, felt to account for the slight retrolisthesis of C5.   4. 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                 Current Meds:  Current Facility-Administered Medications   Medication Dose Route Frequency    0.9 % sodium chloride infusion   IntraVENous Continuous    traMADol (ULTRAM) tablet 25 mg  25 mg Oral Q6H PRN    lidocaine 4 % external patch 2 patch  2 patch TransDERmal Daily    hydrALAZINE (APRESOLINE) injection 10 mg  10 mg IntraVENous Q6H PRN    hydrOXYzine pamoate (VISTARIL) capsule 25 mg  25 mg Oral TID PRN    0.9 % sodium chloride infusion   IntraVENous PRN    lisinopril (PRINIVIL;ZESTRIL) tablet 5 mg  5 mg Oral Daily    hydrALAZINE (APRESOLINE) tablet 25 mg  25 mg Oral Daily carboxymethylcellulose (THERATEARS) 1 % ophthalmic gel 1 drop  1 drop Both Eyes PRN    Vitamin D (CHOLECALCIFEROL) tablet 2,000 Units  2,000 Units Oral Daily    naloxone (NARCAN) injection 0.4 mg  0.4 mg IntraVENous PRN    clopidogrel (PLAVIX) tablet 75 mg  75 mg Oral Daily    morphine injection 0.5 mg  0.5 mg IntraVENous Q4H PRN    sodium chloride flush 0.9 % injection 5-40 mL  5-40 mL IntraVENous PRN    0.9 % sodium chloride infusion   IntraVENous PRN    ondansetron (ZOFRAN-ODT) disintegrating tablet 4 mg  4 mg Oral Q8H PRN    Or    ondansetron (ZOFRAN) injection 4 mg  4 mg IntraVENous Q6H PRN    medicated lip ointment (BLISTEX)   Topical PRN    sodium chloride flush 0.9 % injection 5-40 mL  5-40 mL IntraVENous 2 times per day    sodium chloride flush 0.9 % injection 5-40 mL  5-40 mL IntraVENous PRN    0.9 % sodium chloride infusion   IntraVENous PRN    polyethylene glycol (GLYCOLAX) packet 17 g  17 g Oral Daily PRN    acetaminophen (TYLENOL) tablet 650 mg  650 mg Oral Q6H PRN    Or    acetaminophen (TYLENOL) suppository 650 mg  650 mg Rectal Q6H PRN    carvedilol (COREG) tablet 6.25 mg  6.25 mg Oral Daily    levothyroxine (SYNTHROID) tablet 50 mcg  50 mcg Oral Daily    trimethoprim (TRIMPEX) tablet 100 mg  100 mg Oral Daily    melatonin tablet 3 mg  3 mg Oral Nightly PRN       Signed:  Sivakumar Mcintosh MD    Part of this note may have been written by using a voice dictation software. The note has been proof read but may still contain some grammatical/other typographical errors.

## 2023-01-16 NOTE — CARE COORDINATION
CM met with patient and patient's daughter at bedside. Patient's creatinine is elevated this AM and attending would like to administer fluids. D/C postponed and delay added to [facility]. [address] liaison notified. Patient's daughter is very hopeful for bed at [facility], [place], however, facility liaison notes that there will not be any beds available until possibly the end of the week. Goals of care discussed and patient's daughter would like for patient to discharge to SNF for STR in order to evaluate if patient can make progress physically. Daughter is agreeable to speaking with palliative care/hospice company who could follow patient while at SNF. Message sent to Demetrio Link with [facility] to confirm her availability to meet with patient and daughter. Daughter is also agreeable to this CM looking for a SNF bed in the [facility] area other than [facility], [place], as this would be most convenient for patient to have visitors who can spend time with her. Messages sent to [address] [place] [address] and [employer]. Patient is placed on will call transport with [facility] for hopeful discharge tomorrow; reference number [id number]. Rapid Covid testing ordered to ensure within 72 hours of admission to SNF.

## 2023-01-16 NOTE — PLAN OF CARE
Problem: Discharge Planning  Goal: Discharge to home or other facility with appropriate resources  Outcome: Progressing     Problem: Pain  Goal: Verbalizes/displays adequate comfort level or baseline comfort level  Outcome: Progressing     Problem: Chronic Conditions and Co-morbidities  Goal: Patient's chronic conditions and co-morbidity symptoms are monitored and maintained or improved  Outcome: Progressing     Problem: Neurosensory - Adult  Goal: Achieves stable or improved neurological status  Recent Flowsheet Documentation  Taken 1/15/2023 0740 by Rudolph Cai RN  Achieves stable or improved neurological status: Assess for and report changes in neurological status     Problem: Respiratory - Adult  Goal: Achieves optimal ventilation and oxygenation  Recent Flowsheet Documentation  Taken 1/15/2023 0740 by Rudolph Cai RN  Achieves optimal ventilation and oxygenation: Assess for changes in respiratory status

## 2023-01-16 NOTE — PROGRESS NOTES
Physician Progress Note      PATIENTDafannie RAMIRES #:                  [id number]  :                       3/20/1929  ADMIT DATE:       2023 10:05 AM  DISCH DATE:  RESPONDING  PROVIDER #:        Oliver Paz MD          QUERY TEXT:    Pt admitted with R femoral fracture and has post operative anemia documented. If possible, please document in progress notes and discharge summary further   specificity regarding the acuity and type of anemia:    The medical record reflects the following:  Risk Factors: 89 YOF, s/p ORIF femoral shaft fracture, CKD4, Lovenox,  Clinical Indicators:   HGB 10.8,  HGB 6.9,  HGB 10.1; EBL 30cc  1/15 PN: Hemoglobin 10.8 on admission (baseline   10-11) but trended down to lowest 6.9 post op and received 1U PRBC on 23. Post-transfusion Hgb improved to >10. Treatment: Monitoring, transfuse 1 U PRBC    Thank you,  Anton Anand RN,C BSN  Clinical   [email or web address]  Options provided:  -- Anemia due to acute blood loss  -- Anemia due to chronic blood loss  -- Anemia due to acute on chronic blood loss  -- Anemia due to iron deficiency  -- Anemia due to postoperative blood loss  -- Dilutional anemia  -- Other - I will add my own diagnosis  -- Disagree - Not applicable / Not valid  -- Disagree - Clinically unable to determine / Unknown  -- Refer to Clinical Documentation Reviewer    PROVIDER RESPONSE TEXT:    This patient has anemia due to postoperative blood loss. Query created by:  Angelia Quintanilla on 2023 3:04 PM      Electronically signed by:  Oliver Paz MD 2023 3:35 PM

## 2023-01-16 NOTE — PROGRESS NOTES
Physical Therapy Note:    Attempted to see patient this PM for physical therapy treatment  session. Patient had trnf to BS followed by [facility]. Pt was too fatigued for another round. RN reporting pt participated more than last week.  Will follow and re-attempt as schedule permits/patient available. Thank you,    Keaton Chan, PT     Rehab Caseload Tracker

## 2023-01-16 NOTE — PLAN OF CARE
Addended by: AUDREY BUSBY on: 9/10/2020 04:18 PM     Modules accepted: Orders     Problem: Discharge Planning  Goal: Discharge to home or other facility with appropriate resources  1/15/2023 2127 by Good Lopez RN  Outcome: Progressing  Flowsheets (Taken 1/15/2023 1950)  Discharge to home or other facility with appropriate resources: Identify barriers to discharge with patient and caregiver  1/15/2023 1919 by Rudolph aCi RN  Outcome: Progressing     Problem: Pain  Goal: Verbalizes/displays adequate comfort level or baseline comfort level  1/15/2023 2127 by Good Lopez RN  Outcome: Progressing  1/15/2023 1919 by Rudolph Cai RN  Outcome: Progressing     Problem: Chronic Conditions and Co-morbidities  Goal: Patient's chronic conditions and co-morbidity symptoms are monitored and maintained or improved  1/15/2023 2127 by Good Lopez RN  Outcome: Progressing  1/15/2023 1919 by Rudolph Cai RN  Outcome: Progressing     Problem: Safety - Adult  Goal: Free from fall injury  Outcome: Progressing  Flowsheets (Taken 1/15/2023 1950)  Free From Fall Injury: Instruct family/caregiver on patient safety     Problem: Skin/Tissue Integrity  Goal: Absence of new skin breakdown  Description: 1. Monitor for areas of redness and/or skin breakdown  2. Assess vascular access sites hourly  3. Every 4-6 hours minimum:  Change oxygen saturation probe site  4. Every 4-6 hours:  If on nasal continuous positive airway pressure, respiratory therapy assess nares and determine need for appliance change or resting period.   Outcome: Progressing     Problem: Neurosensory - Adult  Goal: Achieves stable or improved neurological status  Outcome: Progressing  Flowsheets (Taken 1/15/2023 0740 by Rudolph Cai RN)  Achieves stable or improved neurological status: Assess for and report changes in neurological status     Problem: Respiratory - Adult  Goal: Achieves optimal ventilation and oxygenation  Outcome: Progressing  Flowsheets (Taken 1/15/2023 0740 by Rudolph Cai RN)  Achieves optimal ventilation and oxygenation: Assess for changes in respiratory status     Problem: Cardiovascular - Adult  Goal: Maintains optimal cardiac output and hemodynamic stability  Outcome: Progressing  Flowsheets (Taken 1/15/2023 0740 by Akila June RN)  Maintains optimal cardiac output and hemodynamic stability: Monitor blood pressure and heart rate     Problem: Skin/Tissue Integrity - Adult  Goal: Skin integrity remains intact  Outcome: Progressing  Flowsheets  Taken 1/15/2023 1950 by Ruthy Queen RN  Skin Integrity Remains Intact: Monitor for areas of redness and/or skin breakdown  Taken 1/15/2023 0740 by Akila June RN  Skin Integrity Remains Intact: Monitor for areas of redness and/or skin breakdown  Goal: Incisions, wounds, or drain sites healing without S/S of infection  Outcome: Progressing  Goal: Oral mucous membranes remain intact  Outcome: Progressing     Problem: Musculoskeletal - Adult  Goal: Return mobility to safest level of function  Outcome: Progressing  Flowsheets (Taken 1/15/2023 1950)  Return Mobility to Safest Level of Function: Assess patient stability and activity tolerance for standing, transferring and ambulating with or without assistive devices  Goal: Maintain proper alignment of affected body part  Outcome: Progressing  Goal: Return ADL status to a safe level of function  Outcome: Progressing     Problem: Gastrointestinal - Adult  Goal: Minimal or absence of nausea and vomiting  Outcome: Progressing  Goal: Maintains or returns to baseline bowel function  Outcome: Progressing  Goal: Maintains adequate nutritional intake  Outcome: Progressing     Problem: Genitourinary - Adult  Goal: Absence of urinary retention  Outcome: Progressing  Flowsheets (Taken 1/15/2023 0740 by Akila June RN)  Absence of urinary retention: Assess patients ability to void and empty bladder     Problem: Infection - Adult  Goal: Absence of infection at discharge  Outcome: Progressing  Flowsheets (Taken 1/15/2023 0740 by Jeannine Conde RN)  Absence of infection at discharge: Assess and monitor for signs and symptoms of infection  Goal: Absence of infection during hospitalization  Outcome: Progressing     Problem: Metabolic/Fluid and Electrolytes - Adult  Goal: Electrolytes maintained within normal limits  Outcome: Progressing  Goal: Hemodynamic stability and optimal renal function maintained  Outcome: Progressing     Problem: Hematologic - Adult  Goal: Maintains hematologic stability  Outcome: Progressing

## 2023-01-16 NOTE — THERAPY EVALUATION
ACUTE PHYSICAL THERAPY GOALS:   (Developed with and agreed upon by patient and/or caregiver.)  Goals reviewed 1/16/23  (1.) Moises Grimaldo  will move from supine to sit and sit to supine  with MINIMAL ASSIST within 7 treatment day(s). (2.) Luciana Umaña will transfer from bed to chair and chair to bed with MINIMAL ASSIST using the least restrictive device within 7 treatment day(s). (3.) Moises Grimaldo will ambulate with MINIMAL ASSIST for 10 feet with the least restrictive device within 7 treatment day(s). (4.) Luciana Umaña will perform standing static and dynamic balance activities x 10 minutes with MINIMAL ASSIST to improve safety within 7 treatment day(s). (5.) Luciana Umaña will perform bilateral lower extremity exercises x 15 min for HEP with SUPERVISION to improve strength, endurance, and functional mobility within 7 treatment day(s). PHYSICAL THERAPY Initial Assessment, Daily Note, and AM  (Link to Caseload Tracking: PT Visit Days : 1  Acknowledge Orders  Time In/Out  PT Charge Capture  Rehab Caseload Tracker    Moises Grimaldo is a 80 y.o. female   PRIMARY DIAGNOSIS: Femoral fracture ([facility] [address]  Fall, initial encounter [W19. XXXA]  Fall from ground level [W18.30XA]  Periprosthetic fracture of shaft of femur [H46. 8XXA, Z96.649]  Procedure(s) (LRB):  RIGHT FEMUR INSERTION DISTAL TFN SCREW (Right)  7 Days Post-Op  Reason for Referral: Pain in Right Hip (M25.551)  Difficulty in walking, Not elsewhere classified (R26.2)  Inpatient: Payor: MEDICARE / Plan: MEDICARE PART A AND B / Product Type: *No Product type* /     ASSESSMENT:     REHAB RECOMMENDATIONS:   Recommendation to date pending progress:  Setting:  Short-term Rehab    Equipment:    To Be Determined     ASSESSMENT:  Ms. Freddie Stout  was supine at arrival, dtr Nathalie Schafer present. Pt needed modA to get to EOB with 3trials of pt shutting down in the sea of \"I cant\".   Pts hardest problem is upper body going into ext while trying to get to flex into sitting. Attempted to explain that to pt but returned with \"I cant\". Once sitting pt attempted a number of times to get BTB but was prevented by blocking her L side from getting onto bed. Attempted sit-stand with RW, but pt would not comply with hand placement on bed and could not extend hips. 1 PT LE blocked L knee, but pt UA to extend hips. 4th attempt to stand was with L knee completely blocked and pt was able to reach her natural height with hips extended, rotated feet and trunk then sat in chair. Trnf was to pt L. Pt main limitation is self with motion and body mass going in opposite directions for trnfs. Main direction pt refuses to go is fwd with fear of falling. Extra time taken to attempt to explain, but pt had hard time focusing. Goals reviewed and will cont with present goals. She has not accomplished any goals, but she is moving toward them. Limited by age and confusion, pain. Will continue to follow. [address] \[facility] [place] [facility]\"" [address] [address] AM-PAC 6 Clicks Basic Mobility Inpatient Short Form  AM-PAC Mobility Inpatient   How much difficulty turning over in bed?: A Lot  How much difficulty sitting down on / standing up from a chair with arms?: A Lot  How much difficulty moving from lying on back to sitting on side of bed?: A Lot  How much help from another person moving to and from a bed to a chair?: A Lot  How much help from another person needed to walk in hospital room?: A Lot  How much help from another person for climbing 3-5 steps with a railing?: Total  AM-PAC Inpatient Mobility Raw Score : 11  AM-PAC Inpatient T-Scale Score : 33.86  Mobility Inpatient CMS 0-100% Score: 72.57  Mobility Inpatient CMS G-Code Modifier : CL    SUBJECTIVE:   Ms. Freddie Stout states, \"it doesn't hurt as bad today.  \"     Social/Functional Lives With: Alone  Type of Home: House  Home Layout: One level  Home Access: Stairs to enter with rails  Entrance Stairs - Number of Steps: 3-4  Home Equipment: Ad Tucker, 4 wheeled  Has the patient had two or more falls in the past year or any fall with injury in the past year?: Yes  Receives Help From: Family, Friend(s), Personal care attendant  ADL Assistance: Needs assistance  Homemaking Assistance: Needs assistance  Ambulation Assistance: Independent  Transfer Assistance: Independent  Per dtr, pt has caregivers 7 days a week from 8-230/4 depending on the day. Either dtr or SHAYNE checks on pt before bed each night and neighbor also watches out for her.    OBJECTIVE:     PAIN: Alvarez Alesha / O2: PRECAUTION / Ardith Pott / DRAINS:   Pre Treatment:1         Post Treatment: 3 Vitals        Oxygen     IV    RESTRICTIONS/PRECAUTIONS:  Restrictions/Precautions: Weight Bearing, Fall Risk  Right Lower Extremity Weight Bearing: Weight Bearing As Tolerated              GROSS EVALUATION: Intact Impaired (Comments):   AROM []  RLE limitations as expected post-op   PROM []    Strength []  RLE grossly 3-/5, LLE grossly 3+/5   Balance []  Fair sitting balance, poor standing balance   Posture [] Forward Head  Rounded Shoulders  Thoracic Kyphosis   Sensation [x]     Coordination [x]      Tone [x]     Edema [x]    Activity Tolerance []  Limited by confusion and pain    []      COGNITION/  PERCEPTION: Intact Impaired (Comments):   Orientation []     Vision []     Hearing []     Cognition  []  Intermittent confusion and difficulty following commands     MOBILITY: I Mod I S SBA CGA Min Mod Max Total  NT x2 Comments:   Bed Mobility    Rolling [] [] [] [] [] [] [x] [] [] [] []    Supine to Sit [] [] [] [] [] [] [x] [] [] [] []    Scooting [] [] [] [] [] [] [x] [] [] [] []    Sit to Supine [] [] [] [] [] [] [] [] [] [] []    Transfers    Sit to Stand [] [] [] [] [] [] [x] [x] [] [] []    Bed to Chair [] [] [] [] [] [] [x] [] [] [] []    Stand to Sit [] [] [] [] [] [] [x] [] [] [] []     [] [] [] [] [] [] [] [] [] [] []    I=Independent, Mod I=Modified Independent, S=Supervision, SBA=Standby Assistance, CGA=Contact Guard Assistance, Min=Minimal Assistance, Mod=Moderate Assistance, Max=Maximal Assistance, Total=Total Assistance, NT=Not Tested    GAIT: I Mod I S SBA CGA Min Mod Max Total  NT x2 Comments:   Level of Assistance [] [] [] [] [] [] [] [] [] [x] []    Distance   feet    DME N/A    Gait Quality N/A    Weightbearing Status      Stairs      I=Independent, Mod I=Modified Independent, S=Supervision, SBA=Standby Assistance, CGA=Contact Guard Assistance,   Min=Minimal Assistance, Mod=Moderate Assistance, Max=Maximal Assistance, Total=Total Assistance, NT=Not Tested    PLAN:   FREQUENCY AND DURATION: BID for duration of hospital stay or until stated goals are met, whichever comes first.    THERAPY PROGNOSIS: Fair    PROBLEM LIST:   (Skilled intervention is medically necessary to address:)  Decreased ADL/Functional Activities  Decreased Activity Tolerance  Decreased AROM/PROM  Decreased Balance  Decreased Cognition  Decreased Gait Ability  Decreased Safety Awareness  Decreased Strength  Decreased Transfer Abilities  Increased Pain INTERVENTIONS PLANNED:   (Benefits and precautions of physical therapy have been discussed with the patient.)  Therapeutic Activity  Therapeutic Exercise/HEP  Neuromuscular Re-education  Gait Training  Education       TREATMENT:   EVALUATION: MODERATE COMPLEXITY: (Untimed Charge)    TREATMENT:   Therapeutic Activity (30 Minutes): Therapeutic activity included Rolling, Supine to Sit, Sit to Supine, Scooting, Lateral Scooting, Transfer Training, Sitting balance , and Standing balance to improve functional Activity tolerance, Balance, Coordination, Mobility, Strength, and ROM.     TREATMENT GRID:  N/A    AFTER TREATMENT PRECAUTIONS: Alarm Activated, Bed, Bed/Chair Locked, Call light within reach, Needs within reach, RN notified, and Visitors at bedside    INTERDISCIPLINARY COLLABORATION:  RN/ PCT and PT/ PTA    EDUCATION:      TIME IN/OUT:  Time In: 1010  Time Out: 4[facility]  Minutes: 113 [facility], PT

## 2023-01-17 VITALS
WEIGHT: 154 LBS | HEART RATE: 64 BPM | OXYGEN SATURATION: 94 % | BODY MASS INDEX: 26.29 KG/M2 | TEMPERATURE: 98.2 F | DIASTOLIC BLOOD PRESSURE: 56 MMHG | RESPIRATION RATE: 17 BRPM | SYSTOLIC BLOOD PRESSURE: 117 MMHG | HEIGHT: 64 IN

## 2023-01-17 LAB
ANION GAP SERPL CALC-SCNC: 10 MMOL/L (ref 2–11)
BUN SERPL-MCNC: 42 MG/DL (ref 8–23)
CALCIUM SERPL-MCNC: 8.2 MG/DL (ref 8.3–10.4)
CHLORIDE SERPL-SCNC: 107 MMOL/L (ref 101–110)
CO2 SERPL-SCNC: 23 MMOL/L (ref 21–32)
CREAT SERPL-MCNC: 1.3 MG/DL (ref 0.6–1)
ERYTHROCYTE [DISTWIDTH] IN BLOOD BY AUTOMATED COUNT: 13.8 % (ref 11.9–14.6)
GLUCOSE SERPL-MCNC: 177 MG/DL (ref 65–100)
HCT VFR BLD AUTO: 30.8 % (ref 35.8–46.3)
HGB BLD-MCNC: 10.1 G/DL (ref 11.7–15.4)
MCH RBC QN AUTO: 29.9 PG (ref 26.1–32.9)
MCHC RBC AUTO-ENTMCNC: 32.8 G/DL (ref 31.4–35)
MCV RBC AUTO: 91.1 FL (ref 82–102)
NRBC # BLD: 0 K/UL (ref 0–0.2)
PLATELET # BLD AUTO: 288 K/UL (ref 150–450)
PMV BLD AUTO: 11.3 FL (ref 9.4–12.3)
POTASSIUM SERPL-SCNC: 4.8 MMOL/L (ref 3.5–5.1)
RBC # BLD AUTO: 3.38 M/UL (ref 4.05–5.2)
SARS-COV-2 RDRP RESP QL NAA+PROBE: NOT DETECTED
SODIUM SERPL-SCNC: 140 MMOL/L (ref 133–143)
SOURCE: NORMAL
WBC # BLD AUTO: 9.1 K/UL (ref 4.3–11.1)

## 2023-01-17 PROCEDURE — 6370000000 HC RX 637 (ALT 250 FOR IP): Performed by: ORTHOPAEDIC SURGERY

## 2023-01-17 PROCEDURE — 6370000000 HC RX 637 (ALT 250 FOR IP): Performed by: FAMILY MEDICINE

## 2023-01-17 PROCEDURE — 2580000003 HC RX 258: Performed by: ORTHOPAEDIC SURGERY

## 2023-01-17 PROCEDURE — 36415 COLL VENOUS BLD VENIPUNCTURE: CPT

## 2023-01-17 PROCEDURE — 97535 SELF CARE MNGMENT TRAINING: CPT

## 2023-01-17 PROCEDURE — 6370000000 HC RX 637 (ALT 250 FOR IP): Performed by: PHYSICIAN ASSISTANT

## 2023-01-17 PROCEDURE — 80048 BASIC METABOLIC PNL TOTAL CA: CPT

## 2023-01-17 PROCEDURE — 87635 SARS-COV-2 COVID-19 AMP PRB: CPT

## 2023-01-17 PROCEDURE — 85027 COMPLETE CBC AUTOMATED: CPT

## 2023-01-17 PROCEDURE — 97112 NEUROMUSCULAR REEDUCATION: CPT

## 2023-01-17 PROCEDURE — 97110 THERAPEUTIC EXERCISES: CPT

## 2023-01-17 PROCEDURE — 97530 THERAPEUTIC ACTIVITIES: CPT

## 2023-01-17 RX ORDER — TRAMADOL HYDROCHLORIDE 50 MG/1
25 TABLET ORAL EVERY 6 HOURS PRN
Qty: 20 TABLET | Refills: 0 | Status: SHIPPED | OUTPATIENT
Start: 2023-01-17 | End: 2023-01-22

## 2023-01-17 RX ORDER — LIDOCAINE 4 G/G
2 PATCH TOPICAL DAILY
Qty: 60 EACH | Refills: 0 | Status: SHIPPED | OUTPATIENT
Start: 2023-01-18 | End: 2023-02-17

## 2023-01-17 RX ORDER — LANOLIN ALCOHOL/MO/W.PET/CERES
3 CREAM (GRAM) TOPICAL NIGHTLY PRN
Qty: 30 TABLET | Refills: 0 | Status: SHIPPED | OUTPATIENT
Start: 2023-01-17 | End: 2023-02-16

## 2023-01-17 RX ORDER — CHOLECALCIFEROL (VITAMIN D3) 50 MCG
2000 TABLET ORAL DAILY
Qty: 76 TABLET | Refills: 0 | Status: SHIPPED | OUTPATIENT
Start: 2023-01-18 | End: 2023-04-04

## 2023-01-17 RX ADMIN — LEVOTHYROXINE SODIUM 50 MCG: 0.05 TABLET ORAL at 05:38

## 2023-01-17 RX ADMIN — LISINOPRIL 5 MG: 5 TABLET ORAL at 08:39

## 2023-01-17 RX ADMIN — TRIMETHOPRIM 100 MG: 100 TABLET ORAL at 08:39

## 2023-01-17 RX ADMIN — CARVEDILOL 6.25 MG: 6.25 TABLET, FILM COATED ORAL at 08:39

## 2023-01-17 RX ADMIN — CLOPIDOGREL BISULFATE 75 MG: 75 TABLET ORAL at 08:39

## 2023-01-17 RX ADMIN — SODIUM CHLORIDE, PRESERVATIVE FREE 10 ML: 5 INJECTION INTRAVENOUS at 08:39

## 2023-01-17 RX ADMIN — HYDRALAZINE HYDROCHLORIDE 25 MG: 25 TABLET, FILM COATED ORAL at 08:39

## 2023-01-17 RX ADMIN — CHOLECALCIFEROL TAB 25 MCG (1000 UNIT) 2000 UNITS: 25 TAB at 08:38

## 2023-01-17 RX ADMIN — ACETAMINOPHEN 650 MG: 325 TABLET ORAL at 12:20

## 2023-01-17 ASSESSMENT — PAIN DESCRIPTION - ONSET: ONSET: SUDDEN

## 2023-01-17 ASSESSMENT — PAIN DESCRIPTION - ORIENTATION: ORIENTATION: POSTERIOR

## 2023-01-17 ASSESSMENT — PAIN DESCRIPTION - PAIN TYPE: TYPE: ACUTE PAIN

## 2023-01-17 ASSESSMENT — PAIN SCALES - GENERAL
PAINLEVEL_OUTOF10: 0
PAINLEVEL_OUTOF10: 0
PAINLEVEL_OUTOF10: 3

## 2023-01-17 ASSESSMENT — PAIN DESCRIPTION - FREQUENCY: FREQUENCY: INTERMITTENT

## 2023-01-17 ASSESSMENT — PAIN DESCRIPTION - LOCATION: LOCATION: BACK;SACRUM

## 2023-01-17 ASSESSMENT — PAIN DESCRIPTION - DESCRIPTORS: DESCRIPTORS: ACHING;SORE

## 2023-01-17 NOTE — CARE COORDINATION
Patient is medically ready for discharge per attending. Facility can accept patient today. Patient's daughter has met with Maria R Vázquez with [address] this AM.  This service will follow along with patient while at STR in order to assist patient and family with decision making. Patient will discharge at 1600 via [address] transport to room 212A at [facility]; transport reference number [id number]. Primary RN can call report to [phone] (unit 2.)  Patient's daughter updated on discharge plan and is agreeable. Discharge packet made and controlled script for Ultram included. 01/17/23 1318   [address] Discharge   Mode of Transport at Discharge [address] Time of Discharge 1600   Confirm Follow Up Transport Family   Condition of Participation: Discharge Planning   The Plan for Transition of Care is related to the following treatment goals: Patient will participate in STR therapies in order to return home to safe baseline independence in ADLs   The Patient and/Or Patient Representative agree with the Discharge Plan?  Yes

## 2023-01-17 NOTE — PROGRESS NOTES
dACUTE OCCUPATIONAL THERAPY GOALS:   (Developed with and agreed upon by patient and/or caregiver.)  Patient will complete functional activity while seated EOB, unsupported, with set-up and adaptive equipment as needed. 2. Patient will complete functional transfers with min A and adaptive equipment as needed. 3. Patient will complete lower body bathing and dressing with mod A and adaptive equipment as needed. 4. Patient will complete toileting with min A.   5. Patient will tolerate at least 15  minutes of BUE therapeutic exercises to increase strength in BUE to aid in functional transfers. 6. Patient will tolerate at least 30 minutes of OT treatment with no rest breaks to increase activity tolerance for ADLs. OCCUPATIONAL THERAPY Daily Note AM  OT Visit Days: 5   Time  OT Charge Capture  Rehab Caseload Tracker  OT Orders    Luciana Luna is a 80 y.o. female   PRIMARY DIAGNOSIS: Femoral fracture ([address]  Fall, initial encounter [W19. XXXA]  Fall from ground level [W18.30XA]  Periprosthetic fracture of shaft of femur [I48. 8XXA, Z96.649]  Procedure(s) (LRB):  RIGHT FEMUR INSERTION DISTAL TFN SCREW (Right)  8 Days Post-Op  Inpatient: Payor: MEDICARE / Plan: MEDICARE PART A AND B / Product Type: *No Product type* /     ASSESSMENT:     REHAB RECOMMENDATIONS: CURRENT LEVEL OF FUNCTION:  (Most Recently Demonstrated)   Recommendation to date pending progress:  Setting:  Short-term Rehab    Equipment:    To Be Determined Bathing:  Not Tested  Dressing:  Not Tested  Feeding/Grooming:  Not Tested  Toileting:  Maximal Assist  Functional Mobility:  Maximal Assist x2     ASSESSMENT:  Ms. Shari Crowder is doing fair today. Pt presents sitting up in chair upon arrival. Pt was able to stand and transfer over to [facility] with mod/max A x2. Pt required max/total A for bladder hygiene at this time due to using BUEs for support on RW. Pt was then transferred over to bed with max A x2. Minimal progress made today.  Will continue to benefit from skilled OT during stay.        SUBJECTIVE:     Ms. Manuel Marcos states, \"I need to sit down\"     Social/Functional Lives With: Alone  Type of Home: House  Home Layout: One level  Home Access: Stairs to enter with rails  Entrance Stairs - Number of Steps: 3-4  Home Equipment: Mauricio Layman, 4 wheeled  Has the patient had two or more falls in the past year or any fall with injury in the past year?: Yes  Receives Help From: Family, Friend(s), Personal care attendant  ADL Assistance: Needs assistance  Homemaking Assistance: Needs assistance  Ambulation Assistance: Independent  Transfer Assistance: Independent    OBJECTIVE:     Jeannie Marmolejo / Allison German / Onelia Tobin: NA    RESTRICTIONS/PRECAUTIONS:  Restrictions/Precautions  Restrictions/Precautions: Weight Bearing, Fall Risk  Lower Extremity Weight Bearing Restrictions  Right Lower Extremity Weight Bearing: Weight Bearing As Tolerated        PAIN: VITALS / O2:   Pre Treatment:   Numeric: 0/10             Post Treatment: 0 /10 Vitals          Oxygen                MOBILITY: I Mod I S SBA CGA Min Mod Max Total  NT x2 Comments:   Bed Mobility    Rolling [] [] [] [] [] [] [] [] [] [x] []    Supine to Sit [] [] [] [] [] [] [] [] [] [x] []    Scooting [] [] [] [] [] [] [] [] [] [x] []    Sit to Supine [] [] [] [] [] [x] [] [] [] [] [x]    Transfers    Sit to Stand [] [] [] [] [] [] [x] [x] [] [] [x]    Bed to Chair [] [] [] [] [] [] [x] [x] [] [] [x]    Stand to Sit [] [] [] [] [] [] [] [x] [] [] [x]    I=Independent, Mod I=Modified Independent, S=Supervision/Setup, SBA=Standby Assistance, CGA=Contact Guard Assistance, Min=Minimal Assistance, Mod=Moderate Assistance, Max=Maximal Assistance, Total=Total Assistance, NT=Not Tested    ACTIVITIES OF DAILY LIVING: I Mod I S SBA CGA Min Mod Max Total NT Comments   BASIC ADLs:              Bathing/ Showering [] [] [] [] [] [] [] [] [] [x]    Toileting [] [] [] [] [] [] [] [x] [] []    Upper Body Dressing [] [] [] [] [] [] [] [] [] [x]    Lower Body Dressing [] [] [] [] [] [] [] [] [] [x]    Feeding [] [] [] [] [] [] [] [] [] [x]    Grooming [] [] [] [] [] [] [] [] [] [x]    Personal Device Care [] [] [] [] [] [] [] [] [] [x]    Functional Mobility [] [] [] [] [] [] [x] [x] [] [] x2   I=Independent, Mod I=Modified Independent, S=Supervision/Setup, SBA=Standby Assistance, CGA=Contact Guard Assistance, Min=Minimal Assistance, Mod=Moderate Assistance, Max=Maximal Assistance, Total=Total Assistance, NT=Not Tested    BALANCE: Good Fair+ Fair Fair- Poor NT Comments   Sitting Static [] [] [] [x] [] []    Sitting Dynamic [] [] [] [x] [] []              Standing Static [] [] [] [x] [] []    Standing Dynamic [] [] [] [] [x] []        PLAN:     FREQUENCY/DURATION   OT Plan of Care: 4 times/week for duration of hospital stay or until stated goals are met, whichever comes first.      TREATMENT:     TREATMENT:   Co-Treatment PT/OT necessary due to patient's decreased overall endurance/tolerance levels, as well as need for high level skilled assistance to complete functional transfers/mobility and functional tasks  Neuromuscular Re-education (15 Minutes): Neuromuscular Re-education included Balance Training, Coordination training, Functional mobility with facilitation, Sitting balance training, and Standing balance training to improve Balance, Coordination, and Postural Control. Self Care (10 minutes): Patient participated in toileting ADLs in supported sitting and standing with moderate verbal and manual cueing to increase independence, decrease assistance required, and increase activity tolerance. Patient also participated in functional transfer training to increase independence, decrease assistance required, and increase activity tolerance.      TREATMENT GRID:  N/A    AFTER TREATMENT PRECAUTIONS: Bed, Bed/Chair Locked, Call light within reach, Needs within reach, and Visitors at bedside    INTERDISCIPLINARY COLLABORATION:  RN/ PCT, PT/ PTA, and OT/ AARON    EDUCATION: TOTAL TREATMENT DURATION AND TIME:  Time In: 1335  Time Out: 1400  Minutes: 25    Nimdougus C Maria Dolores Estrada

## 2023-01-17 NOTE — PROGRESS NOTES
ACUTE PHYSICAL THERAPY GOALS:   (Developed with and agreed upon by patient and/or caregiver.)  (1.) Mitali Padron  will move from supine to sit and sit to supine  with MINIMAL ASSIST within 7 treatment day(s). (2.) Luciana Sierra will transfer from bed to chair and chair to bed with MINIMAL ASSIST using the least restrictive device within 7 treatment day(s). (3.) Mitali Padron will ambulate with MINIMAL ASSIST for 10 feet with the least restrictive device within 7 treatment day(s). (4.) Luciana Sierra will perform standing static and dynamic balance activities x 10 minutes with MINIMAL ASSIST to improve safety within 7 treatment day(s). (5.) Luciana Sierra will perform bilateral lower extremity exercises x 15 min for HEP with SUPERVISION to improve strength, endurance, and functional mobility within 7 treatment day(s). PHYSICAL THERAPY: Daily Note PM   (Link to Caseload Tracking: PT Visit Days : 2  Time In/Out PT Charge Capture  Rehab Caseload Tracker  Orders    Mitali Padron is a 80 y.o. female   PRIMARY DIAGNOSIS: Femoral fracture ([facility] [address]  Fall, initial encounter [W19. XXXA]  Fall from ground level [W18.30XA]  Periprosthetic fracture of shaft of femur [I92. 8XXA, Z96.649]  Procedure(s) (LRB):  RIGHT FEMUR INSERTION DISTAL TFN SCREW (Right)  8 Days Post-Op  Inpatient: Payor: MEDICARE / Plan: MEDICARE PART A AND B / Product Type: *No Product type* /     ASSESSMENT:     REHAB RECOMMENDATIONS:   Recommendation to date pending progress:  Setting:  Short-term Rehab    Equipment:    To Be Determined     ASSESSMENT:  Ms. Mitzi Smith is supine and willing to participate but has several moments during our session where she is talking to family members not in room. She performed supine exercises below with fair ability then sat up with Mod A x2. Her sitting balance was good and she stood into the walker with Mod A x2 and was able to take a few steps to the chair with Mod A x2.   Slow steady progress    PM: patient is still in the recliner and needing to use the commode. She required more assist to stand and transfer this afternoon requiring Max A x2. She used the commode then stood a few times with Max A to get clean. To transfer to the bed she again required max A without the walker stand pivot.   Will be slow progress and going to rehab today     SUBJECTIVE:   Ms. Jessi Manning states, \"I have a new birthday now\"     Social/Functional Lives With: Alone  Type of Home: House  Home Layout: One level  Home Access: Stairs to enter with rails  Entrance Stairs - Number of Steps: 3-4  Home Equipment: Belinda Favia, 4 wheeled  Has the patient had two or more falls in the past year or any fall with injury in the past year?: Yes  Receives Help From: Family, Friend(s), Personal care attendant  ADL Assistance: Needs assistance  Homemaking Assistance: Needs assistance  Ambulation Assistance: Independent  Transfer Assistance: Independent  OBJECTIVE:     PAIN: Obey Millers / O2: Nicanorin Gabriele / Nanda Appl / Thalia Hook:   Pre Treatment:          Post Treatment: 0 Vitals        Oxygen    [place]    RESTRICTIONS/PRECAUTIONS:  Restrictions/Precautions  Restrictions/Precautions: Weight Bearing, Fall Risk  Lower Extremity Weight Bearing Restrictions  Right Lower Extremity Weight Bearing: Weight Bearing As Tolerated  Restrictions/Precautions: Weight Bearing, Fall Risk     MOBILITY: I Mod I S SBA CGA Min Mod Max Total  NT x2 Comments:   Bed Mobility    Rolling [] [] [] [] [] [] [] [] [] [] []    Supine to Sit [] [] [] [] [] [] [] [] [] [] []    Scooting [] [] [] [] [] [] [] [] [] [] []    Sit to Supine [] [] [] [] [] [] [] [x] [] [] [x]    Transfers    Sit to Stand [] [] [] [] [] [] [] [x] [] [] [x]    Bed to Chair [] [] [] [] [] [] [] [x] [] [] [x]    Stand to Sit [] [] [] [] [] [] [] [x] [] [] [x]     [] [] [] [] [] [] [] [] [] [] []    I=Independent, Mod I=Modified Independent, S=Supervision, SBA=Standby Assistance, CGA=Contact Guard Assistance, Min=Minimal Assistance, Mod=Moderate Assistance, Max=Maximal Assistance, Total=Total Assistance, NT=Not Tested    BALANCE: Good Fair+ Fair Fair- Poor NT Comments   Sitting Static [] [x] [] [] [] []    Sitting Dynamic [] [x] [] [] [] []              Standing Static [] [] [] [] [x] []    Standing Dynamic [] [] [] [] [x] []      GAIT: I Mod I S SBA CGA Min Mod Max Total  NT x2 Comments:   Level of Assistance [] [] [] [] [] [] [] [x] [] [] [x]    Distance   feet    DME Rolling Walker    Gait Quality Antalgic, Decreased meli , Decreased step clearance, and Decreased step length    Weightbearing Status      Stairs      I=Independent, Mod I=Modified Independent, S=Supervision, SBA=Standby Assistance, CGA=Contact Guard Assistance,   Min=Minimal Assistance, Mod=Moderate Assistance, Max=Maximal Assistance, Total=Total Assistance, NT=Not Tested    PLAN:   FREQUENCY AND DURATION: BID for duration of hospital stay or until stated goals are met, whichever comes first.    TREATMENT:   TREATMENT:   Therapeutic Activity (30 Minutes): Therapeutic activity included Sit to Supine, Scooting, Transfer Training, Ambulation on level ground, Sitting balance , and Standing balance to improve functional Activity tolerance, Balance, Mobility, and Strength.     TREATMENT GRID:   Date:  1/17 Date:   Date:     Activity/Exercise Parameters Parameters Parameters   AP 10x B     Heel slides 10x B AAR     SAQ 10x B     Hip abd 10x B AAR     Arm raises 10x B                     AFTER TREATMENT PRECAUTIONS: Bed/Chair Locked, Call light within reach, Chair, Needs within reach, and Visitors at bedside    INTERDISCIPLINARY COLLABORATION:  RN/ PCT, PT/ PTA, and OT/ AARON    EDUCATION:      TIME IN/OUT:  Time In: 1330  Time Out: 1400  Minutes: 30    Marilynn Victoria PTA

## 2023-01-17 NOTE — PROGRESS NOTES
ACUTE PHYSICAL THERAPY GOALS:   (Developed with and agreed upon by patient and/or caregiver.)  (1.) Mitali Padron  will move from supine to sit and sit to supine  with MINIMAL ASSIST within 7 treatment day(s). (2.) Luciana Cuenac will transfer from bed to chair and chair to bed with MINIMAL ASSIST using the least restrictive device within 7 treatment day(s). (3.) Mitali Padron will ambulate with MINIMAL ASSIST for 10 feet with the least restrictive device within 7 treatment day(s). (4.) Luciana Cuenca will perform standing static and dynamic balance activities x 10 minutes with MINIMAL ASSIST to improve safety within 7 treatment day(s). (5.) Luciana Baez Cuenca will perform bilateral lower extremity exercises x 15 min for HEP with SUPERVISION to improve strength, endurance, and functional mobility within 7 treatment day(s). PHYSICAL THERAPY: Daily Note AM   (Link to Caseload Tracking: PT Visit Days : 2  Time In/Out PT Charge Capture  Rehab Caseload Tracker  Orders    Mitali Padron is a 80 y.o. female   PRIMARY DIAGNOSIS: Femoral fracture ([address]  Fall, initial encounter [W19. XXXA]  Fall from ground level [W18.30XA]  Periprosthetic fracture of shaft of femur [A39. 8XXA, Z96.649]  Procedure(s) (LRB):  RIGHT FEMUR INSERTION DISTAL TFN SCREW (Right)  8 Days Post-Op  Inpatient: Payor: MEDICARE / Plan: MEDICARE PART A AND B / Product Type: *No Product type* /     ASSESSMENT:     REHAB RECOMMENDATIONS:   Recommendation to date pending progress:  Setting:  Short-term Rehab    Equipment:    To Be Determined     ASSESSMENT:  Ms. David Henderson is supine and willing to participate but has several moments during our session where she is talking to family members not in room. She performed supine exercises below with fair ability then sat up with Mod A x2. Her sitting balance was good and she stood into the walker with Mod A x2 and was able to take a few steps to the chair with Mod A x2.   Slow steady progress     SUBJECTIVE:   Ms. Bruce Hummel states, \"not bad for 8 year old\"     Social/Functional Lives With: Alone  Type of Home: House  Home Layout: One level  Home Access: Stairs to enter with rails  Entrance Stairs - Number of Steps: 3-4  Home Equipment: Gabby Budd, 4 wheeled  Has the patient had two or more falls in the past year or any fall with injury in the past year?: Yes  Receives Help From: Family, Friend(s), Personal care attendant  ADL Assistance: Needs assistance  Homemaking Assistance: Needs assistance  Ambulation Assistance: Independent  Transfer Assistance: Independent  OBJECTIVE:     PAIN: [place] / O2: Bailey Zaman / Michael Whitt / Cathy Oas:   Pre Treatment:          Post Treatment: 0 Vitals        Oxygen    [place]wick    RESTRICTIONS/PRECAUTIONS:  Restrictions/Precautions  Restrictions/Precautions: Weight Bearing, Fall Risk  Lower Extremity Weight Bearing Restrictions  Right Lower Extremity Weight Bearing: Weight Bearing As Tolerated  Restrictions/Precautions: Weight Bearing, Fall Risk     MOBILITY: I Mod I S SBA CGA Min Mod Max Total  NT x2 Comments:   Bed Mobility    Rolling [] [] [] [] [] [] [] [] [] [] []    Supine to Sit [] [] [] [] [] [] [] [] [] [] []    Scooting [] [] [] [] [] [] [] [] [] [] []    Sit to Supine [] [] [] [] [] [] [] [] [] [] []    Transfers    Sit to Stand [] [] [] [] [] [] [] [] [] [] []    Bed to Chair [] [] [] [] [] [] [] [] [] [] []    Stand to Sit [] [] [] [] [] [] [] [] [] [] []     [] [] [] [] [] [] [] [] [] [] []    I=Independent, Mod I=Modified Independent, S=Supervision, SBA=Standby Assistance, CGA=Contact Guard Assistance,   Min=Minimal Assistance, Mod=Moderate Assistance, Max=Maximal Assistance, Total=Total Assistance, NT=Not Tested    BALANCE: Good Fair+ Fair Fair- Poor NT Comments   Sitting Static [] [] [] [] [] []    Sitting Dynamic [] [] [] [] [] []              Standing Static [] [] [] [] [] []    Standing Dynamic [] [] [] [] [] []      GAIT: I Mod I S SBA CGA Min Mod Max Total  NT x2 Comments:   Level of Assistance [] [] [] [] [] [] [] [] [] [] []    Distance   feet    DME Rolling Walker    Gait Quality Antalgic, Decreased meli , Decreased step clearance, and Decreased step length    Weightbearing Status      Stairs      I=Independent, Mod I=Modified Independent, S=Supervision, SBA=Standby Assistance, CGA=Contact Guard Assistance,   Min=Minimal Assistance, Mod=Moderate Assistance, Max=Maximal Assistance, Total=Total Assistance, NT=Not Tested    PLAN:   FREQUENCY AND DURATION: BID for duration of hospital stay or until stated goals are met, whichever comes first.    TREATMENT:   TREATMENT:   Therapeutic Activity (15 Minutes): Therapeutic activity included Supine to Sit, Scooting, Transfer Training, Ambulation on level ground, Sitting balance , and Standing balance to improve functional Activity tolerance, Balance, Mobility, and Strength. Therapeutic Exercise (20 Minutes): Therapeutic exercises noted below to improve functional activity tolerance, AROM, strength, and mobility.      TREATMENT GRID:   Date:  1/17 Date:   Date:     Activity/Exercise Parameters Parameters Parameters   AP 10x B     Heel slides 10x B AAR     SAQ 10x B     Hip abd 10x B AAR     Arm raises 10x B                     AFTER TREATMENT PRECAUTIONS: Bed/Chair Locked, Call light within reach, Chair, Needs within reach, and Visitors at bedside    INTERDISCIPLINARY COLLABORATION:  RN/ PCT and PT/ PTA    EDUCATION:      TIME IN/OUT:  Time In: 1020  Time Out: 1055  Minutes: 35    Adelaide Flores, PTA

## 2023-01-17 NOTE — DISCHARGE SUMMARY
Hospitalist Discharge Summary   Admit Date:  2023 10:05 AM   DC Note date: 2023  Name:  Moises Grimaldo   Age:  80 y.o. Sex:  female  :  3/20/1929   MRN:  [id number]   Room:  [facility]  PCP:  Maik Ayers MD    Presenting Complaint: Fall     Initial Admission Diagnosis: Fall, initial encounter [W19. XXXA]  Fall from ground level [W18.30XA]  Periprosthetic fracture of shaft of femur [N67. Sylviaavani Bond, Z96.649]     Problem List for this Hospitalization (present on admission):    Principal Problem:    Femoral fracture ([facility]  Active Problems:    Fall from ground level    Dementia ([facility]    Hypothyroidism    Postoperative anemia    CKD (chronic kidney disease) stage 3, GFR 30-59 ml/min ([facility])    Essential hypertension, benign    Acute blood loss anemia    Coronary artery disease involving native coronary artery of native heart    Nonrheumatic aortic valve insufficiency    S/P TAVR (transcatheter aortic valve replacement)  Resolved Problems:    * No resolved hospital problems. *      Hospital Course:  Moises Grimaldo is a 80 y.o. female with medical history of hypertension, dementia, aortic stenosis status post TAVR, CKD stage III, coronary artery disease  who presented in the setting of a fall after being found on the floor by her caregiver. Admitted with right femur fracture after her fall. She underwent right femur insertion distal TFN screw on 2023. On 1/10, patient was unable to talk so Code S was called. Neurology saw in presentation was due to pain medications. No CT head was recommended. Pt was given narcan with improvement. Home Plavix was resumed after ok from Ortho. PT/OT evaluated and recommended short-term rehab. Patient was also treated for postoperative anemia. Hemoglobin 10.8 on admission (baseline ~10-11) but trended down to lowest 6.9 post op and received 1U PRBC on 23. Post-transfusion Hgb improved to >10.     Patient had a mild episode of acute on CKD III but at time of discharge her renal function returned to normal.    Discussion had with daughter concerning the progression of her dementia. Daughter would like to have Palliative Care discuss goals of care at her SNF. Disposition: SNF  Diet: ADULT DIET; Regular  ADULT ORAL NUTRITION SUPPLEMENT; Breakfast, Lunch, Dinner; Standard High Calorie/High Protein Oral Supplement  Code Status: DNR    Follow Ups:   Contact information for follow-up providers     LUC Mcdonough, PA. Schedule an appointment as soon as possible for   a visit in 2 week(s). Specialties: Orthopedic Surgery, Physician Assistant  Why: For suture removal, For wound re-check  Contact information:  [address] [facility] [address] [facility] [address] [facility]  [phone]                   Contact information for after-discharge care     Discharge Destination     [facility]) . Service: Skilled Nursing  Contact information:  Kimmy Araiza [address] [facility]  [phone]                 Discharge [facility]) . Service: [address] [place] [address] information:  [address] [place] [address] [phone]                           Time spent in patient discharge and coordination 36 minutes. Follow up labs/diagnostics (ultimately defer to outpatient provider):  none    Plan was discussed with daughter and patient. All questions answered. Patient was stable at time of discharge. Instructions given to call a physician or return if any concerns. Current Discharge Medication List        START taking these medications    Details   traMADol (ULTRAM) 50 MG tablet Take 0.5 tablets by mouth every 6 hours as needed for Pain for up to 5 days.  Max Daily Amount: 100 mg  Qty: 20 tablet, Refills: 0    Comments: Reduce doses taken as pain becomes manageable  Associated Diagnoses: Periprosthetic fracture of shaft of femur      lidocaine 4 % external patch Place 2 patches onto the skin daily  Qty: 60 each, Refills: 0      melatonin 3 MG TABS tablet Take 1 tablet by mouth nightly as needed (insomnia)  Qty: 30 tablet, Refills: 0      Vitamin D (CHOLECALCIFEROL) 50 MCG (2000 UT) TABS tablet Take 1 tablet by mouth daily for 76 doses  Qty: 76 tablet, Refills: 0    Comments: Labeling may look different. 25 mcg=1000 Units. Please double check dosages. CONTINUE these medications which have NOT CHANGED    Details   benazepril (LOTENSIN) 20 MG tablet Take 20 mg by mouth daily      carbamide peroxide (DEBROX) 6.5 % otic solution Place 10 drops in ear(s) 2 times daily      carvedilol (COREG) 6.25 MG tablet TAKE ONE TABLET BY MOUTH TWICE A DAY (WITH MEALS) . `      chlorthalidone (HYGROTON) 25 MG tablet Take 25 mg by mouth daily      clopidogrel (PLAVIX) 75 MG tablet Take 75 mg by mouth daily      hydrALAZINE (APRESOLINE) 50 MG tablet Take 50 mg by mouth 2 times daily      levothyroxine (SYNTHROID) 50 MCG tablet Take 50 mcg by mouth every morning (before breakfast)      trimethoprim (TRIMPEX) 100 MG tablet TAKE ONE TABLET BY MOUTH EVERY DAY FOR SUPPRESSION           STOP taking these medications       azithromycin (ZITHROMAX) 500 MG tablet Comments:   Reason for Stopping:               Procedures done this admission:  Procedure(s):  RIGHT FEMUR INSERTION DISTAL TFN SCREW    Consults this admission:  [address] TO EVAL  IP CONSULT TO DIETITIAN    Echocardiogram results:  No results found for this or any previous visit. Diagnostic Imaging/Tests:   XR HIP RIGHT (2-3 VIEWS)    Result Date: 1/8/2023  Postoperative changes without evidence of acute bony abnormality. XR FEMUR RIGHT (MIN 2 VIEWS)    Result Date: 1/8/2023  Spiral type fracture of the distal femoral metadiaphysis. XR KNEE RIGHT (1-2 VIEWS)    Result Date: 1/9/2023  Surgical fixation of distal femur periprosthetic fracture.     XR KNEE RIGHT (3 VIEWS)    Result Date: 1/8/2023  Spiral type fracture of the distal femoral metadiaphysis with mild displacement. CT HEAD WO CONTRAST    Result Date: 1/8/2023  1. No evidence of acute intracranial hemorrhage. 2. No evidence of acute cervical spine fracture. 3. Cervical spondylosis, felt to account for the slight retrolisthesis of C5. 4. 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 CT CERVICAL SPINE WO CONTRAST    Result Date: 1/8/2023  1. No evidence of acute intracranial hemorrhage. 2. No evidence of acute cervical spine fracture. 3. Cervical spondylosis, felt to account for the slight retrolisthesis of C5. 4. 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 XR CHEST PORTABLE    Result Date: 1/8/2023  Unremarkable portable chest radiograph. Labs: Results:       BMP, Mg, Phos Recent Labs     01/15/23  0843 01/16/23  0532 01/17/23  0500    138 140   K 4.3 4.5 4.8    103 107   CO2 27 27 23   ANIONGAP 6 8 10   BUN 36* 47* 42*   CREATININE 1.40* 1.80* 1.30*   LABGLOM 35* 26* 38*   CALCIUM 8.4 9.2 8.2*   GLUCOSE 155* 165* 177*      CBC Recent Labs     01/15/23  0843 01/16/23  0532 01/17/23  0500   WBC 7.1 7.3 9.1   RBC 3.17* 3.41* 3.38*   HGB 9.4* 10.1* 10.1*   HCT 28.7* 31.6* 30.8*   MCV 90.5 92.7 91.1   MCH 29.7 29.6 29.9   MCHC 32.8 32.0 32.8   RDW 13.9 14.0 13.8    262 288   MPV 10.8 11.2 11.3   NRBC 0.00 0.00 0.00      LFT No results for input(s): BILITOT, BILIDIR, ALKPHOS, AST, ALT, PROT, LABALBU, GLOB in the last 72 hours.    Cardiac  No results found for: NTPROBNP, TROPHS   Coags Lab Results   Component Value Date/Time    PROTIME 13.0 01/08/2023 10:54 AM    INR 0.9 01/08/2023 10:54 AM      A1c Lab Results   Component Value Date/Time    LABA1C 6.3 11/17/2021 08:34 AM    LABA1C 5.9 03/06/2020 09:54 AM     11/17/2021 08:34 AM     03/06/2020 09:54 AM      Lipids Lab Results   Component Value Date/Time CHOL 299 11/17/2021 08:34 AM    LDLCALC 211 11/17/2021 08:34 AM    LABVLDL 54 03/06/2020 09:54 AM    HDL 33 11/17/2021 08:34 AM    TRIG 272 11/17/2021 08:34 AM      Thyroid  Lab Results   Component Value Date/Time    TSHELE 0.86 01/08/2023 10:20 AM        Most Recent UA Lab Results   Component Value Date/Time    COLORU YELLOW/STRAW 01/08/2023 11:40 AM    APPEARANCE CLEAR 01/08/2023 11:40 AM    SPECGRAV 1.018 01/08/2023 11:40 AM    LABPH 6.0 01/08/2023 11:40 AM    PROTEINU 30 01/08/2023 11:40 AM    GLUCOSEU Negative 01/08/2023 11:40 AM    KETUA Negative 01/08/2023 11:40 AM    BILIRUBINUR Negative 01/08/2023 11:40 AM    BILIRUBINUR Negative 10/19/2020 04:43 PM    BLOODU Negative 01/08/2023 11:40 AM    UROBILINOGEN 0.2 01/08/2023 11:40 AM    NITRU Negative 01/08/2023 11:40 AM    LEUKOCYTESUR Negative 01/08/2023 11:40 AM    WBCUA 0-3 01/08/2023 11:40 AM    RBCUA 0 10/19/2020 04:43 PM    EPITHUA 0-3 01/08/2023 11:40 AM    BACTERIA TRACE 01/08/2023 11:40 AM        No results for input(s): CULTURE in the last 720 hours.     All Labs from Last 24 Hrs:  Recent Results (from the past 24 hour(s))   CBC    Collection Time: 01/17/23  5:00 AM   Result Value Ref Range    WBC 9.1 4.3 - 11.1 K/uL    RBC 3.38 (L) 4.05 - 5.2 M/uL    Hemoglobin 10.1 (L) 11.7 - 15.4 g/dL    Hematocrit 30.8 (L) 35.8 - 46.3 %    MCV 91.1 82 - 102 FL    MCH 29.9 26.1 - 32.9 PG    MCHC 32.8 31.4 - 35.0 g/dL    RDW 13.8 11.9 - 14.6 %    Platelets 383 312 - 357 K/uL    MPV 11.3 9.4 - 12.3 FL    nRBC 0.00 0.0 - 0.2 K/uL   Basic Metabolic Panel    Collection Time: 01/17/23  5:00 AM   Result Value Ref Range    Sodium 140 133 - 143 mmol/L    Potassium 4.8 3.5 - 5.1 mmol/L    Chloride 107 101 - 110 mmol/L    CO2 23 21 - 32 mmol/L    Anion Gap 10 2 - 11 mmol/L    Glucose 177 (H) 65 - 100 mg/dL    BUN 42 (H) 8 - 23 MG/DL    Creatinine 1.30 (H) 0.6 - 1.0 MG/DL    Est, Glom Filt Rate 38 (L) >60 ml/min/1.73m2    Calcium 8.2 (L) 8.3 - 10.4 MG/DL   COVID-19, Rapid Collection Time: 01/17/23  7:02 AM    Specimen: Nasopharyngeal   Result Value Ref Range    Source NASAL      SARS-CoV-2, Rapid Not detected NOTD         Allergies   Allergen Reactions    Aspirin Anaphylaxis     Hives, swelling    Lorazepam Other (See Comments)     Confused and aggressive    Methocarbamol Anaphylaxis     Swelling of tongue, wheezing  Muscle relaxers in general    Amlodipine Other (See Comments)     Edema    Ciprofloxacin Diarrhea     severe    Clorazepate Dipotassium Hives    Cyclobenzaprine Itching    Ezetimibe-Simvastatin Other (See Comments)     Muscle soreness    Ranitidine Hcl Itching    Sucralfate Hives    Sulfamethoxazole-Trimethoprim Other (See Comments)    Naproxen Sodium Rash     Immunization History   Administered Date(s) Administered    COVID-19, MODERNA BLUE border, Primary or Immunocompromised, (age 12y+), IM, 100 mcg/0.5mL 01/16/2021, 02/13/2021    Influenza, FLUARIX, FLULAVAL, FLUZONE (age 10 mo+) AND AFLURIA, (age 1 y+), PF, 0.5mL 11/02/2018, 10/22/2020    Influenza, FLUCELVAX, (age 10 mo+), MDCK, PF, 0.5mL 09/11/2017    PPD Test 04/30/2017, 07/08/2017, 03/23/2018, 01/09/2023    Pneumococcal Conjugate 13-valent ([id number]) 09/29/2015    Pneumococcal Polysaccharide ([id number]) 09/11/2017       Recent Vital Data:  Patient Vitals for the past 24 hrs:   Temp Pulse Resp BP SpO2   01/17/23 1124 98.7 °F (37.1 °C) 63 18 (!) 150/59 96 %   01/17/23 0755 97.9 °F (36.6 °C) 86 19 (!) 184/89 92 %   01/17/23 0344 97.5 °F (36.4 °C) 79 18 (!) 168/72 96 %   01/16/23 2324 98.8 °F (37.1 °C) 80 19 (!) 179/69 96 %   01/16/23 2017 -- -- 18 -- --   01/16/23 1917 98.4 °F (36.9 °C) 74 18 (!) 151/49 96 %   01/16/23 1543 97.5 °F (36.4 °C) 65 18 (!) 125/59 97 %       Oxygen Therapy  SpO2: 96 %  Pulse via Oximetry: 83 beats per minute  Pulse Oximeter Device Mode: Intermittent  Pulse Oximeter Device Location: Finger  O2 Device: None (Room air)  O2 Flow Rate (L/min): 2 L/min    Estimated body mass index is 26.43 kg/m² as calculated from the following:    Height as of this encounter: 5' 4\" (1.626 m). Weight as of this encounter: 154 lb (69.9 kg). Intake/Output Summary (Last 24 hours) at 1/17/2023 1228  Last data filed at 1/17/2023 0344  Gross per 24 hour   Intake --   Output 1050 ml   Net -1050 ml         Physical Exam:    General:    Well nourished. No overt distress  Head:  Normocephalic, atraumatic  Eyes:  Sclerae appear normal.  Pupils equally round. HENT:  Nares appear normal, no drainage. Moist mucous membranes  Neck:  No restricted ROM. Trachea midline  CV:   RRR. No m/r/g. No JVD  Lungs:   CTAB. No wheezing, rhonchi, or rales. Respirations even, unlabored  Abdomen:   Soft, nontender, nondistended. Extremities: Warm and dry. No cyanosis or clubbing. No edema. Skin:     No rashes. Normal coloration  Neuro:  CN II-XII grossly intact. A+O 1-2  Psych:  Normal mood and affect. Signed:  Damon Elliott MD    Part of this note may have been written by using a voice dictation software. The note has been proof read but may still contain some grammatical/other typographical errors.

## 2023-01-18 ENCOUNTER — CARE COORDINATION (OUTPATIENT)
Dept: OTHER | Facility: CLINIC | Age: 88
End: 2023-01-18

## 2023-01-18 ENCOUNTER — CARE COORDINATION (OUTPATIENT)
Dept: CARE COORDINATION | Facility: CLINIC | Age: 88
End: 2023-01-18

## 2023-01-18 NOTE — CARE COORDINATION
Chart reviewed. Notified [facility] IDT of admission for STR. Will follow weekly progress.

## 2023-01-18 NOTE — CARE COORDINATION
Transition of care outreach postponed for 14 days due to patient's discharge to SNF. [facility] Hand r.

## 2023-01-19 ENCOUNTER — CARE COORDINATION (OUTPATIENT)
Dept: CARE COORDINATION | Facility: CLINIC | Age: 88
End: 2023-01-19

## 2023-01-19 NOTE — CARE COORDINATION
Post Acute Facility Update     *The information contained in this note was received during a weekly care coordination call with the post acute facility*    Current Facility: [facility] ([facility])  Anticipated Discharge Date: TBD    Facility Nursing Update: Admitted on 1/17/23 dx of Fracture of unspecified part of neck of right femur, regular diet, added Lidocaine patches to lower back and rt thigh.V/S stable  Facility Social Work Update: Plan is for the patient to stay short term and return home with sitters. Prior to hospitalization, patient was home with sitters for the last 2 years from  sitter, they assisted with bathing and dressing. Patient does not drive and received MOW. DME includes a RW, rollator, BSC and a hand held shower. Has had [facility] in the past for  services.   Bundle Program Status: none  Upper Extremity Assistance: Minimal Assistance   Lower Extremity Assistance: Dependent  Bed Mobility: Mod/Max Assistance  Transfers: Maximum Assistance x2  Ambulation: Dependent  How far (in feet) is the patient ambulating? 0  Device Used: n/a  Barriers to Discharge: Age and progression with therapy  Other: n/a

## 2023-01-25 ENCOUNTER — OFFICE VISIT (OUTPATIENT)
Dept: ORTHOPEDIC SURGERY | Age: 88
End: 2023-01-25

## 2023-01-25 DIAGNOSIS — S72.301A CLOSED FRACTURE OF SHAFT OF RIGHT FEMUR, UNSPECIFIED FRACTURE MORPHOLOGY, INITIAL ENCOUNTER (HCC): ICD-10-CM

## 2023-01-25 DIAGNOSIS — S72.001A CLOSED FRACTURE OF RIGHT HIP, INITIAL ENCOUNTER (HCC): Primary | ICD-10-CM

## 2023-01-25 PROCEDURE — 99024 POSTOP FOLLOW-UP VISIT: CPT | Performed by: PHYSICIAN ASSISTANT

## 2023-01-25 NOTE — PROGRESS NOTES
[facility]            Patient ID:  Name: Ruthann Mancilla  AGE/Gender: 80 y.o. female  MRN: [id number]  : 3/20/1929    Date of Service: 2023          ALLERGIES:   Allergies   Allergen Reactions    Aspirin Anaphylaxis     Hives, swelling    Lorazepam Other (See Comments)     Confused and aggressive    Methocarbamol Anaphylaxis     Swelling of tongue, wheezing  Muscle relaxers in general    Amlodipine Other (See Comments)     Edema    Ciprofloxacin Diarrhea     severe    Clorazepate Dipotassium Hives    Cyclobenzaprine Itching    Ezetimibe-Simvastatin Other (See Comments)     Muscle soreness    Ranitidine Hcl Itching    Sucralfate Hives    Sulfamethoxazole-Trimethoprim Other (See Comments)    Naproxen Sodium Rash          History:  The patient is seen today for follow-up. The patient sustained  a right distal femur fracture and underwent ORIF at [facility].  She is somewhat slow to progress with PT They have no other complaints or concerns: The patient has been progressing with physical therapy. Physical Exam:       General:  On exam the patient is a pleasant 80 y.o. female in no acute distress, A&O x 3. Right Lower Extremity. : This incision is healing there is swelling consistent with the postoperative time frame. There is no drainage. ROM not assessed. The calf is soft and non-tender. X-rays: AP lateral right distal femur demonstrates intramedullary nail present. Fracture site unchanged from prior studies. No other appreciable fractures. Left total knee arthroplasty in good position. X-ray diagnosis: Right distal femur periprosthetic fracture status post retrograde nail fixation    Assessment and Plan:   The incision is healing. OK to bathe. WBAT with walker and assistance at all times. We will follow up in 4 weeks or sooner if needed.        Electronically signed by:   LUC Mejia, PA  2023,  10:14 AM

## 2023-01-26 ENCOUNTER — CARE COORDINATION (OUTPATIENT)
Dept: CARE COORDINATION | Facility: CLINIC | Age: 88
End: 2023-01-26

## 2023-01-26 NOTE — CARE COORDINATION
Post Acute Facility Update     *The information contained in this note was received during a weekly care coordination call with the post acute facility*    Current Facility: [facility] ([facility])  Anticipated Discharge Date: TBD    Facility Nursing Update: Staples removed to postoperative site rt knee lateral aspect. Increase Tylenol 650mg PO Qam for postoperative pain. Synthroid increased to 75mcg every am from lab results. THS repeat in 3 wk. Facility Social Work Update: Plan is for the patient to stay short term and return home with sitters. Prior to hospitalization, patient was home with sitters for the last 2 years from 80 sitter, they assisted with bathing and dressing. Patient does not drive and received MOW. DME includes a RW, rollator, BSC and a hand held shower. Has had [facility] in the past for [facility] services. Bundle Program Status: none  Upper Extremity Assistance: Minimal Assistance   Lower Extremity Assistance: Maximum Assistance  Bed Mobility: Moderate Assistance   Transfers: Maximum Assistance  Ambulation: Dependent  How far (in feet) is the patient ambulating?  0  Device Used: n/a  Barriers to Discharge: age and slow progression with therapy  Other: n/a

## 2023-02-02 ENCOUNTER — CARE COORDINATION (OUTPATIENT)
Dept: CARE COORDINATION | Facility: CLINIC | Age: 88
End: 2023-02-02

## 2023-02-02 NOTE — CARE COORDINATION
Post Acute Facility Update    Care Transitions Post Acute Facility Update    Care Transitions Interventions  Post Acute Facility: [facility]  Post Acute Facility Update  Reported Nursing Issues: Family is currently weighing the options of discharging the patient back home with 24/7 sitters/care or keeping her in our facility for LTC. Therapy set an [facility] from services on 2/21/2023 and conversion to Private Pay or D/C on 2/22/2023. D/C Debrox, change Tramadol 1 tab Q12h and Q6hPRN. Staples removed to postoperative site. Invcrease Tylenol 650mg PO Qid for postop pain. ADLs: Maximum Assistance   Bed Mobility: Moderate Assistance   Transfer Assistance: Maximum Assistance   Ambulation Assistance: Dependent   How far (in feet) is the patient ambulating?: 0   Does patient use an assistive device?: Yes (Comment: Slide board)   Assistive Devices: LTC vs. Home with daughter. TBD   Barriers to Discharge: Age, debility, slow progression with therapy, cognitive issues.    Anticipated date for discharge: 2/22/23    Anticipated discharge services: TBD              Next IDT Planned Review: 2/9/23    Future Appointments   Date Time Provider Mike Bermudez   2/23/2023  9:30 AM Derick Ayala MD [facility]       SN Notes:   Diet: - REG  Wounds:   Post-acute CC Notes: LTC vs. Home with daughter    Oniel Simental, RN

## 2023-02-14 ENCOUNTER — CARE COORDINATION (OUTPATIENT)
Dept: CARE COORDINATION | Facility: CLINIC | Age: 88
End: 2023-02-14

## 2023-02-14 NOTE — CARE COORDINATION
Post Acute Facility Update    Care Transitions Post Acute Facility Update    Care Transitions Interventions  Post Acute Facility Update  Reported Nursing Issues: Labs reviewed. Stop Chlorthalidone, Kayexalate 15 grams x 3 doses recheck BMP 2/9/23. Ortho appt on 2/23/23     ADLs: Maximum Assistance   Bed Mobility: Moderate Assistance   Transfer Assistance: Moderate Assistance   Ambulation Assistance: Dependent   Does patient use an assistive device?: No   Anticipated date for discharge: 2/22/23    Anticipated discharge services: Family is currently weighing the options of discharging the patient back home with 24/7 sitters/care or keeping her in our facility for LTC. Therapy set an [facility] from services on 2/21/2023 and conversion to Private Pay or D/C on 2/22/2023.                  Next IDT Planned Review: 2/16/23    Future Appointments   Date Time Provider Mike Bermudez   2/23/2023  9:30 AM Kristian Mendez MD [facility] LETICIA Chun RN

## 2023-02-21 RX ORDER — CHLORTHALIDONE 25 MG/1
TABLET ORAL
Qty: 90 TABLET | Refills: 3 | OUTPATIENT
Start: 2023-02-21

## 2023-02-22 RX ORDER — TRIMETHOPRIM 100 MG/1
TABLET ORAL
Qty: 45 TABLET | Refills: 11 | OUTPATIENT
Start: 2023-02-22

## 2023-02-22 RX ORDER — CLOPIDOGREL BISULFATE 75 MG/1
TABLET ORAL
Qty: 90 TABLET | Refills: 5 | OUTPATIENT
Start: 2023-02-22

## 2023-02-22 NOTE — PROGRESS NOTES
This note will not be viewable in [address] [facility] OUTREACH #2  Left message to call back. 2nd outreach attempt unsuccessful. Will schedule 3rd outreach attempt within 5 business days. patient seen. has laceration to left leg. to OR for repair.

## 2023-02-23 ENCOUNTER — CARE COORDINATION (OUTPATIENT)
Dept: CARE COORDINATION | Facility: CLINIC | Age: 88
End: 2023-02-23

## 2023-02-23 NOTE — CARE COORDINATION
Post Acute Facility Update     *The information contained in this note was received during a weekly care coordination call with the post acute facility*    Current Facility: [facility] ([facility])  Anticipated Discharge Date: 3/3/23 LTC    Facility Nursing Update: Medically stable, no new medication changes. V/S remain stable  Facility Social Work Update: Will remain in our facility for LTC. Therapy extended the patient's [facility] from services to 3/2/2023 and conversion to Private Pay on 3/3/2023. Bundle Program Status: none  Upper Extremity Assistance: Contact Guard Assist - hands on patient for balance   Lower Extremity Assistance: Maximum Assistance  Bed Mobility: Moderate Assistance   Transfers: Moderate Assistance   Ambulation: Maximum Assistance x2  How far (in feet) is the patient ambulating? 5 ft //  Device Used: parallel bars  Barriers to Discharge: n/a  Other: Pt now standing and ambulating in // bars 5'. UB self care SBA and pt now assisting with LB self care, though at max assist level. Extended to 3/2 due to improvement with self care and mobility.

## 2023-02-28 ENCOUNTER — CARE COORDINATION (OUTPATIENT)
Dept: CARE COORDINATION | Facility: CLINIC | Age: 88
End: 2023-02-28

## 2023-02-28 ENCOUNTER — OFFICE VISIT (OUTPATIENT)
Dept: ORTHOPEDIC SURGERY | Age: 88
End: 2023-02-28

## 2023-02-28 DIAGNOSIS — S72.301A CLOSED FRACTURE OF SHAFT OF RIGHT FEMUR, UNSPECIFIED FRACTURE MORPHOLOGY, INITIAL ENCOUNTER (HCC): Primary | ICD-10-CM

## 2023-02-28 PROCEDURE — 99024 POSTOP FOLLOW-UP VISIT: CPT | Performed by: ORTHOPAEDIC SURGERY

## 2023-02-28 NOTE — PROGRESS NOTES
Progress Note    Patient: Autumn Wesley MRN: [id number]  SSN: [id number]    YOB: 1929  Age: 80 y.o. Sex: female        2/28/2023      Subjective:     Patient is about 6 weeks out from sort of unusual injury she had a distal femur fracture that was periprosthetic between an old Synthes trochanteric femoral nail and her right total knee replacement. So the options at that time are either to remove the nail treated with a retrograde nail and then probably some sort of DHS type of device proximally or what I chose to do which was just provide some fixation distally she did have a enough bone distally I thought was reasonable so I did place 3 interlock bolts in her Synthes trochanteric femoral nail and this was a pretty reasonable way to try to treat this. She seems that she is improving she is getting a little bit better her family is with her today and they think she is doing better as well    Objective: There were no vitals filed for this visit. Physical Exam:     Skin - incision is well healed with no redness or drainage  Motor and sensory function intact in RIGHT LOWER extremity  Pulses palpable in RIGHT LOWER extremity     XRAY FINDINGS:  [id number] right distal femur fracture, findings-true AP and lateral views of the right knee and distal femur shows that the fixation is intact the overall alignment is satisfactory of her distal femur fracture. There is significant evidence of callus formation across the primary fracture lines visible on both the AP and lateral projection. The knee replacement prosthesis itself appears to be reasonably well aligned and no evidence of loosening impression-healing well aligned right distal femur fracture    Assessment:     Healing right distal femur fracture now 6 weeks out    Plan:     I am very pleased the way she looks today. I think we can allow her to be weightbearing as tolerated now.   She can be full active and passive range of motion of the right knee she can be full strengthening right knee and again she can be weightbearing as tolerated right lower extremity.   I will see her back in 6 weeks with AP and lateral right knee and distal femur on return she will be about 3 months out at that time and hopefully that will be her last visit    Signed By: Sonja Donahue MD     February 28, 2023

## 2023-05-03 ENCOUNTER — TELEPHONE (OUTPATIENT)
Dept: PRIMARY CARE CLINIC | Facility: CLINIC | Age: 88
End: 2023-05-03

## 2023-05-03 NOTE — TELEPHONE ENCOUNTER
Please call [phone] from [address] [facility] concerning orders for Ms. Coulterey.

## 2024-02-18 NOTE — PROGRESS NOTES
Hospitalist Progress Note   Admit Date:  2023 10:05 AM   Name:  Claude Comber   Age:  80 y.o. Sex:  female  :  3/20/1929   MRN:  [id number]   Room:  [facility]    Presenting Complaint: Fall     Reason(s) for Admission: Fall, initial encounter [W19. XXXA]  Fall from ground level [W18.30XA]  Periprosthetic fracture of shaft of femur [N56. Katrina Kawasaki Course:   Claude Comber is a 80 y.o. female with medical history of hypertension, dementia, aortic stenosis status post TAVR, CKD stage III, coronary artery disease  who presented in the setting of a fall after being found on the floor by her caregiver. Admitted with right femur fracture after her fall. She underwent right femur insertion distal TFN screw on 2023. On 1/10, patient was unable to talk so Code S was called. Neurology saw in presentation was due to pain medications. No CT head was recommended. Pt was given narcan with improvement. Home Plavix was resumed after ok from Ortho. PT/OT evaluated and recommended short-term rehab. Patient was also treated for postoperative anemia. Hemoglobin 10.8 on admission (baseline ~10-11) but trended down to lowest 6.9 post op and received 1U PRBC on 23. Post-transfusion Hgb improved to >10. Subjective & 24hr Events (23):     Pt only oriented to person this morning, follows basic commands. Stated she has pain on her R hip but otherwise no acute concerns. No fever, chills, SOB, chest pain    Assessment & Plan:     Fall from ground level  Femoral fracture  S/p R femur insertion distal TFN screw 23  OK to resume Plavix per Ortho--for DVT ppx as well  PT/OT--STR  Ortho on board, appreciate recs  F/u with Ortho 2 weeks postop for wound check and staple removal    Post-op anemia  Hgb on admission 10.8 (baseline ~10-11), trended down to 7.6 post op day 1. Was 6.9 on 23. S/p 1U PRBC on 23 and improved to 10.8.   Transfuse if Hgb <7 or if symptomatic    CKD stage 3  Cr 1.2 on admission (baseline ~1.3-1.7)  Avoid nephrotoxic meds  F/u BMP    Dementia   Noted, only oriented to person and follows basic commands  Code S on 1/10 with neuro evaluated--felt 2/2 pain medications, did not recommend CT head. Recommended to resume home Plavix  Limit sedating meds  Delirium precautions    Hypothyroidism  Cont synthroid    Essential hypertension  Cont Coreg, Hydralazine, ACE-i    CAD  Aortic Stenosis  S/p TAVR  Cont Plavix, ACE-I, BB    Chronic UTI  Cont home trimethoprim    Management of Delirium      Non-pharmacological intervention  Reorient the patient throughout the day  Window open and lights on during the day. Lights off, television off, noises down during the night. If able, decrease nursing checks at night  Therapies as often as possible  Avoid restraints to the best of your ability   Avoid sensory deprivation by using glasses and hearing aids, if applicable        Pharmacological intervention  Replete electrolyte abnormalities and correct metabolic abnormalities  Limit benzodiazepines, antihistamines, narcotics, anticholinergics. Preference towards Precedex for sedation over fentanyl and benzodiazepines/GABAa agonists. For dangerous behavior/aggression to self or others can consider Zyprexa or Seroquel if benefits outweigh risk  For persistent insomnia can use melatonin four hours prior to bedtime or Seroquel 25 mg at bedtime         Anticipated discharge needs:      STR pending    I spent 39 minutes of time caring for this patient on the unit nearby or at bedside, and more than 50 percent was spent on coordination of care activities, and/or patient/family counseling regarding status and plan of care. Diet:  ADULT DIET;  Regular  ADULT ORAL NUTRITION SUPPLEMENT; Breakfast, Lunch, Dinner; Standard High Calorie/High Protein Oral Supplement  DVT PPx: Plavix>  Code status: DNR    Hospital Problems:  Principal Problem:    Fall from ground level  Active Previously Declined (within the last year) Problems:    Femoral fracture (HCC)    Dementia (HCC)    Hypothyroidism    Essential hypertension, benign    Coronary artery disease involving native coronary artery of native heart  Resolved Problems:    * No resolved hospital problems. *      Objective:   Patient Vitals for the past 24 hrs:   Temp Pulse Resp BP SpO2   01/13/23 1602 97.2 °F (36.2 °C) 71 19 (!) 164/74 96 %   01/13/23 1329 -- 73 17 (!) 159/60 --   01/13/23 1149 98.1 °F (36.7 °C) 65 20 (!) 142/68 96 %   01/13/23 0729 98.8 °F (37.1 °C) 93 18 (!) 155/132 94 %   01/13/23 0620 -- -- -- (!) 196/76 --   01/13/23 0419 97.6 °F (36.4 °C) 89 18 (!) 198/78 92 %   01/12/23 2350 99 °F (37.2 °C) 79 18 (!) 169/69 97 %   01/12/23 2042 98.4 °F (36.9 °C) 71 18 (!) 124/54 --   01/12/23 1941 99.1 °F (37.3 °C) 70 18 (!) 103/46 97 %   01/12/23 1802 98.2 °F (36.8 °C) 68 18 (!) 100/46 99 %   01/12/23 1747 99.2 °F (37.3 °C) 71 19 (!) 121/57 99 %       Oxygen Therapy  SpO2: 96 %  Pulse via Oximetry: 83 beats per minute  Pulse Oximeter Device Mode: Intermittent  Pulse Oximeter Device Location: Finger  O2 Device: None (Room air)  O2 Flow Rate (L/min): 2 L/min    Estimated body mass index is 26.43 kg/m² as calculated from the following:    Height as of this encounter: 5' 4\" (1.626 m). Weight as of this encounter: 154 lb (69.9 kg). Intake/Output Summary (Last 24 hours) at 1/13/2023 1628  Last data filed at 1/13/2023 8328  Gross per 24 hour   Intake 412.5 ml   Output 800 ml   Net -387.5 ml         Physical Exam:     Blood pressure (!) 164/74, pulse 71, temperature 97.2 °F (36.2 °C), temperature source Oral, resp. rate 19, height 5' 4\" (1.626 m), weight 154 lb (69.9 kg), SpO2 96 %. General:    Frail appearing  Head:  Normocephalic, atraumatic  Eyes:  Sclerae appear normal.  Pupils equally round. ENT:  Nares appear normal.  Moist oral mucosa  CV:   RRR. No m/r/g. No jugular venous distension. Lungs:   CTAB. No wheezing, rhonchi, or rales. Symmetric expansion.   Abdomen: Soft, nontender, nondistended. Extremities: No cyanosis or clubbing. No edema. L hip dressing c/d/I. Skin:     No rashes and normal coloration. Warm and dry. Neuro:  CN II-XII grossly intact. Sensation intact.    Psych:  Pleasantly demented    I have personally reviewed labs and tests showing:  Recent Labs:  Recent Results (from the past 48 hour(s))   PLEASE READ & DOCUMENT PPD TEST IN 48 HRS    Collection Time: 01/11/23  6:20 PM   Result Value Ref Range    PPD, (POC) Negative Negative    mm Induration 0 0 - 5 mm   CBC with Auto Differential    Collection Time: 01/12/23  4:38 AM   Result Value Ref Range    WBC 7.6 4.3 - 11.1 K/uL    RBC 2.32 (L) 4.05 - 5.2 M/uL    Hemoglobin 6.9 (LL) 11.7 - 15.4 g/dL    Hematocrit 21.1 (L) 35.8 - 46.3 %    MCV 90.9 82 - 102 FL    MCH 29.7 26.1 - 32.9 PG    MCHC 32.7 31.4 - 35.0 g/dL    RDW 13.7 11.9 - 14.6 %    Platelets 650 044 - 564 K/uL    MPV 11.4 9.4 - 12.3 FL    nRBC 0.00 0.0 - 0.2 K/uL    Differential Type AUTOMATED      Seg Neutrophils 56 43 - 78 %    Lymphocytes 33 13 - 44 %    Monocytes 9 4.0 - 12.0 %    Eosinophils % 2 0.5 - 7.8 %    Basophils 0 0.0 - 2.0 %    Immature Granulocytes 0 0.0 - 5.0 %    Segs Absolute 4.3 1.7 - 8.2 K/UL    Absolute Lymph # 2.5 0.5 - 4.6 K/UL    Absolute Mono # 0.7 0.1 - 1.3 K/UL    Absolute Eos # 0.2 0.0 - 0.8 K/UL    Basophils Absolute 0.0 0.0 - 0.2 K/UL    Absolute Immature Granulocyte 0.0 0.0 - 0.5 K/UL   Basic Metabolic Panel w/ Reflex to MG    Collection Time: 01/12/23  4:38 AM   Result Value Ref Range    Sodium 137 133 - 143 mmol/L    Potassium 4.3 3.5 - 5.1 mmol/L    Chloride 105 101 - 110 mmol/L    CO2 26 21 - 32 mmol/L    Anion Gap 6 2 - 11 mmol/L    Glucose 145 (H) 65 - 100 mg/dL    BUN 27 (H) 8 - 23 MG/DL    Creatinine 1.20 (H) 0.6 - 1.0 MG/DL    Est, Glom Filt Rate 42 (L) >60 ml/min/1.73m2    Calcium 8.2 (L) 8.3 - 10.4 MG/DL   PREPARE RBC (CROSSMATCH), 1 Units    Collection Time: 01/12/23  6:30 AM   Result Value Ref Range History Check Historical check performed    TYPE AND SCREEN    Collection Time: 01/12/23  6:34 AM   Result Value Ref Range    Crossmatch expiration date 01/15/2023,5452     ABO/Rh O POSITIVE     Antibody Screen NEG     Unit Number [id number]     Product Code Blood Bank RC LR     Unit Divison 00     Dispense Status Blood Bank TRANSFUSED     Crossmatch Result Compatible    PLEASE READ & DOCUMENT PPD TEST IN 72 HRS    Collection Time: 01/12/23  6:20 PM   Result Value Ref Range    PPD, (POC) Negative Negative    mm Induration 0 0 - 5 mm   CBC    Collection Time: 01/13/23  8:06 AM   Result Value Ref Range    WBC 8.0 4.3 - 11.1 K/uL    RBC 3.56 (L) 4.05 - 5.2 M/uL    Hemoglobin 10.8 (L) 11.7 - 15.4 g/dL    Hematocrit 31.2 (L) 35.8 - 46.3 %    MCV 87.6 82 - 102 FL    MCH 30.3 26.1 - 32.9 PG    MCHC 34.6 31.4 - 35.0 g/dL    RDW 13.6 11.9 - 14.6 %    Platelets 528 988 - 124 K/uL    MPV 10.8 9.4 - 12.3 FL    nRBC 0.00 0.0 - 0.2 K/uL   Basic Metabolic Panel    Collection Time: 01/13/23  8:06 AM   Result Value Ref Range    Sodium 136 133 - 143 mmol/L    Potassium 4.0 3.5 - 5.1 mmol/L    Chloride 104 101 - 110 mmol/L    CO2 25 21 - 32 mmol/L    Anion Gap 7 2 - 11 mmol/L    Glucose 188 (H) 65 - 100 mg/dL    BUN 24 (H) 8 - 23 MG/DL    Creatinine 1.20 (H) 0.6 - 1.0 MG/DL    Est, Glom Filt Rate 42 (L) >60 ml/min/1.73m2    Calcium 8.6 8.3 - 10.4 MG/DL       I have personally reviewed imaging studies showing: Other Studies:  XR KNEE RIGHT (1-2 VIEWS)   Final Result   Surgical fixation of distal femur periprosthetic fracture. NC XR TECHNOLOGIST SERVICE   Final Result      XR HIP RIGHT (2-3 VIEWS)   Final Result   Postoperative changes without evidence of acute bony abnormality. XR KNEE RIGHT (3 VIEWS)   Final Result   Spiral type fracture of the distal femoral metadiaphysis with mild   displacement. XR FEMUR RIGHT (MIN 2 VIEWS)   Final Result   Spiral type fracture of the distal femoral metadiaphysis. XR CHEST PORTABLE   Final Result   Unremarkable portable chest radiograph. CT HEAD WO CONTRAST   Final Result   1. No evidence of acute intracranial hemorrhage. 2. No evidence of acute cervical spine fracture. 3. Cervical spondylosis, felt to account for the slight retrolisthesis of C5.   4. 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 CT CERVICAL SPINE WO CONTRAST   Final Result   1. No evidence of acute intracranial hemorrhage. 2. No evidence of acute cervical spine fracture. 3. Cervical spondylosis, felt to account for the slight retrolisthesis of C5.   4. 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                 Current Meds:  Current Facility-Administered Medications   Medication Dose Route Frequency    hydrOXYzine pamoate (VISTARIL) capsule 25 mg  25 mg Oral TID PRN    0.9 % sodium chloride infusion   IntraVENous PRN    lisinopril (PRINIVIL;ZESTRIL) tablet 5 mg  5 mg Oral Daily    hydrALAZINE (APRESOLINE) tablet 25 mg  25 mg Oral Daily    carboxymethylcellulose (THERATEARS) 1 % ophthalmic gel 1 drop  1 drop Both Eyes PRN    Vitamin D (CHOLECALCIFEROL) tablet 2,000 Units  2,000 Units Oral Daily    naloxone (NARCAN) injection 0.4 mg  0.4 mg IntraVENous PRN    clopidogrel (PLAVIX) tablet 75 mg  75 mg Oral Daily    morphine injection 0.5 mg  0.5 mg IntraVENous Q4H PRN    traMADol (ULTRAM) tablet 50 mg  50 mg Oral Q6H PRN    sodium chloride flush 0.9 % injection 5-40 mL  5-40 mL IntraVENous PRN    0.9 % sodium chloride infusion   IntraVENous PRN    ondansetron (ZOFRAN-ODT) disintegrating tablet 4 mg  4 mg Oral Q8H PRN    Or    ondansetron (ZOFRAN) injection 4 mg  4 mg IntraVENous Q6H PRN    medicated lip ointment (BLISTEX)   Topical PRN    sodium chloride flush 0.9 % injection 5-40 mL  5-40 mL IntraVENous 2 times per day    sodium chloride flush 0.9 % injection 5-40 mL  5-40 mL IntraVENous PRN    0.9 % sodium chloride infusion   IntraVENous PRN    polyethylene glycol (GLYCOLAX) packet 17 g  17 g Oral Daily PRN    acetaminophen (TYLENOL) tablet 650 mg  650 mg Oral Q6H PRN    Or    acetaminophen (TYLENOL) suppository 650 mg  650 mg Rectal Q6H PRN    carvedilol (COREG) tablet 6.25 mg  6.25 mg Oral Daily    [Held by provider] chlorthalidone (HYGROTON) tablet 25 mg  25 mg Oral Daily    levothyroxine (SYNTHROID) tablet 50 mcg  50 mcg Oral Daily    trimethoprim (TRIMPEX) tablet 100 mg  100 mg Oral Daily    melatonin tablet 3 mg  3 mg Oral Nightly PRN       Signed:  Yamile Magdaleno DO    Part of this note may have been written by using a voice dictation software.  The note has been proof read but may still contain some grammatical/other typographical errors.

## 2025-05-18 NOTE — INTERVAL H&P NOTE
[facility] LUANN WOODRUFF  I139/01  Consultation Note   Patient: Evelyn Cosme  Today's Date: 5/18/2025    YOB: 1949  Admission Date: 5/13/2025    MRN: [id number]  Date of Service: 5/18/2025   Requesting Provider: Rogelio Silva  Hospital Day: Hospital Day: 6        HISTORY   Past Medical History  Surgical History  Family History Social History       Chief Complaint / Reason for consult: This patient is being seen at the request of Rogelio Silva for the management of medical issues.     History of Present Illness:   75 yo F, currently admitted with severe urinary tract infection 2 days prior to admission, requiring pressor support and broad spectrum antibiotics. CT without contrast noted diffuse thickening concerning for colitis. Labs noted significant drop in hgb since admitted.      Patient evaluated at bedside in ICU, noted in no distress, denies any ongoing symptoms, noted rectal tube with dark green/brown output. Noted no significant abd pain, no nausea/vomiting.   Receiving blood transfusion.     No acute events overnight.   Noted received 2 u PRBC yesterday with response 7-->12.   NO signs of active bleeding.   Diarrhea improving.   Step-down today. Advancing diet.         Histories:     I have personally reviewed and updated the following EPIC sections: Past Medical History, Past Surgical History, Social History, Family History, Current medications,Allergies    Medications and Allergies   Prior to Admission Medications      Allergies         Review of Systems:     Complete ROS performed and negative except as documented in HPI.    Objective   PHYSICAL EXAMINATION     Vital 24 Hour Range Most Recent Value   Temperature Temp  Min: 96.2 °F (35.7 °C)  Max: 98.5 °F (36.9 °C) 97.1 °F (36.2 °C)   Pulse Pulse  Min: 85  Max: 120 96   Respiratory Resp  Min: 16  Max: 31 (!) 25   Blood Pressure BP  Min: 94/49  Max: 143/73 135/67   Pulse Oximetry SpO2  Min: 97 %  Max:  H&P Update:  Anselmo Sequeira was seen and examined. History and physical has been reviewed. The patient has been examined.  There have been no significant clinical changes since the completion of the originally dated History and Physical.    Signed By: Shanon Cunningham MD     February 20, 2017 1:49 PM 100 % 98 %       Weight Height BMI   55.6 kg (122 lb 9.2 oz) 5' 2\" (157.5 cm)  22.42     General:  No acute distress.  HEENT: Normocephalic, atraumatic, PERRL, intact extraocular movement.  Neck:  Trachea is midline. No adenopathy.    Cardiovascular:  Regular rate and rhythm, normal S1, S2, no added sounds or murmurs.  Respiratory: Clear to auscultation bilaterally.  No wheezes, rales or rhonchi.  Gastrointestinal:  Soft, nontender and nondistended, no hepatomegaly or splenomegaly. bowel sounds present. Rectal tube in place, non-bloody output.  Neuro:   Alert and Oriented to time, place, person. CN II-XII intact. no focal weakness or sensory deficits.  Psychiatric:   Cooperative.  Appropriate mood & affect.  Normal judgment.  Skin:  Warm and dry without rash.  Extremities: No pitting edema of the lower extremities bilaterally, distal pulses intact.    TEST RESULTS   Labs Since Admission  Micro Summary Imaging  Results Review       Labs: labs have been reviewed and pertinent findings discussed in the Assessment and Plan.     Radiology: Imaging studies have been reviewed and pertinent findings discussed in the Assessment and Plan.         ASSESSMENT AND PLAN       Acute anemia  Diffuse colitis on CT scan      Noted Significant drop in Hgb 14-->7 since admission, without overt GI bleeding.     S/p transfusion overnight received 2u PRBC with good response currently 7-->12.  Tolerating diet, clinically improved today.     Will continue to follow. No intervention planned today.     Faina Kerr MD

## (undated) DEVICE — 3.2MM GUIDE WIRE 400MM

## (undated) DEVICE — 3M™ TEGADERM™ TRANSPARENT FILM DRESSING FRAME STYLE, 1628, 6 IN X 8 IN (15 CM X 20 CM), 10/CT 8CT/CASE: Brand: 3M™ TEGADERM™

## (undated) DEVICE — GUIDEWIRE ENDOSCP L260CM DIA0.035IN STD BILI HYDRPHLC EXT

## (undated) DEVICE — GLOVE SURG SZ 7 L11.2IN THK9.8MIL STRW LTX POLYMER BEAD CUF

## (undated) DEVICE — KENDALL RADIOLUCENT FOAM MONITORING ELECTRODE RECTANGULAR SHAPE: Brand: KENDALL

## (undated) DEVICE — SUTURE MCRYL SZ 2-0 L27IN ABSRB VLT CT-1 L36MM 1/2 CIR TAPR Y339H

## (undated) DEVICE — 3M™ TEGADERM™ TRANSPARENT FILM DRESSING FRAME STYLE, 1626W, 4 IN X 4-3/4 IN (10 CM X 12 CM), 50/CT 4CT/CASE: Brand: 3M™ TEGADERM™

## (undated) DEVICE — 2000CC GUARDIAN II: Brand: GUARDIAN

## (undated) DEVICE — RETRIEVAL BALLOON CATHETER: Brand: EXTRACTOR™ PRO RX

## (undated) DEVICE — SUTURE ETHBND EXCEL SZ 0 L18IN NONABSORBABLE GRN L36MM CT-1 CX21D

## (undated) DEVICE — DRAPE SURG SPEC PROC 4 PC

## (undated) DEVICE — (D)DRAPE ISOL ANTIMC 129X100IN -- DISC BY MFR

## (undated) DEVICE — STOPCOCK ANGIO 1050PSI DK BLU L ROT M LUER 3 W OFF HNDL

## (undated) DEVICE — TFN: Brand: MEDLINE INDUSTRIES, INC.

## (undated) DEVICE — KENDALL SCD EXPRESS SLEEVES, KNEE LENGTH, MEDIUM: Brand: KENDALL SCD

## (undated) DEVICE — NDL PRT INJ NSAF BLNT 18GX1.5 --

## (undated) DEVICE — INTENDED FOR TISSUE SEPARATION, AND OTHER PROCEDURES THAT REQUIRE A SHARP SURGICAL BLADE TO PUNCTURE OR CUT.: Brand: BARD-PARKER ® STAINLESS STEEL BLADES

## (undated) DEVICE — CATH URETH INTMIT ROB 14FR FUN -- USE ITEM 179521

## (undated) DEVICE — SURGICAL PROCEDURE PACK BASIC ST FRANCIS

## (undated) DEVICE — 7 DAY SILVER-COATED ANTIMICROBIAL BARRIER DRESSING: Brand: ACTICOAT 7  4" X 5"

## (undated) DEVICE — SYR 5ML 1/5 GRAD LL NSAF LF --

## (undated) DEVICE — REM POLYHESIVE ADULT PATIENT RETURN ELECTRODE: Brand: VALLEYLAB

## (undated) DEVICE — SOLUTION IRRIG 1000ML 09% SOD CHL USP PIC PLAS CONTAINER

## (undated) DEVICE — SOLUTION IV 1000ML 0.9% SOD CHL

## (undated) DEVICE — SLIM BODY SKIN STAPLER: Brand: APPOSE ULC

## (undated) DEVICE — SYR 3ML LL TIP 1/10ML GRAD --

## (undated) DEVICE — DRILL, AO, STERILE

## (undated) DEVICE — DEVICE LCK BILI RAP EXCHG OLPS --

## (undated) DEVICE — Device

## (undated) DEVICE — ACCESS AND DELIVERY CATHETER: Brand: SPYSCOPE™ DS II

## (undated) DEVICE — CANNULA NSL ORAL AD FOR CAPNOFLEX CO2 O2 AIRLFE

## (undated) DEVICE — SYR 50ML LR LCK 1ML GRAD NSAF --

## (undated) DEVICE — SPHINCTEROTOME: Brand: JAGTOME RX 44

## (undated) DEVICE — BLOCK BITE AD 60FR W/ VELC STRP ADDRESSES MOST PT AND

## (undated) DEVICE — WIREGUIDED RETRIEVAL BASKET: Brand: TRAPEZOID RX

## (undated) DEVICE — WATER 50W MAX / AIR 8W MAX: Brand: FLEXIVA TRACTIP

## (undated) DEVICE — DRAPE SURG EQUIP DISC 66X44 -- USE ITEM 141557

## (undated) DEVICE — 3000CC GUARDIAN II: Brand: GUARDIAN

## (undated) DEVICE — SNARE ENDOSCP POLYP MED STD AD 2.4X27X240 CM 2.8 MM OVL SENS

## (undated) DEVICE — ADULT SPO2 SENSOR: Brand: NELLCOR

## (undated) DEVICE — AMD ANTIMICROBIAL GAUZE SPONGES,12 PLY USP TYPE VII, 0.2% POLYHEXAMETHYLENE BIGUANIDE HCI (PHMB): Brand: CURITY

## (undated) DEVICE — ROD RMR L950MM DIA2.5MM W/ EXTN BALL TIP

## (undated) DEVICE — (D)SYR 10ML 1/5ML GRAD NSAF -- PKGING CHANGE USE ITEM 338027

## (undated) DEVICE — BIT DRL L145MM DIA4.2MM ST 3 FLUT NDL PNT QUIK CPL FOR FEM

## (undated) DEVICE — (D)STRIP SKN CLSR 0.5X4IN WHT --

## (undated) DEVICE — 1010 S-DRAPE TOWEL DRAPE 10/BX: Brand: STERI-DRAPE™

## (undated) DEVICE — SUTURE VCRL SZ 3-0 L36IN ABSRB UD L36MM CT-1 1/2 CIR J944H

## (undated) DEVICE — SOLUTION IRRIG 3000ML 0.9% SOD CHL FLX CONT 0797208] ICU MEDICAL INC]

## (undated) DEVICE — 1200 GUARD II KIT W/5MM TUBE W/O VAC TUBE: Brand: GUARDIAN

## (undated) DEVICE — BUTTON SWITCH PENCIL BLADE ELECTRODE, HOLSTER: Brand: EDGE

## (undated) DEVICE — NAIL IM L400MM DIA10MM 130DEG LNG R PROX FEM GRN TI CANN
Type: IMPLANTABLE DEVICE | Site: FEMUR | Status: NON-FUNCTIONAL
Removed: 2017-07-09

## (undated) DEVICE — CATHETERIZATION TRAY FOL 16 FR 5 CC INF CTRL LUBRI-SIL IC

## (undated) DEVICE — SUTURE NONABSORBABLE MONOFILAMENT 5-0 C-1 1X24 IN PROLENE 8725H

## (undated) DEVICE — DRESSING,GAUZE,XEROFORM,CURAD,1"X8",ST: Brand: CURAD

## (undated) DEVICE — (D)PREP SKN CHLRAPRP APPL 26ML -- CONVERT TO ITEM 371833

## (undated) DEVICE — BALLOON DILATATION CATHETER: Brand: HURRICANE™ RX

## (undated) DEVICE — SUTURE MCRYL SZ 2-0 L27IN ABSRB UD SH L26MM TAPERPOINT NDL Y417H

## (undated) DEVICE — SUTURE MCRYL SZ 0 L27IN ABSRB VLT CT-1 L36MM 1/2 CIR TAPR Y340H

## (undated) DEVICE — GUIDEWIRE VASC L260CM DIA0.035IN TAPR L11CM FLPY TIP L4CM

## (undated) DEVICE — SUTURE VCRL SZ 4-0 L27IN ABSRB UD L19MM PS-2 3/8 CIR PRIM J426H

## (undated) DEVICE — X-LARGE COTTON GLOVE: Brand: DEROYAL

## (undated) DEVICE — MEDI-VAC YANKAUER SUCTION HANDLE W/BULBOUS TIP: Brand: CARDINAL HEALTH

## (undated) DEVICE — BLADE SAW W10XL54MM FOR PRI REPEAT STRNOTMY

## (undated) DEVICE — KIT ARMOR C DRP COLLAPSIBLE AND SELF EXP TOP CVR FOR FLUOROSCOPIC